# Patient Record
Sex: FEMALE | Race: WHITE
[De-identification: names, ages, dates, MRNs, and addresses within clinical notes are randomized per-mention and may not be internally consistent; named-entity substitution may affect disease eponyms.]

---

## 2022-12-30 ENCOUNTER — TRANSCRIPTION ENCOUNTER (OUTPATIENT)
Age: 57
End: 2022-12-30

## 2022-12-30 ENCOUNTER — OUTPATIENT (OUTPATIENT)
Dept: OUTPATIENT SERVICES | Facility: HOSPITAL | Age: 57
LOS: 1 days | Discharge: HOME | End: 2022-12-30
Payer: MEDICAID

## 2022-12-30 VITALS
DIASTOLIC BLOOD PRESSURE: 55 MMHG | TEMPERATURE: 98 F | RESPIRATION RATE: 17 BRPM | OXYGEN SATURATION: 96 % | HEART RATE: 72 BPM | SYSTOLIC BLOOD PRESSURE: 121 MMHG

## 2022-12-30 VITALS
RESPIRATION RATE: 16 BRPM | TEMPERATURE: 98 F | SYSTOLIC BLOOD PRESSURE: 148 MMHG | HEART RATE: 77 BPM | DIASTOLIC BLOOD PRESSURE: 69 MMHG | OXYGEN SATURATION: 96 %

## 2022-12-30 LAB
BASE EXCESS BLDV CALC-SCNC: 4.7 MMOL/L — HIGH (ref -2–3)
CA-I SERPL-SCNC: 1.22 MMOL/L — SIGNIFICANT CHANGE UP (ref 1.15–1.33)
GAS PNL BLDV: 137 MMOL/L — SIGNIFICANT CHANGE UP (ref 136–145)
GAS PNL BLDV: SIGNIFICANT CHANGE UP
GLUCOSE BLDC GLUCOMTR-MCNC: 91 MG/DL — SIGNIFICANT CHANGE UP (ref 70–99)
HCO3 BLDV-SCNC: 31 MMOL/L — HIGH (ref 22–29)
HCT VFR BLDA CALC: 35 % — LOW (ref 39–51)
HGB BLD CALC-MCNC: 11.7 G/DL — LOW (ref 12.6–17.4)
LACTATE BLDV-MCNC: 2.2 MMOL/L — HIGH (ref 0.5–2)
PCO2 BLDV: 54 MMHG — HIGH (ref 39–42)
PH BLDV: 7.37 — SIGNIFICANT CHANGE UP (ref 7.32–7.43)
PO2 BLDV: 45 MMHG — SIGNIFICANT CHANGE UP
POTASSIUM BLDV-SCNC: 4.3 MMOL/L — SIGNIFICANT CHANGE UP (ref 3.5–5.1)
SAO2 % BLDV: 73.9 % — SIGNIFICANT CHANGE UP

## 2022-12-30 PROCEDURE — 36902 INTRO CATH DIALYSIS CIRCUIT: CPT

## 2022-12-30 PROCEDURE — 93010 ELECTROCARDIOGRAM REPORT: CPT

## 2022-12-30 RX ORDER — ALTEPLASE 100 MG
8 KIT INTRAVENOUS ONCE
Refills: 0 | Status: DISCONTINUED | OUTPATIENT
Start: 2022-12-30 | End: 2023-01-13

## 2022-12-30 NOTE — PROGRESS NOTE ADULT - SUBJECTIVE AND OBJECTIVE BOX
Interventional Radiology Outpatient Documentation    PREOPERATIVE DAY OF PROCEDURE EVALUATION:     I have personally seen and examined this patient. I agree with the history and physical which I have reviewed and noted any changes below:     Plan is for left upper extremity fistulogram with possible intervention (Signed electronically Amaya Freeman MD) 12-30-22 @ 07:00     Procedure/ risks/ benefits/ goals/ alternatives were explained. All questions answered. Informed content obtained from patient. Consent placed in chart.     POSTOPERATIVE PROCEDURAL EVALUATION:     Procedure: Left upper extremity fistulogram with balloon plasty    Pre-Op Diagnosis: Malfunctioning AVF    Post-Op Diagnosis: Same    Attending: Lydia  Resident: Ayse    Anesthesia (type):  [ ] General Anesthesia  [ x] Sedation provided by anesthesia  [ ] Spinal Anesthesia  [ x] Local/Regional    Contrast: None    Estimated Blood Loss: Minimal, < 5 cc    Condition:   [ ] Critical  [ ] Serious  [ ] Fair   [ x] Good    Findings/Follow up Plan of Care:  Left upper extremity fistulogram revealing side to side radiocephalic AVF with moderate stenosis at the arterial anastamosis and mild stenosis of the cephalic outflow. Balloon plasty was performed. 3000u heparin and 200 micrograms of nitro was administered. Patient tolerated procedure well.    See full report in pacs    Specimens Removed: None    Implants: None    Complications: None immediate    Disposition: Recovery, then home

## 2022-12-30 NOTE — CHART NOTE - NSCHARTNOTEFT_GEN_A_CORE
PACU ANESTHESIA ADMISSION NOTE      Procedure:   Post op diagnosis:      ____  Intubated  TV:______       Rate: ______      FiO2: ______    __x__  Patent Airway    __x__  Full return of protective reflexes    __x__  Full recovery from anesthesia / back to baseline status    Vitals:  T(C): 36.4 (12-30-22 @ 07:35), Max: 36.4 (12-30-22 @ 07:16)  HR: 77 (12-30-22 @ 07:35) (77 - 77)  BP: 148/69 (12-30-22 @ 07:35) (148/69 - 148/69)  RR: 16 (12-30-22 @ 07:35) (16 - 16)  SpO2: 96% (12-30-22 @ 07:35) (96% - 96%)    Mental Status:  __x__ Awake   ___x__ Alert   _____ Drowsy   _____ Sedated    Nausea/Vomiting:  __x__ NO  ______Yes,   See Post - Op Orders          Pain Scale (0-10):  ___0__    Treatment: ____ None    __x__ See Post - Op/PCA Orders    Post - Operative Fluids:   ____ Oral   __x__ See Post - Op Orders    Plan: Discharge:   ____Home       _____Floor     _____Critical Care    _____  Other:_________________    Comments: Patient had smooth intraoperative event, no anesthesia complication.  PACU Vital signs: HR:  79          BP:      100/55         RR:   18          O2 Sat:       98%     Temp 97

## 2022-12-30 NOTE — PRE-ANESTHESIA EVALUATION ADULT - NSANTHHOMEMEDSFT_GEN_ALL_CORE
Unknown (pharmacy in [facility])  ? on 2 blood thinners (d/c'd on Nov 8th)

## 2022-12-30 NOTE — PRE-ANESTHESIA EVALUATION ADULT - NSANTHOSAYNRD_GEN_A_CORE
No. IZABELA screening performed.  STOP BANG Legend: 0-2 = LOW Risk; 3-4 = INTERMEDIATE Risk; 5-8 = HIGH Risk

## 2022-12-30 NOTE — PRE-ANESTHESIA EVALUATION ADULT - NSANTHPMHFT_GEN_ALL_CORE
PMHx: DM, HTN, CKD, CHF, ?Heart valve problems, pancreatitis, h/o goiter, L adrenal mass, duplicate L kidney, frequent falls, ?VTE  PSHx: s/p  LUE fistulogram, s/p cataract sx, s/p repair of urethral stricture & kidney sx  Last HD session on Wednesday, 12/28/22 (on MWF schedule)  Denies SOB/CP, mild cough, no f/c, or n/v.  <4 METS

## 2023-02-05 PROBLEM — N18.9 CHRONIC KIDNEY DISEASE, UNSPECIFIED: Chronic | Status: ACTIVE | Noted: 2022-12-30

## 2023-02-06 NOTE — PRE-ANESTHESIA EVALUATION ADULT - WEIGHT IN LBS
----- Message from Katherine Richard MD sent at 2/6/2023 12:36 PM CST -----  Pls let patient know .  Wear stiff soled shoe  TECHNIQUE: Left foot frontal, lateral, oblique upright.     FINDINGS:   No evidence of acute or healing fracture. Mild degenerative changes of the  first metatarsophalangeal joint and Bone mineralization is normal.     Soft tissues are normal.        IMPRESSION:  1. No acute bony abnormality.  2. Mild degenerative changes of the first MTP joint.    Patient would like communication of their results via: Home Phone : [phone] (home). Okay to leave a message containing results?: Yes.    167.5

## 2023-04-04 ENCOUNTER — OUTPATIENT (OUTPATIENT)
Dept: OUTPATIENT SERVICES | Facility: HOSPITAL | Age: 58
LOS: 1 days | Discharge: HOME | End: 2023-04-04
Payer: MEDICAID

## 2023-04-04 ENCOUNTER — EMERGENCY (EMERGENCY)
Facility: HOSPITAL | Age: 58
LOS: 0 days | Discharge: HOME | End: 2023-04-04
Attending: STUDENT IN AN ORGANIZED HEALTH CARE EDUCATION/TRAINING PROGRAM
Payer: MEDICAID

## 2023-04-04 VITALS
TEMPERATURE: 98 F | RESPIRATION RATE: 20 BRPM | SYSTOLIC BLOOD PRESSURE: 163 MMHG | OXYGEN SATURATION: 98 % | DIASTOLIC BLOOD PRESSURE: 74 MMHG | HEART RATE: 80 BPM

## 2023-04-04 VITALS
DIASTOLIC BLOOD PRESSURE: 60 MMHG | RESPIRATION RATE: 18 BRPM | TEMPERATURE: 98 F | SYSTOLIC BLOOD PRESSURE: 130 MMHG | OXYGEN SATURATION: 98 % | HEART RATE: 88 BPM

## 2023-04-04 VITALS — HEART RATE: 85 BPM | RESPIRATION RATE: 18 BRPM | OXYGEN SATURATION: 100 %

## 2023-04-04 VITALS
HEART RATE: 85 BPM | DIASTOLIC BLOOD PRESSURE: 83 MMHG | RESPIRATION RATE: 18 BRPM | TEMPERATURE: 97 F | SYSTOLIC BLOOD PRESSURE: 187 MMHG | OXYGEN SATURATION: 98 % | WEIGHT: 163.14 LBS

## 2023-04-04 DIAGNOSIS — Z99.2 DEPENDENCE ON RENAL DIALYSIS: ICD-10-CM

## 2023-04-04 DIAGNOSIS — N18.9 CHRONIC KIDNEY DISEASE, UNSPECIFIED: ICD-10-CM

## 2023-04-04 DIAGNOSIS — Z00.00 ENCOUNTER FOR GENERAL ADULT MEDICAL EXAMINATION WITHOUT ABNORMAL FINDINGS: ICD-10-CM

## 2023-04-04 DIAGNOSIS — E11.65 TYPE 2 DIABETES MELLITUS WITH HYPERGLYCEMIA: ICD-10-CM

## 2023-04-04 DIAGNOSIS — T82.858A STENOSIS OF OTHER VASCULAR PROSTHETIC DEVICES, IMPLANTS AND GRAFTS, INITIAL ENCOUNTER: ICD-10-CM

## 2023-04-04 DIAGNOSIS — E11.22 TYPE 2 DIABETES MELLITUS WITH DIABETIC CHRONIC KIDNEY DISEASE: ICD-10-CM

## 2023-04-04 LAB
ALBUMIN SERPL ELPH-MCNC: 4.4 G/DL — SIGNIFICANT CHANGE UP (ref 3.5–5.2)
ALP SERPL-CCNC: 111 U/L — SIGNIFICANT CHANGE UP (ref 30–115)
ALT FLD-CCNC: 12 U/L — SIGNIFICANT CHANGE UP (ref 0–41)
ANION GAP SERPL CALC-SCNC: 19 MMOL/L — HIGH (ref 7–14)
ANION GAP SERPL CALC-SCNC: 24 MMOL/L — HIGH (ref 7–14)
AST SERPL-CCNC: 18 U/L — SIGNIFICANT CHANGE UP (ref 0–41)
B-OH-BUTYR SERPL-SCNC: 3.9 MMOL/L — HIGH
BASE EXCESS BLDV CALC-SCNC: -3.5 MMOL/L — LOW (ref -2–3)
BASE EXCESS BLDV CALC-SCNC: -3.9 MMOL/L — LOW (ref -2–3)
BASOPHILS # BLD AUTO: 0.03 K/UL — SIGNIFICANT CHANGE UP (ref 0–0.2)
BASOPHILS NFR BLD AUTO: 0.7 % — SIGNIFICANT CHANGE UP (ref 0–1)
BILIRUB SERPL-MCNC: 0.8 MG/DL — SIGNIFICANT CHANGE UP (ref 0.2–1.2)
BUN SERPL-MCNC: 50 MG/DL — HIGH (ref 10–20)
BUN SERPL-MCNC: 53 MG/DL — HIGH (ref 10–20)
CA-I SERPL-SCNC: 1.16 MMOL/L — SIGNIFICANT CHANGE UP (ref 1.15–1.33)
CA-I SERPL-SCNC: 1.17 MMOL/L — SIGNIFICANT CHANGE UP (ref 1.15–1.33)
CALCIUM SERPL-MCNC: 9.6 MG/DL — SIGNIFICANT CHANGE UP (ref 8.4–10.5)
CALCIUM SERPL-MCNC: 9.6 MG/DL — SIGNIFICANT CHANGE UP (ref 8.4–10.5)
CHLORIDE SERPL-SCNC: 85 MMOL/L — LOW (ref 98–110)
CHLORIDE SERPL-SCNC: 88 MMOL/L — LOW (ref 98–110)
CO2 SERPL-SCNC: 20 MMOL/L — SIGNIFICANT CHANGE UP (ref 17–32)
CO2 SERPL-SCNC: 24 MMOL/L — SIGNIFICANT CHANGE UP (ref 17–32)
CREAT SERPL-MCNC: 5.4 MG/DL — CRITICAL HIGH (ref 0.7–1.5)
CREAT SERPL-MCNC: 5.6 MG/DL — CRITICAL HIGH (ref 0.7–1.5)
EGFR: 8 ML/MIN/1.73M2 — LOW
EGFR: 9 ML/MIN/1.73M2 — LOW
EOSINOPHIL # BLD AUTO: 0.06 K/UL — SIGNIFICANT CHANGE UP (ref 0–0.7)
EOSINOPHIL NFR BLD AUTO: 1.5 % — SIGNIFICANT CHANGE UP (ref 0–8)
GAS PNL BLDV: 128 MMOL/L — LOW (ref 136–145)
GAS PNL BLDV: 129 MMOL/L — LOW (ref 136–145)
GAS PNL BLDV: SIGNIFICANT CHANGE UP
GLUCOSE SERPL-MCNC: 456 MG/DL — CRITICAL HIGH (ref 70–99)
GLUCOSE SERPL-MCNC: 583 MG/DL — CRITICAL HIGH (ref 70–99)
HCG SERPL QL: NEGATIVE — SIGNIFICANT CHANGE UP
HCO3 BLDV-SCNC: 22 MMOL/L — SIGNIFICANT CHANGE UP (ref 22–29)
HCO3 BLDV-SCNC: 23 MMOL/L — SIGNIFICANT CHANGE UP (ref 22–29)
HCT VFR BLD CALC: 38.2 % — SIGNIFICANT CHANGE UP (ref 37–47)
HCT VFR BLDA CALC: 36 % — LOW (ref 39–51)
HCT VFR BLDA CALC: 38 % — LOW (ref 39–51)
HGB BLD CALC-MCNC: 12.1 G/DL — LOW (ref 12.6–17.4)
HGB BLD CALC-MCNC: 12.7 G/DL — SIGNIFICANT CHANGE UP (ref 12.6–17.4)
HGB BLD-MCNC: 12.5 G/DL — SIGNIFICANT CHANGE UP (ref 12–16)
IMM GRANULOCYTES NFR BLD AUTO: 0.2 % — SIGNIFICANT CHANGE UP (ref 0.1–0.3)
LACTATE BLDV-MCNC: 1.9 MMOL/L — SIGNIFICANT CHANGE UP (ref 0.5–2)
LACTATE BLDV-MCNC: 2.2 MMOL/L — HIGH (ref 0.5–2)
LYMPHOCYTES # BLD AUTO: 1.17 K/UL — LOW (ref 1.2–3.4)
LYMPHOCYTES # BLD AUTO: 29.1 % — SIGNIFICANT CHANGE UP (ref 20.5–51.1)
MAGNESIUM SERPL-MCNC: 2.8 MG/DL — HIGH (ref 1.8–2.4)
MCHC RBC-ENTMCNC: 32.2 PG — HIGH (ref 27–31)
MCHC RBC-ENTMCNC: 32.7 G/DL — SIGNIFICANT CHANGE UP (ref 32–37)
MCV RBC AUTO: 98.5 FL — SIGNIFICANT CHANGE UP (ref 81–99)
MONOCYTES # BLD AUTO: 0.25 K/UL — SIGNIFICANT CHANGE UP (ref 0.1–0.6)
MONOCYTES NFR BLD AUTO: 6.2 % — SIGNIFICANT CHANGE UP (ref 1.7–9.3)
NEUTROPHILS # BLD AUTO: 2.5 K/UL — SIGNIFICANT CHANGE UP (ref 1.4–6.5)
NEUTROPHILS NFR BLD AUTO: 62.3 % — SIGNIFICANT CHANGE UP (ref 42.2–75.2)
NRBC # BLD: 0 /100 WBCS — SIGNIFICANT CHANGE UP (ref 0–0)
PCO2 BLDV: 40 MMHG — SIGNIFICANT CHANGE UP (ref 39–42)
PCO2 BLDV: 47 MMHG — HIGH (ref 39–42)
PH BLDV: 7.3 — LOW (ref 7.32–7.43)
PH BLDV: 7.34 — SIGNIFICANT CHANGE UP (ref 7.32–7.43)
PHOSPHATE SERPL-MCNC: 7 MG/DL — HIGH (ref 2.1–4.9)
PLATELET # BLD AUTO: 238 K/UL — SIGNIFICANT CHANGE UP (ref 130–400)
PO2 BLDV: 42 MMHG — SIGNIFICANT CHANGE UP
PO2 BLDV: 50 MMHG — SIGNIFICANT CHANGE UP
POTASSIUM BLDV-SCNC: 5.6 MMOL/L — HIGH (ref 3.5–5.1)
POTASSIUM BLDV-SCNC: 6 MMOL/L — HIGH (ref 3.5–5.1)
POTASSIUM SERPL-MCNC: 5.6 MMOL/L — HIGH (ref 3.5–5)
POTASSIUM SERPL-MCNC: 5.9 MMOL/L — HIGH (ref 3.5–5)
POTASSIUM SERPL-SCNC: 5.6 MMOL/L — HIGH (ref 3.5–5)
POTASSIUM SERPL-SCNC: 5.9 MMOL/L — HIGH (ref 3.5–5)
PROT SERPL-MCNC: 7.2 G/DL — SIGNIFICANT CHANGE UP (ref 6–8)
RBC # BLD: 3.88 M/UL — LOW (ref 4.2–5.4)
RBC # FLD: 12.6 % — SIGNIFICANT CHANGE UP (ref 11.5–14.5)
SAO2 % BLDV: 65.2 % — SIGNIFICANT CHANGE UP
SAO2 % BLDV: 77.9 % — SIGNIFICANT CHANGE UP
SODIUM SERPL-SCNC: 129 MMOL/L — LOW (ref 135–146)
SODIUM SERPL-SCNC: 131 MMOL/L — LOW (ref 135–146)
WBC # BLD: 4.02 K/UL — LOW (ref 4.8–10.8)
WBC # FLD AUTO: 4.02 K/UL — LOW (ref 4.8–10.8)

## 2023-04-04 PROCEDURE — 99285 EMERGENCY DEPT VISIT HI MDM: CPT

## 2023-04-04 PROCEDURE — 84100 ASSAY OF PHOSPHORUS: CPT

## 2023-04-04 PROCEDURE — 36415 COLL VENOUS BLD VENIPUNCTURE: CPT

## 2023-04-04 PROCEDURE — 85018 HEMOGLOBIN: CPT

## 2023-04-04 PROCEDURE — 84132 ASSAY OF SERUM POTASSIUM: CPT

## 2023-04-04 PROCEDURE — 82330 ASSAY OF CALCIUM: CPT

## 2023-04-04 PROCEDURE — 85014 HEMATOCRIT: CPT

## 2023-04-04 PROCEDURE — 84295 ASSAY OF SERUM SODIUM: CPT

## 2023-04-04 PROCEDURE — 85025 COMPLETE CBC W/AUTO DIFF WBC: CPT

## 2023-04-04 PROCEDURE — 83605 ASSAY OF LACTIC ACID: CPT

## 2023-04-04 PROCEDURE — 82803 BLOOD GASES ANY COMBINATION: CPT

## 2023-04-04 PROCEDURE — 99285 EMERGENCY DEPT VISIT HI MDM: CPT | Mod: 25

## 2023-04-04 PROCEDURE — 93005 ELECTROCARDIOGRAM TRACING: CPT

## 2023-04-04 PROCEDURE — 93010 ELECTROCARDIOGRAM REPORT: CPT

## 2023-04-04 PROCEDURE — 82010 KETONE BODYS QUAN: CPT

## 2023-04-04 PROCEDURE — 71045 X-RAY EXAM CHEST 1 VIEW: CPT | Mod: 26

## 2023-04-04 PROCEDURE — 82962 GLUCOSE BLOOD TEST: CPT

## 2023-04-04 PROCEDURE — 80053 COMPREHEN METABOLIC PANEL: CPT

## 2023-04-04 PROCEDURE — 83735 ASSAY OF MAGNESIUM: CPT

## 2023-04-04 PROCEDURE — 84703 CHORIONIC GONADOTROPIN ASSAY: CPT

## 2023-04-04 PROCEDURE — 71045 X-RAY EXAM CHEST 1 VIEW: CPT

## 2023-04-04 PROCEDURE — 80048 BASIC METABOLIC PNL TOTAL CA: CPT

## 2023-04-04 RX ORDER — INSULIN LISPRO 100/ML
10 VIAL (ML) SUBCUTANEOUS ONCE
Refills: 0 | Status: DISCONTINUED | OUTPATIENT
Start: 2023-04-04 | End: 2023-04-04

## 2023-04-04 RX ORDER — INSULIN HUMAN 100 [IU]/ML
10 INJECTION, SOLUTION SUBCUTANEOUS ONCE
Refills: 0 | Status: COMPLETED | OUTPATIENT
Start: 2023-04-04 | End: 2023-04-04

## 2023-04-04 RX ORDER — SODIUM CHLORIDE 9 MG/ML
1000 INJECTION, SOLUTION INTRAVENOUS ONCE
Refills: 0 | Status: COMPLETED | OUTPATIENT
Start: 2023-04-04 | End: 2023-04-04

## 2023-04-04 RX ORDER — INSULIN HUMAN 100 [IU]/ML
10 INJECTION, SOLUTION SUBCUTANEOUS ONCE
Refills: 0 | Status: DISCONTINUED | OUTPATIENT
Start: 2023-04-04 | End: 2023-04-05

## 2023-04-04 RX ADMIN — SODIUM CHLORIDE 1000 MILLILITER(S): 9 INJECTION, SOLUTION INTRAVENOUS at 11:31

## 2023-04-04 RX ADMIN — INSULIN HUMAN 10 UNIT(S): 100 INJECTION, SOLUTION SUBCUTANEOUS at 14:08

## 2023-04-04 NOTE — ED PROVIDER NOTE - OBJECTIVE STATEMENT
58 yo female, PMHx of DM, CKD, HD M/W/F last was yesterday, present with hyperglycemia. Patient was at IR today for fistula repair but was sent to ED with hyperglycemia. She states she has not eaten last night and thus did not take her long acting insulin. Denies fevers, chills, chest pain, shortness of breath, nausea, vomiting, headache, dizziness, abdominal pain.

## 2023-04-04 NOTE — ED PROVIDER NOTE - CLINICAL SUMMARY MEDICAL DECISION MAKING FREE TEXT BOX
61-year-old female with history of insulin-dependent diabetes, CKD on hemodialysis M-W-F, who was sent in from interventional radiology for hyperglycemia.  P.o. after midnight for fistulogram, therefore did not take her long-acting insulin last night.  She is found to have elevated glucose levels prior to procedure and was sent to the ER to rule out DKA.  Patient is asymptomatic on assessment.  Denies nausea, vomiting, fevers, chills, urinary symptoms, chest pain, shortness of breath.  Vitals noted, triage note reviewed, and agree with exam as documented above.  Patient not in DKA.  pH 7.34, elevated beta hydroxybutyrate due to uncontrolled hyperglycemia.  Bicarbonate 22.  Patient was given fluids, subcutaneous insulin with interval improvement in glucose levels.  Well-appearing on reassessment.  Discussed with interventional radiology.  Plan for n.p.o. after midnight, to take half her dose of Lantus tonight, and reports Dr. Trujillo's office at 6:30 AM.  Patient updated at the bedside, all questions answered at the bedside.  Return precautions given.

## 2023-04-04 NOTE — ED PROVIDER NOTE - PATIENT PORTAL LINK FT
You can access the FollowMyHealth Patient Portal offered by [facility] by registering at the following website: [email or web address]. By joining [facility]’s FollowMyHealth portal, you will also be able to view your health information using other applications (apps) compatible with our system.

## 2023-04-04 NOTE — ASU PREOP CHECKLIST - HAND OFF
· New Drainge from old L hip wound, site dressed by nursing  · Increased swelling and redness around site  · Patient is Unable to tell me if increasing pain secondary to paraplegia  ·  afebrile   ·  Infectious Disease and Plastic surgery contacted and updated, awaiting phone call back for further recommendations  · Increased vital signs to q 4  · Will check CBC, BMP, CRP, and ESR in the morning  · Dr Bryant Precise aware and agreeable to plan of care  · Will reassess patient in the morning
Unit RN to OR RN

## 2023-04-04 NOTE — ASU PATIENT PROFILE, ADULT - FALL HARM RISK - RISK INTERVENTIONS

## 2023-04-04 NOTE — ED ADULT NURSE NOTE - OBJECTIVE STATEMENT
Sent from IR for hyperglycemia prior to AV Fistulagram,  in triage  hx of cdk dm2 axox4  left arm fistula

## 2023-04-04 NOTE — ED PROVIDER NOTE - PROGRESS NOTE DETAILS
CP: Patient's BGL decreasing with insulin and IVF. Discussed with IR. Patient to see Dr. Trujillo tomorrow morning 6:30 am, NPO after midnight, and 1/2 dose Lantus tonight. Discussed results with patient with  [id number] and plan. Patient verbalized understanding and is agreeable to plan.

## 2023-04-04 NOTE — ED PROVIDER NOTE - PHYSICAL EXAMINATION
CONSTITUTIONAL: Well-developed; well-nourished; in no acute distress.   SKIN: warm, dry  HEAD: Normocephalic  NECK: Supple; non tender.  CARD: S1, S2 normal; Regular rate and rhythm.   RESP: No wheezes, rales or rhonchi.  ABD: soft ntnd  EXT: Normal ROM.  No clubbing, cyanosis or edema. fistula LUE  NEURO: Alert, oriented, grossly unremarkable  PSYCH: Cooperative, appropriate.

## 2023-04-04 NOTE — ED PROVIDER NOTE - NSFOLLOWUPINSTRUCTIONS_ED_ALL_ED_FT
Hyperglycemia    Hyperglycemia occurs when the glucose (a sugar) level in your blood is too high. Symptoms include increased urination, increased thirst, a dry mouth, or changes in appetite. If started on a medication, take exactly as prescribed by your health care professional. Maintain a healthy lifestyle and follow up with your primary care physician.    SEEK IMMEDIATE MEDICAL CARE IF YOU HAVE THE FOLLOWING SYMPTOMS: shortness of breath, change in mental status, nausea or vomiting, fruity smell to your breath, or any signs of dehydration. Please follow up with Dr. Trujillo tomorrow morning 6:30 am and take 1/2 dose Lantus tonight, nothing by mouth after midnight.    Hyperglycemia    Hyperglycemia occurs when the glucose (a sugar) level in your blood is too high. Symptoms include increased urination, increased thirst, a dry mouth, or changes in appetite. If started on a medication, take exactly as prescribed by your health care professional. Maintain a healthy lifestyle and follow up with your primary care physician.    SEEK IMMEDIATE MEDICAL CARE IF YOU HAVE THE FOLLOWING SYMPTOMS: shortness of breath, change in mental status, nausea or vomiting, fruity smell to your breath, or any signs of dehydration.

## 2023-04-04 NOTE — PRE-ANESTHESIA EVALUATION ADULT - NSANTHADDINFOFT_GEN_ALL_CORE
risks, benefits, alternatives, general anesthesia as a backup discussed with the patient and she agrees to proceed as planned risks, benefits, alternatives, general anesthesia as a backup discussed with the patient and she agrees to proceed as planned  Patient fingerstick elevated >500, insulin administered and repeated, fingerstick 542, BMP drawn, Dr. Trujillo aware, if it remains elevated will cancel and send patient to ER for further workup.

## 2023-04-04 NOTE — PRE-ANESTHESIA EVALUATION ADULT - NSANTHOSAYNRD_GEN_A_CORE
denies/No. IZABELA screening performed.  STOP BANG Legend: 0-2 = LOW Risk; 3-4 = INTERMEDIATE Risk; 5-8 = HIGH Risk

## 2023-04-05 ENCOUNTER — OUTPATIENT (OUTPATIENT)
Dept: OUTPATIENT SERVICES | Facility: HOSPITAL | Age: 58
LOS: 1 days | Discharge: HOME | End: 2023-04-05
Payer: MEDICAID

## 2023-04-05 ENCOUNTER — TRANSCRIPTION ENCOUNTER (OUTPATIENT)
Age: 58
End: 2023-04-05

## 2023-04-05 ENCOUNTER — RESULT REVIEW (OUTPATIENT)
Age: 58
End: 2023-04-05

## 2023-04-05 VITALS
SYSTOLIC BLOOD PRESSURE: 180 MMHG | OXYGEN SATURATION: 97 % | RESPIRATION RATE: 18 BRPM | HEART RATE: 80 BPM | DIASTOLIC BLOOD PRESSURE: 79 MMHG

## 2023-04-05 VITALS
SYSTOLIC BLOOD PRESSURE: 193 MMHG | RESPIRATION RATE: 18 BRPM | HEIGHT: 67 IN | WEIGHT: 163.36 LBS | DIASTOLIC BLOOD PRESSURE: 81 MMHG | OXYGEN SATURATION: 99 % | TEMPERATURE: 97 F | HEART RATE: 80 BPM

## 2023-04-05 DIAGNOSIS — T82.858D STENOSIS OF OTHER VASCULAR PROSTHETIC DEVICES, IMPLANTS AND GRAFTS, SUBSEQUENT ENCOUNTER: ICD-10-CM

## 2023-04-05 DIAGNOSIS — T82.858A STENOSIS OF OTHER VASCULAR PROSTHETIC DEVICES, IMPLANTS AND GRAFTS, INITIAL ENCOUNTER: ICD-10-CM
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
## 2023-04-05 LAB — GLUCOSE BLDC GLUCOMTR-MCNC: 298 MG/DL — HIGH (ref 70–99)

## 2023-04-05 PROCEDURE — C1769: CPT

## 2023-04-05 PROCEDURE — 82962 GLUCOSE BLOOD TEST: CPT

## 2023-04-05 PROCEDURE — 36902 INTRO CATH DIALYSIS CIRCUIT: CPT

## 2023-04-05 PROCEDURE — C1725: CPT

## 2023-04-05 PROCEDURE — C1894: CPT

## 2023-04-05 NOTE — PROGRESS NOTE ADULT - SUBJECTIVE AND OBJECTIVE BOX
Interventional Radiology Outpatient Documentation    PREOPERATIVE DAY OF PROCEDURE EVALUATION:     I have personally seen and examined this patient. I agree with the history and physical which I have reviewed and noted any changes below:     Plan is for CT-guided biopsy of right upper lobe lung mass, possible right chest tube placement with sedation.      Procedure/ risks/ benefits/ goals/ alternatives were explained (including but not limited to pneumothorax and hemoptysis). All questions answered. Informed content obtained from patient. Consent placed in chart.

## 2023-04-05 NOTE — PROGRESS NOTE ADULT - SUBJECTIVE AND OBJECTIVE BOX
Interventional Radiology Outpatient Documentation    PREOPERATIVE DAY OF PROCEDURE EVALUATION:     I have personally seen and examined this patient. I agree with the history and physical which I have reviewed and noted any changes below:     Plan is for left upper extremity fistulogram with balloon plasty  (Signed electronically Amaya Freeman MD) 04-05-23 @ 1100    Procedure/ risks/ benefits/ goals/ alternatives were explained. All questions answered. Informed content obtained from patient. Consent placed in chart.     POSTOPERATIVE PROCEDURAL EVALUATION:     Procedure: LUE AVF with balloon plasty    Pre-Op Diagnosis: ESRD with HD    Post-Op Diagnosis: Same    Attending: Lydia  Resident: Ayse    Anesthesia (type):  [ ] General Anesthesia  [ x] Sedation provided by anesthesa  [ ] Spinal Anesthesia  [ x] Local/Regional    Contrast: 15 cc    Estimated Blood Loss: Minimal, < 5 cc    Condition:   [ ] Critical  [ ] Serious  [ ] Fair   [x ] Good    Findings/Follow up Plan of Care: Left radial access with left upper extremity fistulogram demonstrating stenosis just distal to the cephalic arch. A 7 mm x 8 cm balloon was inflated for 2 min. Post angioplasty revealed decreased stenosis.     See full report in pacs    Specimens Removed: None    Implants: None    Complications: None    Disposition: Recovery, then home

## 2023-04-05 NOTE — PROGRESS NOTE ADULT - SUBJECTIVE AND OBJECTIVE BOX
INTERVENTIONAL RADIOLOGY BRIEF-OPERATIVE NOTE    Procedure:  CT-guided biopsy of right upper lobe lung mass    Pre-Op Diagnosis: right upper lobe lung mass, history of smoking    Post-Op Diagnosis: same    Attending: Norbreto  Resident: None    Anesthesia (type):  [ ] General Anesthesia  [x ] Sedation  [ ] Spinal Anesthesia  [x ] Local/Regional    Contrast: None    Estimated Blood Loss:  Minimal    Condition:   [ ] Critical  [ ] Serious  [x ] Fair   [ ] Good    Findings/Follow up Plan of Care:  Successful CT-guided biopsy of right upper lobe lung mass.  Cores reviewed on site by pathology and deemed adequate for diagnosis.  Trace pneumo and moderate perilesional hemorrhage seen on post imaging CT. Will follow with subsequent radiographs.      Implants: [employer] plug at the pleural surface    Disposition: Radiology recovery area.      Please call Interventional Radiology [phone] with any questions, concerns, or issues.

## 2023-04-05 NOTE — ASU DISCHARGE PLAN (ADULT/PEDIATRIC) - NS MD DC FALL RISK RISK
For information on Fall & Injury Prevention, visit: [email or web address] OR  [email or web address] OR  https://www.cdc.gov/steadi/patient.html

## 2023-05-01 ENCOUNTER — INPATIENT (INPATIENT)
Facility: HOSPITAL | Age: 58
LOS: 7 days | Discharge: HOME | DRG: 420 | End: 2023-05-09
Attending: INTERNAL MEDICINE | Admitting: INTERNAL MEDICINE
Payer: MEDICAID

## 2023-05-01 VITALS
DIASTOLIC BLOOD PRESSURE: 79 MMHG | SYSTOLIC BLOOD PRESSURE: 183 MMHG | RESPIRATION RATE: 16 BRPM | HEART RATE: 73 BPM | OXYGEN SATURATION: 87 %

## 2023-05-01 DIAGNOSIS — R56.9 UNSPECIFIED CONVULSIONS: ICD-10-CM

## 2023-05-01 LAB
A1C WITH ESTIMATED AVERAGE GLUCOSE RESULT: 9.3 % — HIGH (ref 4–5.6)
ALBUMIN SERPL ELPH-MCNC: 4.1 G/DL — SIGNIFICANT CHANGE UP (ref 3.5–5.2)
ALBUMIN SERPL ELPH-MCNC: 4.6 G/DL — SIGNIFICANT CHANGE UP (ref 3.5–5.2)
ALP SERPL-CCNC: 127 U/L — HIGH (ref 30–115)
ALP SERPL-CCNC: 152 U/L — HIGH (ref 30–115)
ALT FLD-CCNC: 11 U/L — SIGNIFICANT CHANGE UP (ref 0–41)
ALT FLD-CCNC: 13 U/L — SIGNIFICANT CHANGE UP (ref 0–41)
ANION GAP SERPL CALC-SCNC: 17 MMOL/L — HIGH (ref 7–14)
ANION GAP SERPL CALC-SCNC: 26 MMOL/L — HIGH (ref 7–14)
APPEARANCE UR: CLEAR — SIGNIFICANT CHANGE UP
APTT BLD: 38.1 SEC — SIGNIFICANT CHANGE UP (ref 27–39.2)
AST SERPL-CCNC: 17 U/L — SIGNIFICANT CHANGE UP (ref 0–41)
AST SERPL-CCNC: 25 U/L — SIGNIFICANT CHANGE UP (ref 0–41)
BACTERIA # UR AUTO: ABNORMAL
BASE EXCESS BLDV CALC-SCNC: -1.3 MMOL/L — SIGNIFICANT CHANGE UP (ref -2–3)
BASOPHILS # BLD AUTO: 0.02 K/UL — SIGNIFICANT CHANGE UP (ref 0–0.2)
BASOPHILS # BLD AUTO: 0.03 K/UL — SIGNIFICANT CHANGE UP (ref 0–0.2)
BASOPHILS NFR BLD AUTO: 0.3 % — SIGNIFICANT CHANGE UP (ref 0–1)
BASOPHILS NFR BLD AUTO: 0.7 % — SIGNIFICANT CHANGE UP (ref 0–1)
BILIRUB SERPL-MCNC: 0.5 MG/DL — SIGNIFICANT CHANGE UP (ref 0.2–1.2)
BILIRUB SERPL-MCNC: 0.6 MG/DL — SIGNIFICANT CHANGE UP (ref 0.2–1.2)
BILIRUB UR-MCNC: NEGATIVE — SIGNIFICANT CHANGE UP
BUN SERPL-MCNC: 76 MG/DL — CRITICAL HIGH (ref 10–20)
BUN SERPL-MCNC: 81 MG/DL — CRITICAL HIGH (ref 10–20)
CA-I SERPL-SCNC: 1.18 MMOL/L — SIGNIFICANT CHANGE UP (ref 1.15–1.33)
CALCIUM SERPL-MCNC: 8.9 MG/DL — SIGNIFICANT CHANGE UP (ref 8.4–10.5)
CALCIUM SERPL-MCNC: 9.2 MG/DL — SIGNIFICANT CHANGE UP (ref 8.4–10.5)
CHLORIDE SERPL-SCNC: 94 MMOL/L — LOW (ref 98–110)
CHLORIDE SERPL-SCNC: 97 MMOL/L — LOW (ref 98–110)
CHOLEST SERPL-MCNC: 226 MG/DL — HIGH
CK SERPL-CCNC: 170 U/L — SIGNIFICANT CHANGE UP (ref 0–225)
CO2 SERPL-SCNC: 17 MMOL/L — SIGNIFICANT CHANGE UP (ref 17–32)
CO2 SERPL-SCNC: 23 MMOL/L — SIGNIFICANT CHANGE UP (ref 17–32)
COLOR SPEC: SIGNIFICANT CHANGE UP
CREAT SERPL-MCNC: 6.1 MG/DL — CRITICAL HIGH (ref 0.7–1.5)
CREAT SERPL-MCNC: 6.2 MG/DL — CRITICAL HIGH (ref 0.7–1.5)
CRP SERPL-MCNC: 3.4 MG/L — SIGNIFICANT CHANGE UP
DIFF PNL FLD: ABNORMAL
EGFR: 7 ML/MIN/1.73M2 — LOW
EGFR: 8 ML/MIN/1.73M2 — LOW
EOSINOPHIL # BLD AUTO: 0.01 K/UL — SIGNIFICANT CHANGE UP (ref 0–0.7)
EOSINOPHIL # BLD AUTO: 0.16 K/UL — SIGNIFICANT CHANGE UP (ref 0–0.7)
EOSINOPHIL NFR BLD AUTO: 0.1 % — SIGNIFICANT CHANGE UP (ref 0–8)
EOSINOPHIL NFR BLD AUTO: 3.9 % — SIGNIFICANT CHANGE UP (ref 0–8)
EPI CELLS # UR: 2 /HPF — SIGNIFICANT CHANGE UP (ref 0–5)
ERYTHROCYTE [SEDIMENTATION RATE] IN BLOOD: 23 MM/HR — HIGH (ref 0–20)
ESTIMATED AVERAGE GLUCOSE: 220 MG/DL — HIGH (ref 68–114)
FLUAV AG NPH QL: SIGNIFICANT CHANGE UP
FLUBV AG NPH QL: SIGNIFICANT CHANGE UP
GAS PNL BLDV: 134 MMOL/L — LOW (ref 136–145)
GAS PNL BLDV: SIGNIFICANT CHANGE UP
GAS PNL BLDV: SIGNIFICANT CHANGE UP
GLUCOSE BLDC GLUCOMTR-MCNC: 103 MG/DL — HIGH (ref 70–99)
GLUCOSE BLDC GLUCOMTR-MCNC: 269 MG/DL — HIGH (ref 70–99)
GLUCOSE BLDC GLUCOMTR-MCNC: 292 MG/DL — HIGH (ref 70–99)
GLUCOSE SERPL-MCNC: 331 MG/DL — HIGH (ref 70–99)
GLUCOSE SERPL-MCNC: 35 MG/DL — CRITICAL LOW (ref 70–99)
GLUCOSE UR QL: ABNORMAL
HCG SERPL QL: NEGATIVE — SIGNIFICANT CHANGE UP
HCO3 BLDV-SCNC: 26 MMOL/L — SIGNIFICANT CHANGE UP (ref 22–29)
HCT VFR BLD CALC: 35.9 % — LOW (ref 37–47)
HCT VFR BLD CALC: 40.8 % — SIGNIFICANT CHANGE UP (ref 37–47)
HCT VFR BLDA CALC: 32 % — LOW (ref 39–51)
HDLC SERPL-MCNC: 62 MG/DL — SIGNIFICANT CHANGE UP
HGB BLD CALC-MCNC: 10.8 G/DL — LOW (ref 12.6–17.4)
HGB BLD-MCNC: 12.1 G/DL — SIGNIFICANT CHANGE UP (ref 12–16)
HGB BLD-MCNC: 13.3 G/DL — SIGNIFICANT CHANGE UP (ref 12–16)
HYALINE CASTS # UR AUTO: 2 /LPF — SIGNIFICANT CHANGE UP (ref 0–7)
IMM GRANULOCYTES NFR BLD AUTO: 0 % — LOW (ref 0.1–0.3)
IMM GRANULOCYTES NFR BLD AUTO: 0.1 % — SIGNIFICANT CHANGE UP (ref 0.1–0.3)
INR BLD: 0.85 RATIO — SIGNIFICANT CHANGE UP (ref 0.65–1.3)
KETONES UR-MCNC: NEGATIVE — SIGNIFICANT CHANGE UP
LACTATE BLDV-MCNC: 1.3 MMOL/L — SIGNIFICANT CHANGE UP (ref 0.5–2)
LACTATE SERPL-SCNC: 1.2 MMOL/L — SIGNIFICANT CHANGE UP (ref 0.7–2)
LACTATE SERPL-SCNC: 1.3 MMOL/L — SIGNIFICANT CHANGE UP (ref 0.7–2)
LACTATE SERPL-SCNC: 6.4 MMOL/L — CRITICAL HIGH (ref 0.7–2)
LEUKOCYTE ESTERASE UR-ACNC: ABNORMAL
LIPID PNL WITH DIRECT LDL SERPL: 145 MG/DL — HIGH
LYMPHOCYTES # BLD AUTO: 0.91 K/UL — LOW (ref 1.2–3.4)
LYMPHOCYTES # BLD AUTO: 12.8 % — LOW (ref 20.5–51.1)
LYMPHOCYTES # BLD AUTO: 2.62 K/UL — SIGNIFICANT CHANGE UP (ref 1.2–3.4)
LYMPHOCYTES # BLD AUTO: 63.7 % — HIGH (ref 20.5–51.1)
MAGNESIUM SERPL-MCNC: 3.5 MG/DL — CRITICAL HIGH (ref 1.8–2.4)
MAGNESIUM SERPL-MCNC: 3.5 MG/DL — CRITICAL HIGH (ref 1.8–2.4)
MCHC RBC-ENTMCNC: 31.7 PG — HIGH (ref 27–31)
MCHC RBC-ENTMCNC: 32.5 PG — HIGH (ref 27–31)
MCHC RBC-ENTMCNC: 32.6 G/DL — SIGNIFICANT CHANGE UP (ref 32–37)
MCHC RBC-ENTMCNC: 33.7 G/DL — SIGNIFICANT CHANGE UP (ref 32–37)
MCV RBC AUTO: 96.5 FL — SIGNIFICANT CHANGE UP (ref 81–99)
MCV RBC AUTO: 97.1 FL — SIGNIFICANT CHANGE UP (ref 81–99)
MONOCYTES # BLD AUTO: 0.35 K/UL — SIGNIFICANT CHANGE UP (ref 0.1–0.6)
MONOCYTES # BLD AUTO: 0.37 K/UL — SIGNIFICANT CHANGE UP (ref 0.1–0.6)
MONOCYTES NFR BLD AUTO: 5.2 % — SIGNIFICANT CHANGE UP (ref 1.7–9.3)
MONOCYTES NFR BLD AUTO: 8.5 % — SIGNIFICANT CHANGE UP (ref 1.7–9.3)
NEUTROPHILS # BLD AUTO: 0.95 K/UL — LOW (ref 1.4–6.5)
NEUTROPHILS # BLD AUTO: 5.79 K/UL — SIGNIFICANT CHANGE UP (ref 1.4–6.5)
NEUTROPHILS NFR BLD AUTO: 23.2 % — LOW (ref 42.2–75.2)
NEUTROPHILS NFR BLD AUTO: 81.5 % — HIGH (ref 42.2–75.2)
NITRITE UR-MCNC: NEGATIVE — SIGNIFICANT CHANGE UP
NON HDL CHOLESTEROL: 164 MG/DL — HIGH
NRBC # BLD: 0 /100 WBCS — SIGNIFICANT CHANGE UP (ref 0–0)
NRBC # BLD: 0 /100 WBCS — SIGNIFICANT CHANGE UP (ref 0–0)
NT-PROBNP SERPL-SCNC: 1600 PG/ML — HIGH (ref 0–300)
PCO2 BLDV: 52 MMHG — HIGH (ref 39–42)
PH BLDV: 7.3 — LOW (ref 7.32–7.43)
PH UR: 6.5 — SIGNIFICANT CHANGE UP (ref 5–8)
PLATELET # BLD AUTO: 214 K/UL — SIGNIFICANT CHANGE UP (ref 130–400)
PLATELET # BLD AUTO: 238 K/UL — SIGNIFICANT CHANGE UP (ref 130–400)
PMV BLD: 10.9 FL — HIGH (ref 7.4–10.4)
PMV BLD: 11 FL — HIGH (ref 7.4–10.4)
PO2 BLDV: 59 MMHG — SIGNIFICANT CHANGE UP
POTASSIUM BLDV-SCNC: 5.2 MMOL/L — HIGH (ref 3.5–5.1)
POTASSIUM SERPL-MCNC: 5.3 MMOL/L — HIGH (ref 3.5–5)
POTASSIUM SERPL-MCNC: 6.2 MMOL/L — CRITICAL HIGH (ref 3.5–5)
POTASSIUM SERPL-SCNC: 5.3 MMOL/L — HIGH (ref 3.5–5)
POTASSIUM SERPL-SCNC: 6.2 MMOL/L — CRITICAL HIGH (ref 3.5–5)
PROCALCITONIN SERPL-MCNC: 0.15 NG/ML — HIGH (ref 0.02–0.1)
PROT SERPL-MCNC: 6.7 G/DL — SIGNIFICANT CHANGE UP (ref 6–8)
PROT SERPL-MCNC: 7.5 G/DL — SIGNIFICANT CHANGE UP (ref 6–8)
PROT UR-MCNC: ABNORMAL
PROTHROM AB SERPL-ACNC: 9.6 SEC — LOW (ref 9.95–12.87)
RBC # BLD: 3.72 M/UL — LOW (ref 4.2–5.4)
RBC # BLD: 4.2 M/UL — SIGNIFICANT CHANGE UP (ref 4.2–5.4)
RBC # FLD: 12.4 % — SIGNIFICANT CHANGE UP (ref 11.5–14.5)
RBC # FLD: 12.4 % — SIGNIFICANT CHANGE UP (ref 11.5–14.5)
RBC CASTS # UR COMP ASSIST: 4 /HPF — SIGNIFICANT CHANGE UP (ref 0–4)
RSV RNA NPH QL NAA+NON-PROBE: SIGNIFICANT CHANGE UP
SAO2 % BLDV: 84.1 % — SIGNIFICANT CHANGE UP
SARS-COV-2 RNA SPEC QL NAA+PROBE: SIGNIFICANT CHANGE UP
SODIUM SERPL-SCNC: 134 MMOL/L — LOW (ref 135–146)
SODIUM SERPL-SCNC: 140 MMOL/L — SIGNIFICANT CHANGE UP (ref 135–146)
SP GR SPEC: 1.02 — SIGNIFICANT CHANGE UP (ref 1.01–1.03)
T3 SERPL-MCNC: 85 NG/DL — SIGNIFICANT CHANGE UP (ref 80–200)
T4 AB SER-ACNC: 6.3 UG/DL — SIGNIFICANT CHANGE UP (ref 4.6–12)
TRIGL SERPL-MCNC: 95 MG/DL — SIGNIFICANT CHANGE UP
TROPONIN T SERPL-MCNC: 0.06 NG/ML — CRITICAL HIGH
TROPONIN T SERPL-MCNC: 0.06 NG/ML — CRITICAL HIGH
TSH SERPL-MCNC: 2.64 UIU/ML — SIGNIFICANT CHANGE UP (ref 0.27–4.2)
UROBILINOGEN FLD QL: SIGNIFICANT CHANGE UP
WBC # BLD: 4.11 K/UL — LOW (ref 4.8–10.8)
WBC # BLD: 7.11 K/UL — SIGNIFICANT CHANGE UP (ref 4.8–10.8)
WBC # FLD AUTO: 4.11 K/UL — LOW (ref 4.8–10.8)
WBC # FLD AUTO: 7.11 K/UL — SIGNIFICANT CHANGE UP (ref 4.8–10.8)
WBC UR QL: 7 /HPF — HIGH (ref 0–5)

## 2023-05-01 PROCEDURE — 83036 HEMOGLOBIN GLYCOSYLATED A1C: CPT

## 2023-05-01 PROCEDURE — 85025 COMPLETE CBC W/AUTO DIFF WBC: CPT

## 2023-05-01 PROCEDURE — 86140 C-REACTIVE PROTEIN: CPT

## 2023-05-01 PROCEDURE — 80307 DRUG TEST PRSMV CHEM ANLYZR: CPT

## 2023-05-01 PROCEDURE — 82330 ASSAY OF CALCIUM: CPT

## 2023-05-01 PROCEDURE — 76700 US EXAM ABDOM COMPLETE: CPT | Mod: 26

## 2023-05-01 PROCEDURE — 82803 BLOOD GASES ANY COMBINATION: CPT

## 2023-05-01 PROCEDURE — 93975 VASCULAR STUDY: CPT | Mod: 26

## 2023-05-01 PROCEDURE — 84681 ASSAY OF C-PEPTIDE: CPT

## 2023-05-01 PROCEDURE — 97162 PT EVAL MOD COMPLEX 30 MIN: CPT | Mod: GP

## 2023-05-01 PROCEDURE — 85652 RBC SED RATE AUTOMATED: CPT

## 2023-05-01 PROCEDURE — 93970 EXTREMITY STUDY: CPT

## 2023-05-01 PROCEDURE — 85018 HEMOGLOBIN: CPT

## 2023-05-01 PROCEDURE — 80048 BASIC METABOLIC PNL TOTAL CA: CPT

## 2023-05-01 PROCEDURE — 80061 LIPID PANEL: CPT

## 2023-05-01 PROCEDURE — 85014 HEMATOCRIT: CPT

## 2023-05-01 PROCEDURE — 84145 PROCALCITONIN (PCT): CPT

## 2023-05-01 PROCEDURE — 70450 CT HEAD/BRAIN W/O DYE: CPT | Mod: 26,MA

## 2023-05-01 PROCEDURE — 93976 VASCULAR STUDY: CPT

## 2023-05-01 PROCEDURE — 71045 X-RAY EXAM CHEST 1 VIEW: CPT | Mod: 26,77

## 2023-05-01 PROCEDURE — 84206 ASSAY OF PROINSULIN: CPT

## 2023-05-01 PROCEDURE — 93010 ELECTROCARDIOGRAM REPORT: CPT

## 2023-05-01 PROCEDURE — 82746 ASSAY OF FOLIC ACID SERUM: CPT

## 2023-05-01 PROCEDURE — 82962 GLUCOSE BLOOD TEST: CPT

## 2023-05-01 PROCEDURE — 93005 ELECTROCARDIOGRAM TRACING: CPT

## 2023-05-01 PROCEDURE — 97116 GAIT TRAINING THERAPY: CPT | Mod: GP

## 2023-05-01 PROCEDURE — 82553 CREATINE MB FRACTION: CPT

## 2023-05-01 PROCEDURE — 95819 EEG AWAKE AND ASLEEP: CPT

## 2023-05-01 PROCEDURE — 93970 EXTREMITY STUDY: CPT | Mod: 26

## 2023-05-01 PROCEDURE — 84484 ASSAY OF TROPONIN QUANT: CPT

## 2023-05-01 PROCEDURE — 99291 CRITICAL CARE FIRST HOUR: CPT

## 2023-05-01 PROCEDURE — 84132 ASSAY OF SERUM POTASSIUM: CPT

## 2023-05-01 PROCEDURE — 76700 US EXAM ABDOM COMPLETE: CPT

## 2023-05-01 PROCEDURE — 80053 COMPREHEN METABOLIC PANEL: CPT

## 2023-05-01 PROCEDURE — 82607 VITAMIN B-12: CPT

## 2023-05-01 PROCEDURE — 97530 THERAPEUTIC ACTIVITIES: CPT | Mod: GP

## 2023-05-01 PROCEDURE — 90935 HEMODIALYSIS ONE EVALUATION: CPT

## 2023-05-01 PROCEDURE — 83880 ASSAY OF NATRIURETIC PEPTIDE: CPT

## 2023-05-01 PROCEDURE — 87040 BLOOD CULTURE FOR BACTERIA: CPT

## 2023-05-01 PROCEDURE — 92610 EVALUATE SWALLOWING FUNCTION: CPT | Mod: GN

## 2023-05-01 PROCEDURE — 74176 CT ABD & PELVIS W/O CONTRAST: CPT | Mod: 26

## 2023-05-01 PROCEDURE — 83605 ASSAY OF LACTIC ACID: CPT

## 2023-05-01 PROCEDURE — 36415 COLL VENOUS BLD VENIPUNCTURE: CPT

## 2023-05-01 PROCEDURE — 83735 ASSAY OF MAGNESIUM: CPT

## 2023-05-01 PROCEDURE — 82010 KETONE BODYS QUAN: CPT

## 2023-05-01 PROCEDURE — 85379 FIBRIN DEGRADATION QUANT: CPT

## 2023-05-01 PROCEDURE — 84295 ASSAY OF SERUM SODIUM: CPT

## 2023-05-01 PROCEDURE — 71045 X-RAY EXAM CHEST 1 VIEW: CPT

## 2023-05-01 PROCEDURE — 71045 X-RAY EXAM CHEST 1 VIEW: CPT | Mod: 26

## 2023-05-01 PROCEDURE — 84100 ASSAY OF PHOSPHORUS: CPT

## 2023-05-01 PROCEDURE — 93306 TTE W/DOPPLER COMPLETE: CPT

## 2023-05-01 PROCEDURE — 74176 CT ABD & PELVIS W/O CONTRAST: CPT

## 2023-05-01 RX ORDER — PANTOPRAZOLE SODIUM 20 MG/1
40 TABLET, DELAYED RELEASE ORAL
Refills: 0 | Status: DISCONTINUED | OUTPATIENT
Start: 2023-05-01 | End: 2023-05-09

## 2023-05-01 RX ORDER — DEXTROSE 50 % IN WATER 50 %
25 SYRINGE (ML) INTRAVENOUS ONCE
Refills: 0 | Status: COMPLETED | OUTPATIENT
Start: 2023-05-01 | End: 2023-05-01

## 2023-05-01 RX ORDER — VANCOMYCIN HCL 1 G
1000 VIAL (EA) INTRAVENOUS ONCE
Refills: 0 | Status: COMPLETED | OUTPATIENT
Start: 2023-05-01 | End: 2023-05-01

## 2023-05-01 RX ORDER — LABETALOL HCL 100 MG
10 TABLET ORAL ONCE
Refills: 0 | Status: COMPLETED | OUTPATIENT
Start: 2023-05-01 | End: 2023-05-01

## 2023-05-01 RX ORDER — CHLORHEXIDINE GLUCONATE 213 G/1000ML
1 SOLUTION TOPICAL
Refills: 0 | Status: DISCONTINUED | OUTPATIENT
Start: 2023-05-01 | End: 2023-05-09

## 2023-05-01 RX ORDER — CEFEPIME 1 G/1
1000 INJECTION, POWDER, FOR SOLUTION INTRAMUSCULAR; INTRAVENOUS DAILY
Refills: 0 | Status: DISCONTINUED | OUTPATIENT
Start: 2023-05-01 | End: 2023-05-02

## 2023-05-01 RX ORDER — ATORVASTATIN CALCIUM 80 MG/1
10 TABLET, FILM COATED ORAL AT BEDTIME
Refills: 0 | Status: DISCONTINUED | OUTPATIENT
Start: 2023-05-01 | End: 2023-05-09

## 2023-05-01 RX ORDER — VANCOMYCIN HCL 1 G
1000 VIAL (EA) INTRAVENOUS
Refills: 0 | Status: DISCONTINUED | OUTPATIENT
Start: 2023-05-01 | End: 2023-05-02

## 2023-05-01 RX ORDER — DIATRIZOATE MEGLUMINE 180 MG/ML
30 INJECTION, SOLUTION INTRAVESICAL ONCE
Refills: 0 | Status: COMPLETED | OUTPATIENT
Start: 2023-05-01 | End: 2023-05-01

## 2023-05-01 RX ORDER — HEPARIN SODIUM 5000 [USP'U]/ML
5000 INJECTION INTRAVENOUS; SUBCUTANEOUS EVERY 8 HOURS
Refills: 0 | Status: DISCONTINUED | OUTPATIENT
Start: 2023-05-01 | End: 2023-05-09

## 2023-05-01 RX ORDER — DEXTROSE 50 % IN WATER 50 %
50 SYRINGE (ML) INTRAVENOUS ONCE
Refills: 0 | Status: COMPLETED | OUTPATIENT
Start: 2023-05-01 | End: 2023-05-01

## 2023-05-01 RX ORDER — HYDRALAZINE HCL 50 MG
10 TABLET ORAL EVERY 8 HOURS
Refills: 0 | Status: DISCONTINUED | OUTPATIENT
Start: 2023-05-01 | End: 2023-05-02

## 2023-05-01 RX ORDER — SODIUM ZIRCONIUM CYCLOSILICATE 10 G/10G
10 POWDER, FOR SUSPENSION ORAL DAILY
Refills: 0 | Status: DISCONTINUED | OUTPATIENT
Start: 2023-05-01 | End: 2023-05-01

## 2023-05-01 RX ORDER — ASPIRIN/CALCIUM CARB/MAGNESIUM 324 MG
81 TABLET ORAL DAILY
Refills: 0 | Status: DISCONTINUED | OUTPATIENT
Start: 2023-05-01 | End: 2023-05-09

## 2023-05-01 RX ORDER — CEFEPIME 1 G/1
1000 INJECTION, POWDER, FOR SOLUTION INTRAMUSCULAR; INTRAVENOUS ONCE
Refills: 0 | Status: COMPLETED | OUTPATIENT
Start: 2023-05-01 | End: 2023-05-01

## 2023-05-01 RX ORDER — ACETAMINOPHEN 500 MG
650 TABLET ORAL EVERY 6 HOURS
Refills: 0 | Status: DISCONTINUED | OUTPATIENT
Start: 2023-05-01 | End: 2023-05-09

## 2023-05-01 RX ORDER — SODIUM ZIRCONIUM CYCLOSILICATE 10 G/10G
10 POWDER, FOR SUSPENSION ORAL ONCE
Refills: 0 | Status: COMPLETED | OUTPATIENT
Start: 2023-05-01 | End: 2023-05-01

## 2023-05-01 RX ORDER — INSULIN HUMAN 100 [IU]/ML
10 INJECTION, SOLUTION SUBCUTANEOUS ONCE
Refills: 0 | Status: COMPLETED | OUTPATIENT
Start: 2023-05-01 | End: 2023-05-01

## 2023-05-01 RX ORDER — CARVEDILOL PHOSPHATE 80 MG/1
12.5 CAPSULE, EXTENDED RELEASE ORAL EVERY 12 HOURS
Refills: 0 | Status: DISCONTINUED | OUTPATIENT
Start: 2023-05-01 | End: 2023-05-06

## 2023-05-01 RX ORDER — CALCIUM GLUCONATE 100 MG/ML
1 VIAL (ML) INTRAVENOUS ONCE
Refills: 0 | Status: COMPLETED | OUTPATIENT
Start: 2023-05-01 | End: 2023-05-01

## 2023-05-01 RX ADMIN — Medication 81 MILLIGRAM(S): at 13:15

## 2023-05-01 RX ADMIN — Medication 100 GRAM(S): at 15:17

## 2023-05-01 RX ADMIN — HEPARIN SODIUM 5000 UNIT(S): 5000 INJECTION INTRAVENOUS; SUBCUTANEOUS at 21:48

## 2023-05-01 RX ADMIN — CARVEDILOL PHOSPHATE 12.5 MILLIGRAM(S): 80 CAPSULE, EXTENDED RELEASE ORAL at 20:20

## 2023-05-01 RX ADMIN — DIATRIZOATE MEGLUMINE 30 MILLILITER(S): 180 INJECTION, SOLUTION INTRAVESICAL at 20:20

## 2023-05-01 RX ADMIN — HEPARIN SODIUM 5000 UNIT(S): 5000 INJECTION INTRAVENOUS; SUBCUTANEOUS at 06:19

## 2023-05-01 RX ADMIN — HEPARIN SODIUM 5000 UNIT(S): 5000 INJECTION INTRAVENOUS; SUBCUTANEOUS at 13:16

## 2023-05-01 RX ADMIN — Medication 10 MILLIGRAM(S): at 21:48

## 2023-05-01 RX ADMIN — Medication 250 MILLIGRAM(S): at 21:48

## 2023-05-01 RX ADMIN — ATORVASTATIN CALCIUM 10 MILLIGRAM(S): 80 TABLET, FILM COATED ORAL at 21:48

## 2023-05-01 RX ADMIN — Medication 50 MILLILITER(S): at 15:17

## 2023-05-01 RX ADMIN — Medication 10 MILLIGRAM(S): at 13:15

## 2023-05-01 RX ADMIN — Medication 250 MILLIGRAM(S): at 03:04

## 2023-05-01 RX ADMIN — Medication 10 MILLIGRAM(S): at 01:52

## 2023-05-01 RX ADMIN — CARVEDILOL PHOSPHATE 12.5 MILLIGRAM(S): 80 CAPSULE, EXTENDED RELEASE ORAL at 06:18

## 2023-05-01 RX ADMIN — PANTOPRAZOLE SODIUM 40 MILLIGRAM(S): 20 TABLET, DELAYED RELEASE ORAL at 09:46

## 2023-05-01 RX ADMIN — Medication 10 MILLIGRAM(S): at 01:06

## 2023-05-01 RX ADMIN — INSULIN HUMAN 10 UNIT(S): 100 INJECTION, SOLUTION SUBCUTANEOUS at 15:23

## 2023-05-01 RX ADMIN — Medication 25 MILLILITER(S): at 00:40

## 2023-05-01 RX ADMIN — Medication 650 MILLIGRAM(S): at 21:53

## 2023-05-01 RX ADMIN — CEFEPIME 100 MILLIGRAM(S): 1 INJECTION, POWDER, FOR SOLUTION INTRAMUSCULAR; INTRAVENOUS at 02:17

## 2023-05-01 RX ADMIN — CEFEPIME 100 MILLIGRAM(S): 1 INJECTION, POWDER, FOR SOLUTION INTRAMUSCULAR; INTRAVENOUS at 13:51

## 2023-05-01 RX ADMIN — Medication 10 MILLIGRAM(S): at 06:17

## 2023-05-01 RX ADMIN — SODIUM ZIRCONIUM CYCLOSILICATE 10 GRAM(S): 10 POWDER, FOR SUSPENSION ORAL at 15:22

## 2023-05-01 NOTE — SWALLOW BEDSIDE ASSESSMENT ADULT - SWALLOW EVAL: DIAGNOSIS
+toleration for regular consistency and thin liquids w/o overt s/s aspiration/penetration; +globus sensation w/ regular solids which resolved w/ liquid wash

## 2023-05-01 NOTE — ED PROVIDER NOTE - PROGRESS NOTE DETAILS
MM: Blood pressure 174/79 after medications. MM: Blood pressure 174/79 after medications. Patient noted to be hypothermic, started on a bear hugger. pk: signed out to ICU pk: labs and imaging reviewed, rectal temp 94, bear nasringger initiaed, pt able to be weaned on O2 but still dependent, labtealol 10 given for bp pk: bp elevated 190 systolic, given labetalol 10, repeat BP after 15 min 150 systolic

## 2023-05-01 NOTE — SWALLOW BEDSIDE ASSESSMENT ADULT - PHARYNGEAL PHASE
+globus sensation w/ regular solids which resolved after taking sip of water/Within functional limits

## 2023-05-01 NOTE — CHART NOTE - NSCHARTNOTEFT_GEN_A_CORE
CCU Transfer Note    Transfer from: CCU    Transfer to: (  ) Medicine    (  ) Telemetry    (  ) RCU                               (  ) Palliative    (  ) Stroke Unit    (  ) MICU    ( x ) _________SDU_________    Accepting Physician:    Signout given to:     HPI / CCU COURSE:    HPI:     57 year old (non smoker) female patient ([place] Islander Speaking) known to have:  - Baseline: Alert, oriented, lives with daughter, ambulates independently  - DM Insulin Dependent. No Hba1c in chart.   - ESRD on hemodialysis every MWF via left UE AV fistula.  - Hypertension. Home med Coreg 12.5mg BID and Hydralazine 10mg in AM (not Q8h; confirmed by daughter)  - Dyslipidemia. No Lipid Profile in chart. Home med Atorvastatin 10mg QD, Aspirin 81mg QD      Patient was confused at time of my evaluation (asking where she is and why she is in ED), so daughter was called over the phone and a Russian PCA was present at bedside.  Patient was brought to the ED by EMS on  following an episode of a witnessed seizure.  Per daughter over the phone, history goes back to  at 23:30 PM when the daughter noticed that her mother was shaking all over 4 extremities.  Prior to the episode, she denied any complaint, including chest pain, diaphoresis, lightheadedness, or headache. She notes that POCT in AM was 95, at noon was 150, and at night was 120, and she reports adequate appetite. When asked about insulin dosing, daughter stated that mother usually administers her own insulin and is not sure how much she administered at night.  During the episode, daughter notes that her mother was shaking all over and was unresponsive; she had no uprolling of eyes, tongue biting, drooling, urinary or fecal incontinence.  The episode lasted for 15 minutes, before it spontaneously broke.  After the episode, the patient was confused and could not remember the details of the event.  Daughter called 911, and on EMS arrival the POCT was 109 and patient was not seizing (still confused).  On arrival, patient was confused. She complained of frontal headache and some blurry vision that she said were not new but had no other complaints.     On review of systems, daughter denies any recent fever, chills, night sweats, URTI symptoms (cough, rhinorrhea, sore throat), urinary symptoms (urinary frequency, urgency, intermittence, dysuria, foul smelling urine, cloudy urine), change in bowel movements (diarrhea or constipation), abdominal pain, headache, nausea, or vomiting.   No sick contacts.   No recent travel or exposure to recent travelers.      Upon presentation to the ED, the patient's Vital Signs in ED:  - /79 mmHg s/p IV Labetalol 10mg x2 doses -> 114/57 mmHg  - HR 70 bpm  - RR 16 bpm  - T 94 degrees  - SaO2 85% on RA so placed on 5LPM NC with improvement to 98%      Investigations   Laboratory Workup  - CBC:                        13.3   4.11  )-----------( 238      ( 01 May 2023 00:42 )             40.8     - Chemistry:      140  |  97<L>  |  76<HH>  ----------------------------<  35<LL>  5.3<H>   |  17  |  6.1<HH>    Ca    9.2      01 May 2023 00:42  Mg     3.5         TPro  7.5  /  Alb  4.6  /  TBili  0.5  /  DBili  x   /  AST  25  /  ALT  13  /  AlkPhos  152<H>      - Coagulation Studies:  PT/INR - ( 01 May 2023 00:42 )   PT: 9.60 sec;   INR: 0.85 ratio    PTT - ( 01 May 2023 00:42 )  PTT:38.1 sec      - Cardiac Markers:  CARDIAC MARKERS ( 01 May 2023 00:42 )  x     / x     / 170 U/L / x     / x          Microbiological Workup  Urinalysis Basic - ( 01 May 2023 01:42 )    Color: Light Yellow / Appearance: Clear / S.016 / pH: x  Gluc: x / Ketone: Negative  / Bili: Negative / Urobili: <2 mg/dL   Blood: x / Protein: 100 mg/dL / Nitrite: Negative   Leuk Esterase: Small / RBC: 4 /HPF / WBC 7 /HPF   Sq Epi: x / Non Sq Epi: x / Bacteria: Few      Radiological Workup  * CT Head No Cont (23 @ 01:32) No acute intracranial pathology. Follow-up MRI of the brain with and without contrast may be helpful for  further evaluation.    - POCT on arrival to ED was 40 so Patient received D50 25mL bolus for hypoglycemia   - She received IV Cefepime and Vancomycin in ED for hypothermia and concern for sepsis  - For SAO2 85% on RA, she was placed on 5LPM NC with improvement in SAO2 to 98%  - For /79 mmHg, she received IV Labetalol 10mg x2 doses with improvement   - She will be admitted to MICU for further investigations, management, and monitoring      ED Course: 56yo F hx of IDDM, ESRD on HD MWF, HTN, DLD presenting for altered mental status, 15 minute seizure 2/2 hypoglycemia from administering insulin. BG was 44. IV insulin was given. BG improved. BP was elevated, 201/79, treated with antihypertensives. CT-H (-). Glucose remains stable. Mental status stable now. Plan for downgrade to SDU.      Vital Signs Last 24 Hrs  T(C): 36.6 (01 May 2023 06:00), Max: 36.6 (01 May 2023 06:00)  T(F): 97.9 (01 May 2023 06:00), Max: 97.9 (01 May 2023 06:00)  HR: 76 (01 May 2023 06:00) (64 - 76)  BP: 132/56 (01 May 2023 06:00) (113/57 - 213/94)  BP(mean): 87 (01 May 2023 05:00) (87 - 87)  RR: 18 (01 May 2023 06:00) (16 - 18)  SpO2: 100% (01 May 2023 06:00) (85% - 100%)    Parameters below as of 01 May 2023 06:00  Patient On (Oxygen Delivery Method): room air        I&O's Summary      Physical Exam:     PHYSICAL EXAM:  GENERAL: NAD, lying in bed comfortably  HEAD:  Atraumatic, Normocephalic  EYES: EOMI, PERRLA, conjunctiva and sclera clear  ENT: Moist mucous membranes  NECK: Supple, No JVD  CHEST/LUNG: Clear to auscultation bilaterally; No rales, rhonchi, wheezing, or rubs. Unlabored respirations  HEART: Regular rate and rhythm; No murmurs, rubs, or gallops  ABDOMEN: Bowel sounds present; Soft, Nontender, Nondistended. No hepatomegaly  EXTREMITIES:  2+ Peripheral Pulses, brisk capillary refill. No clubbing, cyanosis, or edema  NERVOUS SYSTEM:  Alert & Oriented X3, speech clear. No deficits   MSK: FROM all 4 extremities, full and equal strength  SKIN: No rashes or lesions    LABS:   CARDIAC MARKERS ( 01 May 2023 00:42 )  x     / x     / 170 U/L / x     / x                                  13.3   4.11  )-----------( 238      ( 01 May 2023 00:42 )             40.8       05-    140  |  97<L>  |  76<HH>  ----------------------------<  35<LL>  5.3<H>   |  17  |  6.1<HH>    Ca    9.2      01 May 2023 00:42  Mg     3.5     -    TPro  7.5  /  Alb  4.6  /  TBili  0.5  /  DBili  x   /  AST  25  /  ALT  13  /  AlkPhos  152<H>  05-      PT/INR - ( 01 May 2023 00:42 )   PT: 9.60 sec;   INR: 0.85 ratio         PTT - ( 01 May 2023 00:42 )  PTT:38.1 sec          ECG:    Telemetry:    Imaging:      ASSESSMENT & PLAN:           FOR FOLLOW UP:  [ ]   [ ]   [ ]     Daria Ivory PGY2 CCU Transfer Note    Transfer from: CCU    Transfer to: (  ) Medicine    (  ) Telemetry    (  ) RCU                               (  ) Palliative    (  ) Stroke Unit    (  ) MICU    ( x ) _________SDU_________    Accepting Physician:    Signout given to:     HPI / CCU COURSE:    HPI:     57 year old (non smoker) female patient ([place] Speaking) known to have:  - Baseline: Alert, oriented, lives with daughter, ambulates independently  - DM Insulin Dependent. No Hba1c in chart.   - ESRD on hemodialysis every MWF via left UE AV fistula.  - Hypertension. Home med Coreg 12.5mg BID and Hydralazine 10mg in AM (not Q8h; confirmed by daughter)  - Dyslipidemia. No Lipid Profile in chart. Home med Atorvastatin 10mg QD, Aspirin 81mg QD      Patient was confused at time of my evaluation (asking where she is and why she is in ED), so daughter was called over the phone and a Russian PCA was present at bedside.  Patient was brought to the ED by EMS on  following an episode of a witnessed seizure.  Per daughter over the phone, history goes back to  at 23:30 PM when the daughter noticed that her mother was shaking all over 4 extremities.  Prior to the episode, she denied any complaint, including chest pain, diaphoresis, lightheadedness, or headache. She notes that POCT in AM was 95, at noon was 150, and at night was 120, and she reports adequate appetite. When asked about insulin dosing, daughter stated that mother usually administers her own insulin and is not sure how much she administered at night.  During the episode, daughter notes that her mother was shaking all over and was unresponsive; she had no uprolling of eyes, tongue biting, drooling, urinary or fecal incontinence.  The episode lasted for 15 minutes, before it spontaneously broke.  After the episode, the patient was confused and could not remember the details of the event.  Daughter called 911, and on EMS arrival the POCT was 109 and patient was not seizing (still confused).  On arrival, patient was confused. She complained of frontal headache and some blurry vision that she said were not new but had no other complaints.     On review of systems, daughter denies any recent fever, chills, night sweats, URTI symptoms (cough, rhinorrhea, sore throat), urinary symptoms (urinary frequency, urgency, intermittence, dysuria, foul smelling urine, cloudy urine), change in bowel movements (diarrhea or constipation), abdominal pain, headache, nausea, or vomiting.   No sick contacts.   No recent travel or exposure to recent travelers.      Upon presentation to the ED, the patient's Vital Signs in ED:  - /79 mmHg s/p IV Labetalol 10mg x2 doses -> 114/57 mmHg  - HR 70 bpm  - RR 16 bpm  - T 94 degrees  - SaO2 85% on RA so placed on 5LPM NC with improvement to 98%      Investigations   Laboratory Workup  - CBC:                        13.3   4.11  )-----------( 238      ( 01 May 2023 00:42 )             40.8     - Chemistry:      140  |  97<L>  |  76<HH>  ----------------------------<  35<LL>  5.3<H>   |  17  |  6.1<HH>    Ca    9.2      01 May 2023 00:42  Mg     3.5         TPro  7.5  /  Alb  4.6  /  TBili  0.5  /  DBili  x   /  AST  25  /  ALT  13  /  AlkPhos  152<H>      - Coagulation Studies:  PT/INR - ( 01 May 2023 00:42 )   PT: 9.60 sec;   INR: 0.85 ratio    PTT - ( 01 May 2023 00:42 )  PTT:38.1 sec      - Cardiac Markers:  CARDIAC MARKERS ( 01 May 2023 00:42 )  x     / x     / 170 U/L / x     / x          Microbiological Workup  Urinalysis Basic - ( 01 May 2023 01:42 )    Color: Light Yellow / Appearance: Clear / S.016 / pH: x  Gluc: x / Ketone: Negative  / Bili: Negative / Urobili: <2 mg/dL   Blood: x / Protein: 100 mg/dL / Nitrite: Negative   Leuk Esterase: Small / RBC: 4 /HPF / WBC 7 /HPF   Sq Epi: x / Non Sq Epi: x / Bacteria: Few      Radiological Workup  * CT Head No Cont (23 @ 01:32) No acute intracranial pathology. Follow-up MRI of the brain with and without contrast may be helpful for  further evaluation.    - POCT on arrival to ED was 40 so Patient received D50 25mL bolus for hypoglycemia   - She received IV Cefepime and Vancomycin in ED for hypothermia and concern for sepsis  - For SAO2 85% on RA, she was placed on 5LPM NC with improvement in SAO2 to 98%  - For /79 mmHg, she received IV Labetalol 10mg x2 doses with improvement   - She will be admitted to MICU for further investigations, management, and monitoring      ED Course: 56yo F hx of IDDM, ESRD on HD MWF, HTN, DLD presenting for altered mental status, 15 minute seizure 2/2 hypoglycemia from administering insulin. BG was 44. IV insulin was given. BG improved. BP was elevated, 201/79, treated with antihypertensives. CT-H (-). Glucose remains stable. Mental status stable now. Plan for downgrade to SDU.      Vital Signs Last 24 Hrs  T(C): 36.6 (01 May 2023 06:00), Max: 36.6 (01 May 2023 06:00)  T(F): 97.9 (01 May 2023 06:00), Max: 97.9 (01 May 2023 06:00)  HR: 76 (01 May 2023 06:00) (64 - 76)  BP: 132/56 (01 May 2023 06:00) (113/57 - 213/94)  BP(mean): 87 (01 May 2023 05:00) (87 - 87)  RR: 18 (01 May 2023 06:00) (16 - 18)  SpO2: 100% (01 May 2023 06:00) (85% - 100%)    Parameters below as of 01 May 2023 06:00  Patient On (Oxygen Delivery Method): room air        I&O's Summary      Physical Exam:     PHYSICAL EXAM:  GENERAL: NAD, lying in bed comfortably  HEAD:  Atraumatic, Normocephalic  EYES: EOMI, PERRLA, conjunctiva and sclera clear  ENT: Moist mucous membranes  NECK: Supple, No JVD  CHEST/LUNG: Clear to auscultation bilaterally; No rales, rhonchi, wheezing, or rubs. Unlabored respirations  HEART: Regular rate and rhythm; No murmurs, rubs, or gallops  ABDOMEN: Bowel sounds present; Soft, Nontender, Nondistended. No hepatomegaly  EXTREMITIES:  2+ Peripheral Pulses, brisk capillary refill. No clubbing, cyanosis, or edema  NERVOUS SYSTEM:  Alert & Oriented X3, speech clear. No deficits   MSK: FROM all 4 extremities, full and equal strength  SKIN: No rashes or lesions    LABS:   CARDIAC MARKERS ( 01 May 2023 00:42 )  x     / x     / 170 U/L / x     / x                                  13.3   4.11  )-----------( 238      ( 01 May 2023 00:42 )             40.8       05-    140  |  97<L>  |  76<HH>  ----------------------------<  35<LL>  5.3<H>   |  17  |  6.1<HH>    Ca    9.2      01 May 2023 00:42  Mg     3.5         TPro  7.5  /  Alb  4.6  /  TBili  0.5  /  DBili  x   /  AST  25  /  ALT  13  /  AlkPhos  152<H>  05      PT/INR - ( 01 May 2023 00:42 )   PT: 9.60 sec;   INR: 0.85 ratio         PTT - ( 01 May 2023 00:42 )  PTT:38.1 sec          ECG:    Telemetry:    Imaging:      ASSESSMENT & PLAN:     #Acute hypoxic respiratory failure- resolved  #Epilepsy 2/2 hypoglycemia (iatrogenic)  #Lactic acidosis- resolved  - s/p IV glucose  - currently off D5W  - passed swallow test: Easy to Chew  - was SaO2 85% on RA, placed on 5LPM O2 NC with improvement of SaO2 98%    #Hypertensive emergency- resolved  - cont HTN home meds (coreg, hydralazine 10mg QD)    #Suspected sepsis  - hypothermia    - if Blood Cx (-) -> d/c abx  - cont abx for now    #HLD  - cont atorvastatin    #Abdominal pain  - CT A+P    #ESRD on HD  - nephro consult    #Misc  - cont ASA 81mg      FOR FOLLOW UP:  [ ] CT Ab+P  [ ] Downgrade to SDU  [ ] Blood Cx    Daria Ivory PGY2

## 2023-05-01 NOTE — ED PROVIDER NOTE - INPATIENT RESIDENT/ACP NOTIFIED DATE
Colt Quispe (MD), Urology  [address] [place] [address]  [address] [place] [address]  Phone: [phone]  Fax: [phone] 01-May-2023 02:30

## 2023-05-01 NOTE — H&P ADULT - NSHPPHYSICALEXAM_GEN_ALL_CORE
- Physical Exam in ED  * General Appearance: Alert, not oriented to time and place, only oriented to person, in no acute distress  * Head: Normocephalic, without obvious abnormality, atraumatic  * Thyroid:  No enlargement/tenderness/nodules; no carotid bruit or JVD  * Lungs: Respirations unlabored, Good bilateral air entry, normal breath sounds (Clear to auscultation bilaterally, no audible wheezes, crackles, or rhonchi)  * Heart: Regular Rate and Rhythm, normal S1 and S2, no audible murmur, rub, or gallop  * Abdomen: Symmetric, minimally distended, soft, non-tender, bowel sounds active all four quadrants, no masses, no organomegaly (no hepatosplenomegaly)  * Extremities: Extremities normal, atraumatic, no cyanosis, no lower extremity pitting edema bilaterally, adequate dorsalis pedis pulses  * Skin: Skin color, texture, turgor normal, no rashes or lesions  * Lymph nodes: Cervical, supraclavicular, and axillary nodes normal  * Neurologic: limited exam

## 2023-05-01 NOTE — ED PROVIDER NOTE - OBJECTIVE STATEMENT
Patient is a 57-year-old female history of diabetes on insulin, ESRD on dialysis Monday Wednesday Friday last dialysis Friday presenting for evaluation of 7-minute episode witnessed seizure activity per daughter.  On EMS arrival patient was no longer seizing, given no medications.  Reportedly fingerstick in the field was 109.  On ED arrival, patient noted to be lethargic, fingerstick on ED arrival was 44, dextrose administered IV.

## 2023-05-01 NOTE — ED PROVIDER NOTE - CLINICAL SUMMARY MEDICAL DECISION MAKING FREE TEXT BOX
Patient with new onset seizure, most likely due to hypoglycemia.  Patient given D50.  Will admit to ICU for monitoring, fingersticks, and reevaluation.

## 2023-05-01 NOTE — H&P ADULT - ASSESSMENT
IMPRESSION:  Hypoglycemic Seizure in Setting of History of Insulin Dependent DM Not on KRUGER  HAGMA Likely Secondary to Lactic Acidosis from Suspected Seizure  Hypothermia and Asymptomatic Bacteruria   Acute Hypoxic Respiratory Failure in Setting of History of ESRD on HD  Hypertensive Urgency in Setting of History of Hypertension  Elevated ALP  History of Dyslipidemia and ESRD on MWF HD via LUE AV fistula      PLAN:    CNS: Neurology team consulted for concern of hypoglycemic seizure, Avoid sedation, CT head noted; Follow up rEEG and consider vEEG; Consider MR Head imaging; Trend LA; Hold AEDs for now; Monitor POCT and avoid hypoglycemia; Check Hba1c, pro-insulin, C-peptide, urine drug screen, and serum drug screen; Keep K>4 and Mg>2    HEENT: Oral Care    PULMONARY: HOB 45; Aspiration precautions; Monitor SaO2 on 5LPM NC for now and try to wean as tolerated; Continue IV Cefepime and Vancomycin for now; Daily chest X ray; f/u d-dimer, procalcitonin, ESR, and CRP    CARDIOVASCULAR: Monitor BP and avoid overcorrection: BP dropped from 201/79 to 114/57 mmHg in ED after IV Labetalol 10mg x2 doses were administered; Resume home coreg 12.5mg BID and hydralazine 10mg QD (consider changing frequency to Q8h; dose confirmed with daughter); Check TTE; Check pro BNP and trend troponin; Strict I/Os (dialysis patient); Keep euvolemic;    GI: GI prophylaxis; NPO for now pending speech and swallow; Trend LFTs (mainly ALP) and consider RUQ ultrasound if persistently elevated    RENAL: Nephrology team consulted for Hemodialysis resumption (follows with Dr Mendoza; on MWF HD via left UE AV fistula); Trend BUN, Cr and lytes, replete as necessary; Will hold off fluids for now; Check US kidney and bladder to rule out HN or atrophic kidneys; Monitor intake/output (has a murillo that is draining); Trend LA; Consider starting NaHCO3 if metabolic acidosis does not improve after LA trends down    INFECTIOUS DISEASE: Monitor T (hypothermic in ED); Start IV Cefepime and Vancomycin (renally adjusted); Daily chest X ray; f/u d-dimer, procalcitonin, ESR, and CRP; f/u urine culture and blood cultures x2 (received); Check MRSA; Tylenol for fever    HEMATOLOGICAL: DVT prophylaxis; VA duplex venous LE; Trend Hb; Active Type and Screen; Check folate and B12 levels    ENDOCRINE: Monitor POCT and avoid hypoglycemia; Check Hba1c, pro-insulin, C-peptide; Hold insulin for now (not on KRUGER at home); Follow up TSH, T3, and T4 sent by ED (fT4 not sent)      MUSCULOSKELETAL: Bedrest for now; PT/OT once more stable        Full code  Updated daughter Rhianna (HCP) over the phone  Poor prognosis overall   IMPRESSION:  Hypoglycemic Seizure in Setting of History of Insulin Dependent DM Not on KRUGER  HAGMA Likely Secondary to Lactic Acidosis from Suspected Seizure  Hypothermia and Asymptomatic Bacteruria   Acute Hypoxic Respiratory Failure in Setting of History of ESRD on HD  Hypertensive Urgency in Setting of History of Hypertension  Elevated ALP  History of Dyslipidemia and ESRD on MWF HD via LUE AV fistula      PLAN:    CNS: Neurology team consulted for concern of hypoglycemic seizure; Avoid sedation; CT head noted; Follow up rEEG and consider vEEG; Consider MR Head imaging; Trend LA; Hold AEDs for now; Monitor POCT and avoid hypoglycemia; Consider D5 maintenance if needed; Will hold off endocrinology consult; Check Hba1c, pro-insulin, C-peptide, urine drug screen, and serum drug screen; Keep K>4 and Mg>2    HEENT: Oral Care    PULMONARY: HOB 45; Aspiration precautions; Monitor SaO2 on 5LPM NC for now and try to wean as tolerated; Continue IV Cefepime and Vancomycin for now; Daily chest X ray; f/u d-dimer, procalcitonin, ESR, and CRP    CARDIOVASCULAR: Monitor BP and avoid overcorrection: BP dropped from 201/79 to 114/57 mmHg in ED after IV Labetalol 10mg x2 doses were administered; Resume home coreg 12.5mg BID and hydralazine 10mg QD (consider changing frequency to Q8h; dose confirmed with daughter); Check TTE; Check pro BNP and trend troponin; Strict I/Os (dialysis patient); Keep euvolemic;    GI: GI prophylaxis; NPO for now pending speech and swallow; Trend LFTs (mainly ALP) and consider RUQ ultrasound if persistently elevated    RENAL: Nephrology team consulted for Hemodialysis resumption (follows with Dr Mendoza; on MWF HD via left UE AV fistula); Trend BUN, Cr and lytes, replete as necessary; Will hold off fluids for now; Check US kidney and bladder to rule out HN or atrophic kidneys; Monitor intake/output (has a murillo that is draining); Trend LA; Consider starting NaHCO3 if metabolic acidosis does not improve after LA trends down    INFECTIOUS DISEASE: Monitor T (hypothermic in ED); Start IV Cefepime and Vancomycin (renally adjusted); Daily chest X ray; f/u d-dimer, procalcitonin, ESR, and CRP; f/u urine culture and blood cultures x2 (received); Check MRSA; Tylenol for fever    HEMATOLOGICAL: DVT prophylaxis; VA duplex venous LE; Trend Hb; Active Type and Screen; Check folate and B12 levels    ENDOCRINE: Monitor POCT and avoid hypoglycemia; Consider D5 maintenance if needed; Will hold off endocrinology consult; Check Hba1c, pro-insulin, C-peptide; Hold insulin for now (not on KRUGER at home); Follow up TSH, T3, and T4 sent by ED (fT4 not sent)      MUSCULOSKELETAL: Bedrest for now; PT/OT once more stable        Full code  Updated daughter Rhianna (HCP) over the phone  Poor prognosis overall   IMPRESSION:  Hypoglycemic Seizure in Setting of History of Insulin Dependent DM Not on KRUGER  HAGMA Likely Secondary to Lactic Acidosis from Suspected Seizure  Hypothermia and Asymptomatic Bacteruria   Acute Hypoxic Respiratory Failure in Setting of History of ESRD on HD  Hypertensive Urgency in Setting of History of Hypertension  Elevated ALP  History of Dyslipidemia and ESRD on MWF HD via LUE AV fistula      PLAN:    CNS: Neurology team consulted for concern of hypoglycemic seizure; Avoid sedation; CT head noted; Follow up rEEG and consider vEEG; Consider MR Head imaging; Trend LA; Hold AEDs for now; Monitor POCT and avoid hypoglycemia; Consider D5 maintenance if needed; Will hold off endocrinology consult; Check Hba1c, pro-insulin, C-peptide, urine drug screen, and serum drug screen; Keep K>4 and Mg>2    HEENT: Oral Care    PULMONARY: HOB 45; Aspiration precautions; Monitor SaO2 on 5LPM NC for now and try to wean as tolerated; Continue IV Cefepime and Vancomycin for now; Daily chest X ray; f/u d-dimer, procalcitonin, ESR, and CRP    CARDIOVASCULAR: Monitor BP and avoid overcorrection: BP dropped from 201/79 to 114/57 mmHg in ED after IV Labetalol 10mg x2 doses were administered; Resume home coreg 12.5mg BID and hydralazine 10mg QD (consider changing frequency to Q8h; dose confirmed with daughter); Check TTE; Check pro BNP and trend troponin; Strict I/Os (dialysis patient); Keep euvolemic;    GI: GI prophylaxis; NPO for now pending speech and swallow; Trend LFTs (mainly ALP) and consider RUQ ultrasound if persistently elevated    RENAL: Nephrology team consulted for Hemodialysis resumption (follows with Dr Mendoza; on MWF HD via left UE AV fistula); Trend BUN, Cr and lytes, replete as necessary; Will hold off fluids for now; Check US kidney and bladder to rule out HN or atrophic kidneys; Monitor intake/output (has a murillo that is draining); Trend LA; Consider starting NaHCO3 if metabolic acidosis does not improve after LA trends down    INFECTIOUS DISEASE: Monitor T (hypothermic in ED); Start IV Cefepime and Vancomycin (renally adjusted); Daily chest X ray; f/u d-dimer, procalcitonin, ESR, and CRP; f/u urine culture and blood cultures x2 (received); Check MRSA; Tylenol for fever    HEMATOLOGICAL: DVT prophylaxis; VA duplex venous LE; Trend Hb; Active Type and Screen; Check folate and B12 levels    ENDOCRINE: Monitor POCT and avoid hypoglycemia; Consider D5 maintenance if needed; Will hold off endocrinology consult; Check Hba1c, pro-insulin, C-peptide; Hold insulin for now (not on KRUGER at home); Check lipid profile and resume home Atorvastatin 10mg QD and Aspirin 81mg QD (primary prophylaxis);  Follow up TSH, T3, and T4 sent by ED (fT4 not sent)      MUSCULOSKELETAL: Bedrest for now; PT/OT once more stable        Full code  Updated daughter Rhianna (HCP) over the phone  Poor prognosis overall   IMPRESSION:  Hypoglycemic Seizure in Setting of History of Insulin Dependent DM Not on KRUGER  HAGMA Likely Secondary to Lactic Acidosis from Suspected Seizure  Hypothermia and Asymptomatic Bacteruria   Acute Hypoxic Respiratory Failure in Setting of History of ESRD on HD  Hypertensive Urgency in Setting of History of Hypertension  Elevated ALP  History of Dyslipidemia and ESRD on MWF HD via LUE AV fistula      PLAN:    CNS: Neurology team consulted for concern of hypoglycemic seizure; Avoid sedation; CT head noted; Follow up rEEG and consider vEEG; Consider MR Head imaging; Trend LA; Hold AEDs for now; Monitor POCT and avoid hypoglycemia; Consider D5 maintenance if needed; Will hold off endocrinology consult; Check Hba1c, pro-insulin, C-peptide, urine drug screen, and serum drug screen; Keep K>4 and Mg>2    HEENT: Oral Care    PULMONARY: HOB 45; Aspiration precautions; Monitor SaO2 on 5LPM NC for now and try to wean as tolerated; Continue IV Cefepime and Vancomycin for now; Daily chest X ray; f/u d-dimer, procalcitonin, ESR, and CRP    CARDIOVASCULAR: Monitor BP and avoid overcorrection: BP dropped from 201/79 to 114/57 mmHg in ED after IV Labetalol 10mg x2 doses were administered; Resume home coreg 12.5mg BID; Will increase hydralazine from home dose:10mg QD to Q8h (home dose confirmed with daughter); Check TTE; Check pro BNP and trend troponin; Strict I/Os (dialysis patient); Keep euvolemic;    GI: GI prophylaxis; NPO for now pending speech and swallow; Trend LFTs (mainly ALP) and consider RUQ ultrasound if persistently elevated    RENAL: Nephrology team consulted for Hemodialysis resumption (follows with Dr Mendoza; on MWF HD via left UE AV fistula); Trend BUN, Cr and lytes, replete as necessary; Will hold off fluids for now; Check US kidney and bladder to rule out HN or atrophic kidneys; Monitor intake/output (has a murillo that is draining); Trend LA; Consider starting NaHCO3 if metabolic acidosis does not improve after LA trends down    INFECTIOUS DISEASE: Monitor T (hypothermic in ED); Start IV Cefepime and Vancomycin (renally adjusted); Daily chest X ray; f/u d-dimer, procalcitonin, ESR, and CRP; f/u urine culture and blood cultures x2 (received); Check MRSA; Tylenol for fever    HEMATOLOGICAL: DVT prophylaxis; VA duplex venous LE; Trend Hb; Active Type and Screen; Check folate and B12 levels    ENDOCRINE: Monitor POCT and avoid hypoglycemia; Consider D5 maintenance if needed; Will hold off endocrinology consult; Check Hba1c, pro-insulin, C-peptide; Hold insulin for now (not on KRUGER at home); Check lipid profile and resume home Atorvastatin 10mg QD and Aspirin 81mg QD (primary prophylaxis);  Follow up TSH, T3, and T4 sent by ED (fT4 not sent)      MUSCULOSKELETAL: Bedrest for now; PT/OT once more stable        Full code  Updated daughter Rhianna (HCP) over the phone  Poor prognosis overall   IMPRESSION:    Hypoglycemic Seizure in Setting of History of Insulin Dependent DM Not on KRUGER  HAGMA Likely Secondary to Lactic Acidosis from Suspected Seizure  Hypothermia   Hypertensive Urgency in Setting of History of Hypertension  Elevated ALP  History of Dyslipidemia and ESRD on MWF HD via LUE AV fistula      PLAN:    CNS: Neurology team consulted for concern of hypoglycemic seizure; Avoid sedation; CT head noted; Follow up rEEG and consider vEEG; Consider MR Head imaging; Trend LA; Hold AEDs for now; Monitor POCT and avoid hypoglycemia; Consider D5 maintenance if needed; Will hold off endocrinology consult; Check Hba1c, pro-insulin, C-peptide, urine drug screen, and serum drug screen; Keep K>4 and Mg>2    HEENT: Oral Care    PULMONARY: HOB 45; Aspiration precautions; Monitor SaO2 on 5LPM NC for now and try to wean as tolerated; Continue IV Cefepime and Vancomycin for now; Daily chest X ray; f/u d-dimer, procalcitonin, ESR, and CRP    CARDIOVASCULAR: Monitor BP and avoid overcorrection: BP dropped from 201/79 to 114/57 mmHg in ED after IV Labetalol 10mg x2 doses were administered; Resume home coreg 12.5mg BID; Will increase hydralazine from home dose:10mg QD to Q8h (home dose confirmed with daughter); Check TTE; Check pro BNP and trend troponin; Strict I/Os (dialysis patient); Keep euvolemic;    GI: GI prophylaxis; NPO for now pending speech and swallow; Trend LFTs (mainly ALP) and consider RUQ ultrasound if persistently elevated    RENAL: Nephrology team consulted for Hemodialysis resumption (follows with Dr Mendoza; on MWF HD via left UE AV fistula); Trend BUN, Cr and lytes, replete as necessary; Will hold off fluids for now; Check US kidney and bladder to rule out HN or atrophic kidneys; Monitor intake/output (has a murillo that is draining); Trend LA;    INFECTIOUS DISEASE: Monitor T (hypothermic in ED); Start IV Cefepime and Vancomycin (renally adjusted); Daily chest X ray; f/u d-dimer, procalcitonin, ESR, and CRP; f/u urine culture and blood cultures x2 (received); Check MRSA; Tylenol for fever    HEMATOLOGICAL: DVT prophylaxis; VA duplex venous LE; Trend Hb; Active Type and Screen; Check folate and B12 levels    ENDOCRINE: Monitor POCT and avoid hypoglycemia; Consider D5 maintenance if needed; Will hold off endocrinology consult; Check Hba1c, pro-insulin, C-peptide; Hold insulin for now (not on KRUGER at home); Check lipid profile and resume home Atorvastatin 10mg QD and Aspirin 81mg QD (primary prophylaxis);  Follow up TSH, T3, and T4 sent by ED (fT4 not sent)      MUSCULOSKELETAL: Bedrest for now; PT/OT once more stable        Full code  Updated daughter Rhianna (HCP) over the phone  Poor prognosis overall

## 2023-05-01 NOTE — PATIENT PROFILE ADULT - FALL HARM RISK - RISK INTERVENTIONS

## 2023-05-01 NOTE — SWALLOW BEDSIDE ASSESSMENT ADULT - SLP GENERAL OBSERVATIONS
pt received on ED stretcher asleep arousable w/o c/o pain. +2L NC +Language Line [place] Irish-English ID# [id number] assisted w/ translation.

## 2023-05-01 NOTE — ED PROVIDER NOTE - PHYSICAL EXAMINATION
CONSTITUTIONAL: lethargic  SKIN: diaphoretic,  HEAD: NCAT  EYES: EOMI, PERRLA, no scleral icterus, conjunctiva pink  ENT: normal pharynx with no erythema or exudates  NECK: Supple; non tender. Full ROM.  CARD: RRR, no murmurs.  RESP: clear to ausculation b/l. No crackles or wheezing.  ABD: soft, non-tender, non-distended, no rebound or guarding.  EXT: Full ROM, no bony tenderness, no pedal edema, no calf tenderness  NEURO: normal motor. normal sensory. CN II-XII intact. Cerebellar testing normal. Normal gait.  PSYCH: Cooperative, appropriate.

## 2023-05-01 NOTE — ED PROVIDER NOTE - ATTENDING CONTRIBUTION TO CARE
56 yo female, PMHx of DM, CKD, HD M/W/F 58 yo female, PMHx of DM, CKD, HD M/W/F, BIBA after she was found to have a tonic-clonic seizure which lasted about 7 minutes as per EMS.  Seizure activity was witnessed by daughter who called 911.  Daughter is not at bedside and is not picking up the phone.  On arrival, patient is no longer seizing, lethargic but arousable.  Patient is unable to provide history.  On exam, pt in NAD, lethargic, head NC/AT, PEERL, lungs CTA B/L, CV S1S2 regular, abdomen soft/NT/ND/(+)BS, ext (-) edema, moving all extremities. Fingerstick done and is found to be 44.  Patient is given dextrose with improvement of mental status.  We will do labs, CT, EKG and admit.

## 2023-05-01 NOTE — ED PROVIDER NOTE - CARE PLAN
1 Principal Discharge DX:	Seizure due to hypoglycemia  Secondary Diagnosis:	Hypothermia  Secondary Diagnosis:	Sepsis

## 2023-05-01 NOTE — ED ADULT NURSE REASSESSMENT NOTE - NS ED NURSE REASSESS COMMENT FT1
Hand off report given by previous nurse Eliecer. Pt came for seizure. Per previous nurse, pt is at dialysis unit and had just finished the treatment. PT is waiting for transport back to ER. Pt is admit to SDU waiting for bed.
Pt is back to ER room 14A approx 2010. Pt finished her Monday dialysis schedule. VS reassessed. Plan of care explained to pt using Syrian  name was Edd [id number]. Pending ct scan of A/P. Oral contrast given to pt to prep for ct scan. Pending SDU admit waiting for bed. Bed alarm on and call bell within reach. Safety and comfort measure in place. Will continue to monitor pt .

## 2023-05-01 NOTE — ED ADULT NURSE NOTE - OBJECTIVE STATEMENT
Pt BIBA s/p 7-minute seizure witnessed by daughter, per EMS  in field, no medications given. On arrival patient was no longer seizing. On arrival, patient noted to be lethargic, fingerstick on ED arrival was 44, IV access obtained and dextrose administered IV.

## 2023-05-01 NOTE — H&P ADULT - HISTORY OF PRESENT ILLNESS
History of Present Illness  Ms. Ponce is a 57 year old (non smoker) female patient ([place] Speaking) known to have:  - Baseline: Alert, oriented, lives with daughter, ambulates independently  - DM Insulin Dependent. No Hba1c in chart. Follows with Dr Jarvis  - ESRD on hemodialysis every MWF via left UE AV fistula. Follows with Dr Arianna Mendoza   - Hypertension. Home med Coreg 12.5mg BID and Hydralazine 10mg in AM (not Q8h; confirmed by daughter)  - Dyslipidemia. No Lipid Profile in chart. Home med Atorvastatin 10mg QD, Aspirin 81mg QD      Patient was confused at time of my evaluation, so daughter was called over the phone.  Patient was brought to the ED by EMS on 04/30 following an episode of a witnessed seizure.  Per daughter over the phone, history goes back to 04/30 at 23:30 PM when the daughter noticed that her mother was shaking all over 4 extremities.  Prior to the episode, she denied any complaint, including chest pain, diaphoresis, light headedness, or headache. She notes that POCT in AM was 95, at noon was 150, and at night was 120, and she reports adequate appetite. When asked about insulin dosing, daughter stated that mother usually administers her own insulin and is not sure how much she administered at night.  During the episode, daughter notes that her mother was shaking all over and was unresponsive; she had no uprolling of eyes, tongue biting, drooling, urinary or fecal incontinence.  The episode lasted for 15 minutes, before it spontaneously broke.  After the episode, the patient was confused and could not remember the details of the event.  Daughter called 911, and on EMS arrival the POCT was 109 and patient was not seizing (still confused).  On arrival, patient was confused. She complained of frontal headache and some blurry vision that she said were not new but had no other complaints.     On review of systems, daughter denies any recent fever, chills, night sweats, URTI symptoms (cough, rhinorrhea, sore throat), urinary symptoms (urinary frequency, urgency, intermittence, dysuria, foul smelling urine, cloudy urine), change in bowel movements (diarrhea or constipation), abdominal pain, headache, nausea, or vomiting.   No sick contacts.   No recent travel or exposure to recent travelers.      Upon presentation to the ED, the patient's Vital Signs in ED:  - BP        Location: [facility] ED Hold 007 A ([facility] ED Hold)  Patient Name: ANAM NGUYỄN  Age: 57y  Gender: Female      History of Present Illness  Ms. Nguyễn is a 57 year old (non smoker) female patient ([place] Speaking) known to have:  - Baseline: Alert, oriented, lives with daughter, ambulates independently  - DM Insulin Dependent. No Hba1c in chart. Follows with Dr Jarvis  - ESRD on hemodialysis every MWF via left UE AV fistula. Follows with Dr Arianna Mendoza   - Hypertension. Home med Coreg 12.5mg BID and Hydralazine 10mg in AM (not Q8h; confirmed by daughter)  - Dyslipidemia. No Lipid Profile in chart. Home med Atorvastatin 10mg QD, Aspirin 81mg QD      Patient was confused at time of my evaluation (asking where she is and why she is in ED), so daughter was called over the phone and a Russian PCA was present at bedside.  Patient was brought to the ED by EMS on  following an episode of a witnessed seizure.  Per daughter over the phone, history goes back to  at 23:30 PM when the daughter noticed that her mother was shaking all over 4 extremities.  Prior to the episode, she denied any complaint, including chest pain, diaphoresis, light headedness, or headache. She notes that POCT in AM was 95, at noon was 150, and at night was 120, and she reports adequate appetite. When asked about insulin dosing, daughter stated that mother usually administers her own insulin and is not sure how much she administered at night.  During the episode, daughter notes that her mother was shaking all over and was unresponsive; she had no uprolling of eyes, tongue biting, drooling, urinary or fecal incontinence.  The episode lasted for 15 minutes, before it spontaneously broke.  After the episode, the patient was confused and could not remember the details of the event.  Daughter called 911, and on EMS arrival the POCT was 109 and patient was not seizing (still confused).  On arrival, patient was confused. She complained of frontal headache and some blurry vision that she said were not new but had no other complaints.     On review of systems, daughter denies any recent fever, chills, night sweats, URTI symptoms (cough, rhinorrhea, sore throat), urinary symptoms (urinary frequency, urgency, intermittence, dysuria, foul smelling urine, cloudy urine), change in bowel movements (diarrhea or constipation), abdominal pain, headache, nausea, or vomiting.   No sick contacts.   No recent travel or exposure to recent travelers.      Upon presentation to the ED, the patient's Vital Signs in ED:  - /79 mmHg s/p IV Labetalol 10mg x2 doses -> 114/57 mmHg  - HR 70 bpm  - RR 16 bpm  - T 94 degrees  - SaO2 85% on RA so placed on 5LPM NC with improvement to 98%      Investigations   Laboratory Workup  - CBC:                        13.3   4.11  )-----------( 238      ( 01 May 2023 00:42 )             40.8     - Chemistry:      140  |  97<L>  |  76<HH>  ----------------------------<  35<LL>  5.3<H>   |  17  |  6.1<HH>    Ca    9.2      01 May 2023 00:42  Mg     3.5         TPro  7.5  /  Alb  4.6  /  TBili  0.5  /  DBili  x   /  AST  25  /  ALT  13  /  AlkPhos  152<H>      - Coagulation Studies:  PT/INR - ( 01 May 2023 00:42 )   PT: 9.60 sec;   INR: 0.85 ratio    PTT - ( 01 May 2023 00:42 )  PTT:38.1 sec      - Cardiac Markers:  CARDIAC MARKERS ( 01 May 2023 00:42 )  x     / x     / 170 U/L / x     / x          Microbiological Workup  Urinalysis Basic - ( 01 May 2023 01:42 )    Color: Light Yellow / Appearance: Clear / S.016 / pH: x  Gluc: x / Ketone: Negative  / Bili: Negative / Urobili: <2 mg/dL   Blood: x / Protein: 100 mg/dL / Nitrite: Negative   Leuk Esterase: Small / RBC: 4 /HPF / WBC 7 /HPF   Sq Epi: x / Non Sq Epi: x / Bacteria: Few      Radiological Workup  * CT Head No Cont (23 @ 01:32) No acute intracranial pathology. Follow-up MRI of the brain with and without contrast may be helpful for  further evaluation.    - POCT on arrival to ED was 40 so Patient received D50 25mL bolus for hypoglycemia   - She received IV Cefepime and Vancomycin in ED for hypothermia and concern for sepsis  - For SAO2 85% on RA, she was placed on 5LPM NC with improvement in SAO2 to 98%  - For /79 mmHg, she received IV Labetalol 10mg x2 doses with improvement   - She will be admitted to MICU for further investigations, management, and monitoring     Location: [facility] ED Hold 007 A ([facility] ED Hold)  Patient Name: ANAM NGUYỄN  Age: 57y  Gender: Female      History of Present Illness  57 year old (non smoker) female patient ([place] Speaking) known to have:  - Baseline: Alert, oriented, lives with daughter, ambulates independently  - DM Insulin Dependent. No Hba1c in chart.   - ESRD on hemodialysis every MWF via left UE AV fistula.  - Hypertension. Home med Coreg 12.5mg BID and Hydralazine 10mg in AM (not Q8h; confirmed by daughter)  - Dyslipidemia. No Lipid Profile in chart. Home med Atorvastatin 10mg QD, Aspirin 81mg QD      Patient was confused at time of my evaluation (asking where she is and why she is in ED), so daughter was called over the phone and a Russian PCA was present at bedside.  Patient was brought to the ED by EMS on  following an episode of a witnessed seizure.  Per daughter over the phone, history goes back to  at 23:30 PM when the daughter noticed that her mother was shaking all over 4 extremities.  Prior to the episode, she denied any complaint, including chest pain, diaphoresis, lightheadedness, or headache. She notes that POCT in AM was 95, at noon was 150, and at night was 120, and she reports adequate appetite. When asked about insulin dosing, daughter stated that mother usually administers her own insulin and is not sure how much she administered at night.  During the episode, daughter notes that her mother was shaking all over and was unresponsive; she had no uprolling of eyes, tongue biting, drooling, urinary or fecal incontinence.  The episode lasted for 15 minutes, before it spontaneously broke.  After the episode, the patient was confused and could not remember the details of the event.  Daughter called 911, and on EMS arrival the POCT was 109 and patient was not seizing (still confused).  On arrival, patient was confused. She complained of frontal headache and some blurry vision that she said were not new but had no other complaints.     On review of systems, daughter denies any recent fever, chills, night sweats, URTI symptoms (cough, rhinorrhea, sore throat), urinary symptoms (urinary frequency, urgency, intermittence, dysuria, foul smelling urine, cloudy urine), change in bowel movements (diarrhea or constipation), abdominal pain, headache, nausea, or vomiting.   No sick contacts.   No recent travel or exposure to recent travelers.      Upon presentation to the ED, the patient's Vital Signs in ED:  - /79 mmHg s/p IV Labetalol 10mg x2 doses -> 114/57 mmHg  - HR 70 bpm  - RR 16 bpm  - T 94 degrees  - SaO2 85% on RA so placed on 5LPM NC with improvement to 98%      Investigations   Laboratory Workup  - CBC:                        13.3   4.11  )-----------( 238      ( 01 May 2023 00:42 )             40.8     - Chemistry:      140  |  97<L>  |  76<HH>  ----------------------------<  35<LL>  5.3<H>   |  17  |  6.1<HH>    Ca    9.2      01 May 2023 00:42  Mg     3.5         TPro  7.5  /  Alb  4.6  /  TBili  0.5  /  DBili  x   /  AST  25  /  ALT  13  /  AlkPhos  152<H>      - Coagulation Studies:  PT/INR - ( 01 May 2023 00:42 )   PT: 9.60 sec;   INR: 0.85 ratio    PTT - ( 01 May 2023 00:42 )  PTT:38.1 sec      - Cardiac Markers:  CARDIAC MARKERS ( 01 May 2023 00:42 )  x     / x     / 170 U/L / x     / x          Microbiological Workup  Urinalysis Basic - ( 01 May 2023 01:42 )    Color: Light Yellow / Appearance: Clear / S.016 / pH: x  Gluc: x / Ketone: Negative  / Bili: Negative / Urobili: <2 mg/dL   Blood: x / Protein: 100 mg/dL / Nitrite: Negative   Leuk Esterase: Small / RBC: 4 /HPF / WBC 7 /HPF   Sq Epi: x / Non Sq Epi: x / Bacteria: Few      Radiological Workup  * CT Head No Cont (23 @ 01:32) No acute intracranial pathology. Follow-up MRI of the brain with and without contrast may be helpful for  further evaluation.    - POCT on arrival to ED was 40 so Patient received D50 25mL bolus for hypoglycemia   - She received IV Cefepime and Vancomycin in ED for hypothermia and concern for sepsis  - For SAO2 85% on RA, she was placed on 5LPM NC with improvement in SAO2 to 98%  - For /79 mmHg, she received IV Labetalol 10mg x2 doses with improvement   - She will be admitted to MICU for further investigations, management, and monitoring

## 2023-05-01 NOTE — CONSULT NOTE ADULT - ASSESSMENT
57 year old (non smoker) female patient (Spanish Speaking) known to have DM, ESRD on HD, HTN and DLD admitted for AMS and seizures likely secondary to hypoglycemia.    Assessment and plan  ESRD on HD/ Seizure/ HTN/ DM  Last HD on Friday, due today  opti 160 2 k bath 2L UF as tolerated  BP controlled, c/w home medications  Hgb at target  Last P elevated, repeat P and PTH  will follow

## 2023-05-01 NOTE — H&P ADULT - ATTENDING COMMENTS
my note ondina resident noted  presented with seizure like activity, in ED was hypoglycemic on d5, hx of ESRD on HD, feels better this am, off D 5, CO LLQ pain, LA improved, on ABX  CT AP, ABX till cx if - dc, renal Fup for HD, sdu

## 2023-05-01 NOTE — SWALLOW BEDSIDE ASSESSMENT ADULT - SLP PERTINENT HISTORY OF CURRENT PROBLEM
Pt is a 58 y/o F w/ PMHx: IDDM, ESRD on HD MWF, HTN, DLD, presenting for altered mental status, 15 minute seizure 2' hypoglycemia from administering insulin. CTH (-). HAGMA likely 2' lactic acidosis from suspected seizure. Pt is a 58 y/o F w/ PMHx: IDDM, ESRD on HD MWF, HTN, DLD, presenting for altered mental status, 15 minute seizure 2' hypoglycemia from administering insulin. HAGMA likely 2' lactic acidosis from suspected seizure. CTH (-).

## 2023-05-02 LAB
ALBUMIN SERPL ELPH-MCNC: 3.8 G/DL — SIGNIFICANT CHANGE UP (ref 3.5–5.2)
ALP SERPL-CCNC: 101 U/L — SIGNIFICANT CHANGE UP (ref 30–115)
ALT FLD-CCNC: 9 U/L — SIGNIFICANT CHANGE UP (ref 0–41)
ANION GAP SERPL CALC-SCNC: 13 MMOL/L — SIGNIFICANT CHANGE UP (ref 7–14)
ANION GAP SERPL CALC-SCNC: 13 MMOL/L — SIGNIFICANT CHANGE UP (ref 7–14)
AST SERPL-CCNC: 17 U/L — SIGNIFICANT CHANGE UP (ref 0–41)
BASOPHILS # BLD AUTO: 0.04 K/UL — SIGNIFICANT CHANGE UP (ref 0–0.2)
BASOPHILS NFR BLD AUTO: 1 % — SIGNIFICANT CHANGE UP (ref 0–1)
BILIRUB SERPL-MCNC: 0.7 MG/DL — SIGNIFICANT CHANGE UP (ref 0.2–1.2)
BUN SERPL-MCNC: 51 MG/DL — HIGH (ref 10–20)
BUN SERPL-MCNC: 51 MG/DL — HIGH (ref 10–20)
C PEPTIDE SERPL-MCNC: 0.2 NG/ML — LOW (ref 1.1–4.4)
CALCIUM SERPL-MCNC: 9.2 MG/DL — SIGNIFICANT CHANGE UP (ref 8.4–10.5)
CALCIUM SERPL-MCNC: 9.3 MG/DL — SIGNIFICANT CHANGE UP (ref 8.4–10.4)
CHLORIDE SERPL-SCNC: 96 MMOL/L — LOW (ref 98–110)
CHLORIDE SERPL-SCNC: 99 MMOL/L — SIGNIFICANT CHANGE UP (ref 98–110)
CO2 SERPL-SCNC: 26 MMOL/L — SIGNIFICANT CHANGE UP (ref 17–32)
CO2 SERPL-SCNC: 28 MMOL/L — SIGNIFICANT CHANGE UP (ref 17–32)
CREAT SERPL-MCNC: 4.6 MG/DL — CRITICAL HIGH (ref 0.7–1.5)
CREAT SERPL-MCNC: 4.9 MG/DL — CRITICAL HIGH (ref 0.7–1.5)
EGFR: 10 ML/MIN/1.73M2 — LOW
EGFR: 11 ML/MIN/1.73M2 — LOW
EOSINOPHIL # BLD AUTO: 0.14 K/UL — SIGNIFICANT CHANGE UP (ref 0–0.7)
EOSINOPHIL NFR BLD AUTO: 3.4 % — SIGNIFICANT CHANGE UP (ref 0–8)
FOLATE SERPL-MCNC: 14.3 NG/ML — SIGNIFICANT CHANGE UP
GLUCOSE BLDC GLUCOMTR-MCNC: 175 MG/DL — HIGH (ref 70–99)
GLUCOSE BLDC GLUCOMTR-MCNC: 332 MG/DL — HIGH (ref 70–99)
GLUCOSE BLDC GLUCOMTR-MCNC: 465 MG/DL — CRITICAL HIGH (ref 70–99)
GLUCOSE BLDC GLUCOMTR-MCNC: 530 MG/DL — CRITICAL HIGH (ref 70–99)
GLUCOSE BLDC GLUCOMTR-MCNC: 538 MG/DL — CRITICAL HIGH (ref 70–99)
GLUCOSE SERPL-MCNC: 191 MG/DL — HIGH (ref 70–99)
GLUCOSE SERPL-MCNC: 209 MG/DL — HIGH (ref 70–99)
HCT VFR BLD CALC: 35.7 % — LOW (ref 37–47)
HGB BLD-MCNC: 11.7 G/DL — LOW (ref 12–16)
IMM GRANULOCYTES NFR BLD AUTO: 0.2 % — SIGNIFICANT CHANGE UP (ref 0.1–0.3)
LYMPHOCYTES # BLD AUTO: 1.52 K/UL — SIGNIFICANT CHANGE UP (ref 1.2–3.4)
LYMPHOCYTES # BLD AUTO: 37 % — SIGNIFICANT CHANGE UP (ref 20.5–51.1)
MAGNESIUM SERPL-MCNC: 2.8 MG/DL — HIGH (ref 1.8–2.4)
MCHC RBC-ENTMCNC: 31.5 PG — HIGH (ref 27–31)
MCHC RBC-ENTMCNC: 32.8 G/DL — SIGNIFICANT CHANGE UP (ref 32–37)
MCV RBC AUTO: 96.2 FL — SIGNIFICANT CHANGE UP (ref 81–99)
MONOCYTES # BLD AUTO: 0.29 K/UL — SIGNIFICANT CHANGE UP (ref 0.1–0.6)
MONOCYTES NFR BLD AUTO: 7.1 % — SIGNIFICANT CHANGE UP (ref 1.7–9.3)
NEUTROPHILS # BLD AUTO: 2.11 K/UL — SIGNIFICANT CHANGE UP (ref 1.4–6.5)
NEUTROPHILS NFR BLD AUTO: 51.3 % — SIGNIFICANT CHANGE UP (ref 42.2–75.2)
NRBC # BLD: 0 /100 WBCS — SIGNIFICANT CHANGE UP (ref 0–0)
PLATELET # BLD AUTO: 202 K/UL — SIGNIFICANT CHANGE UP (ref 130–400)
PMV BLD: 11 FL — HIGH (ref 7.4–10.4)
POTASSIUM SERPL-MCNC: 4.8 MMOL/L — SIGNIFICANT CHANGE UP (ref 3.5–5)
POTASSIUM SERPL-MCNC: 4.9 MMOL/L — SIGNIFICANT CHANGE UP (ref 3.5–5)
POTASSIUM SERPL-SCNC: 4.8 MMOL/L — SIGNIFICANT CHANGE UP (ref 3.5–5)
POTASSIUM SERPL-SCNC: 4.9 MMOL/L — SIGNIFICANT CHANGE UP (ref 3.5–5)
PROT SERPL-MCNC: 6.4 G/DL — SIGNIFICANT CHANGE UP (ref 6–8)
RBC # BLD: 3.71 M/UL — LOW (ref 4.2–5.4)
RBC # FLD: 12.3 % — SIGNIFICANT CHANGE UP (ref 11.5–14.5)
SODIUM SERPL-SCNC: 137 MMOL/L — SIGNIFICANT CHANGE UP (ref 135–146)
SODIUM SERPL-SCNC: 138 MMOL/L — SIGNIFICANT CHANGE UP (ref 135–146)
VIT B12 SERPL-MCNC: 1206 PG/ML — SIGNIFICANT CHANGE UP (ref 232–1245)
WBC # BLD: 4.11 K/UL — LOW (ref 4.8–10.8)
WBC # FLD AUTO: 4.11 K/UL — LOW (ref 4.8–10.8)

## 2023-05-02 PROCEDURE — 99233 SBSQ HOSP IP/OBS HIGH 50: CPT

## 2023-05-02 PROCEDURE — 71045 X-RAY EXAM CHEST 1 VIEW: CPT | Mod: 26

## 2023-05-02 PROCEDURE — 93306 TTE W/DOPPLER COMPLETE: CPT | Mod: 26

## 2023-05-02 PROCEDURE — 95819 EEG AWAKE AND ASLEEP: CPT | Mod: 26

## 2023-05-02 RX ORDER — INSULIN LISPRO 100/ML
VIAL (ML) SUBCUTANEOUS
Refills: 0 | Status: DISCONTINUED | OUTPATIENT
Start: 2023-05-02 | End: 2023-05-09

## 2023-05-02 RX ORDER — SODIUM CHLORIDE 9 MG/ML
1000 INJECTION, SOLUTION INTRAVENOUS
Refills: 0 | Status: DISCONTINUED | OUTPATIENT
Start: 2023-05-02 | End: 2023-05-09

## 2023-05-02 RX ORDER — NIFEDIPINE 30 MG
30 TABLET, EXTENDED RELEASE 24 HR ORAL ONCE
Refills: 0 | Status: COMPLETED | OUTPATIENT
Start: 2023-05-02 | End: 2023-05-02

## 2023-05-02 RX ORDER — DEXTROSE 50 % IN WATER 50 %
25 SYRINGE (ML) INTRAVENOUS ONCE
Refills: 0 | Status: DISCONTINUED | OUTPATIENT
Start: 2023-05-02 | End: 2023-05-09

## 2023-05-02 RX ORDER — DEXTROSE 50 % IN WATER 50 %
15 SYRINGE (ML) INTRAVENOUS ONCE
Refills: 0 | Status: DISCONTINUED | OUTPATIENT
Start: 2023-05-02 | End: 2023-05-09

## 2023-05-02 RX ORDER — GLUCAGON INJECTION, SOLUTION 0.5 MG/.1ML
1 INJECTION, SOLUTION SUBCUTANEOUS ONCE
Refills: 0 | Status: DISCONTINUED | OUTPATIENT
Start: 2023-05-02 | End: 2023-05-09

## 2023-05-02 RX ORDER — INSULIN LISPRO 100/ML
10 VIAL (ML) SUBCUTANEOUS ONCE
Refills: 0 | Status: COMPLETED | OUTPATIENT
Start: 2023-05-02 | End: 2023-05-02

## 2023-05-02 RX ORDER — DEXTROSE 50 % IN WATER 50 %
12.5 SYRINGE (ML) INTRAVENOUS ONCE
Refills: 0 | Status: DISCONTINUED | OUTPATIENT
Start: 2023-05-02 | End: 2023-05-09

## 2023-05-02 RX ORDER — INSULIN LISPRO 100/ML
6 VIAL (ML) SUBCUTANEOUS ONCE
Refills: 0 | Status: COMPLETED | OUTPATIENT
Start: 2023-05-02 | End: 2023-05-02

## 2023-05-02 RX ORDER — HYDRALAZINE HCL 50 MG
25 TABLET ORAL EVERY 8 HOURS
Refills: 0 | Status: DISCONTINUED | OUTPATIENT
Start: 2023-05-02 | End: 2023-05-07

## 2023-05-02 RX ADMIN — Medication 10 MILLIGRAM(S): at 05:09

## 2023-05-02 RX ADMIN — Medication 25 MILLIGRAM(S): at 21:13

## 2023-05-02 RX ADMIN — HEPARIN SODIUM 5000 UNIT(S): 5000 INJECTION INTRAVENOUS; SUBCUTANEOUS at 05:09

## 2023-05-02 RX ADMIN — CARVEDILOL PHOSPHATE 12.5 MILLIGRAM(S): 80 CAPSULE, EXTENDED RELEASE ORAL at 17:21

## 2023-05-02 RX ADMIN — Medication 10 UNIT(S): at 18:11

## 2023-05-02 RX ADMIN — ATORVASTATIN CALCIUM 10 MILLIGRAM(S): 80 TABLET, FILM COATED ORAL at 21:14

## 2023-05-02 RX ADMIN — Medication 6 UNIT(S): at 17:21

## 2023-05-02 RX ADMIN — CEFEPIME 100 MILLIGRAM(S): 1 INJECTION, POWDER, FOR SOLUTION INTRAMUSCULAR; INTRAVENOUS at 12:17

## 2023-05-02 RX ADMIN — Medication 81 MILLIGRAM(S): at 12:19

## 2023-05-02 RX ADMIN — Medication 25 MILLIGRAM(S): at 13:33

## 2023-05-02 RX ADMIN — HEPARIN SODIUM 5000 UNIT(S): 5000 INJECTION INTRAVENOUS; SUBCUTANEOUS at 13:33

## 2023-05-02 RX ADMIN — HEPARIN SODIUM 5000 UNIT(S): 5000 INJECTION INTRAVENOUS; SUBCUTANEOUS at 21:13

## 2023-05-02 RX ADMIN — Medication 30 MILLIGRAM(S): at 10:47

## 2023-05-02 RX ADMIN — CARVEDILOL PHOSPHATE 12.5 MILLIGRAM(S): 80 CAPSULE, EXTENDED RELEASE ORAL at 05:09

## 2023-05-02 NOTE — CHART NOTE - NSCHARTNOTEFT_GEN_A_CORE
Transfer from: SDU    Transfer to:  (  ) CCU    (  ) MICU   (  ) Telemetry     (  ) RCU                               (  ) Palliative    (  ) Stroke Unit    ( x ) FLOORS      Follow up:  1) endo consult for insulin regimen f/u  2) monitor FS, started sliding scale for now on 05/02    HPI / HOSPITAL COURSE:  57 year old (non smoker) female patient ([place] Speaking) known to have:  - Baseline: Alert, oriented, lives with daughter, ambulates independently  - DM Insulin Dependent. No Hba1c in chart.   - ESRD on hemodialysis every MWF via left UE AV fistula.  - Hypertension. Home med Coreg 12.5mg BID and Hydralazine 10mg in AM (not Q8h; confirmed by daughter)  - Dyslipidemia. No Lipid Profile in chart. Home med Atorvastatin 10mg QD, Aspirin 81mg QD    Patient was confused at time of my evaluation (asking where she is and why she is in ED), so daughter was called over the phone and a Russian PCA was present at bedside.  Patient was brought to the ED by EMS on 04/30 following an episode of a witnessed seizure.  Per daughter over the phone, history goes back to 04/30 at 23:30 PM when the daughter noticed that her mother was shaking all over 4 extremities.  Prior to the episode, she denied any complaint, including chest pain, diaphoresis, lightheadedness, or headache. She notes that POCT in AM was 95, at noon was 150, and at night was 120, and she reports adequate appetite. When asked about insulin dosing, daughter stated that mother usually administers her own insulin and is not sure how much she administered at night.  During the episode, daughter notes that her mother was shaking all over and was unresponsive; she had no uprolling of eyes, tongue biting, drooling, urinary or fecal incontinence.  The episode lasted for 15 minutes, before it spontaneously broke.  After the episode, the patient was confused and could not remember the details of the event.  Daughter called 911, and on EMS arrival the POCT was 109 and patient was not seizing (still confused).  On arrival, patient was confused. She complained of frontal headache and some blurry vision that she said were not new but had no other complaints.     On review of systems, daughter denies any recent fever, chills, night sweats, URTI symptoms (cough, rhinorrhea, sore throat), urinary symptoms (urinary frequency, urgency, intermittence, dysuria, foul smelling urine, cloudy urine), change in bowel movements (diarrhea or constipation), abdominal pain, headache, nausea, or vomiting.   No sick contacts.   No recent travel or exposure to recent travelers.      Upon presentation to the ED, the patient's Vital Signs in ED:  - /79 mmHg s/p IV Labetalol 10mg x2 doses -> 114/57 mmHg  - HR 70 bpm  - RR 16 bpm  - T 94 degrees  - SaO2 85% on RA so placed on 5LPM NC with improvement to 98%    SDU course:  Pt was admitted for Hypoglycemic Seizure like activity in Setting of History of Insulin Dependent DM.  Pt was given dextrrose, and all insulin regimen was holded. Her FS improved and now in good range. She is now placed on insulin sliding scale, and endo consult placed for insulin regimen. Her BP is improved now. she received HD session yesterday (nephro asad). Stable for DGTF today.      ASSESSMENT & PLAN:    Hypoglycemic Seizure like activity in Setting of History of Insulin Dependent DM.    Hypothermia resolved    Hypertensive Urgency in Setting of History of Hypertension resolved   History of Dyslipidemia and ESRD on MWF HD via LUE AV fistula      PLAN:    CNS:  No depressants.  FU with Neuro.  Avoid hypoglycemia     HEENT: Oral care    PULMONARY:  HOB @ 45 degrees.  Aspiration precautions.  Wean O2.      CARDIOVASCULAR:  BP control.  Avoid overload     GI: GI prophylaxis.  Feeding.  Bowel regimen     RENAL:  Follow up lytes.  Correct as needed.  HD per renal     INFECTIOUS DISEASE: Follow up cultures.  DC ABX if cultures neg.  Procal noted     HEMATOLOGICAL:  DVT prophylaxis.  Dimer     ENDOCRINE:  Follow up FS.  Endo follow up     MUSCULOSKELETAL:  OOB to chair with PT OT     DGTF Transfer from: SDU    Transfer to:  (  ) CCU    (  ) MICU   (  ) Telemetry     (  ) RCU                               (  ) Palliative    (  ) Stroke Unit    ( x ) FLOORS      Follow up:  1) endo consult for insulin regimen f/u  2) monitor FS, started sliding scale for now on 05/02  3) dc Abx once blood culture is neg    HPI / HOSPITAL COURSE:  57 year old (non smoker) female patient ([place] Speaking) known to have:  - Baseline: Alert, oriented, lives with daughter, ambulates independently  - DM Insulin Dependent. No Hba1c in chart.   - ESRD on hemodialysis every MWF via left UE AV fistula.  - Hypertension. Home med Coreg 12.5mg BID and Hydralazine 10mg in AM (not Q8h; confirmed by daughter)  - Dyslipidemia. No Lipid Profile in chart. Home med Atorvastatin 10mg QD, Aspirin 81mg QD    Patient was confused at time of my evaluation (asking where she is and why she is in ED), so daughter was called over the phone and a Russian PCA was present at bedside.  Patient was brought to the ED by EMS on 04/30 following an episode of a witnessed seizure.  Per daughter over the phone, history goes back to 04/30 at 23:30 PM when the daughter noticed that her mother was shaking all over 4 extremities.  Prior to the episode, she denied any complaint, including chest pain, diaphoresis, lightheadedness, or headache. She notes that POCT in AM was 95, at noon was 150, and at night was 120, and she reports adequate appetite. When asked about insulin dosing, daughter stated that mother usually administers her own insulin and is not sure how much she administered at night.  During the episode, daughter notes that her mother was shaking all over and was unresponsive; she had no uprolling of eyes, tongue biting, drooling, urinary or fecal incontinence.  The episode lasted for 15 minutes, before it spontaneously broke.  After the episode, the patient was confused and could not remember the details of the event.  Daughter called 911, and on EMS arrival the POCT was 109 and patient was not seizing (still confused).  On arrival, patient was confused. She complained of frontal headache and some blurry vision that she said were not new but had no other complaints.     On review of systems, daughter denies any recent fever, chills, night sweats, URTI symptoms (cough, rhinorrhea, sore throat), urinary symptoms (urinary frequency, urgency, intermittence, dysuria, foul smelling urine, cloudy urine), change in bowel movements (diarrhea or constipation), abdominal pain, headache, nausea, or vomiting.   No sick contacts.   No recent travel or exposure to recent travelers.      Upon presentation to the ED, the patient's Vital Signs in ED:  - /79 mmHg s/p IV Labetalol 10mg x2 doses -> 114/57 mmHg  - HR 70 bpm  - RR 16 bpm  - T 94 degrees  - SaO2 85% on RA so placed on 5LPM NC with improvement to 98%    SDU course:  Pt was admitted for Hypoglycemic Seizure like activity in Setting of History of Insulin Dependent DM.  Pt was given dextrrose, and all insulin regimen was holded. Her FS improved and now in good range. She is now placed on insulin sliding scale, and endo consult placed for insulin regimen. Her BP is improved now. she received HD session yesterday (nephro [facility]). Stable for DGTF today.      ASSESSMENT & PLAN:    Hypoglycemic Seizure like activity in Setting of History of Insulin Dependent DM.    Hypothermia resolved    Hypertensive Urgency in Setting of History of Hypertension resolved   History of Dyslipidemia and ESRD on MWF HD via LUE AV fistula      PLAN:    CNS:  No depressants.  FU with Neuro.  Avoid hypoglycemia     HEENT: Oral care    PULMONARY:  HOB @ 45 degrees.  Aspiration precautions.  Wean O2.      CARDIOVASCULAR:  BP control.  Avoid overload     GI: GI prophylaxis.  Feeding.  Bowel regimen     RENAL:  Follow up lytes.  Correct as needed.  HD per renal     INFECTIOUS DISEASE: Follow up cultures.  DC ABX if cultures neg.  Procal noted     HEMATOLOGICAL:  DVT prophylaxis.  Dimer     ENDOCRINE:  Follow up FS.  Endo follow up     MUSCULOSKELETAL:  OOB to chair with PT OT     DGTF

## 2023-05-02 NOTE — PROGRESS NOTE ADULT - SUBJECTIVE AND OBJECTIVE BOX
ANAM NGUYỄN  57y, Female  Allergy: No Known Allergies    Hospital Day: 1d    Patient seen and examined. No acute events overnight    PMH/PSH:  PAST MEDICAL & SURGICAL HISTORY:  Chronic kidney disease, unspecified CKD stage          VITALS:  T(F): 96.3 (23 @ 09:18), Max: 98.1 (23 @ 07:59)  HR: 68 (23 @ 09:18)  BP: 181/77 (23 @ 09:18) (113/57 - 182/77)  RR: 18 (23 @ 09:18)  SpO2: 96% (23 @ 07:59)    TESTS & MEASUREMENTS:  Weight (Kg):       23 @ 07:01  -  23 @ 07:00  --------------------------------------------------------  IN: 0 mL / OUT: 2350 mL / NET: -2350 mL                            11.7   4.11  )-----------( 202      ( 02 May 2023 06:30 )             35.7     PT/INR - ( 01 May 2023 00:42 )   PT: 9.60 sec;   INR: 0.85 ratio         PTT - ( 01 May 2023 00:42 )  PTT:38.1 sec      138  |  99  |  51<H>  ----------------------------<  209<H>  4.9   |  26  |  4.9<HH>    Ca    9.3      02 May 2023 06:30  Mg     2.8         TPro  6.4  /  Alb  3.8  /  TBili  0.7  /  DBili  x   /  AST  17  /  ALT  9   /  AlkPhos  101  0502    LIVER FUNCTIONS - ( 02 May 2023 06:30 )  Alb: 3.8 g/dL / Pro: 6.4 g/dL / ALK PHOS: 101 U/L / ALT: 9 U/L / AST: 17 U/L / GGT: x           CARDIAC MARKERS ( 01 May 2023 20:42 )  x     / 0.06 ng/mL / x     / x     / x      CARDIAC MARKERS ( 01 May 2023 12:46 )  x     / 0.06 ng/mL / x     / x     / x      CARDIAC MARKERS ( 01 May 2023 00:42 )  x     / x     / 170 U/L / x     / x            Urinalysis Basic - ( 01 May 2023 01:42 )    Color: Light Yellow / Appearance: Clear / S.016 / pH: x  Gluc: x / Ketone: Negative  / Bili: Negative / Urobili: <2 mg/dL   Blood: x / Protein: 100 mg/dL / Nitrite: Negative   Leuk Esterase: Small / RBC: 4 /HPF / WBC 7 /HPF   Sq Epi: x / Non Sq Epi: x / Bacteria: Few        RADIOLOGY & ADDITIONAL TESTS:    RECENT DIAGNOSTIC ORDERS:  Magnesium, Serum: AM Sched. Collection: 03-May-2023 04:30 (23 @ 10:33)  Comprehensive Metabolic Panel: AM Sched. Collection: 03-May-2023 04:30 (23 @ 10:33)  Complete Blood Count + Automated Diff: AM Sched. Collection: 03-May-2023 04:30 (23 @ 10:33)  VA Duplex Kidneys Limited: 04:09  Exam Completed (23 @ 15:51)  Basic Metabolic Panel: 16:00 (23 @ 14:22)  Troponin T, Serum: 16:00 (23 @ 13:45)  Diet, Easy to Chew:   For patients receiving Renal Replacement - No Protein Restr, No Conc K, No Conc Phos, Low Sodium (23 @ 12:34)      MEDICATIONS:  MEDICATIONS  (STANDING):  aspirin  chewable 81 milliGRAM(s) Oral daily  atorvastatin 10 milliGRAM(s) Oral at bedtime  carvedilol 12.5 milliGRAM(s) Oral every 12 hours  cefepime   IVPB 1000 milliGRAM(s) IV Intermittent daily  chlorhexidine 4% Liquid 1 Application(s) Topical <User Schedule>  heparin   Injectable 5000 Unit(s) SubCutaneous every 8 hours  hydrALAZINE 10 milliGRAM(s) Oral every 8 hours  pantoprazole    Tablet 40 milliGRAM(s) Oral before breakfast  vancomycin  IVPB 1000 milliGRAM(s) IV Intermittent every 48 hours    MEDICATIONS  (PRN):  acetaminophen     Tablet .. 650 milliGRAM(s) Oral every 6 hours PRN Temp greater or equal to 38C (100.4F), Mild Pain (1 - 3)      HOME MEDICATIONS:  aspirin 81 mg oral tablet ()  atorvastatin 10 mg oral tablet ()  Coreg 12.5 mg oral tablet ()  hydrALAZINE 10 mg oral tablet ()      REVIEW OF SYSTEMS:  All other review of systems is negative unless indicated above.     PHYSICAL EXAM:  PHYSICAL EXAM:  GENERAL: NAD, well-developed  HEAD:  Atraumatic, Normocephalic  NECK: Supple, No JVD  CHEST/LUNG: Clear to auscultation bilaterally; No wheeze  HEART: Regular rate and rhythm; No murmurs, rubs, or gallops  ABDOMEN: Soft, Nontender, Nondistended; Bowel sounds present  EXTREMITIES:  2+ Peripheral Pulses, No clubbing, cyanosis, or edema  SKIN: No rashes or lesions

## 2023-05-02 NOTE — PROGRESS NOTE ADULT - ASSESSMENT
57 year old (non smoker) female patient ([place] Speaking) pmhx of IDDM, ESRD, HTN/HLD  Patient was brought to the ED by EMS on 04/30 following an episode of a witnessed seizure.    #Acute hypoxic respiratory failure  #Seizure 2/2 hypoglycemia  #Lactic acidosis- resolved  #Hx of IDDM  #Susepcted aspiration pneumonitis  FS 35 on admission  Currently off insulin, FS in 170s-200s  CXR (05/02): No focal consolidation, pneumonthorax, or pleural effusion  CTAP 05/01: Hazy patchy left basilar pulmonary opacity  Currently on 2L NC saturating 95-98%  - passed swallow test: Easy to Chew  - Cont vanc/cefepime for now  - f/u BCx, DC abx if negative  - Endo eval for DM regimen recds on discharge    #Hypertensive urgency, resolved  - cont HTN home meds (coreg, hydralazine)  - Uptitrate hydralazine to 25 TID    #HLD  - cont atorvastatin    #ESRD on HD  - HD per nephrology    DVT PPX heparin    #Progress Note Handoff  Pending (specify): f/u BCx, f/u endo for insulin rec, monitor FS  Family discussion: edison pt regarding tx for hypoglycemia  Disposition: Home

## 2023-05-02 NOTE — PROGRESS NOTE ADULT - SUBJECTIVE AND OBJECTIVE BOX
Patient is a 57y old  Female who presents with a chief complaint of Hypoglycemic Seizure (01 May 2023 15:00)        Over Night Events:  No complaints.  no events overnight.  Tolerating PO         ROS:     All ROS are negative except HPI         PHYSICAL EXAM    ICU Vital Signs Last 24 Hrs  T(C): 36.1 (02 May 2023 05:12), Max: 36.5 (01 May 2023 22:27)  T(F): 97 (02 May 2023 05:12), Max: 97.7 (01 May 2023 22:27)  HR: 69 (02 May 2023 05:12) (66 - 73)  BP: 141/65 (02 May 2023 05:12) (113/57 - 182/77)  BP(mean): --  ABP: --  ABP(mean): --  RR: 18 (02 May 2023 05:12) (16 - 18)  SpO2: 99% (02 May 2023 05:12) (97% - 100%)    O2 Parameters below as of 02 May 2023 05:12  Patient On (Oxygen Delivery Method): nasal cannula  O2 Flow (L/min): 2          CONSTITUTIONAL:  Well nourished.  NAD    ENT:   Airway patent,   Mouth with normal mucosa.       EYES:   Pupils equal,   Round and reactive to light.    CARDIAC:   Normal rate,   Regular rhythm.    No edema      RESPIRATORY:   No wheezing  Bilateral BS  Normal chest expansion  Not tachypneic,  No use of accessory muscles    GASTROINTESTINAL:  Abdomen soft,   Non-tender,   No guarding,   + BS    MUSCULOSKELETAL:   Range of motion is not limited,  No clubbing, cyanosis    NEUROLOGICAL:   Alert   No motor  deficits.    SKIN:   Skin normal color for race,   No evidence of rash.    PSYCHIATRIC:   No apparent risk to self or others.        23 @ 07:01  -  23 @ 07:00  --------------------------------------------------------  IN:  Total IN: 0 mL    OUT:    Indwelling Catheter - Urethral (mL): 350 mL    Other (mL): 2000 mL  Total OUT: 2350 mL    Total NET: -2350 mL          LABS:                            12.1   7.11  )-----------( 214      ( 01 May 2023 12:46 )             35.9                                               05    137  |  96<L>  |  51<H>  ----------------------------<  191<H>  4.8   |  28  |  4.6<HH>    Ca    9.2      02 May 2023 00:44  Mg     3.5     05-    TPro  6.7  /  Alb  4.1  /  TBili  0.6  /  DBili  x   /  AST  17  /  ALT  11  /  AlkPhos  127<H>  05-      PT/INR - ( 01 May 2023 00:42 )   PT: 9.60 sec;   INR: 0.85 ratio         PTT - ( 01 May 2023 00:42 )  PTT:38.1 sec                                       Urinalysis Basic - ( 01 May 2023 01:42 )    Color: Light Yellow / Appearance: Clear / S.016 / pH: x  Gluc: x / Ketone: Negative  / Bili: Negative / Urobili: <2 mg/dL   Blood: x / Protein: 100 mg/dL / Nitrite: Negative   Leuk Esterase: Small / RBC: 4 /HPF / WBC 7 /HPF   Sq Epi: x / Non Sq Epi: x / Bacteria: Few        CARDIAC MARKERS ( 01 May 2023 20:42 )  x     / 0.06 ng/mL / x     / x     / x      CARDIAC MARKERS ( 01 May 2023 12:46 )  x     / 0.06 ng/mL / x     / x     / x      CARDIAC MARKERS ( 01 May 2023 00:42 )  x     / x     / 170 U/L / x     / x                                                LIVER FUNCTIONS - ( 01 May 2023 12:46 )  Alb: 4.1 g/dL / Pro: 6.7 g/dL / ALK PHOS: 127 U/L / ALT: 11 U/L / AST: 17 U/L / GGT: x                                                                                                                                       MEDICATIONS  (STANDING):  aspirin  chewable 81 milliGRAM(s) Oral daily  atorvastatin 10 milliGRAM(s) Oral at bedtime  carvedilol 12.5 milliGRAM(s) Oral every 12 hours  cefepime   IVPB 1000 milliGRAM(s) IV Intermittent daily  chlorhexidine 4% Liquid 1 Application(s) Topical <User Schedule>  heparin   Injectable 5000 Unit(s) SubCutaneous every 8 hours  hydrALAZINE 10 milliGRAM(s) Oral every 8 hours  pantoprazole    Tablet 40 milliGRAM(s) Oral before breakfast  vancomycin  IVPB 1000 milliGRAM(s) IV Intermittent every 48 hours    MEDICATIONS  (PRN):  acetaminophen     Tablet .. 650 milliGRAM(s) Oral every 6 hours PRN Temp greater or equal to 38C (100.4F), Mild Pain (1 - 3)      New X-rays reviewed:                                                                                  ECHO    CXR interpreted by me:

## 2023-05-02 NOTE — PROGRESS NOTE ADULT - SUBJECTIVE AND OBJECTIVE BOX
Nephrology progress note    Patient was seen and examined, events over the last 24 h noted .  HD yesterday    Allergies:  No Known Allergies    Hospital Medications:   MEDICATIONS  (STANDING):  aspirin  chewable 81 milliGRAM(s) Oral daily  atorvastatin 10 milliGRAM(s) Oral at bedtime  carvedilol 12.5 milliGRAM(s) Oral every 12 hours  chlorhexidine 4% Liquid 1 Application(s) Topical <User Schedule>  dextrose 5%. 1000 milliLiter(s) (50 mL/Hr) IV Continuous <Continuous>  dextrose 5%. 1000 milliLiter(s) (100 mL/Hr) IV Continuous <Continuous>  dextrose 50% Injectable 25 Gram(s) IV Push once  dextrose 50% Injectable 12.5 Gram(s) IV Push once  dextrose 50% Injectable 25 Gram(s) IV Push once  glucagon  Injectable 1 milliGRAM(s) IntraMuscular once  heparin   Injectable 5000 Unit(s) SubCutaneous every 8 hours  hydrALAZINE 25 milliGRAM(s) Oral every 8 hours  insulin lispro (ADMELOG) corrective regimen sliding scale   SubCutaneous three times a day before meals  pantoprazole    Tablet 40 milliGRAM(s) Oral before breakfast        VITALS:  T(F): 96.5 (23 @ 13:16), Max: 98.1 (23 @ 07:59)  HR: 80 (23 @ 16:00)  BP: 149/67 (23 @ 16:00)  RR: 19 (23 @ 13:16)  SpO2: 96% (23 @ 07:59)  Wt(kg): --     @ 07:01  -   @ 07:00  --------------------------------------------------------  IN: 0 mL / OUT: 2350 mL / NET: -2350 mL     @ 07:01  -   @ 19:44  --------------------------------------------------------  IN: 0 mL / OUT: 200 mL / NET: -200 mL          PHYSICAL EXAM:  Constitutional: NAD  HEENT: anicteric sclera, oropharynx clear, MMM  Neck: No JVD  Respiratory: CTAB, no wheezes, rales or rhonchi  Cardiovascular: S1, S2, RRR  Gastrointestinal: BS+, soft, NT/ND  Extremities: No cyanosis or clubbing. No peripheral edema  :  No murillo.   Skin: No rashes    LABS:      138  |  99  |  51<H>  ----------------------------<  209<H>  4.9   |  26  |  4.9<HH>    Ca    9.3      02 May 2023 06:30  Mg     2.8         TPro  6.4  /  Alb  3.8  /  TBili  0.7  /  DBili      /  AST  17  /  ALT  9   /  AlkPhos  101                            11.7   4.11  )-----------( 202      ( 02 May 2023 06:30 )             35.7       Urine Studies:  Urinalysis Basic - ( 01 May 2023 01:42 )    Color: Light Yellow / Appearance: Clear / S.016 / pH:   Gluc:  / Ketone: Negative  / Bili: Negative / Urobili: <2 mg/dL   Blood:  / Protein: 100 mg/dL / Nitrite: Negative   Leuk Esterase: Small / RBC: 4 /HPF / WBC 7 /HPF   Sq Epi:  / Non Sq Epi:  / Bacteria: Few        RADIOLOGY & ADDITIONAL STUDIES:

## 2023-05-02 NOTE — PROGRESS NOTE ADULT - ASSESSMENT
Impression:    Hypoglycemic Seizure like activity in Setting of History of Insulin Dependent DM.    Hypothermia resolved    Hypertensive Urgency in Setting of History of Hypertension resolved   History of Dyslipidemia and ESRD on MWF HD via LUE AV fistula      PLAN:    CNS:  No depressants.  FU with Neuro.  Avoid hypoglycemia     HEENT: Oral care    PULMONARY:  HOB @ 45 degrees.  Aspiration precautions.  Wean O2.      CARDIOVASCULAR:  BP control.  Avoid overload     GI: GI prophylaxis.  Feeding.  Bowel regimen     RENAL:  Follow up lytes.  Correct as needed.  HD per renal     INFECTIOUS DISEASE: Follow up cultures.  DC ABX if cultures neg.  Procal     HEMATOLOGICAL:  DVT prophylaxis.  Dimer     ENDOCRINE:  Follow up FS.  Endo follow up     MUSCULOSKELETAL:  OOB to chair urbano PT OT     DGTF          Impression:    Hypoglycemic Seizure like activity in Setting of History of Insulin Dependent DM.    Hypothermia resolved    Hypertensive Urgency in Setting of History of Hypertension resolved   History of Dyslipidemia and ESRD on MWF HD via LUE AV fistula      PLAN:    CNS:  No depressants.  FU with Neuro.  Avoid hypoglycemia     HEENT: Oral care    PULMONARY:  HOB @ 45 degrees.  Aspiration precautions.  Wean O2.      CARDIOVASCULAR:  BP control.  Avoid overload     GI: GI prophylaxis.  Feeding.  Bowel regimen     RENAL:  Follow up lytes.  Correct as needed.  HD per renal     INFECTIOUS DISEASE: Follow up cultures.  DC ABX if cultures neg.  Procal noted     HEMATOLOGICAL:  DVT prophylaxis.  Dimer     ENDOCRINE:  Follow up FS.  Endo follow up     MUSCULOSKELETAL:  OOB to chair urbano PT OT     DGTF

## 2023-05-02 NOTE — PROGRESS NOTE ADULT - ASSESSMENT
57 year old (non smoker) female patient ([place] Speaking) known to have DM, ESRD on HD, HTN and DLD admitted for AMS and seizures likely secondary to hypoglycemia.    Assessment and plan  ESRD on HD/ Seizure/ HTN/ DM  HD yesterday, next tomorrow  BP controlled, c/w home medications  Hgb at target  Last Phos  elevated, repeat Phos and PTH  will follow

## 2023-05-03 LAB
-  AMPICILLIN: SIGNIFICANT CHANGE UP
-  CIPROFLOXACIN: SIGNIFICANT CHANGE UP
-  LEVOFLOXACIN: SIGNIFICANT CHANGE UP
-  NITROFURANTOIN: SIGNIFICANT CHANGE UP
-  TETRACYCLINE: SIGNIFICANT CHANGE UP
-  VANCOMYCIN: SIGNIFICANT CHANGE UP
ALBUMIN SERPL ELPH-MCNC: 3.6 G/DL — SIGNIFICANT CHANGE UP (ref 3.5–5.2)
ALBUMIN SERPL ELPH-MCNC: 4 G/DL — SIGNIFICANT CHANGE UP (ref 3.5–5.2)
ALP SERPL-CCNC: 118 U/L — HIGH (ref 30–115)
ALP SERPL-CCNC: 125 U/L — HIGH (ref 30–115)
ALT FLD-CCNC: 9 U/L — SIGNIFICANT CHANGE UP (ref 0–41)
ALT FLD-CCNC: 9 U/L — SIGNIFICANT CHANGE UP (ref 0–41)
ANION GAP SERPL CALC-SCNC: 12 MMOL/L — SIGNIFICANT CHANGE UP (ref 7–14)
ANION GAP SERPL CALC-SCNC: 15 MMOL/L — HIGH (ref 7–14)
ANION GAP SERPL CALC-SCNC: 22 MMOL/L — HIGH (ref 7–14)
ANION GAP SERPL CALC-SCNC: 25 MMOL/L — HIGH (ref 7–14)
AST SERPL-CCNC: 13 U/L — SIGNIFICANT CHANGE UP (ref 0–41)
AST SERPL-CCNC: 15 U/L — SIGNIFICANT CHANGE UP (ref 0–41)
B-OH-BUTYR SERPL-SCNC: 0.9 MMOL/L — HIGH
B-OH-BUTYR SERPL-SCNC: 3.9 MMOL/L — HIGH
B-OH-BUTYR SERPL-SCNC: 6 MMOL/L — HIGH
BASE EXCESS BLDA CALC-SCNC: 2.4 MMOL/L — SIGNIFICANT CHANGE UP (ref -2–3)
BASOPHILS # BLD AUTO: 0.04 K/UL — SIGNIFICANT CHANGE UP (ref 0–0.2)
BASOPHILS # BLD AUTO: 0.04 K/UL — SIGNIFICANT CHANGE UP (ref 0–0.2)
BASOPHILS NFR BLD AUTO: 1.1 % — HIGH (ref 0–1)
BASOPHILS NFR BLD AUTO: 1.7 % — HIGH (ref 0–1)
BILIRUB SERPL-MCNC: 0.5 MG/DL — SIGNIFICANT CHANGE UP (ref 0.2–1.2)
BILIRUB SERPL-MCNC: 0.7 MG/DL — SIGNIFICANT CHANGE UP (ref 0.2–1.2)
BUN SERPL-MCNC: 32 MG/DL — HIGH (ref 10–20)
BUN SERPL-MCNC: 36 MG/DL — HIGH (ref 10–20)
BUN SERPL-MCNC: 41 MG/DL — HIGH (ref 10–20)
BUN SERPL-MCNC: 80 MG/DL — CRITICAL HIGH (ref 10–20)
CALCIUM SERPL-MCNC: 8.4 MG/DL — SIGNIFICANT CHANGE UP (ref 8.4–10.5)
CALCIUM SERPL-MCNC: 8.5 MG/DL — SIGNIFICANT CHANGE UP (ref 8.4–10.4)
CALCIUM SERPL-MCNC: 8.5 MG/DL — SIGNIFICANT CHANGE UP (ref 8.4–10.5)
CALCIUM SERPL-MCNC: 9.1 MG/DL — SIGNIFICANT CHANGE UP (ref 8.4–10.4)
CHLORIDE SERPL-SCNC: 89 MMOL/L — LOW (ref 98–110)
CHLORIDE SERPL-SCNC: 92 MMOL/L — LOW (ref 98–110)
CHLORIDE SERPL-SCNC: 93 MMOL/L — LOW (ref 98–110)
CHLORIDE SERPL-SCNC: 97 MMOL/L — LOW (ref 98–110)
CK MB CFR SERPL CALC: 2.7 NG/ML — SIGNIFICANT CHANGE UP (ref 0.6–6.3)
CO2 SERPL-SCNC: 16 MMOL/L — LOW (ref 17–32)
CO2 SERPL-SCNC: 19 MMOL/L — SIGNIFICANT CHANGE UP (ref 17–32)
CO2 SERPL-SCNC: 23 MMOL/L — SIGNIFICANT CHANGE UP (ref 17–32)
CO2 SERPL-SCNC: 28 MMOL/L — SIGNIFICANT CHANGE UP (ref 17–32)
CREAT SERPL-MCNC: 2.8 MG/DL — HIGH (ref 0.7–1.5)
CREAT SERPL-MCNC: 3.7 MG/DL — HIGH (ref 0.7–1.5)
CREAT SERPL-MCNC: 4.5 MG/DL — CRITICAL HIGH (ref 0.7–1.5)
CREAT SERPL-MCNC: 6.3 MG/DL — CRITICAL HIGH (ref 0.7–1.5)
CULTURE RESULTS: SIGNIFICANT CHANGE UP
D DIMER BLD IA.RAPID-MCNC: 247 NG/ML DDU — HIGH
EGFR: 11 ML/MIN/1.73M2 — LOW
EGFR: 14 ML/MIN/1.73M2 — LOW
EGFR: 19 ML/MIN/1.73M2 — LOW
EGFR: 7 ML/MIN/1.73M2 — LOW
EOSINOPHIL # BLD AUTO: 0.05 K/UL — SIGNIFICANT CHANGE UP (ref 0–0.7)
EOSINOPHIL # BLD AUTO: 0.13 K/UL — SIGNIFICANT CHANGE UP (ref 0–0.7)
EOSINOPHIL NFR BLD AUTO: 2.6 % — SIGNIFICANT CHANGE UP (ref 0–8)
EOSINOPHIL NFR BLD AUTO: 3.5 % — SIGNIFICANT CHANGE UP (ref 0–8)
GIANT PLATELETS BLD QL SMEAR: PRESENT — SIGNIFICANT CHANGE UP
GLUCOSE BLDC GLUCOMTR-MCNC: 118 MG/DL — HIGH (ref 70–99)
GLUCOSE BLDC GLUCOMTR-MCNC: 219 MG/DL — HIGH (ref 70–99)
GLUCOSE BLDC GLUCOMTR-MCNC: 219 MG/DL — HIGH (ref 70–99)
GLUCOSE BLDC GLUCOMTR-MCNC: 242 MG/DL — HIGH (ref 70–99)
GLUCOSE BLDC GLUCOMTR-MCNC: 321 MG/DL — HIGH (ref 70–99)
GLUCOSE BLDC GLUCOMTR-MCNC: 363 MG/DL — HIGH (ref 70–99)
GLUCOSE BLDC GLUCOMTR-MCNC: 367 MG/DL — HIGH (ref 70–99)
GLUCOSE BLDC GLUCOMTR-MCNC: 424 MG/DL — HIGH (ref 70–99)
GLUCOSE BLDC GLUCOMTR-MCNC: 502 MG/DL — CRITICAL HIGH (ref 70–99)
GLUCOSE BLDC GLUCOMTR-MCNC: 512 MG/DL — CRITICAL HIGH (ref 70–99)
GLUCOSE BLDC GLUCOMTR-MCNC: 518 MG/DL — CRITICAL HIGH (ref 70–99)
GLUCOSE BLDC GLUCOMTR-MCNC: 73 MG/DL — SIGNIFICANT CHANGE UP (ref 70–99)
GLUCOSE BLDC GLUCOMTR-MCNC: 79 MG/DL — SIGNIFICANT CHANGE UP (ref 70–99)
GLUCOSE SERPL-MCNC: 105 MG/DL — HIGH (ref 70–99)
GLUCOSE SERPL-MCNC: 237 MG/DL — HIGH (ref 70–99)
GLUCOSE SERPL-MCNC: 468 MG/DL — CRITICAL HIGH (ref 70–99)
GLUCOSE SERPL-MCNC: 524 MG/DL — CRITICAL HIGH (ref 70–99)
HCO3 BLDA-SCNC: 26 MMOL/L — SIGNIFICANT CHANGE UP (ref 21–28)
HCT VFR BLD CALC: 33.2 % — LOW (ref 37–47)
HCT VFR BLD CALC: 49.7 % — HIGH (ref 37–47)
HGB BLD-MCNC: 10.7 G/DL — LOW (ref 12–16)
HGB BLD-MCNC: 16.7 G/DL — HIGH (ref 12–16)
HOROWITZ INDEX BLDA+IHG-RTO: 21 — SIGNIFICANT CHANGE UP
IMM GRANULOCYTES NFR BLD AUTO: 0.3 % — SIGNIFICANT CHANGE UP (ref 0.1–0.3)
LYMPHOCYTES # BLD AUTO: 0.46 K/UL — LOW (ref 1.2–3.4)
LYMPHOCYTES # BLD AUTO: 1.43 K/UL — SIGNIFICANT CHANGE UP (ref 1.2–3.4)
LYMPHOCYTES # BLD AUTO: 22.2 % — SIGNIFICANT CHANGE UP (ref 20.5–51.1)
LYMPHOCYTES # BLD AUTO: 38.6 % — SIGNIFICANT CHANGE UP (ref 20.5–51.1)
MAGNESIUM SERPL-MCNC: 2.3 MG/DL — SIGNIFICANT CHANGE UP (ref 1.8–2.4)
MAGNESIUM SERPL-MCNC: 3 MG/DL — HIGH (ref 1.8–2.4)
MANUAL SMEAR VERIFICATION: SIGNIFICANT CHANGE UP
MCHC RBC-ENTMCNC: 31 PG — SIGNIFICANT CHANGE UP (ref 27–31)
MCHC RBC-ENTMCNC: 31.6 PG — HIGH (ref 27–31)
MCHC RBC-ENTMCNC: 32.2 G/DL — SIGNIFICANT CHANGE UP (ref 32–37)
MCHC RBC-ENTMCNC: 33.6 G/DL — SIGNIFICANT CHANGE UP (ref 32–37)
MCV RBC AUTO: 92.2 FL — SIGNIFICANT CHANGE UP (ref 81–99)
MCV RBC AUTO: 97.9 FL — SIGNIFICANT CHANGE UP (ref 81–99)
METAMYELOCYTES # FLD: 0.9 % — HIGH (ref 0–0)
METHOD TYPE: SIGNIFICANT CHANGE UP
MONOCYTES # BLD AUTO: 0.18 K/UL — SIGNIFICANT CHANGE UP (ref 0.1–0.6)
MONOCYTES # BLD AUTO: 0.27 K/UL — SIGNIFICANT CHANGE UP (ref 0.1–0.6)
MONOCYTES NFR BLD AUTO: 7.3 % — SIGNIFICANT CHANGE UP (ref 1.7–9.3)
MONOCYTES NFR BLD AUTO: 8.5 % — SIGNIFICANT CHANGE UP (ref 1.7–9.3)
NEUTROPHILS # BLD AUTO: 1.22 K/UL — LOW (ref 1.4–6.5)
NEUTROPHILS # BLD AUTO: 1.82 K/UL — SIGNIFICANT CHANGE UP (ref 1.4–6.5)
NEUTROPHILS NFR BLD AUTO: 49.2 % — SIGNIFICANT CHANGE UP (ref 42.2–75.2)
NEUTROPHILS NFR BLD AUTO: 59 % — SIGNIFICANT CHANGE UP (ref 42.2–75.2)
NRBC # BLD: 0 /100 WBCS — SIGNIFICANT CHANGE UP (ref 0–0)
ORGANISM # SPEC MICROSCOPIC CNT: SIGNIFICANT CHANGE UP
ORGANISM # SPEC MICROSCOPIC CNT: SIGNIFICANT CHANGE UP
OVALOCYTES BLD QL SMEAR: SLIGHT — SIGNIFICANT CHANGE UP
PCO2 BLDA: 38 MMHG — SIGNIFICANT CHANGE UP (ref 25–48)
PH BLDA: 7.45 — SIGNIFICANT CHANGE UP (ref 7.35–7.45)
PHOSPHATE SERPL-MCNC: 2.9 MG/DL — SIGNIFICANT CHANGE UP (ref 2.1–4.9)
PLAT MORPH BLD: NORMAL — SIGNIFICANT CHANGE UP
PLATELET # BLD AUTO: 125 K/UL — LOW (ref 130–400)
PLATELET # BLD AUTO: 192 K/UL — SIGNIFICANT CHANGE UP (ref 130–400)
PMV BLD: 12 FL — HIGH (ref 7.4–10.4)
PMV BLD: SIGNIFICANT CHANGE UP (ref 7.4–10.4)
PO2 BLDA: 88 MMHG — SIGNIFICANT CHANGE UP (ref 83–108)
POIKILOCYTOSIS BLD QL AUTO: SLIGHT — SIGNIFICANT CHANGE UP
POTASSIUM SERPL-MCNC: 3.7 MMOL/L — SIGNIFICANT CHANGE UP (ref 3.5–5)
POTASSIUM SERPL-MCNC: 4.3 MMOL/L — SIGNIFICANT CHANGE UP (ref 3.5–5)
POTASSIUM SERPL-MCNC: 5.1 MMOL/L — HIGH (ref 3.5–5)
POTASSIUM SERPL-MCNC: 6 MMOL/L — CRITICAL HIGH (ref 3.5–5)
POTASSIUM SERPL-SCNC: 3.7 MMOL/L — SIGNIFICANT CHANGE UP (ref 3.5–5)
POTASSIUM SERPL-SCNC: 4.3 MMOL/L — SIGNIFICANT CHANGE UP (ref 3.5–5)
POTASSIUM SERPL-SCNC: 5.1 MMOL/L — HIGH (ref 3.5–5)
POTASSIUM SERPL-SCNC: 6 MMOL/L — CRITICAL HIGH (ref 3.5–5)
PROT SERPL-MCNC: 5.9 G/DL — LOW (ref 6–8)
PROT SERPL-MCNC: 6.6 G/DL — SIGNIFICANT CHANGE UP (ref 6–8)
RBC # BLD: 3.39 M/UL — LOW (ref 4.2–5.4)
RBC # BLD: 5.39 M/UL — SIGNIFICANT CHANGE UP (ref 4.2–5.4)
RBC # FLD: 12 % — SIGNIFICANT CHANGE UP (ref 11.5–14.5)
RBC # FLD: 12.2 % — SIGNIFICANT CHANGE UP (ref 11.5–14.5)
RBC BLD AUTO: ABNORMAL
SAO2 % BLDA: 96.7 % — SIGNIFICANT CHANGE UP (ref 94–98)
SODIUM SERPL-SCNC: 130 MMOL/L — LOW (ref 135–146)
SODIUM SERPL-SCNC: 131 MMOL/L — LOW (ref 135–146)
SODIUM SERPL-SCNC: 133 MMOL/L — LOW (ref 135–146)
SODIUM SERPL-SCNC: 137 MMOL/L — SIGNIFICANT CHANGE UP (ref 135–146)
SPECIMEN SOURCE: SIGNIFICANT CHANGE UP
TROPONIN T SERPL-MCNC: 0.07 NG/ML — CRITICAL HIGH
TROPONIN T SERPL-MCNC: 0.08 NG/ML — CRITICAL HIGH
TROPONIN T SERPL-MCNC: 0.1 NG/ML — CRITICAL HIGH
VARIANT LYMPHS # BLD: 5.1 % — HIGH (ref 0–5)
WBC # BLD: 2.07 K/UL — LOW (ref 4.8–10.8)
WBC # BLD: 3.7 K/UL — LOW (ref 4.8–10.8)
WBC # FLD AUTO: 2.07 K/UL — LOW (ref 4.8–10.8)
WBC # FLD AUTO: 3.7 K/UL — LOW (ref 4.8–10.8)

## 2023-05-03 PROCEDURE — 99233 SBSQ HOSP IP/OBS HIGH 50: CPT

## 2023-05-03 PROCEDURE — 71045 X-RAY EXAM CHEST 1 VIEW: CPT | Mod: 26

## 2023-05-03 PROCEDURE — 93010 ELECTROCARDIOGRAM REPORT: CPT

## 2023-05-03 PROCEDURE — 76937 US GUIDE VASCULAR ACCESS: CPT | Mod: 26

## 2023-05-03 PROCEDURE — 93010 ELECTROCARDIOGRAM REPORT: CPT | Mod: 77

## 2023-05-03 RX ORDER — INSULIN HUMAN 100 [IU]/ML
6 INJECTION, SOLUTION SUBCUTANEOUS ONCE
Refills: 0 | Status: COMPLETED | OUTPATIENT
Start: 2023-05-03 | End: 2023-05-03

## 2023-05-03 RX ORDER — INSULIN GLARGINE 100 [IU]/ML
16 INJECTION, SOLUTION SUBCUTANEOUS EVERY MORNING
Refills: 0 | Status: DISCONTINUED | OUTPATIENT
Start: 2023-05-03 | End: 2023-05-03

## 2023-05-03 RX ORDER — INSULIN GLARGINE 100 [IU]/ML
16 INJECTION, SOLUTION SUBCUTANEOUS AT BEDTIME
Refills: 0 | Status: DISCONTINUED | OUTPATIENT
Start: 2023-05-03 | End: 2023-05-07

## 2023-05-03 RX ORDER — SODIUM CHLORIDE 9 MG/ML
1000 INJECTION, SOLUTION INTRAVENOUS
Refills: 0 | Status: DISCONTINUED | OUTPATIENT
Start: 2023-05-03 | End: 2023-05-03

## 2023-05-03 RX ORDER — INSULIN HUMAN 100 [IU]/ML
10 INJECTION, SOLUTION SUBCUTANEOUS ONCE
Refills: 0 | Status: COMPLETED | OUTPATIENT
Start: 2023-05-03 | End: 2023-05-03

## 2023-05-03 RX ORDER — ACETAMINOPHEN 500 MG
975 TABLET ORAL ONCE
Refills: 0 | Status: COMPLETED | OUTPATIENT
Start: 2023-05-03 | End: 2023-05-03

## 2023-05-03 RX ORDER — INSULIN LISPRO 100/ML
5 VIAL (ML) SUBCUTANEOUS
Refills: 0 | Status: DISCONTINUED | OUTPATIENT
Start: 2023-05-03 | End: 2023-05-09

## 2023-05-03 RX ORDER — INSULIN HUMAN 100 [IU]/ML
10 INJECTION, SOLUTION SUBCUTANEOUS ONCE
Refills: 0 | Status: DISCONTINUED | OUTPATIENT
Start: 2023-05-03 | End: 2023-05-03

## 2023-05-03 RX ORDER — INSULIN HUMAN 100 [IU]/ML
7 INJECTION, SOLUTION SUBCUTANEOUS
Qty: 100 | Refills: 0 | Status: DISCONTINUED | OUTPATIENT
Start: 2023-05-03 | End: 2023-05-03

## 2023-05-03 RX ADMIN — INSULIN HUMAN 10 UNIT(S): 100 INJECTION, SOLUTION SUBCUTANEOUS at 21:50

## 2023-05-03 RX ADMIN — INSULIN HUMAN 7 UNIT(S)/HR: 100 INJECTION, SOLUTION SUBCUTANEOUS at 09:25

## 2023-05-03 RX ADMIN — SODIUM CHLORIDE 75 MILLILITER(S): 9 INJECTION, SOLUTION INTRAVENOUS at 09:21

## 2023-05-03 RX ADMIN — INSULIN HUMAN 6 UNIT(S): 100 INJECTION, SOLUTION SUBCUTANEOUS at 09:22

## 2023-05-03 RX ADMIN — PANTOPRAZOLE SODIUM 40 MILLIGRAM(S): 20 TABLET, DELAYED RELEASE ORAL at 05:21

## 2023-05-03 RX ADMIN — INSULIN GLARGINE 16 UNIT(S): 100 INJECTION, SOLUTION SUBCUTANEOUS at 21:26

## 2023-05-03 RX ADMIN — HEPARIN SODIUM 5000 UNIT(S): 5000 INJECTION INTRAVENOUS; SUBCUTANEOUS at 15:00

## 2023-05-03 RX ADMIN — CARVEDILOL PHOSPHATE 12.5 MILLIGRAM(S): 80 CAPSULE, EXTENDED RELEASE ORAL at 17:44

## 2023-05-03 RX ADMIN — Medication 4: at 17:50

## 2023-05-03 RX ADMIN — Medication 12: at 07:58

## 2023-05-03 RX ADMIN — HEPARIN SODIUM 5000 UNIT(S): 5000 INJECTION INTRAVENOUS; SUBCUTANEOUS at 05:22

## 2023-05-03 RX ADMIN — Medication 975 MILLIGRAM(S): at 16:15

## 2023-05-03 RX ADMIN — Medication 25 MILLIGRAM(S): at 05:22

## 2023-05-03 RX ADMIN — Medication 81 MILLIGRAM(S): at 14:58

## 2023-05-03 RX ADMIN — Medication 25 MILLIGRAM(S): at 14:58

## 2023-05-03 RX ADMIN — CHLORHEXIDINE GLUCONATE 1 APPLICATION(S): 213 SOLUTION TOPICAL at 05:24

## 2023-05-03 RX ADMIN — HEPARIN SODIUM 5000 UNIT(S): 5000 INJECTION INTRAVENOUS; SUBCUTANEOUS at 21:26

## 2023-05-03 RX ADMIN — CARVEDILOL PHOSPHATE 12.5 MILLIGRAM(S): 80 CAPSULE, EXTENDED RELEASE ORAL at 05:21

## 2023-05-03 RX ADMIN — ATORVASTATIN CALCIUM 10 MILLIGRAM(S): 80 TABLET, FILM COATED ORAL at 21:25

## 2023-05-03 RX ADMIN — Medication 975 MILLIGRAM(S): at 15:15

## 2023-05-03 NOTE — PROGRESS NOTE ADULT - ATTENDING COMMENTS
Impression:    DKA  Recent seizure due to hypoglycemia  Hypertensive Urgency in Setting of History of Hypertension resolved   History of Dyslipidemia and ESRD on MWF HD via LUE AV fistula    Impression and plan above are my own.

## 2023-05-03 NOTE — PROGRESS NOTE ADULT - ASSESSMENT
57 year old (non smoker) female patient ([place] Speaking) known to have DM, ESRD on HD, HTN and DLD admitted for AMS and seizures likely secondary to hypoglycemia.    Assessment and plan  ESRD on HD/ Seizure/ hypoglycemia / HTN/ DM  upgraded to ICU for DKA, on insulin drip during HD today, 2K bath, UF 2L as tolerated  BP controlled, c/w home medications  phos at goal (was drawn post HD, so should check another level pre-HD)  will follow

## 2023-05-03 NOTE — PHYSICAL THERAPY INITIAL EVALUATION ADULT - SPECIFY REASON(S)
Pt recently returned from HD, c/o of chest pain, /88, MD present at bedside. Recommend to hold PT and f/u when appropriate.

## 2023-05-03 NOTE — PROGRESS NOTE ADULT - SUBJECTIVE AND OBJECTIVE BOX
SUBJECTIVE:    Patient is a 57y old Female who presents with a chief complaint of Hypoglycemic Seizure (03 May 2023 15:44)    Currently admitted to medicine with the primary diagnosis of: Hypoglycemia    Today is hospital day 2d.     Overnight Events:     FS were high last night and this AM    PAST MEDICAL & SURGICAL HISTORY  Chronic kidney disease, unspecified CKD stage      SOCIAL HISTORY:  no significant social hx    ALLERGIES:  No Known Allergies    MEDICATIONS:  STANDING MEDICATIONS  aspirin  chewable 81 milliGRAM(s) Oral daily  atorvastatin 10 milliGRAM(s) Oral at bedtime  carvedilol 12.5 milliGRAM(s) Oral every 12 hours  chlorhexidine 4% Liquid 1 Application(s) Topical <User Schedule>  dextrose 5%. 1000 milliLiter(s) IV Continuous <Continuous>  dextrose 5%. 1000 milliLiter(s) IV Continuous <Continuous>  dextrose 50% Injectable 25 Gram(s) IV Push once  dextrose 50% Injectable 12.5 Gram(s) IV Push once  dextrose 50% Injectable 25 Gram(s) IV Push once  glucagon  Injectable 1 milliGRAM(s) IntraMuscular once  heparin   Injectable 5000 Unit(s) SubCutaneous every 8 hours  hydrALAZINE 25 milliGRAM(s) Oral every 8 hours  insulin glargine Injectable (LANTUS) 16 Unit(s) SubCutaneous at bedtime  insulin lispro (ADMELOG) corrective regimen sliding scale   SubCutaneous three times a day before meals  insulin lispro Injectable (ADMELOG) 5 Unit(s) SubCutaneous three times a day before meals  pantoprazole    Tablet 40 milliGRAM(s) Oral before breakfast    PRN MEDICATIONS  acetaminophen     Tablet .. 650 milliGRAM(s) Oral every 6 hours PRN  dextrose Oral Gel 15 Gram(s) Oral once PRN    VITALS:   ICU Vital Signs Last 24 Hrs  T(C): 36.2 (03 May 2023 04:50), Max: 36.2 (03 May 2023 04:50)  T(F): 97.1 (03 May 2023 04:50), Max: 97.1 (03 May 2023 04:50)  HR: 70 (03 May 2023 17:38) (70 - 77)  BP: 167/77 (03 May 2023 17:38) (118/58 - 167/77)  BP(mean): 92 (03 May 2023 04:50) (92 - 92)  RR: 18 (03 May 2023 04:50) (18 - 18)      LABS:                        16.7   2.07  )-----------( 125      ( 03 May 2023 13:25 )             49.7     05-03    137  |  97<L>  |  32<H>  ----------------------------<  105<H>  3.7   |  28  |  2.8<H>    Ca    9.1      03 May 2023 13:25  Phos  2.9     05-03  Mg     2.3     05-03    TPro  6.6  /  Alb  4.0  /  TBili  0.7  /  DBili  x   /  AST  15  /  ALT  9   /  AlkPhos  118<H>  05-03        Troponin T, Serum: 0.10 ng/mL *HH* (05-03-23 @ 15:04)      Culture - Blood (collected 01 May 2023 03:00)  Source: .Blood Blood  Preliminary Report (02 May 2023 14:01):    No growth to date.    Culture - Blood (collected 01 May 2023 03:00)  Source: .Blood Blood  Preliminary Report (02 May 2023 14:01):    No growth to date.    Culture - Urine (collected 01 May 2023 01:42)  Source: Catheterized Catheterized  Preliminary Report (02 May 2023 22:04):    >100,000 CFU/ml Enterococcus faecalis      CARDIAC MARKERS ( 03 May 2023 15:04 )  x     / 0.10 ng/mL / x     / x     / 2.7 ng/mL  CARDIAC MARKERS ( 01 May 2023 20:42 )  x     / 0.06 ng/mL / x     / x     / x          RADIOLOGY:    < from: Xray Chest 1 View-PORTABLE IMMEDIATE (Xray Chest 1 View-PORTABLE IMMEDIATE .) (05.03.23 @ 16:14) >    ACC: [id number] EXAM:  XR CHEST PORTABLE IMMED 1V   ORDERED BY: KECIA COMER     PROCEDURE DATE:  05/03/2023      INTERPRETATION:  Clinical History / Reason for exam: Chest pain.    Comparison : Chest radiograph 5/2/2023.    Technique/Positioning: Frontal view of the chest.    Findings:    Support devices: Overlying telemetry leads.    Cardiac/mediastinum/hilum: Unchanged.    Lung parenchyma/Pleura: No focal consolidation, pleural effusion, or   pneumothorax.    Skeleton/soft tissues: Unchanged.    Impression:    No radiographic evidence of acute cardiopulmonary disease.    --- End of Report ---      PHYSICAL EXAM:  GEN: ill appearing, in NAD  LUNGS: clear to ascultation b/l, no active wheezing or crackles  HEART: normal rate and rhythm  ABD: soft, nontender, nondistended, +BS, no guarding  EXT: no edema in b/l LEs  NEURO: A&Ox3, Citizen of [place] speaking only    Lines:  Brandt Catheter:   Indwelling Urethral Catheter:     Connect To:  Straight Drainage/Gravity    Indication:  Urine Output Monitoring in Critically Ill (05-01-23 @ 04:12) (not performed) [Active]

## 2023-05-03 NOTE — PROGRESS NOTE ADULT - ASSESSMENT
57 year old (non smoker) female patient ([place] Speaking) pmhx of IDDM, ESRD, HTN and HLD presented to ED by EMS on 4/30 following an episode of a witnessed seizure. Serum glucose on arrival was 35, AG was 26, Lactate was 6.4    #DKA  - patient was found to be in DKA this AM, upgraded to ICU level of care w/ insulin drip during HD and downgraded back to 3E after HD  - s/p insulin drip; gap closed today, continue to monitor  - was placed on D5W at 50cc as patient has minimal urine production in the setting of ESRD  - c/w basal bolus regimen 16u Lantus at bedtime and 5u Lispro qac with ISS  - keep sugars 140-180, monitor FS  - monitor BMP for AG  - adjust insulin regimen PRN    #Chest pain- atypical  #Suspecting Type II NSTEMI  - not in respiratory distress, saturating well on RA, lungs are clear, chest pain not reproducible, no rash, occurred in the setting of DKA and post HD  - EKG NSR  - CXR wnl  - ECHO 5/3 EF 74%, no DD  - Trops 0.10 (baseline of 0.06), CKMB 2.7, D-dimer 247  - c/w aspirin     - tylenol prn for pain  - repeat EKG in PM and tomorrow AM and trend trops as per cardio  - if EKG changes or trops continue to uptrend, consider official cardio consult    #Acute hypoxic respiratory failure- resolved   #Seizure 2/2 hypoglycemia  #Lactic acidosis- resolved  #Hx of IDDM   #Suspected aspiration pneumonitis  - CXR (05/02): No focal consolidation, pneumonthorax, or pleural effusion  - CTAP 05/01: Hazy patchy left basilar pulmonary opacity  - Currently on RA saturating well   - passed swallow test: Easy to Chew  - Cont vanc/cefepime for now; UCx growing E. faecalis   - f/u BCx, DC abx if negative  - Endo eval for DM regimen recs on discharge    #Hypertensive urgency  - cont HTN home meds (coreg, hydralazine)  - c/w hydralazine 25 TID     #HLD  - cont atorvastatin    #ESRD on HD  - HD today  - HD per nephrology    #Misc  - DVT ppx: heparin  - GI ppx: PPI  - Diet: Carb Consistent, Renal diet, Easy to Chew  - Activity: IAT, pending PT  - Code Status: Full Code  - Dispo: Home  - Family discussion: Plan of care discussed with patient, aware and agreeable. Tried calling daughter Rhianna at [phone], no response.     57 year old (non smoker) female patient ([place] Speaking) pmhx of IDDM, ESRD, HTN and HLD presented to ED by EMS on 4/30 following an episode of a witnessed seizure. Serum glucose on arrival was 35, AG was 26, Lactate was 6.4    - not w/ chronic murillo, placed for critically ill patient, consider TOV in AM if patient's DKA resolves. Murillo placed here with minimal urine output    #DKA  - patient was found to be in DKA this AM, upgraded to ICU level of care w/ insulin drip during HD and downgraded back to 3E after HD  - s/p insulin drip; gap closed today, continue to monitor  - was placed on D5W at 50cc as patient has minimal urine production in the setting of ESRD  - c/w basal bolus regimen 16u Lantus at bedtime and 5u Lispro qac with ISS  - keep sugars 140-180, monitor FS  - monitor BMP for AG  - adjust insulin regimen PRN    #Chest pain- atypical  #Suspecting Type II NSTEMI  - not in respiratory distress, saturating well on RA, lungs are clear, chest pain not reproducible, no rash, occurred in the setting of DKA and post HD  - EKG NSR  - CXR wnl  - ECHO 5/3 EF 74%, no DD  - Trops 0.10 (baseline of 0.06), CKMB 2.7, D-dimer 247  - c/w aspirin     - tylenol prn for pain  - repeat EKG in PM and tomorrow AM and trend trops as per cardio  - if EKG changes or trops continue to uptrend, consider official cardio consult    #Acute hypoxic respiratory failure- resolved   #Seizure 2/2 hypoglycemia  #Lactic acidosis- resolved  #Hx of IDDM   #Suspected aspiration pneumonitis  - CXR (05/02): No focal consolidation, pneumonthorax, or pleural effusion  - CTAP 05/01: Hazy patchy left basilar pulmonary opacity  - Currently on RA saturating well   - passed swallow test: Easy to Chew  - Cont vanc/cefepime for now; UCx growing E. faecalis   - f/u BCx, DC abx if negative  - Endo eval for DM regimen recs on discharge    #Hypertensive urgency  - cont HTN home meds (coreg, hydralazine)  - c/w hydralazine 25 TID     #HLD  - cont atorvastatin    #ESRD on HD  - HD today  - HD per nephrology    #Misc  - DVT ppx: heparin  - GI ppx: PPI  - Diet: Carb Consistent, Renal diet, Easy to Chew  - Activity: IAT, pending PT  - Code Status: Full Code  - Dispo: Home  - Family discussion: Plan of care discussed with patient, aware and agreeable. Tried calling daughter Rhianna at [phone], no response.     57 year old (non smoker) female patient ([place] Speaking) pmhx of IDDM, ESRD, HTN and HLD presented to ED by EMS on 4/30 following an episode of a witnessed seizure. Serum glucose on arrival was 35, AG was 26, Lactate was 6.4    - not w/ chronic murillo, placed for critically ill patient, consider TOV in AM if patient's DKA resolves. Murillo placed here with minimal urine output    #DKA  - patient was found to be in DKA this AM, upgraded to ICU level of care w/ insulin drip during HD and downgraded back to 3E after HD  - s/p insulin drip; gap closed today, continue to monitor  - was placed on D5W at 50cc as patient has minimal urine production in the setting of ESRD  - c/w basal bolus regimen 16u Lantus at bedtime and 5u Lispro qac with ISS  - keep sugars 140-180, monitor FS  - monitor BMP for AG  - adjust insulin regimen PRN    #Chest pain- atypical  #Suspecting Type II NSTEMI  - not in respiratory distress, saturating well on RA, lungs are clear, chest pain not reproducible, no rash, occurred in the setting of DKA and post HD  - EKG NSR  - CXR wnl  - ECHO 5/3 EF 74%, no DD  - Trops 0.10 (baseline of 0.06), CKMB 2.7, D-dimer 247  - c/w aspirin     - tylenol prn for pain  - repeat EKG in PM and tomorrow AM and trend trops as per cardio  - if EKG changes or trops continue to uptrend, consider official cardio consult    #Asymptomatic Bacteruria  - CT A/P does not show evidence of cystitis or hydronephrosis  - murillo placed here, not chronic w/ minimum urine output in the setting of ESRD  - UCx grew E. Facaelis  - monitor off Abx for now  - monitor fever curve and WBC or urinary symptoms or suprapubic tenderness/flank pain    #Acute hypoxic respiratory failure- resolved   #Seizure 2/2 hypoglycemia  #Lactic acidosis- resolved  #Hx of IDDM   #Suspected aspiration pneumonitis  - CXR (05/02): No focal consolidation, pneumonthorax, or pleural effusion  - CTAP 05/01: Hazy patchy left basilar pulmonary opacity  - Currently on RA saturating well   - passed swallow test: Easy to Chew  - Cont vanc/cefepime for now; UCx growing E. faecalis   - f/u BCx, DC abx if negative  - Endo eval for DM regimen recs on discharge    #Hypertensive urgency  - cont HTN home meds (coreg, hydralazine)  - c/w hydralazine 25 TID     #HLD  - cont atorvastatin    #ESRD on HD  - HD today  - HD per nephrology    #Misc  - DVT ppx: heparin  - GI ppx: PPI  - Diet: Carb Consistent, Renal diet, Easy to Chew  - Activity: IAT, pending PT  - Code Status: Full Code  - Dispo: Home  - Family discussion: Plan of care discussed with patient, aware and agreeable. Tried calling daughter Rhianna at [phone], no response.

## 2023-05-03 NOTE — PROGRESS NOTE ADULT - ASSESSMENT
57 year old (non smoker) female patient ([place] Speaking) pmhx of IDDM, ESRD, HTN/HLD  Patient was brought to the ED by EMS on 04/30 following an episode of a witnessed seizure.    #DKA  -s/p insulin drip; gap closed today   -c/w basal bolus regimen 16u Lantus qam and 5u Lispro qac with ISS  -keep sugars 140-180   -monitor BMP    #Chest pain- atypical   -EKG NSR; repeat in AM   -trend troponin; get CK-MB   -CXR  -ECHO reviewed    -c/w aspirin     -tylenol prn for pain     #Acute hypoxic respiratory failure- resolved   #Seizure 2/2 hypoglycemia  #Lactic acidosis- resolved  #Hx of IDDM   #Susepcted aspiration pneumonitis  -CXR (05/02): No focal consolidation, pneumonthorax, or pleural effusion  -CTAP 05/01: Hazy patchy left basilar pulmonary opacity  -Currently on RA saturating well   - passed swallow test: Easy to Chew  - Cont vanc/cefepime for now; UCx growing E. faecalis   - f/u BCx, DC abx if negative  - Endo eval for DM regimen recs on discharge    #Hypertensive urgency  - cont HTN home meds (coreg, hydralazine)  - c/w hydralazine 25 TID     #HLD  - cont atorvastatin    #ESRD on HD  - HD per nephrology    DVT PPX heparin    #Progress Note Handoff  Pending (specify): trend trops, CXR, monitor FS   Family discussion: Plan of care discussed with patient, aware and agreeable. Tried calling daughter Rhianna at [phone], no response.   Disposition: Home

## 2023-05-03 NOTE — PROGRESS NOTE ADULT - ASSESSMENT
Impression:    DKA  Recent seizure due to hypoglycemia  Hypertensive Urgency in Setting of History of Hypertension resolved   History of Dyslipidemia and ESRD on MWF HD via LUE AV fistula      PLAN:    CNS:  No depressants.  FU with Neuro.  Avoid hypoglycemia     HEENT: Oral care    PULMONARY:  HOB @ 45 degrees.  Aspiration precautions.  Wean O2.      CARDIOVASCULAR:  BP control.  Avoid overload     GI: GI prophylaxis.  Feeding.  Bowel regimen     RENAL:  Follow up lytes.  Correct as needed.  HD per renal     INFECTIOUS DISEASE: Follow up cultures.  DC ABX if cultures neg.  Procal noted     HEMATOLOGICAL:  DVT prophylaxis.  Dimer     ENDOCRINE:  FS q 1 hr, LR at 125/hr. Insulin gtt.    MUSCULOSKELETAL:  OOB to chair with PT OT     MICU     Impression:    DKA  Recent seizure due to hypoglycemia  Hypertensive Urgency in Setting of History of Hypertension resolved   History of Dyslipidemia and ESRD on MWF HD via LUE AV fistula      PLAN:    CNS:  No depressants.  FU with Neuro.  Avoid hypoglycemia     HEENT: Oral care    PULMONARY:  HOB @ 45 degrees.  Aspiration precautions.  Wean O2.      CARDIOVASCULAR:  BP control.  Avoid overload     GI: GI prophylaxis.  Feeding.  Bowel regimen     RENAL:  Follow up lytes.  Correct as needed.  HD today    INFECTIOUS DISEASE: Follow up cultures.  DC ABX if cultures neg.  Procal mildy elevated which could be seen in ESRD    HEMATOLOGICAL:  DVT prophylaxis.  Dimer     ENDOCRINE:  FS q 1 hr. Insulin gtt. Gentle Iv hydration (ESRD)    MUSCULOSKELETAL:  OOB to chair with PT OT     MICU until off the drip and AG closes      Impression:    DKA  Recent seizure due to hypoglycemia  Hypertensive Urgency in Setting of History of Hypertension resolved   History of Dyslipidemia and ESRD on MWF HD via LUE AV fistula      PLAN:    CNS:  No depressants.  FU with Neuro.  Avoid hypoglycemia     HEENT: Oral care    PULMONARY:  HOB @ 45 degrees.  Aspiration precautions.  Wean O2.  CXR noted.     CARDIOVASCULAR:  BP control.  Avoid overload     GI: GI prophylaxis.  Feeding.  Bowel regimen     RENAL:  Follow up lytes.  Correct as needed.  HD today. Gentle IV hydration at 75cc/hr. Keep equal balance.     INFECTIOUS DISEASE: Follow up cultures.  Procal mildy elevated which could be seen in ESRD. Afebrile. RVP negative.     HEMATOLOGICAL:  DVT prophylaxis.  Dimer     ENDOCRINE:  FS q 1 hr. Insulin drip at 0.1 units/kg for now. When FS is 200 or less then half the infusion and start D5NS.     MUSCULOSKELETAL:  OOB to chair with PT OT     MICU until off the drip and AG closes

## 2023-05-03 NOTE — PROGRESS NOTE ADULT - SUBJECTIVE AND OBJECTIVE BOX
Patient is a 57y old  Female who presents with a chief complaint of Hypoglycemic Seizure (02 May 2023 19:44)        Over Night Events:    Developed hyperglycemia and HAGMA, tolerating PO diet. Now on insulin gtt at 7 units/ hr.  Afebrile      ROS:  See HPI    PHYSICAL EXAM    ICU Vital Signs Last 24 Hrs  T(C): 36.2 (03 May 2023 04:50), Max: 36.2 (03 May 2023 04:50)  T(F): 97.1 (03 May 2023 04:50), Max: 97.1 (03 May 2023 04:50)  HR: 75 (03 May 2023 04:50) (75 - 80)  BP: 133/64 (03 May 2023 04:50) (118/58 - 171/70)  BP(mean): 92 (03 May 2023 04:50) (92 - 92)  ABP: --  ABP(mean): --  RR: 18 (03 May 2023 04:50) (18 - 19)  SpO2: --        05-02-23 @ 07:01  -  05-03-23 @ 07:00  --------------------------------------------------------  IN:  Total IN: 0 mL    OUT:    Indwelling Catheter - Urethral (mL): 500 mL    Stool (mL): 1 mL  Total OUT: 501 mL    Total NET: -501 mL      CONSTITUTIONAL:  Well nourished.  NAD    ENT:   Airway patent,   No thrush    EYES:   Clear bilaterally,   pupils equal,   round and reactive to light.    CARDIAC:   Normal rate,   regular rhythm.    no edema      CAROTID:   normal systolic impulse  no bruits    RESPIRATORY:   No wheezing  Normal chest expansion  Not tachypneic,  No use of accessory muscles    GASTROINTESTINAL:  Abdomen soft,   non-tender,   no guarding,   + BS    MUSCULOSKELETAL:   range of motion is not limited,  no clubbing, cyanosis    NEUROLOGICAL:   Alert and oriented   no motor deficits.        LABS:                            10.7   3.70  )-----------( 192      ( 03 May 2023 06:02 )             33.2                                               05-03    130<L>  |  89<L>  |  80<HH>  ----------------------------<  524<HH>  6.0<HH>   |  16<L>  |  6.3<HH>    Ca    8.4      03 May 2023 06:02  Mg     3.0     05-03    TPro  5.9<L>  /  Alb  3.6  /  TBili  0.5  /  DBili  x   /  AST  13  /  ALT  9   /  AlkPhos  125<H>  05-03                                                 CARDIAC MARKERS ( 01 May 2023 20:42 )  x     / 0.06 ng/mL / x     / x     / x      CARDIAC MARKERS ( 01 May 2023 12:46 )  x     / 0.06 ng/mL / x     / x     / x                                                LIVER FUNCTIONS - ( 03 May 2023 06:02 )  Alb: 3.6 g/dL / Pro: 5.9 g/dL / ALK PHOS: 125 U/L / ALT: 9 U/L / AST: 13 U/L / GGT: x                    Procalcitonin, Serum: 0.15 ng/mL (05-01-23 @ 12:46)  C-Reactive Protein, Serum: 3.4 mg/L (05-01-23 @ 12:46)                    POCT Blood Glucose.: 424 mg/dL (05-03-23 @ 09:36)  POCT Blood Glucose.: 502 mg/dL (05-03-23 @ 08:36)  POCT Blood Glucose.: 518 mg/dL (05-03-23 @ 07:53)  POCT Blood Glucose.: 512 mg/dL (05-03-23 @ 07:43)  POCT Blood Glucose.: 332 mg/dL (05-02-23 @ 21:12)  POCT Blood Glucose.: 465 mg/dL (05-02-23 @ 19:20)  POCT Blood Glucose.: 530 mg/dL (05-02-23 @ 18:02)  POCT Blood Glucose.: 538 mg/dL (05-02-23 @ 16:42)                      Culture - Blood (collected 01 May 2023 03:00)  Source: .Blood Blood  Preliminary Report (02 May 2023 14:01):    No growth to date.    Culture - Blood (collected 01 May 2023 03:00)  Source: .Blood Blood  Preliminary Report (02 May 2023 14:01):    No growth to date.    Culture - Urine (collected 01 May 2023 01:42)  Source: Catheterized Catheterized  Preliminary Report (02 May 2023 22:04):    >100,000 CFU/ml Enterococcus faecalis                                                                                           MEDICATIONS  (STANDING):  aspirin  chewable 81 milliGRAM(s) Oral daily  atorvastatin 10 milliGRAM(s) Oral at bedtime  carvedilol 12.5 milliGRAM(s) Oral every 12 hours  chlorhexidine 4% Liquid 1 Application(s) Topical <User Schedule>  dextrose 5%. 1000 milliLiter(s) (50 mL/Hr) IV Continuous <Continuous>  dextrose 5%. 1000 milliLiter(s) (100 mL/Hr) IV Continuous <Continuous>  dextrose 50% Injectable 25 Gram(s) IV Push once  dextrose 50% Injectable 12.5 Gram(s) IV Push once  dextrose 50% Injectable 25 Gram(s) IV Push once  glucagon  Injectable 1 milliGRAM(s) IntraMuscular once  heparin   Injectable 5000 Unit(s) SubCutaneous every 8 hours  hydrALAZINE 25 milliGRAM(s) Oral every 8 hours  insulin glargine Injectable (LANTUS) 16 Unit(s) SubCutaneous every morning  insulin lispro (ADMELOG) corrective regimen sliding scale   SubCutaneous three times a day before meals  insulin lispro Injectable (ADMELOG) 5 Unit(s) SubCutaneous three times a day before meals  insulin regular Infusion 7 Unit(s)/Hr (7 mL/Hr) IV Continuous <Continuous>  pantoprazole    Tablet 40 milliGRAM(s) Oral before breakfast  sodium chloride 0.45%. 1000 milliLiter(s) (75 mL/Hr) IV Continuous <Continuous>    MEDICATIONS  (PRN):  acetaminophen     Tablet .. 650 milliGRAM(s) Oral every 6 hours PRN Temp greater or equal to 38C (100.4F), Mild Pain (1 - 3)  dextrose Oral Gel 15 Gram(s) Oral once PRN Blood Glucose LESS THAN 70 milliGRAM(s)/deciliter      Xrays:                                                                                     ECHO

## 2023-05-03 NOTE — PROGRESS NOTE ADULT - SUBJECTIVE AND OBJECTIVE BOX
ANAM [facility]GUY[facility]  [facility] (Back) [address] ([facility] (Back))      Patient is a 57y old  Female who presents with a chief complaint of Hypoglycemic Seizure (03 May 2023 11:35)        Interval events:  Fijian translated by ROBBIE Marrero at bedside.  Patient seen and examined at bedside. Was in DKA this morning, started on insulin drip, gap closed. Went to HD, came back and c/o chest pain under left rib radiating to back, and 's/90's. EKG NSR. Trops sent. Gave Tylenol and BP meds.     -PMHx: Chronic kidney disease, unspecified CKD stage      -PSHx:        REVIEW OF SYSTEMS:  CONSTITUTIONAL: No fever, weight loss, or fatigue  RESPIRATORY: No cough, wheezing, chills or hemoptysis; No shortness of breath  CARDIOVASCULAR: as above   GASTROINTESTINAL: No abdominal or epigastric pain. No nausea, vomiting, or hematemesis; No diarrhea or constipation. No melena or hematochezia.  NEUROLOGICAL: No headaches  LYMPH NODES: No enlarged glands  MUSCULOSKELETAL: No joint pain or swelling; No muscle, back, or extremity pain      T(C): , Max: 36.2 (05-03-23 @ 04:50)  HR: 75 (05-03-23 @ 04:50)  BP: 133/64 (05-03-23 @ 04:50)  RR: 18 (05-03-23 @ 04:50)  SpO2: --  CAPILLARY BLOOD GLUCOSE      POCT Blood Glucose.: 73 mg/dL (03 May 2023 14:40)  POCT Blood Glucose.: 79 mg/dL (03 May 2023 14:11)  POCT Blood Glucose.: 118 mg/dL (03 May 2023 12:32)  POCT Blood Glucose.: 219 mg/dL (03 May 2023 11:30)  POCT Blood Glucose.: 363 mg/dL (03 May 2023 10:32)  POCT Blood Glucose.: 424 mg/dL (03 May 2023 09:36)  POCT Blood Glucose.: 502 mg/dL (03 May 2023 08:36)  POCT Blood Glucose.: 518 mg/dL (03 May 2023 07:53)  POCT Blood Glucose.: 512 mg/dL (03 May 2023 07:43)  POCT Blood Glucose.: 332 mg/dL (02 May 2023 21:12)  POCT Blood Glucose.: 465 mg/dL (02 May 2023 19:20)  POCT Blood Glucose.: 530 mg/dL (02 May 2023 18:02)  POCT Blood Glucose.: 538 mg/dL (02 May 2023 16:42)      PHYSICAL EXAM:  GENERAL: NAD, well-developed  HEAD:  Atraumatic, Normocephalic  NECK: Supple, No JVD   CHEST/LUNG: Clear to auscultation bilaterally; No wheeze  HEART: Regular rate and rhythm; No murmurs, rubs, or gallops  ABDOMEN: Soft, Nontender, Nondistended; Bowel sounds present  EXTREMITIES:  2+ Peripheral Pulses, No clubbing, cyanosis, or edema; LUE fistula with thrill   SKIN: No rashes or lesions    Consultant(s) Notes Reviewed:  [x ] YES  [ ] NO  Care Discussed with Consultants/Other Providers [ x] YES  [ ] NO    LABS:          16.7  2.07  )-------(125          49.7  N=59.0  L=22.2  MCV=92.2          10.7  3.70  )-------(192          33.2  N=49.2  L=38.6  MCV=97.9    137|97<L>|32<H>  ------------------<105<H>  3.7|28|2.8<H>  eGFR:--  Ca:9.1  130<L>|89<L>|80<HH>  ------------------<524<HH>  6.0<HH>|16<L>|6.3<HH>  eGFR:--  Ca:8.4            Microbiology:    Culture - Blood (collected 05-01-23 @ 03:00)  Source: .Blood Blood  Preliminary Report (05-02-23 @ 14:01):    No growth to date.    Culture - Blood (collected 05-01-23 @ 03:00)  Source: .Blood Blood  Preliminary Report (05-02-23 @ 14:01):    No growth to date.    Culture - Urine (collected 05-01-23 @ 01:42)  Source: Catheterized Catheterized  Preliminary Report (05-02-23 @ 22:04):    >100,000 CFU/ml Enterococcus faecalis        RADIOLOGY & ADDITIONAL TESTS:  < from: Xray Chest 1 View- PORTABLE-Routine (Xray Chest 1 View- PORTABLE-Routine in AM.) (05.02.23 @ 05:56) >  FINDINGS/  IMPRESSION:    No focal consolidation, pneumothorax or pleural effusion.    Stable cardiomediastinal silhouette.    Unchanged osseous structures.    < end of copied text >    < from: CT Abdomen and Pelvis w/ Oral Cont (05.01.23 @ 20:02) >  IMPRESSION:    No evidence of bowel obstruction.    Hazy somewhat patchy left basilar pulmonary opacity, may be infectious.   Correlate clinically.    < end of copied text >        Medications:  acetaminophen     Tablet .. 650 milliGRAM(s) Oral every 6 hours PRN  acetaminophen     Tablet .. 975 milliGRAM(s) Oral once  aspirin  chewable 81 milliGRAM(s) Oral daily  atorvastatin 10 milliGRAM(s) Oral at bedtime  carvedilol 12.5 milliGRAM(s) Oral every 12 hours  chlorhexidine 4% Liquid 1 Application(s) Topical <User Schedule>  dextrose 5% + sodium chloride 0.45%. 1000 milliLiter(s) IV Continuous <Continuous>  dextrose 5%. 1000 milliLiter(s) IV Continuous <Continuous>  dextrose 5%. 1000 milliLiter(s) IV Continuous <Continuous>  dextrose 50% Injectable 25 Gram(s) IV Push once  dextrose 50% Injectable 12.5 Gram(s) IV Push once  dextrose 50% Injectable 25 Gram(s) IV Push once  dextrose Oral Gel 15 Gram(s) Oral once PRN  glucagon  Injectable 1 milliGRAM(s) IntraMuscular once  heparin   Injectable 5000 Unit(s) SubCutaneous every 8 hours  hydrALAZINE 25 milliGRAM(s) Oral every 8 hours  insulin glargine Injectable (LANTUS) 16 Unit(s) SubCutaneous every morning  insulin lispro (ADMELOG) corrective regimen sliding scale   SubCutaneous three times a day before meals  insulin lispro Injectable (ADMELOG) 5 Unit(s) SubCutaneous three times a day before meals  insulin regular Infusion 7 Unit(s)/Hr IV Continuous <Continuous>  pantoprazole    Tablet 40 milliGRAM(s) Oral before breakfast

## 2023-05-03 NOTE — CONSULT NOTE ADULT - ATTENDING COMMENTS
ESRD on HD MWF / A[facility]   HD today  rest of plan as above
DKA-Pt currently on insulin drip-this should be maintained until anion gap normalized.  D5W can be coinfused to prevent hypoglycemia.  When anion gap resolved can then convert to basal bolus therapy.

## 2023-05-03 NOTE — PROGRESS NOTE ADULT - TIME BILLING
Total time spent to complete patient's bedside assessment, physical examination, review medical chart including labs & imaging, discuss medical plan of care with housestaff was more than 50 minutes.

## 2023-05-03 NOTE — CONSULT NOTE ADULT - ASSESSMENT
IMPRESSION:    PLAN;     IMPRESSION:  IDDM (since 2005)      PLAN:  - please get islet cell abs   - c/w insulin drip and dc drip 2 hours after bridging.   - on dc she can continue with her regular dose of: 15 U of long acting and 5U of short acting insulin TID with meals  - f/u FS, Insulin protocol

## 2023-05-03 NOTE — CONSULT NOTE ADULT - SUBJECTIVE AND OBJECTIVE BOX
NEPHROLOGY CONSULTATION NOTE    57 year old (non smoker) female patient ([place] Speaking) known to have DM, ESRD on HD, HTN and DLD admitted for AMS and seizures likely secondary to hypoglycemia.  Patient seen at bedside, on NC saturating well.  VS within acceptable range.    PAST MEDICAL & SURGICAL HISTORY:  Chronic kidney disease, unspecified CKD stage        Allergies:  No Known Allergies    Home Medications Reviewed  Hospital Medications:   MEDICATIONS  (STANDING):  aspirin  chewable 81 milliGRAM(s) Oral daily  atorvastatin 10 milliGRAM(s) Oral at bedtime  calcium gluconate IVPB 1 Gram(s) IV Intermittent once  carvedilol 12.5 milliGRAM(s) Oral every 12 hours  cefepime   IVPB 1000 milliGRAM(s) IV Intermittent daily  chlorhexidine 4% Liquid 1 Application(s) Topical <User Schedule>  dextrose 50% Injectable 50 milliLiter(s) IV Push once  heparin   Injectable 5000 Unit(s) SubCutaneous every 8 hours  hydrALAZINE 10 milliGRAM(s) Oral every 8 hours  insulin regular  human recombinant 10 Unit(s) IV Push once  pantoprazole    Tablet 40 milliGRAM(s) Oral before breakfast  sodium zirconium cyclosilicate 10 Gram(s) Oral once  vancomycin  IVPB 1000 milliGRAM(s) IV Intermittent every 48 hours    SOCIAL HISTORY:  Denies ETOH,Smoking,   FAMILY HISTORY:      REVIEW OF SYSTEMS:  CONSTITUTIONAL: No weakness, fevers or chills  EYES/ENT: No visual changes;  No vertigo or throat pain   NECK: No pain or stiffness  RESPIRATORY: No cough, wheezing, hemoptysis; No shortness of breath  CARDIOVASCULAR: No chest pain or palpitations.  GASTROINTESTINAL: No abdominal or epigastric pain. No nausea, vomiting, or hematemesis; No diarrhea or constipation. No melena or hematochezia.  GENITOURINARY: No dysuria, frequency, foamy urine, urinary urgency, incontinence or hematuria  NEUROLOGICAL: No numbness or weakness  SKIN: No itching, burning, rashes, or lesions   VASCULAR: No bilateral lower extremity edema.   All other review of systems is negative unless indicated above.    VITALS:  Vital Signs Last 24 Hrs  T(C): 36.2 (01 May 2023 13:37), Max: 36.6 (01 May 2023 06:00)  T(F): 97.2 (01 May 2023 13:37), Max: 97.9 (01 May 2023 06:00)  HR: 72 (01 May 2023 13:37) (64 - 76)  BP: 128/60 (01 May 2023 13:37) (113/57 - 213/94)  BP(mean): 87 (01 May 2023 05:00) (87 - 87)  RR: 18 (01 May 2023 13:37) (16 - 18)  SpO2: 99% (01 May 2023 13:37) (85% - 100%)    Parameters below as of 01 May 2023 13:37  Patient On (Oxygen Delivery Method): room air         @ 07:01  -   @ 15:01  --------------------------------------------------------  IN: 0 mL / OUT: 350 mL / NET: -350 mL      Height (cm): 170.2 ( @ 01:16)  PHYSICAL EXAM:  Constitutional: NAD  HEENT: anicteric sclera, oropharynx clear, MMM  Neck: No JVD  Respiratory: CTAB, no wheezes, rales or rhonchi  Cardiovascular: S1, S2, RRR  Gastrointestinal: BS+, soft, NT/ND  Extremities: No cyanosis or clubbing. No peripheral edema  Neurological: A/O x 3, no focal deficits  Psychiatric: Normal mood, normal affect  : No CVA tenderness. No murillo.   Skin: No rashes  Vascular Access: AVF     LABS:      134<L>  |  94<L>  |  81<HH>  ----------------------------<  331<H>  6.2<HH>   |  23  |  6.2<HH>    Ca    8.9      01 May 2023 12:46  Mg     3.5         TPro  6.7  /  Alb  4.1  /  TBili  0.6  /  DBili      /  AST  17  /  ALT  11  /  AlkPhos  127<H>      Creatinine Trend: 6.2 <--, 6.1 <--, 5.6 <--, 5.4 <--                        12.1   7.11  )-----------( 214      ( 01 May 2023 12:46 )             35.9     Urine Studies:  Urinalysis Basic - ( 01 May 2023 01:42 )    Color: Light Yellow / Appearance: Clear / S.016 / pH:   Gluc:  / Ketone: Negative  / Bili: Negative / Urobili: <2 mg/dL   Blood:  / Protein: 100 mg/dL / Nitrite: Negative   Leuk Esterase: Small / RBC: 4 /HPF / WBC 7 /HPF   Sq Epi:  / Non Sq Epi:  / Bacteria: Few                    
Request for consultation:  Requested by:    Patient is a 57y old  Female who presents with a chief complaint of Hypoglycemic Seizure (03 May 2023 09:58)    HPI:  Location: [facility] [place] ED Hold 007 A ([facility] [place] ED Hold)  Patient Name: ANAM NGUYỄN  Age: 57y  Gender: Female      History of Present Illness  57 year old (non smoker) female patient ([place] Speaking) known to have:  - Baseline: Alert, oriented, lives with daughter, ambulates independently  - DM Insulin Dependent. No Hba1c in chart.   - ESRD on hemodialysis every MWF via left UE AV fistula.  - Hypertension. Home med Coreg 12.5mg BID and Hydralazine 10mg in AM (not Q8h; confirmed by daughter)  - Dyslipidemia. No Lipid Profile in chart. Home med Atorvastatin 10mg QD, Aspirin 81mg QD      Patient was confused at time of my evaluation (asking where she is and why she is in ED), so daughter was called over the phone and a Russian PCA was present at bedside.  Patient was brought to the ED by EMS on  following an episode of a witnessed seizure.  Per daughter over the phone, history goes back to  at 23:30 PM when the daughter noticed that her mother was shaking all over 4 extremities.  Prior to the episode, she denied any complaint, including chest pain, diaphoresis, lightheadedness, or headache. She notes that POCT in AM was 95, at noon was 150, and at night was 120, and she reports adequate appetite. When asked about insulin dosing, daughter stated that mother usually administers her own insulin and is not sure how much she administered at night.  During the episode, daughter notes that her mother was shaking all over and was unresponsive; she had no uprolling of eyes, tongue biting, drooling, urinary or fecal incontinence.  The episode lasted for 15 minutes, before it spontaneously broke.  After the episode, the patient was confused and could not remember the details of the event.  Daughter called 911, and on EMS arrival the POCT was 109 and patient was not seizing (still confused).  On arrival, patient was confused. She complained of frontal headache and some blurry vision that she said were not new but had no other complaints.     On review of systems, daughter denies any recent fever, chills, night sweats, URTI symptoms (cough, rhinorrhea, sore throat), urinary symptoms (urinary frequency, urgency, intermittence, dysuria, foul smelling urine, cloudy urine), change in bowel movements (diarrhea or constipation), abdominal pain, headache, nausea, or vomiting.   No sick contacts.   No recent travel or exposure to recent travelers.      Upon presentation to the ED, the patient's Vital Signs in ED:  - /79 mmHg s/p IV Labetalol 10mg x2 doses -> 114/57 mmHg  - HR 70 bpm  - RR 16 bpm  - T 94 degrees  - SaO2 85% on RA so placed on 5LPM NC with improvement to 98%      Investigations   Laboratory Workup  - CBC:                        13.3   4.11  )-----------( 238      ( 01 May 2023 00:42 )             40.8     - Chemistry:      140  |  97<L>  |  76<HH>  ----------------------------<  35<LL>  5.3<H>   |  17  |  6.1<HH>    Ca    9.2      01 May 2023 00:42  Mg     3.5         TPro  7.5  /  Alb  4.6  /  TBili  0.5  /  DBili  x   /  AST  25  /  ALT  13  /  AlkPhos  152<H>      - Coagulation Studies:  PT/INR - ( 01 May 2023 00:42 )   PT: 9.60 sec;   INR: 0.85 ratio    PTT - ( 01 May 2023 00:42 )  PTT:38.1 sec      - Cardiac Markers:  CARDIAC MARKERS ( 01 May 2023 00:42 )  x     / x     / 170 U/L / x     / x          Microbiological Workup  Urinalysis Basic - ( 01 May 2023 01:42 )    Color: Light Yellow / Appearance: Clear / S.016 / pH: x  Gluc: x / Ketone: Negative  / Bili: Negative / Urobili: <2 mg/dL   Blood: x / Protein: 100 mg/dL / Nitrite: Negative   Leuk Esterase: Small / RBC: 4 /HPF / WBC 7 /HPF   Sq Epi: x / Non Sq Epi: x / Bacteria: Few      Radiological Workup  * CT Head No Cont (23 @ 01:32) No acute intracranial pathology. Follow-up MRI of the brain with and without contrast may be helpful for  further evaluation.    - POCT on arrival to ED was 40 so Patient received D50 25mL bolus for hypoglycemia   - She received IV Cefepime and Vancomycin in ED for hypothermia and concern for sepsis  - For SAO2 85% on RA, she was placed on 5LPM NC with improvement in SAO2 to 98%  - For /79 mmHg, she received IV Labetalol 10mg x2 doses with improvement   - She will be admitted to MICU for further investigations, management, and monitoring     (01 May 2023 03:28)      FAMILY HISTORY:    PAST MEDICAL & SURGICAL HISTORY:  Chronic kidney disease, unspecified CKD stage        Birth History:  Developmental History:    Review of Systems:  All review of systems negative, except for those marked:  General:		[] Abnormal:  Pulmonary:		[] Abnormal:  Cardiac:		[] Abnormal:  Gastrointestinal:	[] Abnormal:  ENT:			[] Abnormal:  Renal/Urologic:		[] Abnormal:  Musculoskeletal:	[] Abnormal:  Endocrine:		[] Abnormal:  Hematologic:		[] Abnormal:  Neurologic:		[] Abnormal:  Skin:			[] Abnormal:  Allergy/Immune:	[] Abnormal:  Psychiatric:		[] Abnormal:    Allergies    No Known Allergies    Intolerances      MEDICATIONS  (STANDING):  aspirin  chewable 81 milliGRAM(s) Oral daily  atorvastatin 10 milliGRAM(s) Oral at bedtime  carvedilol 12.5 milliGRAM(s) Oral every 12 hours  chlorhexidine 4% Liquid 1 Application(s) Topical <User Schedule>  dextrose 5%. 1000 milliLiter(s) (50 mL/Hr) IV Continuous <Continuous>  dextrose 5%. 1000 milliLiter(s) (100 mL/Hr) IV Continuous <Continuous>  dextrose 50% Injectable 25 Gram(s) IV Push once  dextrose 50% Injectable 12.5 Gram(s) IV Push once  dextrose 50% Injectable 25 Gram(s) IV Push once  glucagon  Injectable 1 milliGRAM(s) IntraMuscular once  heparin   Injectable 5000 Unit(s) SubCutaneous every 8 hours  hydrALAZINE 25 milliGRAM(s) Oral every 8 hours  insulin glargine Injectable (LANTUS) 16 Unit(s) SubCutaneous every morning  insulin lispro (ADMELOG) corrective regimen sliding scale   SubCutaneous three times a day before meals  insulin lispro Injectable (ADMELOG) 5 Unit(s) SubCutaneous three times a day before meals  insulin regular Infusion 7 Unit(s)/Hr (7 mL/Hr) IV Continuous <Continuous>  pantoprazole    Tablet 40 milliGRAM(s) Oral before breakfast  sodium chloride 0.45%. 1000 milliLiter(s) (75 mL/Hr) IV Continuous <Continuous>    MEDICATIONS  (PRN):  acetaminophen     Tablet .. 650 milliGRAM(s) Oral every 6 hours PRN Temp greater or equal to 38C (100.4F), Mild Pain (1 - 3)  dextrose Oral Gel 15 Gram(s) Oral once PRN Blood Glucose LESS THAN 70 milliGRAM(s)/deciliter      Vital Signs Last 24 Hrs  T(C): 36.2 (03 May 2023 04:50), Max: 36.2 (03 May 2023 04:50)  T(F): 97.1 (03 May 2023 04:50), Max: 97.1 (03 May 2023 04:50)  HR: 75 (03 May 2023 04:50) (75 - 80)  BP: 133/64 (03 May 2023 04:50) (118/58 - 171/70)  BP(mean): 92 (03 May 2023 04:50) (92 - 92)  RR: 18 (03 May 2023 04:50) (18 - 19)  SpO2: --      Height (cm): 170.2 ( @ 01:16)  Weight (kg): 78 ( @ 08:10)  BMI (kg/m2): 26.9 ( @ 08:10)    PHYSICAL EXAM  All physical exam findings normal, except those marked:  General:	Alert, active, cooperative, NAD, well hydrated  Neck		Normal: supple, no cervical adenopathy, no palpable thyroid  Cardiovascular	Normal: regular rate, normal S1, S2, no murmurs  Respiratory	Normal: no chest wall deformity, normal respiratory pattern, CTA B/L  Abdominal	Normal: soft, ND, NT, bowel sounds present, no masses, no organomegaly  		Normal normal genitalia, testes descended, circumcised/uncircumcised  .		Aldo stage:			Breast aldo:  .		Menstrual history:  Extremities	Normal: FROM x4  Skin		Normal: intact and not indurated, no rash, no acanthosis nigricans  Neurologic	Normal: grossly intact    LABS                          10.7   3.70  )-----------( 192      ( 03 May 2023 06:02 )             33.2     05-    130<L>  |  89<L>  |  80<HH>  ----------------------------<  524<HH>  6.0<HH>   |  16<L>  |  6.3<HH>    Ca    8.4      03 May 2023 06:02  Mg     3.0     05-    TPro  5.9<L>  /  Alb  3.6  /  TBili  0.5  /  DBili  x   /  AST  13  /  ALT  9   /  AlkPhos  125<H>  05-        CAPILLARY BLOOD GLUCOSE      POCT Blood Glucose.: 219 mg/dL (03 May 2023 11:30)  POCT Blood Glucose.: 363 mg/dL (03 May 2023 10:32)  POCT Blood Glucose.: 424 mg/dL (03 May 2023 09:36)  POCT Blood Glucose.: 502 mg/dL (03 May 2023 08:36)  POCT Blood Glucose.: 518 mg/dL (03 May 2023 07:53)  POCT Blood Glucose.: 512 mg/dL (03 May 2023 07:43)  POCT Blood Glucose.: 332 mg/dL (02 May 2023 21:12)  POCT Blood Glucose.: 465 mg/dL (02 May 2023 19:20)  POCT Blood Glucose.: 530 mg/dL (02 May 2023 18:02)  POCT Blood Glucose.: 538 mg/dL (02 May 2023 16:42)

## 2023-05-03 NOTE — PROGRESS NOTE ADULT - SUBJECTIVE AND OBJECTIVE BOX
Nephrology Progress Note    ANAM NGUYỄN  MRN-[id number]  57y  Female    S:  Patient is seen and examined, events over the last 24h noted.    O:  Allergies:  No Known Allergies    Hospital Medications:   MEDICATIONS  (STANDING):  acetaminophen     Tablet .. 975 milliGRAM(s) Oral once  aspirin  chewable 81 milliGRAM(s) Oral daily  atorvastatin 10 milliGRAM(s) Oral at bedtime  carvedilol 12.5 milliGRAM(s) Oral every 12 hours  chlorhexidine 4% Liquid 1 Application(s) Topical <User Schedule>  dextrose 5%. 1000 milliLiter(s) (100 mL/Hr) IV Continuous <Continuous>  dextrose 5%. 1000 milliLiter(s) (50 mL/Hr) IV Continuous <Continuous>  dextrose 50% Injectable 25 Gram(s) IV Push once  dextrose 50% Injectable 12.5 Gram(s) IV Push once  dextrose 50% Injectable 25 Gram(s) IV Push once  glucagon  Injectable 1 milliGRAM(s) IntraMuscular once  heparin   Injectable 5000 Unit(s) SubCutaneous every 8 hours  hydrALAZINE 25 milliGRAM(s) Oral every 8 hours  insulin glargine Injectable (LANTUS) 16 Unit(s) SubCutaneous every morning  insulin lispro (ADMELOG) corrective regimen sliding scale   SubCutaneous three times a day before meals  insulin lispro Injectable (ADMELOG) 5 Unit(s) SubCutaneous three times a day before meals  pantoprazole    Tablet 40 milliGRAM(s) Oral before breakfast    MEDICATIONS  (PRN):  acetaminophen     Tablet .. 650 milliGRAM(s) Oral every 6 hours PRN Temp greater or equal to 38C (100.4F), Mild Pain (1 - 3)  dextrose Oral Gel 15 Gram(s) Oral once PRN Blood Glucose LESS THAN 70 milliGRAM(s)/deciliter    Home Medications:  aspirin 81 mg oral tablet: 1 tab(s) orally once a day (01 May 2023 04:22)  atorvastatin 10 mg oral tablet: 1 tab(s) orally once a day (01 May 2023 04:22)  Coreg 12.5 mg oral tablet: 1 tab(s) orally 2 times a day (01 May 2023 04:24)  hydrALAZINE 10 mg oral tablet: 1 tab(s) orally once a day in AM only per daughter/ not Q8h (01 May 2023 04:23)      VITALS:  Daily     Daily Weight in k (03 May 2023 08:09)  T(F): 97.1 (23 @ 04:50), Max: 97.1 (23 @ 04:50)  HR: 75 (23 @ 04:50)  BP: 133/64 (23 @ 04:50)  RR: 18 (23 @ 04:50)  SpO2: --  Wt(kg): --  I&O's Detail    02 May 2023 07:01  -  03 May 2023 07:00  --------------------------------------------------------  IN:  Total IN: 0 mL    OUT:    Indwelling Catheter - Urethral (mL): 500 mL    Stool (mL): 1 mL  Total OUT: 501 mL    Total NET: -501 mL        I&O's Summary    02 May 2023 07:01  -  03 May 2023 07:00  --------------------------------------------------------  IN: 0 mL / OUT: 501 mL / NET: -501 mL        Weight (kg): 78 ( @ 08:10)    PHYSICAL EXAM:  Gen: NAD  Resp: b/l breath sounds  Card: S1/S2  Abd: soft  Extremities: no edema  Vascular access:       LABS:    Blood Gas Profile w/Lytes - Venous: Performed in Lab (23 @ 03:06)  Blood Gas Venous - Sodium: 134 mmol/L (23 @ 03:06)  Blood Gas Venous - Potassium: 5.2 mmol/L (23 @ 03:06)        137  |  97<L>  |  32<H>  ----------------------------<  105<H>  3.7   |  28  |  2.8<H>    Ca    9.1      03 May 2023 13:25  Phos  2.9       Mg     2.3         TPro  6.6  /  Alb  4.0  /  TBili  0.7  /  DBili      /  AST  15  /  ALT  9   /  AlkPhos  118<H>      eGFR: 19 mL/min/1.73m2 (23 @ 13:25)  eGFR: 7 mL/min/1.73m2 (23 @ 06:02)    Phosphorus Level, Serum: 2.9 mg/dL (23 @ 13:25)  Phosphorus Level, Serum: 7.0 mg/dL (23 @ 11:02)                            16.7   2.07  )-----------( 125      ( 03 May 2023 13:25 )             49.7     Mean Cell Volume: 92.2 fL (23 @ 13:25)        Urine Studies:  Urinalysis Basic - ( 01 May 2023 01:42 )    Color: Light Yellow / Appearance: Clear / S.016 / pH:   Gluc:  / Ketone: Negative  / Bili: Negative / Urobili: <2 mg/dL   Blood:  / Protein: 100 mg/dL / Nitrite: Negative   Leuk Esterase: Small / RBC: 4 /HPF / WBC 7 /HPF   Sq Epi:  / Non Sq Epi:  / Bacteria: Few          Culture Results:   No growth to date. ( @ 03:00)  Culture Results:   No growth to date. ( @ 03:00)    Creatinine trend:  Creatinine, Serum: 2.8 mg/dL (23 @ 13:25)  Creatinine, Serum: 6.3 mg/dL (23 @ 06:02)  Creatinine, Serum: 4.9 mg/dL (23 @ 06:30)  Creatinine, Serum: 4.6 mg/dL (23 @ 00:44)  Creatinine, Serum: 6.2 mg/dL (23 @ 12:46)  Creatinine, Serum: 6.1 mg/dL (23 @ 00:42)  Creatinine, Serum: 5.6 mg/dL (23 @ 11:02)  Creatinine, Serum: 5.4 mg/dL (23 @ 08:24)           Nephrology Progress Note    ANAM NGUYỄN  MRN-[id number]  57y  Female    S:  Patient is seen and examined, events over the last 24h noted.    O:  Allergies:  No Known Allergies    Hospital Medications:   MEDICATIONS  (STANDING):  acetaminophen     Tablet .. 975 milliGRAM(s) Oral once  aspirin  chewable 81 milliGRAM(s) Oral daily  atorvastatin 10 milliGRAM(s) Oral at bedtime  carvedilol 12.5 milliGRAM(s) Oral every 12 hours  heparin   Injectable 5000 Unit(s) SubCutaneous every 8 hours  hydrALAZINE 25 milliGRAM(s) Oral every 8 hours  insulin glargine Injectable (LANTUS) 16 Unit(s) SubCutaneous every morning  insulin lispro (ADMELOG) corrective regimen sliding scale   SubCutaneous three times a day before meals  insulin lispro Injectable (ADMELOG) 5 Unit(s) SubCutaneous three times a day before meals  pantoprazole    Tablet 40 milliGRAM(s) Oral before breakfast    MEDICATIONS  (PRN):  acetaminophen     Tablet .. 650 milliGRAM(s) Oral every 6 hours PRN Temp greater or equal to 38C (100.4F), Mild Pain (1 - 3)  dextrose Oral Gel 15 Gram(s) Oral once PRN Blood Glucose LESS THAN 70 milliGRAM(s)/deciliter    Home Medications:  aspirin 81 mg oral tablet: 1 tab(s) orally once a day (01 May 2023 04:22)  atorvastatin 10 mg oral tablet: 1 tab(s) orally once a day (01 May 2023 04:22)  Coreg 12.5 mg oral tablet: 1 tab(s) orally 2 times a day (01 May 2023 04:24)  hydrALAZINE 10 mg oral tablet: 1 tab(s) orally once a day in AM only per daughter/ not Q8h (01 May 2023 04:23)      VITALS:  Daily Weight in k (03 May 2023 08:09)  T(F): 97.1 (23 @ 04:50), Max: 97.1 (23 @ 04:50)  HR: 75 (23 @ 04:50)  BP: 133/64 (23 @ 04:50)  RR: 18 (23 @ 04:50)  SpO2: --  Wt(kg): --  I&O's Detail    02 May 2023 07:01  -  03 May 2023 07:00  --------------------------------------------------------  IN:  Total IN: 0 mL    OUT:    Indwelling Catheter - Urethral (mL): 500 mL    Stool (mL): 1 mL  Total OUT: 501 mL    Total NET: -501 mL        I&O's Summary    02 May 2023 07:01  -  03 May 2023 07:00  --------------------------------------------------------  IN: 0 mL / OUT: 501 mL / NET: -501 mL        Weight (kg): 78 ( @ 08:10)    PHYSICAL EXAM:  Gen: NAD  Resp: b/l breath sounds  Card: S1/S2  Abd: soft  Vascular access: AVF      LABS:        137  |  97<L>  |  32<H>  ----------------------------<  105<H>  3.7   |  28  |  2.8<H>    Ca    9.1      03 May 2023 13:25  Phos  2.9       Mg     2.3         TPro  6.6  /  Alb  4.0  /  TBili  0.7  /  DBili      /  AST  15  /  ALT  9   /  AlkPhos  118<H>      Phosphorus Level, Serum: 2.9 mg/dL (23 @ 13:25)                            16.7   2.07  )-----------( 125      ( 03 May 2023 13:25 )             49.7     Mean Cell Volume: 92.2 fL (23 @ 13:25)        Urine Studies:  Urinalysis Basic - ( 01 May 2023 01:42 )    Color: Light Yellow / Appearance: Clear / S.016 / pH:   Gluc:  / Ketone: Negative  / Bili: Negative / Urobili: <2 mg/dL   Blood:  / Protein: 100 mg/dL / Nitrite: Negative   Leuk Esterase: Small / RBC: 4 /HPF / WBC 7 /HPF   Sq Epi:  / Non Sq Epi:  / Bacteria: Few          Culture Results:   No growth to date. ( @ 03:00)  Culture Results:   No growth to date. ( @ 03:00)

## 2023-05-04 LAB
ALBUMIN SERPL ELPH-MCNC: 3.5 G/DL — SIGNIFICANT CHANGE UP (ref 3.5–5.2)
ALP SERPL-CCNC: 102 U/L — SIGNIFICANT CHANGE UP (ref 30–115)
ALT FLD-CCNC: 9 U/L — SIGNIFICANT CHANGE UP (ref 0–41)
ANION GAP SERPL CALC-SCNC: 14 MMOL/L — SIGNIFICANT CHANGE UP (ref 7–14)
ANION GAP SERPL CALC-SCNC: 16 MMOL/L — HIGH (ref 7–14)
AST SERPL-CCNC: 15 U/L — SIGNIFICANT CHANGE UP (ref 0–41)
B-OH-BUTYR SERPL-SCNC: 1.7 MMOL/L — HIGH
BASOPHILS # BLD AUTO: 0.04 K/UL — SIGNIFICANT CHANGE UP (ref 0–0.2)
BASOPHILS NFR BLD AUTO: 1.1 % — HIGH (ref 0–1)
BILIRUB SERPL-MCNC: 0.6 MG/DL — SIGNIFICANT CHANGE UP (ref 0.2–1.2)
BUN SERPL-MCNC: 45 MG/DL — HIGH (ref 10–20)
BUN SERPL-MCNC: 46 MG/DL — HIGH (ref 10–20)
CALCIUM SERPL-MCNC: 8.5 MG/DL — SIGNIFICANT CHANGE UP (ref 8.4–10.5)
CALCIUM SERPL-MCNC: 8.5 MG/DL — SIGNIFICANT CHANGE UP (ref 8.4–10.5)
CHLORIDE SERPL-SCNC: 93 MMOL/L — LOW (ref 98–110)
CHLORIDE SERPL-SCNC: 94 MMOL/L — LOW (ref 98–110)
CO2 SERPL-SCNC: 23 MMOL/L — SIGNIFICANT CHANGE UP (ref 17–32)
CO2 SERPL-SCNC: 26 MMOL/L — SIGNIFICANT CHANGE UP (ref 17–32)
CREAT SERPL-MCNC: 4.6 MG/DL — CRITICAL HIGH (ref 0.7–1.5)
CREAT SERPL-MCNC: 4.8 MG/DL — CRITICAL HIGH (ref 0.7–1.5)
DRUG SCREEN, SERUM: SIGNIFICANT CHANGE UP
EGFR: 10 ML/MIN/1.73M2 — LOW
EGFR: 11 ML/MIN/1.73M2 — LOW
EOSINOPHIL # BLD AUTO: 0.16 K/UL — SIGNIFICANT CHANGE UP (ref 0–0.7)
EOSINOPHIL NFR BLD AUTO: 4.4 % — SIGNIFICANT CHANGE UP (ref 0–8)
GLUCOSE BLDC GLUCOMTR-MCNC: 189 MG/DL — HIGH (ref 70–99)
GLUCOSE BLDC GLUCOMTR-MCNC: 191 MG/DL — HIGH (ref 70–99)
GLUCOSE BLDC GLUCOMTR-MCNC: 220 MG/DL — HIGH (ref 70–99)
GLUCOSE BLDC GLUCOMTR-MCNC: 359 MG/DL — HIGH (ref 70–99)
GLUCOSE BLDC GLUCOMTR-MCNC: 375 MG/DL — HIGH (ref 70–99)
GLUCOSE SERPL-MCNC: 203 MG/DL — HIGH (ref 70–99)
GLUCOSE SERPL-MCNC: 377 MG/DL — HIGH (ref 70–99)
HCT VFR BLD CALC: 33.5 % — LOW (ref 37–47)
HGB BLD-MCNC: 11.3 G/DL — LOW (ref 12–16)
IMM GRANULOCYTES NFR BLD AUTO: 0 % — LOW (ref 0.1–0.3)
LYMPHOCYTES # BLD AUTO: 1.44 K/UL — SIGNIFICANT CHANGE UP (ref 1.2–3.4)
LYMPHOCYTES # BLD AUTO: 39.8 % — SIGNIFICANT CHANGE UP (ref 20.5–51.1)
MAGNESIUM SERPL-MCNC: 2.6 MG/DL — HIGH (ref 1.8–2.4)
MCHC RBC-ENTMCNC: 31.7 PG — HIGH (ref 27–31)
MCHC RBC-ENTMCNC: 33.7 G/DL — SIGNIFICANT CHANGE UP (ref 32–37)
MCV RBC AUTO: 94.1 FL — SIGNIFICANT CHANGE UP (ref 81–99)
MONOCYTES # BLD AUTO: 0.29 K/UL — SIGNIFICANT CHANGE UP (ref 0.1–0.6)
MONOCYTES NFR BLD AUTO: 8 % — SIGNIFICANT CHANGE UP (ref 1.7–9.3)
NEUTROPHILS # BLD AUTO: 1.69 K/UL — SIGNIFICANT CHANGE UP (ref 1.4–6.5)
NEUTROPHILS NFR BLD AUTO: 46.7 % — SIGNIFICANT CHANGE UP (ref 42.2–75.2)
NRBC # BLD: 0 /100 WBCS — SIGNIFICANT CHANGE UP (ref 0–0)
PHOSPHATE SERPL-MCNC: 6.7 MG/DL — HIGH (ref 2.1–4.9)
PLATELET # BLD AUTO: 191 K/UL — SIGNIFICANT CHANGE UP (ref 130–400)
PMV BLD: 11.1 FL — HIGH (ref 7.4–10.4)
POTASSIUM SERPL-MCNC: 4.3 MMOL/L — SIGNIFICANT CHANGE UP (ref 3.5–5)
POTASSIUM SERPL-MCNC: 4.7 MMOL/L — SIGNIFICANT CHANGE UP (ref 3.5–5)
POTASSIUM SERPL-SCNC: 4.3 MMOL/L — SIGNIFICANT CHANGE UP (ref 3.5–5)
POTASSIUM SERPL-SCNC: 4.7 MMOL/L — SIGNIFICANT CHANGE UP (ref 3.5–5)
PROT SERPL-MCNC: 5.9 G/DL — LOW (ref 6–8)
RBC # BLD: 3.56 M/UL — LOW (ref 4.2–5.4)
RBC # FLD: 11.9 % — SIGNIFICANT CHANGE UP (ref 11.5–14.5)
SODIUM SERPL-SCNC: 132 MMOL/L — LOW (ref 135–146)
SODIUM SERPL-SCNC: 134 MMOL/L — LOW (ref 135–146)
WBC # BLD: 3.62 K/UL — LOW (ref 4.8–10.8)
WBC # FLD AUTO: 3.62 K/UL — LOW (ref 4.8–10.8)

## 2023-05-04 PROCEDURE — 93010 ELECTROCARDIOGRAM REPORT: CPT

## 2023-05-04 PROCEDURE — 99232 SBSQ HOSP IP/OBS MODERATE 35: CPT

## 2023-05-04 RX ORDER — INSULIN LISPRO 100/ML
5 VIAL (ML) SUBCUTANEOUS ONCE
Refills: 0 | Status: COMPLETED | OUTPATIENT
Start: 2023-05-04 | End: 2023-05-04

## 2023-05-04 RX ADMIN — Medication 4: at 12:20

## 2023-05-04 RX ADMIN — Medication 25 MILLIGRAM(S): at 21:48

## 2023-05-04 RX ADMIN — Medication 5 UNIT(S): at 17:19

## 2023-05-04 RX ADMIN — PANTOPRAZOLE SODIUM 40 MILLIGRAM(S): 20 TABLET, DELAYED RELEASE ORAL at 06:04

## 2023-05-04 RX ADMIN — Medication 25 MILLIGRAM(S): at 13:38

## 2023-05-04 RX ADMIN — Medication 25 MILLIGRAM(S): at 06:04

## 2023-05-04 RX ADMIN — Medication 5 UNIT(S): at 01:08

## 2023-05-04 RX ADMIN — CARVEDILOL PHOSPHATE 12.5 MILLIGRAM(S): 80 CAPSULE, EXTENDED RELEASE ORAL at 06:04

## 2023-05-04 RX ADMIN — HEPARIN SODIUM 5000 UNIT(S): 5000 INJECTION INTRAVENOUS; SUBCUTANEOUS at 06:04

## 2023-05-04 RX ADMIN — HEPARIN SODIUM 5000 UNIT(S): 5000 INJECTION INTRAVENOUS; SUBCUTANEOUS at 13:39

## 2023-05-04 RX ADMIN — Medication 10: at 17:19

## 2023-05-04 RX ADMIN — CARVEDILOL PHOSPHATE 12.5 MILLIGRAM(S): 80 CAPSULE, EXTENDED RELEASE ORAL at 18:31

## 2023-05-04 RX ADMIN — Medication 81 MILLIGRAM(S): at 12:26

## 2023-05-04 RX ADMIN — Medication 5 UNIT(S): at 08:24

## 2023-05-04 RX ADMIN — Medication 2: at 08:24

## 2023-05-04 RX ADMIN — ATORVASTATIN CALCIUM 10 MILLIGRAM(S): 80 TABLET, FILM COATED ORAL at 21:48

## 2023-05-04 RX ADMIN — Medication 5 UNIT(S): at 12:20

## 2023-05-04 RX ADMIN — INSULIN GLARGINE 16 UNIT(S): 100 INJECTION, SOLUTION SUBCUTANEOUS at 21:48

## 2023-05-04 NOTE — PHYSICAL THERAPY INITIAL EVALUATION ADULT - GENERAL OBSERVATIONS, REHAB EVAL
11:20-11:55 Pt. encountered in semifowler in bed in NAD. Agreeable to PT. Vitals monitored t/o session: /56mmHg, HR 70 bpm in supine, BP 91/50mmHg, HR 72 bpm in sitting, /55mmHg, HR 69 bpm in standing. Henna AVALOS aware.

## 2023-05-04 NOTE — CHART NOTE - NSCHARTNOTEFT_GEN_A_CORE
During the day the patient was in DKA with AG of 25 and was upgraded to ICU and started on insulin drip. The AG then closed and patient was taken of the drip anmd downgraded again to floors. labs at 4 pm (resulted around 8 pm) showed opening of the AG again (22), hyperglycemia (300s-400s) and BHB of 3.9. I discussed the case with ICU fellow who recommended getting an ABG to check for the PH (to see if it was significantly acidotic to consider upgrading (PH was normal (7.45) on ABG)) and no insulin drip given the patient has ESRD and she maybe baseline acidotic and insulin will not be cleared, and recommended insulin therapy, fluids if needed, and monitoring. The patient was given 10 units IV insulin, 16 units lantus, and AG went down to 15, BHB went down to 0.9 and FS went down to early 300s. This was followed by 5 units of lispro. Patient was monitored throughout the night. Her FS improved and was 180 in the AM. Excessive insulin was avoided given ESRD, fluids was not needed.     Please follow up the AG in the morning labs, and monitor FS.

## 2023-05-04 NOTE — PROGRESS NOTE ADULT - ASSESSMENT
a/p:  57 year old (non smoker) female patient (Maltese Speaking) pmhx of IDDM, ESRD, HTN/HLDPatient was brought to the ED by EMS on 04/30 following an episode of a witnessed seizure.    #DKA- resolved  #DM  -downgraded from ICU--s/p insulin drip  -AG 14---continue to monitor fs and bmp  -goal fs 140-180  -c/w basal bolus regimen 16u Lantus qam and 5u Lispro qac with ISS  -hba1c 9.3  -echo EF 74%    #Seizure 2/2 hypoglycemia  -EEG with borderline generalized slowing  -cont to monitor fs  -f/u ua/ucx    #Chest pain- atypical   -EKG NSR and trop neg   -continue asa, statin      #ESRD on HD  -cont HD m/w/f  -nephrology f/u    #Hypertensive urgency/HLD  - cont HTN home meds (coreg, hydralazine)  - c/w hydralazine 25 TID, statin    DVT/GI ppx  guarded prognosis    FULL CODE    Total time spent to complete patient's bedside assessment, review medical chart, discuss medical plan of care with covering medical team was more than 35 minutes  with >50% of time spendt face to face with patient, discussion with patient/family and/or coordination of care    #Progress Note Handoff  Pending (specify): f/u ucx, bcx, monitor fs  Disposition: Home_x (anticipate dc home in next 24 hrs)

## 2023-05-04 NOTE — PROGRESS NOTE ADULT - ASSESSMENT
Assessment and Plan:   Assessment	  57 year old (non smoker) female patient (Vin[place] Speaking) pmhx of IDDM, ESRD, HTN/HLD  Patient was brought to the ED by EMS on 04/30 following an episode of a witnessed seizure.    #DKA  -s/p insulin drip; gap closed yesterday, measured 14 @6AM (5/4/23)  -c/w basal bolus regimen 16u Lantus qam and 5u Lispro qac with ISS  -keep sugars 140-180   -monitor BMP    #Chest pain- atypical   -EKG NSR; EKG done   -trend troponin-trending down (5/4/23);   -CK-MB (2.7)  -ECHO reviewed    -c/w aspirin     -tylenol prn for pain     #Acute hypoxic respiratory failure- resolved     #Seizure 2/2 hypoglycemia    #Lactic acidosis- resolved    #Hx of IDDM     #Susepcted aspiration pneumonitis  -CXR (05/02): No focal consolidation, pneumonthorax, or pleural effusion  -CTAP 05/01: Hazy patchy left basilar pulmonary opacity  -Currently on RA saturating well   - passed swallow test: Easy to Chew  - Cont vanc/cefepime for now; UCx growing E. faecalis   - f/u BCx, DC abx if negative  - Endo eval for DM regimen recs on discharge    #Hypertensive urgency  - cont HTN home meds (coreg, hydralazine)  - c/w hydralazine 25 TID     #HLD  - cont atorvastatin    #ESRD on HD  - HD per nephrology    DVT PPX heparin    Family discussion: Plan of care discussed with patient, aware and agreeable. Tried calling daughter Rhianna at [phone], no response.   Disposition: Home

## 2023-05-04 NOTE — PROGRESS NOTE ADULT - SUBJECTIVE AND OBJECTIVE BOX
ANAM NGUYỄN 57y Female  MRN#: [id number]   CODE STATUS: FULL    Hospital Day: 3d    Pt is currently admitted with the primary diagnosis of Hypoglycemic Seizure    SUBJECTIVE  Patient was examined at bed side thia am with the EKG technologist as . She reports no acute events over night and significant improvement of her pain. Patient denies SOB, palpitations, chest pain, fever and chills.                                                OBJECTIVE  PAST MEDICAL & SURGICAL HISTORY  Chronic kidney disease, unspecified CKD stage                                            ALLERGIES:  No Known Allergies         HOME MEDICATIONS  Home Medications:  aspirin 81 mg oral tablet: 1 tab(s) orally once a day (01 May 2023 04:22)  atorvastatin 10 mg oral tablet: 1 tab(s) orally once a day (01 May 2023 04:22)  Coreg 12.5 mg oral tablet: 1 tab(s) orally 2 times a day (01 May 2023 04:24)  hydrALAZINE 10 mg oral tablet: 1 tab(s) orally once a day in AM only per daughter/ not Q8h (01 May 2023 04:23)                           MEDICATIONS:  STANDING MEDICATIONS  aspirin  chewable 81 milliGRAM(s) Oral daily  atorvastatin 10 milliGRAM(s) Oral at bedtime  carvedilol 12.5 milliGRAM(s) Oral every 12 hours  chlorhexidine 4% Liquid 1 Application(s) Topical <User Schedule>  dextrose 5%. 1000 milliLiter(s) IV Continuous <Continuous>  dextrose 5%. 1000 milliLiter(s) IV Continuous <Continuous>  dextrose 50% Injectable 25 Gram(s) IV Push once  dextrose 50% Injectable 25 Gram(s) IV Push once  dextrose 50% Injectable 12.5 Gram(s) IV Push once  glucagon  Injectable 1 milliGRAM(s) IntraMuscular once  heparin   Injectable 5000 Unit(s) SubCutaneous every 8 hours  hydrALAZINE 25 milliGRAM(s) Oral every 8 hours  insulin glargine Injectable (LANTUS) 16 Unit(s) SubCutaneous at bedtime  insulin lispro (ADMELOG) corrective regimen sliding scale   SubCutaneous three times a day before meals  insulin lispro Injectable (ADMELOG) 5 Unit(s) SubCutaneous three times a day before meals  pantoprazole    Tablet 40 milliGRAM(s) Oral before breakfast    PRN MEDICATIONS  acetaminophen     Tablet .. 650 milliGRAM(s) Oral every 6 hours PRN  dextrose Oral Gel 15 Gram(s) Oral once PRN                                            ------------------------------------------------------------  VITAL SIGNS: Last 24 Hours  T(C): 36.7 (04 May 2023 05:39), Max: 36.7 (04 May 2023 05:39)  T(F): 98 (04 May 2023 05:39), Max: 98 (04 May 2023 05:39)  HR: 65 (04 May 2023 05:39) (65 - 70)  BP: 145/67 (04 May 2023 05:39) (103/52 - 167/77)  BP(mean): --  RR: 18 (04 May 2023 05:39) (18 - 18)  SpO2: 100% (04 May 2023 05:39) (100% - 100%)                                               LABS:                        11.3   3.62  )-----------( 191      ( 04 May 2023 05:47 )             33.5     05-04    134<L>  |  94<L>  |  45<H>  ----------------------------<  203<H>  4.3   |  26  |  4.8<HH>    Ca    8.5      04 May 2023 05:47  Phos  6.7     05-04  Mg     2.6     05-04    TPro  5.9<L>  /  Alb  3.5  /  TBili  0.6  /  DBili  x   /  AST  15  /  ALT  9   /  AlkPhos  102  05-04        ABG - ( 03 May 2023 21:02 )  pH, Arterial: 7.45  pH, Blood: x     /  pCO2: 38    /  pO2: 88    / HCO3: 26    / Base Excess: 2.4   /  SaO2: 96.7        Troponin T, Serum: 0.07 ng/mL *HH* (05-03-23 @ 21:27)  Troponin T, Serum: 0.08 ng/mL *HH* (05-03-23 @ 17:42)  Troponin T, Serum: 0.10 ng/mL ** (05-03-23 @ 15:04)      CARDIAC MARKERS ( 03 May 2023 21:27 )  x     / 0.07 ng/mL / x     / x     / x      CARDIAC MARKERS ( 03 May 2023 17:42 )  x     / 0.08 ng/mL / x     / x     / x      CARDIAC MARKERS ( 03 May 2023 15:04 )  x     / 0.10 ng/mL / x     / x     / 2.7 ng/mL                                            RADIOLOGY:  < from: Xray Chest 1 View-PORTABLE IMMEDIATE (Xray Chest 1 View-PORTABLE IMMEDIATE .) (05.03.23 @ 16:14) >  Impression:    No radiographic evidence of acute cardiopulmonary disease.        --- End of Report ---    < end of copied text >  < from: CT Abdomen and Pelvis w/ Oral Cont (05.01.23 @ 20:02) >  IMPRESSION:    No evidence of bowel obstruction.    Hazy somewhat patchy left basilar pulmonary opacity, may be infectious.   Correlate clinically.    --- End of Report ---    < end of copied text >  < from: [facility] Duplex Lower Ext Vein Scan, Bilat (05.01.23 @ 19:14) >  IMPRESSION:  No evidence of deep venous thrombosis in either lower extremity.            --- End of Report ---    < end of copied text >  < from: US Abdomen Complete (05.01.23 @ 19:00) >  IMPRESSION:  1.  Unremarkable ultrasound evaluation of the hepatobiliary system.  2.  A 2.7 cm hypoechoic structure is demonstrated adjacent the pancreatic   head, although without correlate on CT from the same day and potentially   representing duodenum. Outpatient MRI or contrast-enhanced CT is   recommended to exclude a mass lesion.        --- End of Report ---    < end of copied text >        PHYSICAL EXAM:  GENERAL: NAD, lying in bed comfortably  HEAD:  Atraumatic, Normocephalic  ENT: Moist mucous membranes  NECK: Supple, No JVD  CHEST/LUNG: Clear to auscultation bilaterally; No rales, rhonchi, wheezing, or rubs.  HEART: Regular rate and rhythm; No murmurs, rubs, or gallops  ABDOMEN: BSx4; Soft, nontender, nondistended  EXTREMITIES:  2+ Peripheral Pulses, brisk capillary refill. No clubbing, cyanosis, or edema  NERVOUS SYSTEM:  A&Ox3, no focal deficits

## 2023-05-04 NOTE — PROGRESS NOTE ADULT - SUBJECTIVE AND OBJECTIVE BOX
Patient is a 57y old  Female who presents with a chief complaint of Hypoglycemic Seizure (04 May 2023 07:47)    History of Present Illness  57 year old (non smoker) female patient (Mexican Speaking) known to have:  - Baseline: Alert, oriented, lives with daughter, ambulates independently  - DM Insulin Dependent. No Hba1c in chart.   - ESRD on hemodialysis every MWF via left UE AV fistula.  - Hypertension. Home med Coreg 12.5mg BID and Hydralazine 10mg in AM (not Q8h; confirmed by daughter)  - Dyslipidemia. No Lipid Profile in chart. Home med Atorvastatin 10mg QD, Aspirin 81mg QD      Patient was confused at time of my evaluation (asking where she is and why she is in ED), so daughter was called over the phone and a Russian PCA was present at bedside.  Patient was brought to the ED by EMS on 04/30 following an episode of a witnessed seizure.  Per daughter over the phone, history goes back to 04/30 at 23:30 PM when the daughter noticed that her mother was shaking all over 4 extremities.  Prior to the episode, she denied any complaint, including chest pain, diaphoresis, lightheadedness, or headache. She notes that POCT in AM was 95, at noon was 150, and at night was 120, and she reports adequate appetite. When asked about insulin dosing, daughter stated that mother usually administers her own insulin and is not sure how much she administered at night.  During the episode, daughter notes that her mother was shaking all over and was unresponsive; she had no uprolling of eyes, tongue biting, drooling, urinary or fecal incontinence.  The episode lasted for 15 minutes, before it spontaneously broke.  After the episode, the patient was confused and could not remember the details of the event.  Daughter called 911, and on EMS arrival the POCT was 109 and patient was not seizing (still confused).  On arrival, patient was confused. She complained of frontal headache and some blurry vision that she said were not new but had no other complaints.     On review of systems, daughter denies any recent fever, chills, night sweats, URTI symptoms (cough, rhinorrhea, sore throat), urinary symptoms (urinary frequency, urgency, intermittence, dysuria, foul smelling urine, cloudy urine), change in bowel movements (diarrhea or constipation), abdominal pain, headache, nausea, or vomiting.   No sick contacts.   No recent travel or exposure to recent travelers   (01 May 2023 03:28)    PAST MEDICAL & SURGICAL HISTORY:  Chronic kidney disease, unspecified CKD stage    patient seen and examined independently on morning rounds for the first time today, chart reviewed and discussed with the medicine resident and on interdisciplinary rounds:    no overnight events--fs better controlled and AG closing (18--->14)    Vital Signs Last 24 Hrs  T(C): 35.9 (04 May 2023 13:00), Max: 36.7 (04 May 2023 05:39)  T(F): 96.7 (04 May 2023 13:00), Max: 98 (04 May 2023 05:39)  HR: 67 (04 May 2023 13:00) (65 - 70)  BP: 153/68 (04 May 2023 13:00) (103/52 - 167/77)  BP(mean): --  RR: 20 (04 May 2023 13:00) (18 - 20)  SpO2: 100% (04 May 2023 05:39) (100% - 100%)    PE:  GEN-NAD, AAOx3, Mexican speaking  PULM- Clear to auscultation bilaterally, fair air entry  CVS- +s1/s2 RRR   GI- soft NT ND +bs, no rebound, no guarding  EXT- no edema                          11.3   3.62  )-----------( 191      ( 04 May 2023 05:47 )             33.5     05-04    134<L>  |  94<L>  |  45<H>  ----------------------------<  203<H>  4.3   |  26  |  4.8<HH>    Ca    8.5      04 May 2023 05:47  Phos  6.7     05-04  Mg     2.6     05-04    TPro  5.9<L>  /  Alb  3.5  /  TBili  0.6  /  DBili  x   /  AST  15  /  ALT  9   /  AlkPhos  102  05-04    CARDIAC MARKERS ( 03 May 2023 21:27 )  x     / 0.07 ng/mL / x     / x     / x      CARDIAC MARKERS ( 03 May 2023 17:42 )  x     / 0.08 ng/mL / x     / x     / x      CARDIAC MARKERS ( 03 May 2023 15:04 )  x     / 0.10 ng/mL / x     / x     / 2.7 ng/mL            MEDICATIONS  (STANDING):  aspirin  chewable 81 milliGRAM(s) Oral daily  atorvastatin 10 milliGRAM(s) Oral at bedtime  carvedilol 12.5 milliGRAM(s) Oral every 12 hours  chlorhexidine 4% Liquid 1 Application(s) Topical <User Schedule>  dextrose 5%. 1000 milliLiter(s) (50 mL/Hr) IV Continuous <Continuous>  dextrose 5%. 1000 milliLiter(s) (100 mL/Hr) IV Continuous <Continuous>  dextrose 50% Injectable 25 Gram(s) IV Push once  dextrose 50% Injectable 12.5 Gram(s) IV Push once  dextrose 50% Injectable 25 Gram(s) IV Push once  glucagon  Injectable 1 milliGRAM(s) IntraMuscular once  heparin   Injectable 5000 Unit(s) SubCutaneous every 8 hours  hydrALAZINE 25 milliGRAM(s) Oral every 8 hours  insulin glargine Injectable (LANTUS) 16 Unit(s) SubCutaneous at bedtime  insulin lispro (ADMELOG) corrective regimen sliding scale   SubCutaneous three times a day before meals  insulin lispro Injectable (ADMELOG) 5 Unit(s) SubCutaneous three times a day before meals  pantoprazole    Tablet 40 milliGRAM(s) Oral before breakfast

## 2023-05-04 NOTE — PHYSICAL THERAPY INITIAL EVALUATION ADULT - ADDITIONAL COMMENTS
Pt. lives in a private home with daughter with 2 steps to enter and no steps inside. Endorses use of SC for ambulation. Independent with ADLs and IADLs.

## 2023-05-04 NOTE — PHYSICAL THERAPY INITIAL EVALUATION ADULT - PERTINENT HX OF CURRENT PROBLEM, REHAB EVAL
57 year old (non smoker) female patient (Tongan Speaking) known to have:  - Baseline: Alert, oriented, lives with daughter, ambulates independently  - DM Insulin Dependent. No Hba1c in chart.   - ESRD on hemodialysis every MWF via left UE AV fistula.  - Hypertension. Home med Coreg 12.5mg BID and Hydralazine 10mg in AM (not Q8h; confirmed by daughter)  - Dyslipidemia. No Lipid Profile in chart. Home med Atorvastatin 10mg QD, Aspirin 91mg QD

## 2023-05-05 ENCOUNTER — TRANSCRIPTION ENCOUNTER (OUTPATIENT)
Age: 58
End: 2023-05-05

## 2023-05-05 LAB
ALBUMIN SERPL ELPH-MCNC: 3.6 G/DL — SIGNIFICANT CHANGE UP (ref 3.5–5.2)
ALP SERPL-CCNC: 97 U/L — SIGNIFICANT CHANGE UP (ref 30–115)
ALT FLD-CCNC: 8 U/L — SIGNIFICANT CHANGE UP (ref 0–41)
ANION GAP SERPL CALC-SCNC: 16 MMOL/L — HIGH (ref 7–14)
AST SERPL-CCNC: 18 U/L — SIGNIFICANT CHANGE UP (ref 0–41)
B-OH-BUTYR SERPL-SCNC: 0.2 MMOL/L — SIGNIFICANT CHANGE UP
BASOPHILS # BLD AUTO: 0.04 K/UL — SIGNIFICANT CHANGE UP (ref 0–0.2)
BASOPHILS NFR BLD AUTO: 1.2 % — HIGH (ref 0–1)
BILIRUB SERPL-MCNC: 0.3 MG/DL — SIGNIFICANT CHANGE UP (ref 0.2–1.2)
BUN SERPL-MCNC: 67 MG/DL — CRITICAL HIGH (ref 10–20)
CALCIUM SERPL-MCNC: 9 MG/DL — SIGNIFICANT CHANGE UP (ref 8.4–10.5)
CHLORIDE SERPL-SCNC: 102 MMOL/L — SIGNIFICANT CHANGE UP (ref 98–110)
CO2 SERPL-SCNC: 24 MMOL/L — SIGNIFICANT CHANGE UP (ref 17–32)
CREAT SERPL-MCNC: 6.2 MG/DL — CRITICAL HIGH (ref 0.7–1.5)
EGFR: 7 ML/MIN/1.73M2 — LOW
EOSINOPHIL # BLD AUTO: 0.2 K/UL — SIGNIFICANT CHANGE UP (ref 0–0.7)
EOSINOPHIL NFR BLD AUTO: 5.8 % — SIGNIFICANT CHANGE UP (ref 0–8)
GLUCOSE BLDC GLUCOMTR-MCNC: 145 MG/DL — HIGH (ref 70–99)
GLUCOSE BLDC GLUCOMTR-MCNC: 154 MG/DL — HIGH (ref 70–99)
GLUCOSE BLDC GLUCOMTR-MCNC: 162 MG/DL — HIGH (ref 70–99)
GLUCOSE BLDC GLUCOMTR-MCNC: 181 MG/DL — HIGH (ref 70–99)
GLUCOSE BLDC GLUCOMTR-MCNC: 97 MG/DL — SIGNIFICANT CHANGE UP (ref 70–99)
GLUCOSE SERPL-MCNC: 169 MG/DL — HIGH (ref 70–99)
HCT VFR BLD CALC: 33 % — LOW (ref 37–47)
HGB BLD-MCNC: 11.3 G/DL — LOW (ref 12–16)
IMM GRANULOCYTES NFR BLD AUTO: 0 % — LOW (ref 0.1–0.3)
LYMPHOCYTES # BLD AUTO: 1.7 K/UL — SIGNIFICANT CHANGE UP (ref 1.2–3.4)
LYMPHOCYTES # BLD AUTO: 49.4 % — SIGNIFICANT CHANGE UP (ref 20.5–51.1)
MAGNESIUM SERPL-MCNC: 2.8 MG/DL — HIGH (ref 1.8–2.4)
MCHC RBC-ENTMCNC: 32.2 PG — HIGH (ref 27–31)
MCHC RBC-ENTMCNC: 34.2 G/DL — SIGNIFICANT CHANGE UP (ref 32–37)
MCV RBC AUTO: 94 FL — SIGNIFICANT CHANGE UP (ref 81–99)
MONOCYTES # BLD AUTO: 0.24 K/UL — SIGNIFICANT CHANGE UP (ref 0.1–0.6)
MONOCYTES NFR BLD AUTO: 7 % — SIGNIFICANT CHANGE UP (ref 1.7–9.3)
NEUTROPHILS # BLD AUTO: 1.26 K/UL — LOW (ref 1.4–6.5)
NEUTROPHILS NFR BLD AUTO: 36.6 % — LOW (ref 42.2–75.2)
NRBC # BLD: 0 /100 WBCS — SIGNIFICANT CHANGE UP (ref 0–0)
PHOSPHATE SERPL-MCNC: 8.3 MG/DL — HIGH (ref 2.1–4.9)
PLATELET # BLD AUTO: 182 K/UL — SIGNIFICANT CHANGE UP (ref 130–400)
PMV BLD: 11 FL — HIGH (ref 7.4–10.4)
POTASSIUM SERPL-MCNC: 4.5 MMOL/L — SIGNIFICANT CHANGE UP (ref 3.5–5)
POTASSIUM SERPL-SCNC: 4.5 MMOL/L — SIGNIFICANT CHANGE UP (ref 3.5–5)
PROINSULIN SERPL-MCNC: <0.3 PMOL/L — SIGNIFICANT CHANGE UP (ref 0–10)
PROT SERPL-MCNC: 5.9 G/DL — LOW (ref 6–8)
RBC # BLD: 3.51 M/UL — LOW (ref 4.2–5.4)
RBC # FLD: 12 % — SIGNIFICANT CHANGE UP (ref 11.5–14.5)
SODIUM SERPL-SCNC: 142 MMOL/L — SIGNIFICANT CHANGE UP (ref 135–146)
WBC # BLD: 3.44 K/UL — LOW (ref 4.8–10.8)
WBC # FLD AUTO: 3.44 K/UL — LOW (ref 4.8–10.8)

## 2023-05-05 PROCEDURE — 99233 SBSQ HOSP IP/OBS HIGH 50: CPT

## 2023-05-05 RX ORDER — SEVELAMER CARBONATE 2400 MG/1
1600 POWDER, FOR SUSPENSION ORAL
Refills: 0 | Status: DISCONTINUED | OUTPATIENT
Start: 2023-05-05 | End: 2023-05-09

## 2023-05-05 RX ORDER — SEVELAMER CARBONATE 2400 MG/1
1800 POWDER, FOR SUSPENSION ORAL
Refills: 0 | Status: DISCONTINUED | OUTPATIENT
Start: 2023-05-05 | End: 2023-05-05

## 2023-05-05 RX ADMIN — HEPARIN SODIUM 5000 UNIT(S): 5000 INJECTION INTRAVENOUS; SUBCUTANEOUS at 05:50

## 2023-05-05 RX ADMIN — Medication 25 MILLIGRAM(S): at 15:36

## 2023-05-05 RX ADMIN — CARVEDILOL PHOSPHATE 12.5 MILLIGRAM(S): 80 CAPSULE, EXTENDED RELEASE ORAL at 17:23

## 2023-05-05 RX ADMIN — SEVELAMER CARBONATE 1600 MILLIGRAM(S): 2400 POWDER, FOR SUSPENSION ORAL at 17:24

## 2023-05-05 RX ADMIN — Medication 650 MILLIGRAM(S): at 12:00

## 2023-05-05 RX ADMIN — CARVEDILOL PHOSPHATE 12.5 MILLIGRAM(S): 80 CAPSULE, EXTENDED RELEASE ORAL at 05:46

## 2023-05-05 RX ADMIN — Medication 5 UNIT(S): at 17:23

## 2023-05-05 RX ADMIN — Medication 25 MILLIGRAM(S): at 05:46

## 2023-05-05 RX ADMIN — HEPARIN SODIUM 5000 UNIT(S): 5000 INJECTION INTRAVENOUS; SUBCUTANEOUS at 15:35

## 2023-05-05 RX ADMIN — Medication 5 UNIT(S): at 11:59

## 2023-05-05 RX ADMIN — Medication 2: at 17:23

## 2023-05-05 RX ADMIN — ATORVASTATIN CALCIUM 10 MILLIGRAM(S): 80 TABLET, FILM COATED ORAL at 21:34

## 2023-05-05 RX ADMIN — PANTOPRAZOLE SODIUM 40 MILLIGRAM(S): 20 TABLET, DELAYED RELEASE ORAL at 05:48

## 2023-05-05 RX ADMIN — Medication 650 MILLIGRAM(S): at 13:00

## 2023-05-05 RX ADMIN — HEPARIN SODIUM 5000 UNIT(S): 5000 INJECTION INTRAVENOUS; SUBCUTANEOUS at 21:35

## 2023-05-05 RX ADMIN — Medication 81 MILLIGRAM(S): at 12:00

## 2023-05-05 RX ADMIN — INSULIN GLARGINE 16 UNIT(S): 100 INJECTION, SOLUTION SUBCUTANEOUS at 21:35

## 2023-05-05 RX ADMIN — Medication 25 MILLIGRAM(S): at 21:35

## 2023-05-05 RX ADMIN — Medication 5 UNIT(S): at 07:58

## 2023-05-05 RX ADMIN — CHLORHEXIDINE GLUCONATE 1 APPLICATION(S): 213 SOLUTION TOPICAL at 05:46

## 2023-05-05 NOTE — PROGRESS NOTE ADULT - SUBJECTIVE AND OBJECTIVE BOX
Patient is a 57y old  Female who presents with a chief complaint of Hypoglycemic Seizure (05 May 2023 08:46)    INTERVAL HPI/OVERNIGHT EVENTS: Patient was examined and seen at bedside. This morning pt is resting comfortably in bed and reports no new issues or overnight events. Feels better. Reports some dizziness when getting up.  ROS: Denies CP, SOB, AP, new weakness  All other systems reviewed and are within normal limits.  InitialHPI:  Location: [facility] ED Hold 007 A ([facility] ED Hold)  Patient Name: ANAM NGUYỄN  Age: 57y  Gender: Female      History of Present Illness  57 year old (non smoker) female patient (Israeli Speaking) known to have:  - Baseline: Alert, oriented, lives with daughter, ambulates independently  - DM Insulin Dependent. No Hba1c in chart.   - ESRD on hemodialysis every MWF via left UE AV fistula.  - Hypertension. Home med Coreg 12.5mg BID and Hydralazine 10mg in AM (not Q8h; confirmed by daughter)  - Dyslipidemia. No Lipid Profile in chart. Home med Atorvastatin 10mg QD, Aspirin 81mg QD      Patient was confused at time of my evaluation (asking where she is and why she is in ED), so daughter was called over the phone and a Russian PCA was present at bedside.  Patient was brought to the ED by EMS on  following an episode of a witnessed seizure.  Per daughter over the phone, history goes back to  at 23:30 PM when the daughter noticed that her mother was shaking all over 4 extremities.  Prior to the episode, she denied any complaint, including chest pain, diaphoresis, lightheadedness, or headache. She notes that POCT in AM was 95, at noon was 150, and at night was 120, and she reports adequate appetite. When asked about insulin dosing, daughter stated that mother usually administers her own insulin and is not sure how much she administered at night.  During the episode, daughter notes that her mother was shaking all over and was unresponsive; she had no uprolling of eyes, tongue biting, drooling, urinary or fecal incontinence.  The episode lasted for 15 minutes, before it spontaneously broke.  After the episode, the patient was confused and could not remember the details of the event.  Daughter called 911, and on EMS arrival the POCT was 109 and patient was not seizing (still confused).  On arrival, patient was confused. She complained of frontal headache and some blurry vision that she said were not new but had no other complaints.     On review of systems, daughter denies any recent fever, chills, night sweats, URTI symptoms (cough, rhinorrhea, sore throat), urinary symptoms (urinary frequency, urgency, intermittence, dysuria, foul smelling urine, cloudy urine), change in bowel movements (diarrhea or constipation), abdominal pain, headache, nausea, or vomiting.   No sick contacts.   No recent travel or exposure to recent travelers.      Upon presentation to the ED, the patient's Vital Signs in ED:  - /79 mmHg s/p IV Labetalol 10mg x2 doses -> 114/57 mmHg  - HR 70 bpm  - RR 16 bpm  - T 94 degrees  - SaO2 85% on RA so placed on 5LPM NC with improvement to 98%      Investigations   Laboratory Workup  - CBC:                        13.3   4.11  )-----------( 238      ( 01 May 2023 00:42 )             40.8     - Chemistry:      140  |  97<L>  |  76<HH>  ----------------------------<  35<LL>  5.3<H>   |  17  |  6.1<HH>    Ca    9.2      01 May 2023 00:42  Mg     3.5         TPro  7.5  /  Alb  4.6  /  TBili  0.5  /  DBili  x   /  AST  25  /  ALT  13  /  AlkPhos  152<H>      - Coagulation Studies:  PT/INR - ( 01 May 2023 00:42 )   PT: 9.60 sec;   INR: 0.85 ratio    PTT - ( 01 May 2023 00:42 )  PTT:38.1 sec      - Cardiac Markers:  CARDIAC MARKERS ( 01 May 2023 00:42 )  x     / x     / 170 U/L / x     / x          Microbiological Workup  Urinalysis Basic - ( 01 May 2023 01:42 )    Color: Light Yellow / Appearance: Clear / S.016 / pH: x  Gluc: x / Ketone: Negative  / Bili: Negative / Urobili: <2 mg/dL   Blood: x / Protein: 100 mg/dL / Nitrite: Negative   Leuk Esterase: Small / RBC: 4 /HPF / WBC 7 /HPF   Sq Epi: x / Non Sq Epi: x / Bacteria: Few      Radiological Workup  * CT Head No Cont (23 @ 01:32) No acute intracranial pathology. Follow-up MRI of the brain with and without contrast may be helpful for  further evaluation.    - POCT on arrival to ED was 40 so Patient received D50 25mL bolus for hypoglycemia   - She received IV Cefepime and Vancomycin in ED for hypothermia and concern for sepsis  - For SAO2 85% on RA, she was placed on 5LPM NC with improvement in SAO2 to 98%  - For /79 mmHg, she received IV Labetalol 10mg x2 doses with improvement   - She will be admitted to MICU for further investigations, management, and monitoring     (01 May 2023 03:28)    PAST MEDICAL & SURGICAL HISTORY:  Chronic kidney disease, unspecified CKD stage          General: NAD, AAO3  HEENT:  EOMI, no LAD  CV: S1 S2  Resp: decreased breath sounds at bases  GI: NT/ND/S +BS  MS: no clubbing/cyanosis/edema, + pulses b/l  Neuro: nonfocal, +reflexes thruout    MEDICATIONS  (STANDING):  aspirin  chewable 81 milliGRAM(s) Oral daily  atorvastatin 10 milliGRAM(s) Oral at bedtime  carvedilol 12.5 milliGRAM(s) Oral every 12 hours  chlorhexidine 4% Liquid 1 Application(s) Topical <User Schedule>  dextrose 5%. 1000 milliLiter(s) (100 mL/Hr) IV Continuous <Continuous>  dextrose 5%. 1000 milliLiter(s) (50 mL/Hr) IV Continuous <Continuous>  dextrose 50% Injectable 25 Gram(s) IV Push once  dextrose 50% Injectable 25 Gram(s) IV Push once  dextrose 50% Injectable 12.5 Gram(s) IV Push once  glucagon  Injectable 1 milliGRAM(s) IntraMuscular once  heparin   Injectable 5000 Unit(s) SubCutaneous every 8 hours  hydrALAZINE 25 milliGRAM(s) Oral every 8 hours  insulin glargine Injectable (LANTUS) 16 Unit(s) SubCutaneous at bedtime  insulin lispro (ADMELOG) corrective regimen sliding scale   SubCutaneous three times a day before meals  insulin lispro Injectable (ADMELOG) 5 Unit(s) SubCutaneous three times a day before meals  pantoprazole    Tablet 40 milliGRAM(s) Oral before breakfast    MEDICATIONS  (PRN):  acetaminophen     Tablet .. 650 milliGRAM(s) Oral every 6 hours PRN Temp greater or equal to 38C (100.4F), Mild Pain (1 - 3)  dextrose Oral Gel 15 Gram(s) Oral once PRN Blood Glucose LESS THAN 70 milliGRAM(s)/deciliter    Vital Signs Last 24 Hrs  T(C): 36.2 (05 May 2023 04:24), Max: 36.2 (05 May 2023 04:24)  T(F): 97.1 (05 May 2023 04:24), Max: 97.1 (05 May 2023 04:24)  HR: 67 (05 May 2023 11:00) (64 - 73)  BP: 128/66 (05 May 2023 11:00) (119/59 - 163/72)  BP(mean): 103 (05 May 2023 04:24) (103 - 103)  RR: 18 (05 May 2023 04:24) (18 - 18)  SpO2: --    Parameters below as of 05 May 2023 04:24  Patient On (Oxygen Delivery Method): room air      CAPILLARY BLOOD GLUCOSE      POCT Blood Glucose.: 97 mg/dL (05 May 2023 11:41)  POCT Blood Glucose.: 145 mg/dL (05 May 2023 07:40)  POCT Blood Glucose.: 191 mg/dL (04 May 2023 21:11)  POCT Blood Glucose.: 375 mg/dL (04 May 2023 16:47)                          11.3   3.44  )-----------( 182      ( 05 May 2023 07:04 )             33.0     -    142  |  102  |  67<HH>  ----------------------------<  169<H>  4.5   |  24  |  6.2<HH>    Ca    9.0      05 May 2023 07:04  Phos  8.3     05-05  Mg     2.8     05-05    TPro  5.9<L>  /  Alb  3.6  /  TBili  0.3  /  DBili  x   /  AST  18  /  ALT  8   /  AlkPhos  97  05-05    LIVER FUNCTIONS - ( 05 May 2023 07:04 )  Alb: 3.6 g/dL / Pro: 5.9 g/dL / ALK PHOS: 97 U/L / ALT: 8 U/L / AST: 18 U/L / GGT: x           CARDIAC MARKERS ( 03 May 2023 21:27 )  x     / 0.07 ng/mL / x     / x     / x      CARDIAC MARKERS ( 03 May 2023 17:42 )  x     / 0.08 ng/mL / x     / x     / x      CARDIAC MARKERS ( 03 May 2023 15:04 )  x     / 0.10 ng/mL / x     / x     / 2.7 ng/mL          ABG - ( 03 May 2023 21:02 )  pH, Arterial: 7.45  pH, Blood: x     /  pCO2: 38    /  pO2: 88    / HCO3: 26    / Base Excess: 2.4   /  SaO2: 96.7                    Chart, Consultant(s) Notes Reviewed:  [x ] YES  [ ] NO  Care Discussed with Consultants/Other Providers/ Housestaff [ x] YES  [ ] NO  Radiology, labs, old available records personally reviewed.

## 2023-05-05 NOTE — PROGRESS NOTE ADULT - SUBJECTIVE AND OBJECTIVE BOX
ANAM NGUYỄN 57y Female  MRN#: [id number]   CODE STATUS: FULL    Hospital Day: 4d    Pt is currently admitted with the primary diagnosis of Hypoglycemic Seizure    SUBJECTIVE  Patient was examined at bed side thia am with the EKG technologist as . She reports no acute events over night and significant improvement of her pain. Patient denies SOB, palpitations, chest pain, fever and chills.             OBJECTIVE  PAST MEDICAL & SURGICAL HISTORY  Chronic kidney disease, unspecified CKD stage                                                ALLERGIES:  No Known Allergies                           HOME MEDICATIONS  Home Medications:  aspirin 81 mg oral tablet: 1 tab(s) orally once a day (01 May 2023 04:22)  atorvastatin 10 mg oral tablet: 1 tab(s) orally once a day (01 May 2023 04:22)  Coreg 12.5 mg oral tablet: 1 tab(s) orally 2 times a day (01 May 2023 04:24)  hydrALAZINE 10 mg oral tablet: 1 tab(s) orally once a day in AM only per daughter/ not Q8h (01 May 2023 04:23)                           MEDICATIONS:  STANDING MEDICATIONS  aspirin  chewable 81 milliGRAM(s) Oral daily  atorvastatin 10 milliGRAM(s) Oral at bedtime  carvedilol 12.5 milliGRAM(s) Oral every 12 hours  chlorhexidine 4% Liquid 1 Application(s) Topical <User Schedule>  dextrose 5%. 1000 milliLiter(s) IV Continuous <Continuous>  dextrose 5%. 1000 milliLiter(s) IV Continuous <Continuous>  dextrose 50% Injectable 25 Gram(s) IV Push once  dextrose 50% Injectable 12.5 Gram(s) IV Push once  dextrose 50% Injectable 25 Gram(s) IV Push once  glucagon  Injectable 1 milliGRAM(s) IntraMuscular once  heparin   Injectable 5000 Unit(s) SubCutaneous every 8 hours  hydrALAZINE 25 milliGRAM(s) Oral every 8 hours  insulin glargine Injectable (LANTUS) 16 Unit(s) SubCutaneous at bedtime  insulin lispro (ADMELOG) corrective regimen sliding scale   SubCutaneous three times a day before meals  insulin lispro Injectable (ADMELOG) 5 Unit(s) SubCutaneous three times a day before meals  pantoprazole    Tablet 40 milliGRAM(s) Oral before breakfast    PRN MEDICATIONS  acetaminophen     Tablet .. 650 milliGRAM(s) Oral every 6 hours PRN  dextrose Oral Gel 15 Gram(s) Oral once PRN                                            ------------------------------------------------------------  VITAL SIGNS: Last 24 Hours  T(C): 36.2 (05 May 2023 04:24), Max: 36.2 (05 May 2023 04:24)  T(F): 97.1 (05 May 2023 04:24), Max: 97.1 (05 May 2023 04:24)  HR: 64 (05 May 2023 04:24) (64 - 73)  BP: 163/72 (05 May 2023 04:24) (119/59 - 163/72)  BP(mean): 103 (05 May 2023 04:24) (103 - 103)  RR: 18 (05 May 2023 04:24) (18 - 20)  SpO2: --      05-04-23 @ 07:01  -  05-05-23 @ 07:00  --------------------------------------------------------  IN: 0 mL / OUT: 350 mL / NET: -350 mL                                               LABS:                        11.3   3.62  )-----------( 191      ( 04 May 2023 05:47 )             33.5     05-04    134<L>  |  94<L>  |  45<H>  ----------------------------<  203<H>  4.3   |  26  |  4.8<HH>    Ca    8.5      04 May 2023 05:47  Phos  6.7     05-04  Mg     2.6     05-04    TPro  5.9<L>  /  Alb  3.5  /  TBili  0.6  /  DBili  x   /  AST  15  /  ALT  9   /  AlkPhos  102  05-04      ABG - ( 03 May 2023 21:02 )  pH, Arterial: 7.45  pH, Blood: x     /  pCO2: 38    /  pO2: 88    / HCO3: 26    / Base Excess: 2.4   /  SaO2: 96.7        CARDIAC MARKERS ( 03 May 2023 21:27 )  x     / 0.07 ng/mL / x     / x     / x      CARDIAC MARKERS ( 03 May 2023 17:42 )  x     / 0.08 ng/mL / x     / x     / x      CARDIAC MARKERS ( 03 May 2023 15:04 )  x     / 0.10 ng/mL / x     / x     / 2.7 ng/mL                                              RADIOLOGY:  < from: Xray Chest 1 View-PORTABLE IMMEDIATE (Xray Chest 1 View-PORTABLE IMMEDIATE .) (05.03.23 @ 16:14) >    Impression:    No radiographic evidence of acute cardiopulmonary disease.        --- End of Report ---    < end of copied text >  < from: CT Abdomen and Pelvis w/ Oral Cont (05.01.23 @ 20:02) >  IMPRESSION:    No evidence of bowel obstruction.    Hazy somewhat patchy left basilar pulmonary opacity, may be infectious.   Correlate clinically.    --- End of Report ---    < end of copied text >  < from: US Abdomen Complete (05.01.23 @ 19:00) >    IMPRESSION:  1.  Unremarkable ultrasound evaluation of the hepatobiliary system.  2.  A 2.7 cm hypoechoic structure is demonstrated adjacent the pancreatic   head, although without correlate on CT from the same day and potentially   representing duodenum. Outpatient MRI or contrast-enhanced CT is   recommended to exclude a mass lesion.        --- End of Report ---    < end of copied text >        PHYSICAL EXAM:  GENERAL: NAD, lying in bed comfortably  HEAD:  Atraumatic, Normocephalic  EYES: EOMI, PERRLA, conjunctiva and sclera clear  ENT: Moist mucous membranes  NECK: Supple, No JVD  CHEST/LUNG: Clear to auscultation bilaterally; No rales, rhonchi, wheezing, or rubs. Unlabored respirations  HEART: Regular rate and rhythm; No murmurs, rubs, or gallops  ABDOMEN: BSx4; Soft, nontender, nondistended  EXTREMITIES:  2+ Peripheral Pulses, brisk capillary refill. No clubbing, cyanosis, or edema  NERVOUS SYSTEM:  A&Ox3, no focal deficits   SKIN: No rashes or lesions                   ANAM NGUYỄN 57y Female  MRN#: [id number]   CODE STATUS: FULL    Hospital Day: 4d    Pt is currently admitted with the primary diagnosis of Hypoglycemic Seizure    SUBJECTIVE  Patient was examined at bed side this am with [id number] as . She reports no acute events over night and significant improvement of her pain. Patient denies SOB, palpitations, chest pain, fever and chills. she is however endorsing cough.             OBJECTIVE  PAST MEDICAL & SURGICAL HISTORY  Chronic kidney disease, unspecified CKD stage                                            ALLERGIES:  No Known Allergies                        HOME MEDICATIONS  Home Medications:  aspirin 81 mg oral tablet: 1 tab(s) orally once a day (01 May 2023 04:22)  atorvastatin 10 mg oral tablet: 1 tab(s) orally once a day (01 May 2023 04:22)  Coreg 12.5 mg oral tablet: 1 tab(s) orally 2 times a day (01 May 2023 04:24)  hydrALAZINE 10 mg oral tablet: 1 tab(s) orally once a day in AM only per daughter/ not Q8h (01 May 2023 04:23)                           MEDICATIONS:  STANDING MEDICATIONS  aspirin  chewable 81 milliGRAM(s) Oral daily  atorvastatin 10 milliGRAM(s) Oral at bedtime  carvedilol 12.5 milliGRAM(s) Oral every 12 hours  chlorhexidine 4% Liquid 1 Application(s) Topical <User Schedule>  dextrose 5%. 1000 milliLiter(s) IV Continuous <Continuous>  dextrose 5%. 1000 milliLiter(s) IV Continuous <Continuous>  dextrose 50% Injectable 25 Gram(s) IV Push once  dextrose 50% Injectable 12.5 Gram(s) IV Push once  dextrose 50% Injectable 25 Gram(s) IV Push once  glucagon  Injectable 1 milliGRAM(s) IntraMuscular once  heparin   Injectable 5000 Unit(s) SubCutaneous every 8 hours  hydrALAZINE 25 milliGRAM(s) Oral every 8 hours  insulin glargine Injectable (LANTUS) 16 Unit(s) SubCutaneous at bedtime  insulin lispro (ADMELOG) corrective regimen sliding scale   SubCutaneous three times a day before meals  insulin lispro Injectable (ADMELOG) 5 Unit(s) SubCutaneous three times a day before meals  pantoprazole    Tablet 40 milliGRAM(s) Oral before breakfast    PRN MEDICATIONS  acetaminophen     Tablet .. 650 milliGRAM(s) Oral every 6 hours PRN  dextrose Oral Gel 15 Gram(s) Oral once PRN                                            ------------------------------------------------------------  VITAL SIGNS: Last 24 Hours  T(C): 36.2 (05 May 2023 04:24), Max: 36.2 (05 May 2023 04:24)  T(F): 97.1 (05 May 2023 04:24), Max: 97.1 (05 May 2023 04:24)  HR: 64 (05 May 2023 04:24) (64 - 73)  BP: 163/72 (05 May 2023 04:24) (119/59 - 163/72)  BP(mean): 103 (05 May 2023 04:24) (103 - 103)  RR: 18 (05 May 2023 04:24) (18 - 20)  SpO2: --      05-04-23 @ 07:01  -  05-05-23 @ 07:00  --------------------------------------------------------  IN: 0 mL / OUT: 350 mL / NET: -350 mL                                               LABS:                        11.3   3.62  )-----------( 191      ( 04 May 2023 05:47 )             33.5     05-04    134<L>  |  94<L>  |  45<H>  ----------------------------<  203<H>  4.3   |  26  |  4.8<HH>    Ca    8.5      04 May 2023 05:47  Phos  6.7     05-04  Mg     2.6     05-04    TPro  5.9<L>  /  Alb  3.5  /  TBili  0.6  /  DBili  x   /  AST  15  /  ALT  9   /  AlkPhos  102  05-04      ABG - ( 03 May 2023 21:02 )  pH, Arterial: 7.45  pH, Blood: x     /  pCO2: 38    /  pO2: 88    / HCO3: 26    / Base Excess: 2.4   /  SaO2: 96.7        CARDIAC MARKERS ( 03 May 2023 21:27 )  x     / 0.07 ng/mL / x     / x     / x      CARDIAC MARKERS ( 03 May 2023 17:42 )  x     / 0.08 ng/mL / x     / x     / x      CARDIAC MARKERS ( 03 May 2023 15:04 )  x     / 0.10 ng/mL / x     / x     / 2.7 ng/mL                                              RADIOLOGY:  < from: Xray Chest 1 View-PORTABLE IMMEDIATE (Xray Chest 1 View-PORTABLE IMMEDIATE .) (05.03.23 @ 16:14) >    Impression:    No radiographic evidence of acute cardiopulmonary disease.        --- End of Report ---    < end of copied text >  < from: CT Abdomen and Pelvis w/ Oral Cont (05.01.23 @ 20:02) >  IMPRESSION:    No evidence of bowel obstruction.    Hazy somewhat patchy left basilar pulmonary opacity, may be infectious.   Correlate clinically.    --- End of Report ---    < end of copied text >  < from: US Abdomen Complete (05.01.23 @ 19:00) >    IMPRESSION:  1.  Unremarkable ultrasound evaluation of the hepatobiliary system.  2.  A 2.7 cm hypoechoic structure is demonstrated adjacent the pancreatic   head, although without correlate on CT from the same day and potentially   representing duodenum. Outpatient MRI or contrast-enhanced CT is   recommended to exclude a mass lesion.        --- End of Report ---    < end of copied text >        PHYSICAL EXAM:  GENERAL: NAD, lying in bed comfortably  HEAD:  Atraumatic, Normocephalic  ENT: Moist mucous membranes  NECK: Supple, No JVD  CHEST/LUNG: Clear to auscultation bilaterally; No rales, rhonchi, wheezing, or rubs.   HEART: Regular rate and rhythm; No murmurs, rubs, or gallops  ABDOMEN: BSx4; Soft, nontender, nondistended  EXTREMITIES:  2+ Peripheral Pulses, brisk capillary refill. No clubbing, cyanosis, or edema  NERVOUS SYSTEM:  A&Ox3, no focal deficits

## 2023-05-05 NOTE — PROGRESS NOTE ADULT - SUBJECTIVE AND OBJECTIVE BOX
Nephrology progress note    Patient is seen and examined, events over the last 24 h noted .  Seen on HD  No SOB  Allergies:  No Known Allergies    Hospital Medications:   MEDICATIONS  (STANDING):  aspirin  chewable 81 milliGRAM(s) Oral daily  atorvastatin 10 milliGRAM(s) Oral at bedtime  carvedilol 12.5 milliGRAM(s) Oral every 12 hours  chlorhexidine 4% Liquid 1 Application(s) Topical <User Schedule>  dextrose 5%. 1000 milliLiter(s) (100 mL/Hr) IV Continuous <Continuous>  dextrose 5%. 1000 milliLiter(s) (50 mL/Hr) IV Continuous <Continuous>  dextrose 50% Injectable 25 Gram(s) IV Push once  dextrose 50% Injectable 25 Gram(s) IV Push once  dextrose 50% Injectable 12.5 Gram(s) IV Push once  glucagon  Injectable 1 milliGRAM(s) IntraMuscular once  heparin   Injectable 5000 Unit(s) SubCutaneous every 8 hours  hydrALAZINE 25 milliGRAM(s) Oral every 8 hours  insulin glargine Injectable (LANTUS) 16 Unit(s) SubCutaneous at bedtime  insulin lispro (ADMELOG) corrective regimen sliding scale   SubCutaneous three times a day before meals  insulin lispro Injectable (ADMELOG) 5 Unit(s) SubCutaneous three times a day before meals  pantoprazole    Tablet 40 milliGRAM(s) Oral before breakfast        VITALS:  T(F): 97.1 (23 @ 04:24), Max: 97.1 (23 @ 04:24)  HR: 64 (23 @ 04:24)  BP: 163/72 (23 @ 04:24)  RR: 18 (23 @ 04:24)  SpO2: --  Wt(kg): --     @ 07:01  -   @ 07:00  --------------------------------------------------------  IN: 0 mL / OUT: 350 mL / NET: -350 mL          PHYSICAL EXAM:  Constitutional: NAD  HEENT: anicteric sclera  Neck: No JVD  Respiratory: CTA  Cardiovascular: S1, S2, RRR  Gastrointestinal: BS+, soft, NT/ND  Extremities:  No peripheral edema  Neurological: A/O x 3  : No CVA tenderness. No murillo.   Skin: No rashes  Vascular Access: AVF    LABS:      142  |  102  |  67<HH>  ----------------------------<  169<H>  4.5   |  24  |  6.2<HH>    Ca    9.0      05 May 2023 07:04  Phos  8.3       Mg     2.8         TPro  5.9<L>  /  Alb  3.6  /  TBili  0.3  /  DBili      /  AST  18  /  ALT  8   /  AlkPhos  97                            11.3   3.44  )-----------( 182      ( 05 May 2023 07:04 )             33.0       Urine Studies:  Urinalysis Basic - ( 01 May 2023 01:42 )    Color: Light Yellow / Appearance: Clear / S.016 / pH:   Gluc:  / Ketone: Negative  / Bili: Negative / Urobili: <2 mg/dL   Blood:  / Protein: 100 mg/dL / Nitrite: Negative   Leuk Esterase: Small / RBC: 4 /HPF / WBC 7 /HPF   Sq Epi:  / Non Sq Epi:  / Bacteria: Few        RADIOLOGY & ADDITIONAL STUDIES:  < from: CT Abdomen and Pelvis w/ Oral Cont (23 @ 20:02) >  IMPRESSION:    No evidence of bowel obstruction.    Hazy somewhat patchy left basilar pulmonary opacity, may be infectious.   Correlate clinically.    < end of copied text >  < from: Xray Chest 1 View-PORTABLE IMMEDIATE (Xray Chest 1 View-PORTABLE IMMEDIATE .) (23 @ 16:14) >  Impression:    No radiographic evidence of acute cardiopulmonary disease.    < end of copied text >

## 2023-05-05 NOTE — PROGRESS NOTE ADULT - ASSESSMENT
a/p:  57 year old (non smoker) female patient (Vincentian Speaking) pmhx of IDDM, ESRD, HTN/HLDPatient was brought to the ED by EMS on 04/30 following an episode of a witnessed seizure.    #Seizure 2/2 hypoglycemia  -EEG with borderline generalized slowing  -cont to monitor fs    #DKA- resolved (while inpt)  #DM  -downgraded from ICU--s/p insulin drip  -goal fs 140-180  -c/w basal bolus regimen 16u Lantus qam and 5u Lispro qac with ISS  -hba1c 9.3  -echo EF 74%    #Chest pain- atypical   -EKG NSR and trop neg   -continue asa, statin  - outpt cardiac workup      #ESRD on HD  -cont HD m/w/f  -nephrology f/u    #Hypertensive urgency/HLD  - cont HTN home meds (coreg, hydralazine)  - c/w hydralazine 25 TID, statin    Dizziness  check orthostatics    Chronic Headaches  outpt follow up    Pancreatic lesion (pt aware)  outpt MRI or CT w/ contrast    DVT/GI ppx      FULL CODE    Discharge instructions discussed and patient knows when to seek immediate medical attention.  Patient has proper follow up.  All results discussed and patient aware they require further follow up/work up.  Stressed importance of proper follow up.  Medications prescribed and changes discussed.  All questions and concerns from patient and family addressed. Understanding of instructions verbalized.    Time spent in completing discharge process and coordinating care 45 minutes.    Discussed with housestaff, nursing, case mgmt   a/p:  57 year old (non smoker) female patient (E[place]ian Speaking) pmhx of IDDM, ESRD, HTN/HLDPatient was brought to the ED by EMS on 04/30 following an episode of a witnessed seizure.    #Seizure 2/2 hypoglycemia  -EEG with borderline generalized slowing  -cont to monitor fs    #DKA- resolved (while inpt)  #DM  -downgraded from ICU--s/p insulin drip  -goal fs 140-180  -c/w basal bolus regimen 16u Lantus qam and 5u Lispro qac with ISS  -hba1c 9.3  -echo EF 74%    #Chest pain- atypical   -EKG NSR and trop neg   -continue asa, statin  - outpt cardiac workup      #ESRD on HD  -cont HD m/w/f  -nephrology f/u    #Hypertensive urgency/HLD  - cont HTN home meds (coreg, hydralazine)  - c/w hydralazine 25 TID, statin    Dizziness  check orthostatics if + TEDs, abd binder    Chronic Headaches  outpt follow up    Pancreatic lesion (pt aware)  outpt MRI or CT w/ contrast    DVT/GI ppx    Dispo: home if walks with PT  Has an appt at [facility] on 5/8 at 1pm w/ Dr. Chanel    FULL CODE    Discharge instructions discussed and patient knows when to seek immediate medical attention.  Patient has proper follow up.  All results discussed and patient aware they require further follow up/work up.  Stressed importance of proper follow up.  Medications prescribed and changes discussed.  All questions and concerns from patient and family addressed. Understanding of instructions verbalized.    Time spent in completing discharge process and coordinating care 45 minutes.    Discussed with housestaff, nursing, case mgmt, PT

## 2023-05-05 NOTE — DISCHARGE NOTE NURSING/CASE MANAGEMENT/SOCIAL WORK - PATIENT PORTAL LINK FT
You can access the FollowMyHealth Patient Portal offered by [facility] by registering at the following website: [email or web address]. By joining [facility]’s FollowMyHealth portal, you will also be able to view your health information using other applications (apps) compatible with our system.

## 2023-05-05 NOTE — PROGRESS NOTE ADULT - ASSESSMENT
57 year old (non smoker) female patient (Micronesian Speaking) known to have DM, ESRD on HD, HTN and DLD admitted for AMS and seizures likely secondary to hypoglycemia.    Assessment and plan  ESRD on HD/ Seizure/ HTN/ DM  HD MWF   opti 160 2 k bath 2L UF as tolerated  BP controlled, c/w home medications  Hgb at target  Last P elevated - renal diet, start Sevelamer 1800 mg q8h with meals  will follow

## 2023-05-05 NOTE — PROGRESS NOTE ADULT - ASSESSMENT
Assessment and Plan:   	  57 year old (non smoker) female patient (Scottish Speaking) pmhx of IDDM, ESRD, HTN/HLD  Patient was brought to the ED by EMS on 04/30 following an episode of a witnessed seizure.    #DKA  -s/p insulin drip; gap closed yesterday, measured 14 @6AM (5/4/23)  -c/w basal bolus regimen 16u Lantus qam and 5u Lispro qac with ISS  -keep sugars 140-180   -monitor BMP    #Chest pain- atypical   -EKG NSR; EKG done   -trend troponin-trending down (5/4/23);   -CK-MB (2.7)  -ECHO reviewed    -c/w aspirin     -tylenol prn for pain     #Acute hypoxic respiratory failure- resolved     #Seizure 2/2 hypoglycemia    #Lactic acidosis- resolved    #Hx of IDDM     #Susepcted aspiration pneumonitis  -CXR (05/02): No focal consolidation, pneumonthorax, or pleural effusion  -CTAP 05/01: Hazy patchy left basilar pulmonary opacity  -Currently on RA saturating well   - passed swallow test: Easy to Chew  - Cont vanc/cefepime for now; UCx growing E. faecalis   - f/u BCx, DC abx if negative  - Endo eval for DM regimen recs on discharge    #Hypertensive urgency  - cont HTN home meds (coreg, hydralazine)  - c/w hydralazine 25 TID     #HLD  - cont atorvastatin    #ESRD on HD  - HD per nephrology    DVT PPX heparin    Family discussion: Plan of care discussed with patient, aware and agreeable. Tried calling daughter Rhianna at [phone], no response.   Disposition: Home             Assessment and Plan:   	  57 year old (non smoker) female patient (Latvian Speaking) pmhx of IDDM, ESRD, HTN/HLD  Patient was brought to the ED by EMS on 04/30 following an episode of a witnessed seizure.    #DKA  -s/p insulin drip; gap closed yesterday, measured 14 @6AM (5/4/23)  -c/w basal bolus regimen 16u Lantus qam and 5u Lispro qac with ISS  -keep sugars 140-180   -monitor BMP  -passed TOV 5/4/23    #Chest pain- atypical   -EKG NSR; EKG done   -trend troponin-trending down (5/4/23);   -CK-MB (2.7)  -ECHO reviewed    -c/w aspirin     -tylenol prn for pain     #Acute hypoxic respiratory failure- resolved     #Seizure 2/2 hypoglycemia    #Lactic acidosis- resolved    #Hx of IDDM     #Susepcted aspiration pneumonitis  -CXR (05/02): No focal consolidation, pneumonthorax, or pleural effusion  -CTAP 05/01: Hazy patchy left basilar pulmonary opacity  -Currently on RA saturating well   - passed swallow test: Easy to Chew  - Cont vanc/cefepime for now; UCx growing E. faecalis   - f/u BCx, DC abx if negative  - Endo eval for DM regimen recs on discharge    #Hypertensive urgency  - cont HTN home meds (coreg, hydralazine)  - c/w hydralazine 25 TID     #HLD  - cont atorvastatin    #ESRD on HD  - HD per nephrology    DVT PPX heparin    Family discussion: Plan of care discussed with patient, aware and agreeable. Tried calling daughter Rhianna at [phone], no response.   Disposition: Home             57F (non-smoker, Maltese Speaking) PMHx IDDM, ESRD, HTN/HLD BIBEMS on 4/30 following an episode of a witnessed seizure. Found to be hypoglycemic and admitted to medicine for management.    #Orthostatic Hypotension  - positive orthostatics post HD  - patient is symptomatic    #Hx of IDDM  #DKA - resolved  #Seizure 2/2 hypoglycemia  - arrived at the ED with a serum glucose in the 40s  - s/p dextrose IV  - upgraded to SDU and downgraded the following day  - DKA after downgrade  - s/p insulin drip; gap closed yesterday, measured 14 @6AM (5/4/23)  - c/w basal bolus regimen 16u Lantus qam and 5u Lispro qac with ISS  - keep sugars 140-180   - monitor BMP  - Endo eval for DM regimen recs on discharge    #ESRD on HD  #Oliguric  #Hyperphosphatemia  - HD per nephrology  - HD today  - starting sevelamar 1800mg TID w/ meals as per nephro  - passed TOV 5/4/23    #Asymptomatic Bacteruria  - UCx +ve for E facaelis  - no leukocytosis, no urinary symptoms, afebrile  - continue to monitor off abx    #Atypical Chest Pain likely Type II NSTEMI - resolved  - EKG NSR; EKG done   - trend troponin-trending down (5/4/23)  - CK-MB (2.7)  - ECHO reviewed    - c/w aspirin     - tylenol prn for pain     #Acute hypoxic respiratory failure- resolved  #Susepcted aspiration pneumonitis  - CXR (05/02): No focal consolidation, pneumonthorax, or pleural effusion  - CTAP 05/01: Hazy patchy left basilar pulmonary opacity  - Currently on RA saturating well   - passed swallow test: Easy to Chew  - Cont vanc/cefepime for now; UCx growing E. faecalis   - BCx NGTD    #Hypertensive urgency - resolved  - cont HTN home meds (coreg, hydralazine)  - c/w hydralazine 25 TID     #HLD  - cont atorvastatin    #Lactic acidosis- resolved    #MISC  - DVT ppx heparin  - GI ppx: PPI  - Diet: Carb Consistent, Renal Diet, Easy to Chew  - Activity: AAT  - Code Status: Full Code  - Dispo: from home, anticipate for d/c

## 2023-05-05 NOTE — DISCHARGE NOTE NURSING/CASE MANAGEMENT/SOCIAL WORK - NSDCPEFALRISK_GEN_ALL_CORE
For information on Fall & Injury Prevention, visit: [email or web address] OR  [email or web address] OR  https://www.cdc.gov/steadi/patient.html

## 2023-05-06 ENCOUNTER — TRANSCRIPTION ENCOUNTER (OUTPATIENT)
Age: 58
End: 2023-05-06

## 2023-05-06 LAB
ALBUMIN SERPL ELPH-MCNC: 3.9 G/DL — SIGNIFICANT CHANGE UP (ref 3.5–5.2)
ALP SERPL-CCNC: 111 U/L — SIGNIFICANT CHANGE UP (ref 30–115)
ALT FLD-CCNC: 16 U/L — SIGNIFICANT CHANGE UP (ref 0–41)
ANION GAP SERPL CALC-SCNC: 17 MMOL/L — HIGH (ref 7–14)
AST SERPL-CCNC: 28 U/L — SIGNIFICANT CHANGE UP (ref 0–41)
BASOPHILS # BLD AUTO: 0.03 K/UL — SIGNIFICANT CHANGE UP (ref 0–0.2)
BASOPHILS NFR BLD AUTO: 0.9 % — SIGNIFICANT CHANGE UP (ref 0–1)
BILIRUB SERPL-MCNC: 0.2 MG/DL — SIGNIFICANT CHANGE UP (ref 0.2–1.2)
BUN SERPL-MCNC: 50 MG/DL — HIGH (ref 10–20)
CALCIUM SERPL-MCNC: 9.2 MG/DL — SIGNIFICANT CHANGE UP (ref 8.4–10.5)
CHLORIDE SERPL-SCNC: 95 MMOL/L — LOW (ref 98–110)
CO2 SERPL-SCNC: 25 MMOL/L — SIGNIFICANT CHANGE UP (ref 17–32)
CREAT SERPL-MCNC: 6.3 MG/DL — CRITICAL HIGH (ref 0.7–1.5)
CULTURE RESULTS: SIGNIFICANT CHANGE UP
CULTURE RESULTS: SIGNIFICANT CHANGE UP
EGFR: 7 ML/MIN/1.73M2 — LOW
EOSINOPHIL # BLD AUTO: 0.18 K/UL — SIGNIFICANT CHANGE UP (ref 0–0.7)
EOSINOPHIL NFR BLD AUTO: 5.3 % — SIGNIFICANT CHANGE UP (ref 0–8)
GLUCOSE BLDC GLUCOMTR-MCNC: 137 MG/DL — HIGH (ref 70–99)
GLUCOSE BLDC GLUCOMTR-MCNC: 147 MG/DL — HIGH (ref 70–99)
GLUCOSE BLDC GLUCOMTR-MCNC: 156 MG/DL — HIGH (ref 70–99)
GLUCOSE BLDC GLUCOMTR-MCNC: 172 MG/DL — HIGH (ref 70–99)
GLUCOSE BLDC GLUCOMTR-MCNC: 96 MG/DL — SIGNIFICANT CHANGE UP (ref 70–99)
GLUCOSE SERPL-MCNC: 130 MG/DL — HIGH (ref 70–99)
HCT VFR BLD CALC: 36.3 % — LOW (ref 37–47)
HGB BLD-MCNC: 12.1 G/DL — SIGNIFICANT CHANGE UP (ref 12–16)
IMM GRANULOCYTES NFR BLD AUTO: 0.3 % — SIGNIFICANT CHANGE UP (ref 0.1–0.3)
LYMPHOCYTES # BLD AUTO: 1.69 K/UL — SIGNIFICANT CHANGE UP (ref 1.2–3.4)
LYMPHOCYTES # BLD AUTO: 50.1 % — SIGNIFICANT CHANGE UP (ref 20.5–51.1)
MAGNESIUM SERPL-MCNC: 2.5 MG/DL — HIGH (ref 1.8–2.4)
MCHC RBC-ENTMCNC: 31.7 PG — HIGH (ref 27–31)
MCHC RBC-ENTMCNC: 33.3 G/DL — SIGNIFICANT CHANGE UP (ref 32–37)
MCV RBC AUTO: 95 FL — SIGNIFICANT CHANGE UP (ref 81–99)
MONOCYTES # BLD AUTO: 0.18 K/UL — SIGNIFICANT CHANGE UP (ref 0.1–0.6)
MONOCYTES NFR BLD AUTO: 5.3 % — SIGNIFICANT CHANGE UP (ref 1.7–9.3)
NEUTROPHILS # BLD AUTO: 1.28 K/UL — LOW (ref 1.4–6.5)
NEUTROPHILS NFR BLD AUTO: 38.1 % — LOW (ref 42.2–75.2)
NRBC # BLD: 0 /100 WBCS — SIGNIFICANT CHANGE UP (ref 0–0)
PHOSPHATE SERPL-MCNC: 7.1 MG/DL — HIGH (ref 2.1–4.9)
PLATELET # BLD AUTO: 212 K/UL — SIGNIFICANT CHANGE UP (ref 130–400)
PMV BLD: 11.9 FL — HIGH (ref 7.4–10.4)
POTASSIUM SERPL-MCNC: 4.6 MMOL/L — SIGNIFICANT CHANGE UP (ref 3.5–5)
POTASSIUM SERPL-SCNC: 4.6 MMOL/L — SIGNIFICANT CHANGE UP (ref 3.5–5)
PROT SERPL-MCNC: 6.4 G/DL — SIGNIFICANT CHANGE UP (ref 6–8)
RBC # BLD: 3.82 M/UL — LOW (ref 4.2–5.4)
RBC # FLD: 12.1 % — SIGNIFICANT CHANGE UP (ref 11.5–14.5)
SODIUM SERPL-SCNC: 137 MMOL/L — SIGNIFICANT CHANGE UP (ref 135–146)
SPECIMEN SOURCE: SIGNIFICANT CHANGE UP
SPECIMEN SOURCE: SIGNIFICANT CHANGE UP
WBC # BLD: 3.37 K/UL — LOW (ref 4.8–10.8)
WBC # FLD AUTO: 3.37 K/UL — LOW (ref 4.8–10.8)

## 2023-05-06 PROCEDURE — 99232 SBSQ HOSP IP/OBS MODERATE 35: CPT

## 2023-05-06 RX ORDER — INSULIN GLARGINE 100 [IU]/ML
16 INJECTION, SOLUTION SUBCUTANEOUS
Qty: 2 | Refills: 0
Start: 2023-05-06 | End: 2023-06-04

## 2023-05-06 RX ORDER — CARVEDILOL PHOSPHATE 80 MG/1
1 CAPSULE, EXTENDED RELEASE ORAL
Refills: 0 | DISCHARGE

## 2023-05-06 RX ORDER — SEVELAMER CARBONATE 2400 MG/1
2 POWDER, FOR SUSPENSION ORAL
Qty: 180 | Refills: 0
Start: 2023-05-06 | End: 2023-06-04

## 2023-05-06 RX ORDER — CARVEDILOL PHOSPHATE 80 MG/1
1 CAPSULE, EXTENDED RELEASE ORAL
Qty: 60 | Refills: 0
Start: 2023-05-06 | End: 2023-06-04

## 2023-05-06 RX ORDER — HYDRALAZINE HCL 50 MG
1 TABLET ORAL
Refills: 0 | DISCHARGE

## 2023-05-06 RX ORDER — HYDRALAZINE HCL 50 MG
1 TABLET ORAL
Qty: 90 | Refills: 0
Start: 2023-05-06 | End: 2023-06-04

## 2023-05-06 RX ORDER — ATORVASTATIN CALCIUM 80 MG/1
1 TABLET, FILM COATED ORAL
Qty: 30 | Refills: 0
Start: 2023-05-06 | End: 2023-06-04

## 2023-05-06 RX ORDER — INSULIN ASPART 100 [IU]/ML
5 INJECTION, SOLUTION SUBCUTANEOUS
Qty: 2 | Refills: 0
Start: 2023-05-06 | End: 2023-06-04

## 2023-05-06 RX ORDER — ATORVASTATIN CALCIUM 80 MG/1
1 TABLET, FILM COATED ORAL
Refills: 0 | DISCHARGE

## 2023-05-06 RX ADMIN — HEPARIN SODIUM 5000 UNIT(S): 5000 INJECTION INTRAVENOUS; SUBCUTANEOUS at 05:30

## 2023-05-06 RX ADMIN — Medication 5 UNIT(S): at 17:23

## 2023-05-06 RX ADMIN — CARVEDILOL PHOSPHATE 12.5 MILLIGRAM(S): 80 CAPSULE, EXTENDED RELEASE ORAL at 05:30

## 2023-05-06 RX ADMIN — Medication 5 UNIT(S): at 08:08

## 2023-05-06 RX ADMIN — HEPARIN SODIUM 5000 UNIT(S): 5000 INJECTION INTRAVENOUS; SUBCUTANEOUS at 21:46

## 2023-05-06 RX ADMIN — Medication 5 UNIT(S): at 12:23

## 2023-05-06 RX ADMIN — Medication 25 MILLIGRAM(S): at 05:31

## 2023-05-06 RX ADMIN — Medication 81 MILLIGRAM(S): at 12:23

## 2023-05-06 RX ADMIN — PANTOPRAZOLE SODIUM 40 MILLIGRAM(S): 20 TABLET, DELAYED RELEASE ORAL at 06:05

## 2023-05-06 RX ADMIN — SEVELAMER CARBONATE 1600 MILLIGRAM(S): 2400 POWDER, FOR SUSPENSION ORAL at 17:23

## 2023-05-06 RX ADMIN — Medication 2: at 12:23

## 2023-05-06 RX ADMIN — ATORVASTATIN CALCIUM 10 MILLIGRAM(S): 80 TABLET, FILM COATED ORAL at 21:46

## 2023-05-06 RX ADMIN — HEPARIN SODIUM 5000 UNIT(S): 5000 INJECTION INTRAVENOUS; SUBCUTANEOUS at 14:56

## 2023-05-06 RX ADMIN — CHLORHEXIDINE GLUCONATE 1 APPLICATION(S): 213 SOLUTION TOPICAL at 05:31

## 2023-05-06 RX ADMIN — Medication 25 MILLIGRAM(S): at 14:56

## 2023-05-06 RX ADMIN — SEVELAMER CARBONATE 1600 MILLIGRAM(S): 2400 POWDER, FOR SUSPENSION ORAL at 12:23

## 2023-05-06 RX ADMIN — INSULIN GLARGINE 16 UNIT(S): 100 INJECTION, SOLUTION SUBCUTANEOUS at 22:29

## 2023-05-06 RX ADMIN — SEVELAMER CARBONATE 1600 MILLIGRAM(S): 2400 POWDER, FOR SUSPENSION ORAL at 08:08

## 2023-05-06 RX ADMIN — Medication 25 MILLIGRAM(S): at 21:46

## 2023-05-06 NOTE — DISCHARGE NOTE PROVIDER - CARE PROVIDER_API CALL
Ayah Topete)  Internal Medicine; Nephrology  [address] [place] [address]  Phone: [phone]  Fax: [phone]  Follow Up Time: 1 week    Lupe Saavedra)  EndocrinologyMetabDiabetes; Internal Medicine  [address] [place] [address]  Phone: [phone]  Fax: [phone]  Follow Up Time: 1 week   Ayah Topete)  Internal Medicine; Nephrology  [address] [place] [address]  Phone: [phone]  Fax: [phone]  Follow Up Time: 1 week    Lupe Saavedra)  EndocrinologyMetabDiabetes; Internal Medicine  [address] [place] [address]  Phone: [phone]  Fax: [phone]  Follow Up Time: 2 weeks    CARLOS GARCIA  Internal Medicine  Phone: [phone]  Fax: [phone])-  Follow Up Time: 1-3 days    Preet Arreola)  Cardiovascular Disease; Internal Medicine; Interventional Cardiology  [address] [place] [address]  Phone: [phone]  Fax: [phone]  Follow Up Time: 2 weeks

## 2023-05-06 NOTE — DISCHARGE NOTE PROVIDER - PROVIDER TOKENS
PROVIDER:[TOKEN:[[id number]],FOLLOWUP:[1 week]],PROVIDER:[TOKEN:[[id number]],FOLLOWUP:[1 week]] PROVIDER:[TOKEN:[[id number]],FOLLOWUP:[1 week]],PROVIDER:[TOKEN:[[id number]],FOLLOWUP:[2 weeks]],PROVIDER:[TOKEN:[[id number]],FOLLOWUP:[1-3 days]],PROVIDER:[TOKEN:[[id number]],FOLLOWUP:[2 weeks]]

## 2023-05-06 NOTE — DISCHARGE NOTE PROVIDER - NSDCFUSCHEDAPPT_GEN_ALL_CORE_FT
Robyn Chanel  [facility]  Scheduled Appointment: 05/08/2023    Robyn Chanel  [facility]  [address]  Scheduled Appointment: 05/08/2023

## 2023-05-06 NOTE — DISCHARGE NOTE PROVIDER - NSDCCPCAREPLAN_GEN_ALL_CORE_FT
PRINCIPAL DISCHARGE DIAGNOSIS  Diagnosis: Seizure due to hypoglycemia  Assessment and Plan of Treatment: A hypoglycemic seizure is a seizure caused by low blood sugar (glucose) levels in the body.  When the blood sugar levels drop too low, the brain can't function properly and may start to misfire, causing symptoms like confusion, disorientation, weakness, sweating, and tremors. If the blood sugar level continues to drop, it can eventually cause a seizure, which is a sudden, uncontrolled electrical discharge in the brain that can cause convulsions, loss of consciousness, and other serious symptoms.  Hypoglycemic seizures can be a complication of diabetes, and can occur when someone takes too much insulin or other medications that lower blood sugar levels. It's important to manage blood sugar levels carefully to avoid hypoglycemic seizures, and to seek medical attention right away if you or someone you know experiences symptoms of low blood sugar.  Please follow up with your primary care doctor and your endocrinologist to safely adjust and continue your diabetes/insulin regimen.      SECONDARY DISCHARGE DIAGNOSES  Diagnosis: Hypothermia  Assessment and Plan of Treatment:     Diagnosis: Sepsis  Assessment and Plan of Treatment:      PRINCIPAL DISCHARGE DIAGNOSIS  Diagnosis: Seizure due to hypoglycemia  Assessment and Plan of Treatment: A hypoglycemic seizure is a seizure caused by low blood sugar (glucose) levels in the body.  When the blood sugar levels drop too low, the brain can't function properly and may start to misfire, causing symptoms like confusion, disorientation, weakness, sweating, and tremors. If the blood sugar level continues to drop, it can eventually cause a seizure, which is a sudden, uncontrolled electrical discharge in the brain that can cause convulsions, loss of consciousness, and other serious symptoms.  Hypoglycemic seizures can be a complication of diabetes, and can occur when someone takes too much insulin or other medications that lower blood sugar levels. It's important to manage blood sugar levels carefully to avoid hypoglycemic seizures, and to seek medical attention right away if you or someone you know experiences symptoms of low blood sugar.  Please follow up with your primary care doctor and your endocrinologist to safely adjust and continue your diabetes/insulin regimen.     PRINCIPAL DISCHARGE DIAGNOSIS  Diagnosis: Seizure due to hypoglycemia  Assessment and Plan of Treatment: A hypoglycemic seizure is a seizure caused by low blood sugar (glucose) levels in the body.  When the blood sugar levels drop too low, the brain can't function properly and may start to misfire, causing symptoms like confusion, disorientation, weakness, sweating, and tremors. If the blood sugar level continues to drop, it can eventually cause a seizure, which is a sudden, uncontrolled electrical discharge in the brain that can cause convulsions, loss of consciousness, and other serious symptoms.  Hypoglycemic seizures can be a complication of diabetes, and can occur when someone takes too much insulin or other medications that lower blood sugar levels. It's important to manage blood sugar levels carefully to avoid hypoglycemic seizures, and to seek medical attention right away if you or someone you know experiences symptoms of low blood sugar. Please monitor your blood sugars very closely at home to prevent hypoglycemia.  Please follow up with your primary care doctor and your endocrinologist to safely adjust and continue your diabetes/insulin   regimen. Discuss with your primary the need for cardiology referral.      SECONDARY DISCHARGE DIAGNOSES  Diagnosis: Orthostatic hypotension  Assessment and Plan of Treatment: Please avoid sudden changes in position as your blood pressure drops on sitting or standing up due to gravity. We recommend to monitor your blood pressure at home 3 times per day and take Hydralazine if systolic blood pressure (first number is >160). We also recommend to wear ABDIRASHID stockings and tight abdominal binder when getting out of bed.  Please bring the blood pressure readings to your primary care doctor.    Diagnosis: Pancreatic lesion  Assessment and Plan of Treatment: please ask your primary to obtain MRI of pancreas or CT with contrast

## 2023-05-06 NOTE — CHART NOTE - NSCHARTNOTEFT_GEN_A_CORE
Patient continues to be severely orthostatic. Carvedilol was stopped. Educated patient on slow transitions. Patient has thigh high caleb stockings and abdominal binder. Continue to monitor BP and adjust blood pressure medications for orthostatic hypotension. Did not start midodrine d/t supine hypertension. Patient's discharged held. Patient continues to be severely orthostatic. Carvedilol was stopped. Educated patient on slow transitions. Patient has thigh high caleb stockings and abdominal binder. Continue to monitor BP and adjust blood pressure medications for orthostatic hypotension. Did not start midodrine d/t supine hypertension.

## 2023-05-06 NOTE — PROGRESS NOTE ADULT - SUBJECTIVE AND OBJECTIVE BOX
seen and examined  24 h events noted   no distress         PAST HISTORY  --------------------------------------------------------------------------------  No significant changes to PMH, PSH, FHx, SHx, unless otherwise noted    ALLERGIES & MEDICATIONS  --------------------------------------------------------------------------------  Allergies    No Known Allergies    Intolerances      Standing Inpatient Medications  aspirin  chewable 81 milliGRAM(s) Oral daily  atorvastatin 10 milliGRAM(s) Oral at bedtime  carvedilol 12.5 milliGRAM(s) Oral every 12 hours  chlorhexidine 4% Liquid 1 Application(s) Topical <User Schedule>  dextrose 5%. 1000 milliLiter(s) IV Continuous <Continuous>  dextrose 5%. 1000 milliLiter(s) IV Continuous <Continuous>  dextrose 50% Injectable 25 Gram(s) IV Push once  dextrose 50% Injectable 12.5 Gram(s) IV Push once  dextrose 50% Injectable 25 Gram(s) IV Push once  glucagon  Injectable 1 milliGRAM(s) IntraMuscular once  heparin   Injectable 5000 Unit(s) SubCutaneous every 8 hours  hydrALAZINE 25 milliGRAM(s) Oral every 8 hours  insulin glargine Injectable (LANTUS) 16 Unit(s) SubCutaneous at bedtime  insulin lispro (ADMELOG) corrective regimen sliding scale   SubCutaneous three times a day before meals  insulin lispro Injectable (ADMELOG) 5 Unit(s) SubCutaneous three times a day before meals  pantoprazole    Tablet 40 milliGRAM(s) Oral before breakfast  sevelamer carbonate 1600 milliGRAM(s) Oral three times a day with meals    PRN Inpatient Medications  acetaminophen     Tablet .. 650 milliGRAM(s) Oral every 6 hours PRN  dextrose Oral Gel 15 Gram(s) Oral once PRN        VITALS/PHYSICAL EXAM  --------------------------------------------------------------------------------  T(C): 35.6 (05-06-23 @ 04:29), Max: 36.4 (05-05-23 @ 19:58)  HR: 63 (05-06-23 @ 04:29) (63 - 68)  BP: 130/62 (05-06-23 @ 04:29) (113/55 - 163/70)  RR: 16 (05-06-23 @ 04:29) (16 - 16)  SpO2: --  Wt(kg): --        05-05-23 @ 07:01  -  05-06-23 @ 07:00  --------------------------------------------------------  IN: 0 mL / OUT: 2000 mL / NET: -2000 mL      Physical Exam:  	Gen: NAD,   	Pulm: decrease BS  B/L  	CV: S1S2; no rub  	Abd:  soft, nontender/nondistended  	LE: minimal  edema  	Vascular access:av     LABS/STUDIES  --------------------------------------------------------------------------------              11.3   3.44  >-----------<  182      [05-05-23 @ 07:04]              33.0     142  |  102  |  67  ----------------------------<  169      [05-05-23 @ 07:04]  4.5   |  24  |  6.2        Ca     9.0     [05-05-23 @ 07:04]      Mg     2.8     [05-05-23 @ 07:04]      Phos  8.3     [05-05-23 @ 07:04]    TPro  5.9  /  Alb  3.6  /  TBili  0.3  /  DBili  x   /  AST  18  /  ALT  8   /  AlkPhos  97  [05-05-23 @ 07:04]      Creatinine Trend:  SCr 6.2 [05-05 @ 07:04]  SCr 4.8 [05-04 @ 05:47]  SCr 4.6 [05-04 @ 01:34]  SCr 4.5 [05-03 @ 22:15]  SCr 3.7 [05-03 @ 17:42]    Urinalysis - [05-01-23 @ 01:42]      Color Light Yellow / Appearance Clear / SG 1.016 / pH 6.5      Gluc 500 mg/dL / Ketone Negative  / Bili Negative / Urobili <2 mg/dL       Blood Small / Protein 100 mg/dL / Leuk Est Small / Nitrite Negative      RBC 4 / WBC 7 / Hyaline 2 / Gran  / Sq Epi  / Non Sq Epi  / Bacteria Few      TSH 2.64      [05-01-23 @ 01:50]  Lipid: chol 226, TG 95, HDL 62, LDL --      [05-01-23 @ 12:46]

## 2023-05-06 NOTE — DISCHARGE NOTE PROVIDER - ATTENDING DISCHARGE PHYSICAL EXAMINATION:
General: NAD, AAO3  HEENT:  EOMI, no LAD  CV: S1 S2 no MRG   Resp: clear to auscultation b/l  GI: soft NT ND  MS: no clubbing/cyanosis/edema  Neuro: nonfocal, AAOx3

## 2023-05-06 NOTE — DISCHARGE NOTE PROVIDER - NSDCMRMEDTOKEN_GEN_ALL_CORE_FT
aspirin 81 mg oral tablet: 1 tab(s) orally once a day  atorvastatin 10 mg oral tablet: 1 tab(s) orally once a day  Coreg 12.5 mg oral tablet: 1 tab(s) orally 2 times a day  hydrALAZINE 10 mg oral tablet: 1 tab(s) orally once a day in AM only per daughter/ not Q8h   aspirin 81 mg oral tablet: 1 tab(s) orally once a day  atorvastatin 10 mg oral tablet: 1 tab(s) orally once a day  Basaglar KwikPen 100 units/mL subcutaneous solution: 16 unit(s) subcutaneous once a day (at bedtime)  Coreg 12.5 mg oral tablet: 1 tab(s) orally 2 times a day  hydrALAZINE 25 mg oral tablet: 1 tab(s) orally every 8 hours  sevelamer carbonate 800 mg oral tablet: 2 tab(s) orally 3 times a day (with meals)   aspirin 81 mg oral tablet: 1 tab(s) orally once a day  atorvastatin 10 mg oral tablet: 1 tab(s) orally once a day  Basaglar KwikPen 100 units/mL subcutaneous solution: 16 unit(s) subcutaneous once a day (at bedtime)  Coreg 12.5 mg oral tablet: 1 tab(s) orally 2 times a day  hydrALAZINE 25 mg oral tablet: 1 tab(s) orally every 8 hours  NovoLOG FlexPen 100 units/mL injectable solution: 5 unit(s) injectable 3 times a day (before meals) Hold premeal insulin if blood glucose &lt; 100  sevelamer carbonate 800 mg oral tablet: 2 tab(s) orally 3 times a day (with meals)   aspirin 81 mg oral tablet: 1 tab(s) orally once a day  atorvastatin 10 mg oral tablet: 1 tab(s) orally once a day  Basaglar KwikPen 100 units/mL subcutaneous solution: 16 unit(s) subcutaneous once a day (at bedtime)  hydrALAZINE 25 mg oral tablet: 1 tab(s) orally every 8 hours as needed for SBP&gt;160  NovoLOG FlexPen 100 units/mL injectable solution: 5 unit(s) injectable 3 times a day (before meals) Hold premeal insulin if blood glucose &lt; 100  sevelamer carbonate 800 mg oral tablet: 2 tab(s) orally 3 times a day (with meals)

## 2023-05-06 NOTE — DISCHARGE NOTE PROVIDER - CARE PROVIDERS DIRECT ADDRESSES
[email or web address]

## 2023-05-06 NOTE — PROGRESS NOTE ADULT - ASSESSMENT
57 year old (non smoker) female patient (Malagasy Speaking) known to have DM, ESRD on HD, HTN and DLD admitted for AMS and seizures likely secondary to hypoglycemia.  Assessment and plan  ESRD on HD/ Seizure/ HTN/ DM  HD on monday s/p HD yesterday   BP controlled,   Hgb at target/ no SHAR   Last P elevated - renal diet, on Sevelamer 1800 mg q8h with meals  will follow

## 2023-05-06 NOTE — PROGRESS NOTE ADULT - SUBJECTIVE AND OBJECTIVE BOX
ANAM NGUYỄN  57y, Female  Allergy: No Known Allergies    Hospital Day: 5d    Patient seen and examined earlier today. Was planned for dc but noted to be severely orthostatic and symptomatic again today.     PMH/PSH:  PAST MEDICAL & SURGICAL HISTORY:  Chronic kidney disease, unspecified CKD stage          LAST 24-Hr EVENTS:    VITALS:  T(F): 97.2 (05-06-23 @ 14:34), Max: 97.5 (05-05-23 @ 19:58)  HR: 70 (05-06-23 @ 14:34)  BP: 138/63 (05-06-23 @ 14:34) (113/55 - 138/63)  RR: 18 (05-06-23 @ 14:34)  SpO2: --        TESTS & MEASUREMENTS:  Weight (Kg):       05-04-23 @ 07:01  -  05-05-23 @ 07:00  --------------------------------------------------------  IN: 0 mL / OUT: 350 mL / NET: -350 mL    05-05-23 @ 07:01  -  05-06-23 @ 07:00  --------------------------------------------------------  IN: 0 mL / OUT: 2000 mL / NET: -2000 mL                            11.3   3.44  )-----------( 182      ( 05 May 2023 07:04 )             33.0       05-05    142  |  102  |  67<HH>  ----------------------------<  169<H>  4.5   |  24  |  6.2<HH>    Ca    9.0      05 May 2023 07:04  Phos  8.3     05-05  Mg     2.8     05-05    TPro  5.9<L>  /  Alb  3.6  /  TBili  0.3  /  DBili  x   /  AST  18  /  ALT  8   /  AlkPhos  97  05-05    LIVER FUNCTIONS - ( 05 May 2023 07:04 )  Alb: 3.6 g/dL / Pro: 5.9 g/dL / ALK PHOS: 97 U/L / ALT: 8 U/L / AST: 18 U/L / GGT: x                 Culture - Blood (collected 05-01-23 @ 03:00)  Source: .Blood Blood  Final Report (05-06-23 @ 14:00):    No Growth Final    Culture - Blood (collected 05-01-23 @ 03:00)  Source: .Blood Blood  Final Report (05-06-23 @ 14:00):    No Growth Final    Culture - Urine (collected 05-01-23 @ 01:42)  Source: Catheterized Catheterized  Final Report (05-03-23 @ 19:48):    >100,000 CFU/ml Enterococcus faecalis  Organism: Enterococcus faecalis (05-03-23 @ 19:48)  Organism: Enterococcus faecalis (05-03-23 @ 19:48)      Method Type: RODNEY      -  Ampicillin: S <=2 Predicts results to ampicillin/sulbactam, amoxacillin-clavulanate and  piperacillin-tazobactam.      -  Ciprofloxacin: I 2      -  Levofloxacin: S 2      -  Nitrofurantoin: S <=32 Should not be used to treat pyelonephritis.      -  Tetracycline: R >8      -  Vancomycin: S 2        Procalcitonin, Serum: 0.15 ng/mL (05-01-23 @ 12:46)    D-Dimer Assay, Quantitative: 247 ng/mL DDU (05-03-23 @ 15:04)            RADIOLOGY, ECG, & ADDITIONAL TESTS:      RECENT DIAGNOSTIC ORDERS:  Magnesium, Serum: AM Sched. Collection: 08-May-2023 04:30 (05-06-23 @ 14:26)  Phosphorus Level, Serum: AM Sched. Collection: 08-May-2023 04:30 (05-06-23 @ 14:26)  Comprehensive Metabolic Panel: AM Sched. Collection: 08-May-2023 04:30 (05-06-23 @ 14:26)  Complete Blood Count + Automated Diff: AM Sched. Collection: 08-May-2023 04:30 (05-06-23 @ 14:26)  Magnesium, Serum: AM Sched. Collection: 07-May-2023 04:30 (05-06-23 @ 14:26)  Phosphorus Level, Serum: AM Sched. Collection: 07-May-2023 04:30 (05-06-23 @ 14:26)  Comprehensive Metabolic Panel: AM Sched. Collection: 07-May-2023 04:30 (05-06-23 @ 14:26)  Complete Blood Count + Automated Diff: AM Sched. Collection: 07-May-2023 04:30 (05-06-23 @ 14:26)  Magnesium, Serum: 16:00 (05-06-23 @ 14:26)  Phosphorus Level, Serum: 16:00 (05-06-23 @ 14:26)  Comprehensive Metabolic Panel: 16:00 (05-06-23 @ 14:26)  Complete Blood Count + Automated Diff: 16:00 (05-06-23 @ 14:26)      MEDICATIONS:  MEDICATIONS  (STANDING):  aspirin  chewable 81 milliGRAM(s) Oral daily  atorvastatin 10 milliGRAM(s) Oral at bedtime  chlorhexidine 4% Liquid 1 Application(s) Topical <User Schedule>  dextrose 5%. 1000 milliLiter(s) (50 mL/Hr) IV Continuous <Continuous>  dextrose 5%. 1000 milliLiter(s) (100 mL/Hr) IV Continuous <Continuous>  dextrose 50% Injectable 25 Gram(s) IV Push once  dextrose 50% Injectable 12.5 Gram(s) IV Push once  dextrose 50% Injectable 25 Gram(s) IV Push once  glucagon  Injectable 1 milliGRAM(s) IntraMuscular once  heparin   Injectable 5000 Unit(s) SubCutaneous every 8 hours  hydrALAZINE 25 milliGRAM(s) Oral every 8 hours  insulin glargine Injectable (LANTUS) 16 Unit(s) SubCutaneous at bedtime  insulin lispro (ADMELOG) corrective regimen sliding scale   SubCutaneous three times a day before meals  insulin lispro Injectable (ADMELOG) 5 Unit(s) SubCutaneous three times a day before meals  pantoprazole    Tablet 40 milliGRAM(s) Oral before breakfast  sevelamer carbonate 1600 milliGRAM(s) Oral three times a day with meals    MEDICATIONS  (PRN):  acetaminophen     Tablet .. 650 milliGRAM(s) Oral every 6 hours PRN Temp greater or equal to 38C (100.4F), Mild Pain (1 - 3)  dextrose Oral Gel 15 Gram(s) Oral once PRN Blood Glucose LESS THAN 70 milliGRAM(s)/deciliter      HOME MEDICATIONS:  aspirin 81 mg oral tablet (05-01)      PHYSICAL EXAM:  General: NAD, AAO3  HEENT:  EOMI, no LAD  CV: S1 S2 no MRG   Resp: clear to auscultation b/l  GI: soft NT ND  MS: no clubbing/cyanosis/edema  Neuro: nonfocal, AAOx3

## 2023-05-06 NOTE — DISCHARGE NOTE PROVIDER - DISCHARGE DIET
Consistent Carbohydrate Diabetic Diets/Renal Diets (for dialysis)/Easy to Chew Diet Low Sodium Diet/Consistent Carbohydrate Diabetic Diets/Renal Diets (for dialysis)/Easy to Chew Diet

## 2023-05-06 NOTE — DISCHARGE NOTE PROVIDER - HOSPITAL COURSE
57 year old (non smoker) female patient ([place]ozamb[place] Speaking) pmhx of IDDM, ESRD, HTN/HLDPatient was brought to the ED by EMS on 04/30 following an episode of a witnessed seizure.    #Seizure 2/2 hypoglycemia  -EEG with borderline generalized slowing  -cont to monitor fs    #DKA- resolved (while inpt)  #DM  -downgraded from ICU--s/p insulin drip  -goal fs 140-180  -c/w basal bolus regimen 16u Lantus qam and 5u Lispro qac with ISS  -hba1c 9.3  -echo EF 74%    #Chest pain- atypical   -EKG NSR and trop neg   -continue asa, statin  - outpt cardiac workup      #ESRD on HD  -cont HD m/w/f  -nephrology f/u    #Hypertensive urgency/HLD  - cont HTN home meds (coreg, hydralazine)  - c/w hydralazine 25 TID, statin    Dizziness  check orthostatics if + TEDs, abd binder    Chronic Headaches  outpt follow up    Pancreatic lesion (pt aware)  outpt MRI or CT w/ contrast    DVT/GI ppx    Dispo: home if walks with PT  Has an appt at [facility] clinic on 5/8 at 1pm w/ Dr. Chanel    FULL CODE 57 year old (non smoker) female patient (Lib[place] Speaking) pmhx of IDDM, ESRD, HTN/HLD. Patient was brought to the ED by EMS on 04/30 following an episode of a witnessed seizure.    #Seizure 2/2 hypoglycemia  -EEG with borderline generalized slowing    #DKA- resolved (not present on admission)  #DM  -downgraded from ICU--s/p insulin drip  -c/w basal bolus regimen 16u Lantus qam and 5u Lispro qac with ISS  -hba1c 9.3    #Chest pain- atypical   -EKG NSR and trop neg   -continue asa, statin  - outpt cardiac workup      #ESRD on HD  -cont HD m/w/f    #Hypertensive urgency/HLD  - cont HTN home meds (coreg, hydralazine)  - c/w statin     Dizziness 2/2 orthostatic hypotension- c/w caleb stockings, advised on sit to stand slowly to prevent falls, pt is able to ambulate without assistance    Chronic Headaches  outpt follow up    Pancreatic lesion (pt aware)  outpt MRI or CT w/ contrast      Has an appt at [facility] on 5/8 at 1pm w/ Dr. Chanel     57 year old (non smoker) female patient (Gib[place] Speaking) pmhx of IDDM, ESRD, HTN/HLD. Patient was brought to the ED by EMS on 04/30 following an episode of a witnessed seizure.    #Seizure 2/2 hypoglycemia  -EEG with borderline generalized slowing    #DKA- resolved (not present on admission)  #DM  -downgraded from ICU--s/p insulin drip  -c/w basal bolus regimen 16u Lantus qam and 5u Lispro qac with ISS  -hba1c 9.3    #Chest pain- atypical   -EKG NSR and trop neg   -continue asa, statin  - outpt cardiac workup      #ESRD on HD  -cont HD m/w/f    #Hypertensive urgency/HLD  - c/w statin   -Hydralazine PRN    Dizziness 2/2 orthostatic hypotension- c/w caleb stockings, advised on sit to stand slowly to prevent falls, pt is able to ambulate without assistance    Chronic Headaches  outpt follow up    Pancreatic lesion (pt aware)  outpt MRI or CT w/ contrast

## 2023-05-07 LAB
ALBUMIN SERPL ELPH-MCNC: 3.8 G/DL — SIGNIFICANT CHANGE UP (ref 3.5–5.2)
ALP SERPL-CCNC: 106 U/L — SIGNIFICANT CHANGE UP (ref 30–115)
ALT FLD-CCNC: 17 U/L — SIGNIFICANT CHANGE UP (ref 0–41)
ANION GAP SERPL CALC-SCNC: 15 MMOL/L — HIGH (ref 7–14)
AST SERPL-CCNC: 24 U/L — SIGNIFICANT CHANGE UP (ref 0–41)
BASOPHILS # BLD AUTO: 0.04 K/UL — SIGNIFICANT CHANGE UP (ref 0–0.2)
BASOPHILS NFR BLD AUTO: 1.1 % — HIGH (ref 0–1)
BILIRUB SERPL-MCNC: 0.5 MG/DL — SIGNIFICANT CHANGE UP (ref 0.2–1.2)
BUN SERPL-MCNC: 68 MG/DL — CRITICAL HIGH (ref 10–20)
CALCIUM SERPL-MCNC: 9.1 MG/DL — SIGNIFICANT CHANGE UP (ref 8.4–10.5)
CHLORIDE SERPL-SCNC: 94 MMOL/L — LOW (ref 98–110)
CO2 SERPL-SCNC: 24 MMOL/L — SIGNIFICANT CHANGE UP (ref 17–32)
CREAT SERPL-MCNC: 6.8 MG/DL — CRITICAL HIGH (ref 0.7–1.5)
EGFR: 7 ML/MIN/1.73M2 — LOW
EOSINOPHIL # BLD AUTO: 0.21 K/UL — SIGNIFICANT CHANGE UP (ref 0–0.7)
EOSINOPHIL NFR BLD AUTO: 5.9 % — SIGNIFICANT CHANGE UP (ref 0–8)
GLUCOSE BLDC GLUCOMTR-MCNC: 165 MG/DL — HIGH (ref 70–99)
GLUCOSE BLDC GLUCOMTR-MCNC: 185 MG/DL — HIGH (ref 70–99)
GLUCOSE BLDC GLUCOMTR-MCNC: 320 MG/DL — HIGH (ref 70–99)
GLUCOSE BLDC GLUCOMTR-MCNC: 320 MG/DL — HIGH (ref 70–99)
GLUCOSE SERPL-MCNC: 359 MG/DL — HIGH (ref 70–99)
HCT VFR BLD CALC: 35.5 % — LOW (ref 37–47)
HGB BLD-MCNC: 11.7 G/DL — LOW (ref 12–16)
IMM GRANULOCYTES NFR BLD AUTO: 0 % — LOW (ref 0.1–0.3)
LYMPHOCYTES # BLD AUTO: 1.56 K/UL — SIGNIFICANT CHANGE UP (ref 1.2–3.4)
LYMPHOCYTES # BLD AUTO: 44.1 % — SIGNIFICANT CHANGE UP (ref 20.5–51.1)
MAGNESIUM SERPL-MCNC: 2.6 MG/DL — HIGH (ref 1.8–2.4)
MCHC RBC-ENTMCNC: 31.2 PG — HIGH (ref 27–31)
MCHC RBC-ENTMCNC: 33 G/DL — SIGNIFICANT CHANGE UP (ref 32–37)
MCV RBC AUTO: 94.7 FL — SIGNIFICANT CHANGE UP (ref 81–99)
MONOCYTES # BLD AUTO: 0.2 K/UL — SIGNIFICANT CHANGE UP (ref 0.1–0.6)
MONOCYTES NFR BLD AUTO: 5.6 % — SIGNIFICANT CHANGE UP (ref 1.7–9.3)
NEUTROPHILS # BLD AUTO: 1.53 K/UL — SIGNIFICANT CHANGE UP (ref 1.4–6.5)
NEUTROPHILS NFR BLD AUTO: 43.3 % — SIGNIFICANT CHANGE UP (ref 42.2–75.2)
NRBC # BLD: 0 /100 WBCS — SIGNIFICANT CHANGE UP (ref 0–0)
PHOSPHATE SERPL-MCNC: 7.7 MG/DL — HIGH (ref 2.1–4.9)
PLATELET # BLD AUTO: 210 K/UL — SIGNIFICANT CHANGE UP (ref 130–400)
PMV BLD: 11.5 FL — HIGH (ref 7.4–10.4)
POTASSIUM SERPL-MCNC: 4.5 MMOL/L — SIGNIFICANT CHANGE UP (ref 3.5–5)
POTASSIUM SERPL-SCNC: 4.5 MMOL/L — SIGNIFICANT CHANGE UP (ref 3.5–5)
PROT SERPL-MCNC: 6.3 G/DL — SIGNIFICANT CHANGE UP (ref 6–8)
RBC # BLD: 3.75 M/UL — LOW (ref 4.2–5.4)
RBC # FLD: 12 % — SIGNIFICANT CHANGE UP (ref 11.5–14.5)
SODIUM SERPL-SCNC: 133 MMOL/L — LOW (ref 135–146)
WBC # BLD: 3.54 K/UL — LOW (ref 4.8–10.8)
WBC # FLD AUTO: 3.54 K/UL — LOW (ref 4.8–10.8)

## 2023-05-07 PROCEDURE — 99232 SBSQ HOSP IP/OBS MODERATE 35: CPT

## 2023-05-07 RX ORDER — INSULIN LISPRO 100/ML
4 VIAL (ML) SUBCUTANEOUS ONCE
Refills: 0 | Status: COMPLETED | OUTPATIENT
Start: 2023-05-07 | End: 2023-05-07

## 2023-05-07 RX ORDER — HYDRALAZINE HCL 50 MG
25 TABLET ORAL EVERY 8 HOURS
Refills: 0 | Status: DISCONTINUED | OUTPATIENT
Start: 2023-05-07 | End: 2023-05-09

## 2023-05-07 RX ORDER — INSULIN GLARGINE 100 [IU]/ML
18 INJECTION, SOLUTION SUBCUTANEOUS AT BEDTIME
Refills: 0 | Status: DISCONTINUED | OUTPATIENT
Start: 2023-05-07 | End: 2023-05-09

## 2023-05-07 RX ADMIN — SEVELAMER CARBONATE 1600 MILLIGRAM(S): 2400 POWDER, FOR SUSPENSION ORAL at 08:07

## 2023-05-07 RX ADMIN — Medication 5 UNIT(S): at 17:30

## 2023-05-07 RX ADMIN — SEVELAMER CARBONATE 1600 MILLIGRAM(S): 2400 POWDER, FOR SUSPENSION ORAL at 11:41

## 2023-05-07 RX ADMIN — Medication 25 MILLIGRAM(S): at 05:39

## 2023-05-07 RX ADMIN — Medication 5 UNIT(S): at 11:41

## 2023-05-07 RX ADMIN — Medication 8: at 08:04

## 2023-05-07 RX ADMIN — Medication 5 UNIT(S): at 08:04

## 2023-05-07 RX ADMIN — Medication 2: at 17:29

## 2023-05-07 RX ADMIN — Medication 2: at 11:41

## 2023-05-07 RX ADMIN — CHLORHEXIDINE GLUCONATE 1 APPLICATION(S): 213 SOLUTION TOPICAL at 05:39

## 2023-05-07 RX ADMIN — Medication 4 UNIT(S): at 22:09

## 2023-05-07 RX ADMIN — SEVELAMER CARBONATE 1600 MILLIGRAM(S): 2400 POWDER, FOR SUSPENSION ORAL at 17:29

## 2023-05-07 RX ADMIN — HEPARIN SODIUM 5000 UNIT(S): 5000 INJECTION INTRAVENOUS; SUBCUTANEOUS at 05:39

## 2023-05-07 RX ADMIN — PANTOPRAZOLE SODIUM 40 MILLIGRAM(S): 20 TABLET, DELAYED RELEASE ORAL at 06:00

## 2023-05-07 RX ADMIN — ATORVASTATIN CALCIUM 10 MILLIGRAM(S): 80 TABLET, FILM COATED ORAL at 21:46

## 2023-05-07 RX ADMIN — Medication 81 MILLIGRAM(S): at 11:41

## 2023-05-07 RX ADMIN — INSULIN GLARGINE 18 UNIT(S): 100 INJECTION, SOLUTION SUBCUTANEOUS at 22:09

## 2023-05-07 RX ADMIN — HEPARIN SODIUM 5000 UNIT(S): 5000 INJECTION INTRAVENOUS; SUBCUTANEOUS at 15:17

## 2023-05-07 RX ADMIN — HEPARIN SODIUM 5000 UNIT(S): 5000 INJECTION INTRAVENOUS; SUBCUTANEOUS at 21:46

## 2023-05-07 NOTE — PROGRESS NOTE ADULT - SUBJECTIVE AND OBJECTIVE BOX
Nephrology progress note    THIS IS AN INCOMPLETE NOTE . FULL NOTE TO FOLLOW SHORTLY    Patient is seen and examined, events over the last 24 h noted .    Allergies:  No Known Allergies    Hospital Medications:   MEDICATIONS  (STANDING):  aspirin  chewable 81 milliGRAM(s) Oral daily  atorvastatin 10 milliGRAM(s) Oral at bedtime  chlorhexidine 4% Liquid 1 Application(s) Topical <User Schedule>  dextrose 5%. 1000 milliLiter(s) (50 mL/Hr) IV Continuous <Continuous>  dextrose 5%. 1000 milliLiter(s) (100 mL/Hr) IV Continuous <Continuous>  dextrose 50% Injectable 25 Gram(s) IV Push once  dextrose 50% Injectable 12.5 Gram(s) IV Push once  dextrose 50% Injectable 25 Gram(s) IV Push once  glucagon  Injectable 1 milliGRAM(s) IntraMuscular once  heparin   Injectable 5000 Unit(s) SubCutaneous every 8 hours  hydrALAZINE 25 milliGRAM(s) Oral every 8 hours  insulin glargine Injectable (LANTUS) 16 Unit(s) SubCutaneous at bedtime  insulin lispro (ADMELOG) corrective regimen sliding scale   SubCutaneous three times a day before meals  insulin lispro Injectable (ADMELOG) 5 Unit(s) SubCutaneous three times a day before meals  pantoprazole    Tablet 40 milliGRAM(s) Oral before breakfast  sevelamer carbonate 1600 milliGRAM(s) Oral three times a day with meals        VITALS:  T(F): 97 (23 @ 05:04), Max: 97.2 (23 @ 14:34)  HR: 70 (23 @ 14:34)  BP: 138/63 (23 @ 14:34)  RR: 18 (23 @ 05:04)  SpO2: --  Wt(kg): --     @ 07:01  -   @ 07:00  --------------------------------------------------------  IN: 0 mL / OUT: 2000 mL / NET: -2000 mL          PHYSICAL EXAM:  Constitutional: NAD  HEENT: anicteric sclera, oropharynx clear, MMM  Neck: No JVD  Respiratory: CTAB, no wheezes, rales or rhonchi  Cardiovascular: S1, S2, RRR  Gastrointestinal: BS+, soft, NT/ND  Extremities: No cyanosis or clubbing. No peripheral edema  :  No murillo.   Skin: No rashes    LABS:      137  |  95<L>  |  50<H>  ----------------------------<  130<H>  4.6   |  25  |  6.3<HH>    Ca    9.2      06 May 2023 17:27  Phos  7.1       Mg     2.5         TPro  6.4  /  Alb  3.9  /  TBili  0.2  /  DBili      /  AST  28  /  ALT  16  /  AlkPhos  111                            12.1   3.37  )-----------( 212      ( 06 May 2023 17:27 )             36.3       Urine Studies:  Urinalysis Basic - ( 01 May 2023 01:42 )    Color: Light Yellow / Appearance: Clear / S.016 / pH:   Gluc:  / Ketone: Negative  / Bili: Negative / Urobili: <2 mg/dL   Blood:  / Protein: 100 mg/dL / Nitrite: Negative   Leuk Esterase: Small / RBC: 4 /HPF / WBC 7 /HPF   Sq Epi:  / Non Sq Epi:  / Bacteria: Few          TSH 2.64      [23 @ 01:50]  Lipid: chol 226, TG 95, HDL 62, LDL --      [23 @ 12:46]          RADIOLOGY & ADDITIONAL STUDIES:   Nephrology progress note    Patient is seen and examined, events over the last 24 h noted .  Lying in bed comfortable     Allergies:  No Known Allergies    Hospital Medications:   MEDICATIONS  (STANDING):  aspirin  chewable 81 milliGRAM(s) Oral daily  atorvastatin 10 milliGRAM(s) Oral at bedtime  glucagon  Injectable 1 milliGRAM(s) IntraMuscular once  heparin   Injectable 5000 Unit(s) SubCutaneous every 8 hours  hydrALAZINE 25 milliGRAM(s) Oral every 8 hours  insulin glargine Injectable (LANTUS) 16 Unit(s) SubCutaneous at bedtime  insulin lispro (ADMELOG) corrective regimen sliding scale   SubCutaneous three times a day before meals  insulin lispro Injectable (ADMELOG) 5 Unit(s) SubCutaneous three times a day before meals  pantoprazole    Tablet 40 milliGRAM(s) Oral before breakfast  sevelamer carbonate 1600 milliGRAM(s) Oral three times a day with meals        VITALS:  T(F): 97 (23 @ 05:04), Max: 97.2 (23 @ 14:34)  HR: 70 (23 @ 14:34)  BP: 138/63 (23 @ 14:34)  RR: 18 (23 @ 05:04)      05-05 @ 07:01  -   @ 07:00  --------------------------------------------------------  IN: 0 mL / OUT: 2000 mL / NET: -2000 mL          PHYSICAL EXAM:  Constitutional: NAD  Respiratory: CTAB,   Cardiovascular: S1, S2, RRR  Gastrointestinal: BS+, soft, NT/ND  Extremities: No cyanosis or clubbing. No peripheral edema  :  No murillo.   Skin: No rashes    LABS:      133<L>  |  94<L>  |  68<HH>  ----------------------------<  359<H>  4.5   |  24  |  6.8<HH>    Ca    9.1      07 May 2023 08:21  Phos  7.7       Mg     2.6         TPro  6.3  /  Alb  3.8  /  TBili  0.5  /  DBili  x   /  AST  24  /  ALT  17  /  AlkPhos  106  05-07    05-06    137  |  95<L>  |  50<H>  ----------------------------<  130<H>  4.6   |  25  |  6.3<HH>    Ca    9.2      06 May 2023 17:27  Phos  7.1     -  Mg     2.5     -    TPro  6.4  /  Alb  3.9  /  TBili  0.2  /  DBili      /  AST  28  /  ALT  16  /  AlkPhos  111  05-06                          12.1   3.37  )-----------( 212      ( 06 May 2023 17:27 )             36.3       Urine Studies:  Urinalysis Basic - ( 01 May 2023 01:42 )    Color: Light Yellow / Appearance: Clear / S.016 / pH:   Gluc:  / Ketone: Negative  / Bili: Negative / Urobili: <2 mg/dL   Blood:  / Protein: 100 mg/dL / Nitrite: Negative   Leuk Esterase: Small / RBC: 4 /HPF / WBC 7 /HPF   Sq Epi:  / Non Sq Epi:  / Bacteria: Few          TSH 2.64      [23 @ 01:50]  Lipid: chol 226, TG 95, HDL 62, LDL --      [23 @ 12:46]          RADIOLOGY & ADDITIONAL STUDIES:

## 2023-05-07 NOTE — PROGRESS NOTE ADULT - ASSESSMENT
57 year old (non smoker) female patient ([place] Speaking) known to have DM, ESRD on HD, HTN and DLD admitted for AMS and seizures likely secondary to hypoglycemia.  Assessment and plan  ESRD on HD/ Seizure/ HTN/ DM  HD on monday s/p HD yesterday   BP controlled,   Hgb at target/ no SHAR   appreciate Endo notes   Last P elevated - renal diet, on Sevelamer 1800 mg q8h with meals  will follow

## 2023-05-07 NOTE — PROGRESS NOTE ADULT - SUBJECTIVE AND OBJECTIVE BOX
ANAM NGUYỄN  57y, Female  Allergy: No Known Allergies    Hospital Day: 6d    Patient seen and examined earlier today. Endorsing mild headache. States this issue with labile BP and orthostatic blood pressure has been going on for years. Still feeling dizzy with ambulation. Pt understands the goal is for safe sit/stand/ ambulation prior to dc.     PMH/PSH:  PAST MEDICAL & SURGICAL HISTORY:  Chronic kidney disease, unspecified CKD stage          LAST 24-Hr EVENTS:    VITALS:  T(F): 97.4 (05-07-23 @ 14:20), Max: 97.4 (05-07-23 @ 14:20)  HR: --  BP: --  RR: 18 (05-07-23 @ 14:20)  SpO2: --        TESTS & MEASUREMENTS:  Weight (Kg):       05-05-23 @ 07:01  -  05-06-23 @ 07:00  --------------------------------------------------------  IN: 0 mL / OUT: 2000 mL / NET: -2000 mL                            11.7   3.54  )-----------( 210      ( 07 May 2023 08:21 )             35.5       05-07    133<L>  |  94<L>  |  68<HH>  ----------------------------<  359<H>  4.5   |  24  |  6.8<HH>    Ca    9.1      07 May 2023 08:21  Phos  7.7     05-07  Mg     2.6     05-07    TPro  6.3  /  Alb  3.8  /  TBili  0.5  /  DBili  x   /  AST  24  /  ALT  17  /  AlkPhos  106  05-07    LIVER FUNCTIONS - ( 07 May 2023 08:21 )  Alb: 3.8 g/dL / Pro: 6.3 g/dL / ALK PHOS: 106 U/L / ALT: 17 U/L / AST: 24 U/L / GGT: x                 Culture - Blood (collected 05-01-23 @ 03:00)  Source: .Blood Blood  Final Report (05-06-23 @ 14:00):    No Growth Final    Culture - Blood (collected 05-01-23 @ 03:00)  Source: .Blood Blood  Final Report (05-06-23 @ 14:00):    No Growth Final    Culture - Urine (collected 05-01-23 @ 01:42)  Source: Catheterized Catheterized  Final Report (05-03-23 @ 19:48):    >100,000 CFU/ml Enterococcus faecalis  Organism: Enterococcus faecalis (05-03-23 @ 19:48)  Organism: Enterococcus faecalis (05-03-23 @ 19:48)      Method Type: RODNEY      -  Ampicillin: S <=2 Predicts results to ampicillin/sulbactam, amoxacillin-clavulanate and  piperacillin-tazobactam.      -  Ciprofloxacin: I 2      -  Levofloxacin: S 2      -  Nitrofurantoin: S <=32 Should not be used to treat pyelonephritis.      -  Tetracycline: R >8      -  Vancomycin: S 2        Procalcitonin, Serum: 0.15 ng/mL (05-01-23 @ 12:46)    D-Dimer Assay, Quantitative: 247 ng/mL DDU (05-03-23 @ 15:04)            RADIOLOGY, ECG, & ADDITIONAL TESTS:      RECENT DIAGNOSTIC ORDERS:      MEDICATIONS:  MEDICATIONS  (STANDING):  aspirin  chewable 81 milliGRAM(s) Oral daily  atorvastatin 10 milliGRAM(s) Oral at bedtime  chlorhexidine 4% Liquid 1 Application(s) Topical <User Schedule>  dextrose 5%. 1000 milliLiter(s) (50 mL/Hr) IV Continuous <Continuous>  dextrose 5%. 1000 milliLiter(s) (100 mL/Hr) IV Continuous <Continuous>  dextrose 50% Injectable 25 Gram(s) IV Push once  dextrose 50% Injectable 12.5 Gram(s) IV Push once  dextrose 50% Injectable 25 Gram(s) IV Push once  glucagon  Injectable 1 milliGRAM(s) IntraMuscular once  heparin   Injectable 5000 Unit(s) SubCutaneous every 8 hours  insulin glargine Injectable (LANTUS) 18 Unit(s) SubCutaneous at bedtime  insulin lispro (ADMELOG) corrective regimen sliding scale   SubCutaneous three times a day before meals  insulin lispro Injectable (ADMELOG) 5 Unit(s) SubCutaneous three times a day before meals  pantoprazole    Tablet 40 milliGRAM(s) Oral before breakfast  sevelamer carbonate 1600 milliGRAM(s) Oral three times a day with meals    MEDICATIONS  (PRN):  acetaminophen     Tablet .. 650 milliGRAM(s) Oral every 6 hours PRN Temp greater or equal to 38C (100.4F), Mild Pain (1 - 3)  dextrose Oral Gel 15 Gram(s) Oral once PRN Blood Glucose LESS THAN 70 milliGRAM(s)/deciliter  hydrALAZINE 25 milliGRAM(s) Oral every 8 hours PRN Systolic blood pressure >180      HOME MEDICATIONS:  aspirin 81 mg oral tablet (05-01)      PHYSICAL EXAM:  General: NAD, AAO3  HEENT:  EOMI, no LAD  CV: S1 S2 no MRG   Resp: clear to auscultation b/l  GI: soft NT ND  MS: no clubbing/cyanosis/edema  Neuro: nonfocal, AAOx3

## 2023-05-07 NOTE — PROGRESS NOTE ADULT - ASSESSMENT
57 year old (non smoker) female patient ([place]ym[place] Speaking) pmhx of IDDM, ESRD, HTN/HLD. Patient was brought to the ED by EMS on 04/30 following an episode of a witnessed seizure.    #Seizure 2/2 hypoglycemia  -EEG with borderline generalized slowing  - continue to monitor blood glucose, stable on current insulin regimen     #Dizziness 2/2 orthostatic hypotension-   - pt endorses difficulty with labile BP and orthostasis for years   - c/w caleb stockings, advised on sit to stand slowly to prevent falls  - discuss adjusting ultrafiltration with nephrology for 5/8 HD   - pt will need to be able to ambulate independently despite orthostasis prior to dc   - BP meds only prn     #DKA- resolved (not present on admission)  #DM  -downgraded from ICU--s/p insulin drip  -c/w basal bolus regimen, increase lantus 18u due to AM hyperglycemia , c/w 5u Lispro qac with ISS  -hba1c 9.3    #Chest pain- atypical   -EKG NSR and trop neg   -continue asa, statin  - outpt cardiac workup      #ESRD on HD  -cont HD m/w/f    #Hypertensive urgency/HLD  - hold coreg, cont hydral  - c/w statin     #Chronic Headaches  outpt follow up    #Pancreatic lesion (pt aware)  outpt MRI or CT w/ contrast    Dispo: needs improvement in orthostatic hypotension prior to dc     Total time spent to complete patient's bedside assessment, review medical chart, discuss medical plan of care with covering medical team was more than 35 minutes  with >50% of time spent face to face with patient, discussion with patient/family and/or coordination of care      Vivian Kiser DO

## 2023-05-08 ENCOUNTER — APPOINTMENT (OUTPATIENT)
Dept: INTERNAL MEDICINE | Facility: CLINIC | Age: 58
End: 2023-05-08

## 2023-05-08 LAB
ALBUMIN SERPL ELPH-MCNC: 3.8 G/DL — SIGNIFICANT CHANGE UP (ref 3.5–5.2)
ALP SERPL-CCNC: 109 U/L — SIGNIFICANT CHANGE UP (ref 30–115)
ALT FLD-CCNC: 17 U/L — SIGNIFICANT CHANGE UP (ref 0–41)
ANION GAP SERPL CALC-SCNC: 17 MMOL/L — HIGH (ref 7–14)
AST SERPL-CCNC: 23 U/L — SIGNIFICANT CHANGE UP (ref 0–41)
BASOPHILS # BLD AUTO: 0.05 K/UL — SIGNIFICANT CHANGE UP (ref 0–0.2)
BASOPHILS NFR BLD AUTO: 1.5 % — HIGH (ref 0–1)
BILIRUB SERPL-MCNC: 0.2 MG/DL — SIGNIFICANT CHANGE UP (ref 0.2–1.2)
BUN SERPL-MCNC: 89 MG/DL — CRITICAL HIGH (ref 10–20)
CALCIUM SERPL-MCNC: 9.4 MG/DL — SIGNIFICANT CHANGE UP (ref 8.4–10.5)
CHLORIDE SERPL-SCNC: 98 MMOL/L — SIGNIFICANT CHANGE UP (ref 98–110)
CO2 SERPL-SCNC: 24 MMOL/L — SIGNIFICANT CHANGE UP (ref 17–32)
CREAT SERPL-MCNC: 7.4 MG/DL — CRITICAL HIGH (ref 0.7–1.5)
EGFR: 6 ML/MIN/1.73M2 — LOW
EOSINOPHIL # BLD AUTO: 0.28 K/UL — SIGNIFICANT CHANGE UP (ref 0–0.7)
EOSINOPHIL NFR BLD AUTO: 8.3 % — HIGH (ref 0–8)
GLUCOSE BLDC GLUCOMTR-MCNC: 144 MG/DL — HIGH (ref 70–99)
GLUCOSE BLDC GLUCOMTR-MCNC: 152 MG/DL — HIGH (ref 70–99)
GLUCOSE BLDC GLUCOMTR-MCNC: 192 MG/DL — HIGH (ref 70–99)
GLUCOSE BLDC GLUCOMTR-MCNC: 44 MG/DL — CRITICAL LOW (ref 70–99)
GLUCOSE BLDC GLUCOMTR-MCNC: 87 MG/DL — SIGNIFICANT CHANGE UP (ref 70–99)
GLUCOSE SERPL-MCNC: 89 MG/DL — SIGNIFICANT CHANGE UP (ref 70–99)
HCT VFR BLD CALC: 34.1 % — LOW (ref 37–47)
HGB BLD-MCNC: 11.3 G/DL — LOW (ref 12–16)
IMM GRANULOCYTES NFR BLD AUTO: 0 % — LOW (ref 0.1–0.3)
LYMPHOCYTES # BLD AUTO: 1.74 K/UL — SIGNIFICANT CHANGE UP (ref 1.2–3.4)
LYMPHOCYTES # BLD AUTO: 51.5 % — HIGH (ref 20.5–51.1)
MAGNESIUM SERPL-MCNC: 2.8 MG/DL — HIGH (ref 1.8–2.4)
MCHC RBC-ENTMCNC: 31.3 PG — HIGH (ref 27–31)
MCHC RBC-ENTMCNC: 33.1 G/DL — SIGNIFICANT CHANGE UP (ref 32–37)
MCV RBC AUTO: 94.5 FL — SIGNIFICANT CHANGE UP (ref 81–99)
MONOCYTES # BLD AUTO: 0.26 K/UL — SIGNIFICANT CHANGE UP (ref 0.1–0.6)
MONOCYTES NFR BLD AUTO: 7.7 % — SIGNIFICANT CHANGE UP (ref 1.7–9.3)
NEUTROPHILS # BLD AUTO: 1.05 K/UL — LOW (ref 1.4–6.5)
NEUTROPHILS NFR BLD AUTO: 31 % — LOW (ref 42.2–75.2)
NRBC # BLD: 0 /100 WBCS — SIGNIFICANT CHANGE UP (ref 0–0)
PHOSPHATE SERPL-MCNC: 7.8 MG/DL — HIGH (ref 2.1–4.9)
PLATELET # BLD AUTO: 205 K/UL — SIGNIFICANT CHANGE UP (ref 130–400)
PMV BLD: 11.5 FL — HIGH (ref 7.4–10.4)
POTASSIUM SERPL-MCNC: 4.6 MMOL/L — SIGNIFICANT CHANGE UP (ref 3.5–5)
POTASSIUM SERPL-SCNC: 4.6 MMOL/L — SIGNIFICANT CHANGE UP (ref 3.5–5)
PROT SERPL-MCNC: 6.2 G/DL — SIGNIFICANT CHANGE UP (ref 6–8)
RBC # BLD: 3.61 M/UL — LOW (ref 4.2–5.4)
RBC # FLD: 12.1 % — SIGNIFICANT CHANGE UP (ref 11.5–14.5)
SODIUM SERPL-SCNC: 139 MMOL/L — SIGNIFICANT CHANGE UP (ref 135–146)
WBC # BLD: 3.38 K/UL — LOW (ref 4.8–10.8)
WBC # FLD AUTO: 3.38 K/UL — LOW (ref 4.8–10.8)

## 2023-05-08 PROCEDURE — 99233 SBSQ HOSP IP/OBS HIGH 50: CPT

## 2023-05-08 RX ORDER — MIDODRINE HYDROCHLORIDE 2.5 MG/1
2.5 TABLET ORAL THREE TIMES A DAY
Refills: 0 | Status: DISCONTINUED | OUTPATIENT
Start: 2023-05-08 | End: 2023-05-09

## 2023-05-08 RX ADMIN — Medication 81 MILLIGRAM(S): at 13:19

## 2023-05-08 RX ADMIN — SEVELAMER CARBONATE 1600 MILLIGRAM(S): 2400 POWDER, FOR SUSPENSION ORAL at 13:19

## 2023-05-08 RX ADMIN — Medication 5 UNIT(S): at 13:19

## 2023-05-08 RX ADMIN — PANTOPRAZOLE SODIUM 40 MILLIGRAM(S): 20 TABLET, DELAYED RELEASE ORAL at 06:29

## 2023-05-08 RX ADMIN — Medication 650 MILLIGRAM(S): at 16:31

## 2023-05-08 RX ADMIN — Medication 650 MILLIGRAM(S): at 17:01

## 2023-05-08 RX ADMIN — INSULIN GLARGINE 18 UNIT(S): 100 INJECTION, SOLUTION SUBCUTANEOUS at 21:52

## 2023-05-08 RX ADMIN — SEVELAMER CARBONATE 1600 MILLIGRAM(S): 2400 POWDER, FOR SUSPENSION ORAL at 16:33

## 2023-05-08 RX ADMIN — CHLORHEXIDINE GLUCONATE 1 APPLICATION(S): 213 SOLUTION TOPICAL at 05:42

## 2023-05-08 RX ADMIN — ATORVASTATIN CALCIUM 10 MILLIGRAM(S): 80 TABLET, FILM COATED ORAL at 21:51

## 2023-05-08 RX ADMIN — MIDODRINE HYDROCHLORIDE 2.5 MILLIGRAM(S): 2.5 TABLET ORAL at 16:33

## 2023-05-08 RX ADMIN — HEPARIN SODIUM 5000 UNIT(S): 5000 INJECTION INTRAVENOUS; SUBCUTANEOUS at 13:19

## 2023-05-08 RX ADMIN — HEPARIN SODIUM 5000 UNIT(S): 5000 INJECTION INTRAVENOUS; SUBCUTANEOUS at 21:51

## 2023-05-08 RX ADMIN — Medication 5 UNIT(S): at 07:49

## 2023-05-08 RX ADMIN — Medication 2: at 13:19

## 2023-05-08 RX ADMIN — HEPARIN SODIUM 5000 UNIT(S): 5000 INJECTION INTRAVENOUS; SUBCUTANEOUS at 05:43

## 2023-05-08 RX ADMIN — Medication 5 UNIT(S): at 17:19

## 2023-05-08 RX ADMIN — SEVELAMER CARBONATE 1600 MILLIGRAM(S): 2400 POWDER, FOR SUSPENSION ORAL at 07:50

## 2023-05-08 NOTE — PROGRESS NOTE ADULT - ASSESSMENT
a/p:  57 year old (non smoker) female patient (Burmese Speaking) pmhx of IDDM, ESRD, HTN/HLDPatient was brought to the ED by EMS on 04/30 following an episode of a witnessed seizure.    Severe symptomatic orthostatic hypotension  Labile HTN  cont thigh high TEDs and tight abd binder  will try low dose Midorine    #Seizure 2/2 hypoglycemia  -EEG with borderline generalized slowing  -cont to monitor fs    #DKA- resolved (while inpt)  #DM  -downgraded from ICU--s/p insulin drip  -goal fs 140-180  -c/w Insulin  -hba1c 9.3  -echo EF 74%    #Chest pain- atypical   -EKG NSR and trop neg   -continue asa, statin  - outpt cardiac workup      #ESRD on HD  -cont HD m/w/f  -nephrology f/u      Chronic Headaches  outpt follow up    Pancreatic lesion (pt aware)  outpt MRI or CT w/ contrast    DVT/GI ppx    Dispo: home once able to ambulate.      #Progress Note Handoff  Pending: Clinical improvement and stability__x___PT____x____  Pt/Family discussion: Pt informed and agrees with the current plan  Disposition: Home___    My note supersedes the residents note should a discrepancy arise.    Chart and notes personally reviewed.  Care Discussed with Consultants/Other Providers/ Housestaff [ x] YES [ ] NO   Radiology, labs, old records personally reviewed.    discussed w/ housestaff, nursing, case management, PT    Attestation Statements:    Attestation Statements:  Risk Statement (NON-critical care).     On this date of service, level of risk to patient is considered: High.     Due to: inability to ambulate due to Severe symptomatic orthostatic hypotension, labile BP, FS, ESRD    Time-based billing (NON-critical care).     50 minutes spent on total encounter. The necessity of the time spent during the encounter on this date of service was due to:     time spent on review of labs, imaging studies, old records, obtaining history, personally examining patient, counselling and communicating with patient/ family, entering orders for medications/tests/etc, discussions with other health care providers, documentation in electronic health records, independent interpretation of labs, imaging/procedure results and care coordination.

## 2023-05-08 NOTE — PROGRESS NOTE ADULT - SUBJECTIVE AND OBJECTIVE BOX
Patient is a 57y old  Female who presents with a chief complaint of Hypoglycemic Seizure (05 May 2023 08:46)    INTERVAL HPI/OVERNIGHT EVENTS: Patient was examined and seen at bedside. This morning pt is resting comfortably in bed and reports no new issues or overnight events. Had HD. Prior to HD was able to ambulate to the bathroom but poss HD was severely orthostatic and unable to tolerate even sitting at the edge of the bed.  ROS: Denies CP, SOB, AP, new weakness  All other systems reviewed and are within normal limits.  InitialHPI:  Location: [facility] ED Hold 007 A ([facility] ED Hold)  Patient Name: ANAM NGUYỄN  Age: 57y  Gender: Female      History of Present Illness  57 year old (non smoker) female patient ([place] Speaking) known to have:  - Baseline: Alert, oriented, lives with daughter, ambulates independently  - DM Insulin Dependent. No Hba1c in chart.   - ESRD on hemodialysis every MWF via left UE AV fistula.  - Hypertension. Home med Coreg 12.5mg BID and Hydralazine 10mg in AM (not Q8h; confirmed by daughter)  - Dyslipidemia. No Lipid Profile in chart. Home med Atorvastatin 10mg QD, Aspirin 81mg QD      Patient was confused at time of my evaluation (asking where she is and why she is in ED), so daughter was called over the phone and a Russian PCA was present at bedside.  Patient was brought to the ED by EMS on  following an episode of a witnessed seizure.  Per daughter over the phone, history goes back to  at 23:30 PM when the daughter noticed that her mother was shaking all over 4 extremities.  Prior to the episode, she denied any complaint, including chest pain, diaphoresis, lightheadedness, or headache. She notes that POCT in AM was 95, at noon was 150, and at night was 120, and she reports adequate appetite. When asked about insulin dosing, daughter stated that mother usually administers her own insulin and is not sure how much she administered at night.  During the episode, daughter notes that her mother was shaking all over and was unresponsive; she had no uprolling of eyes, tongue biting, drooling, urinary or fecal incontinence.  The episode lasted for 15 minutes, before it spontaneously broke.  After the episode, the patient was confused and could not remember the details of the event.  Daughter called 911, and on EMS arrival the POCT was 109 and patient was not seizing (still confused).  On arrival, patient was confused. She complained of frontal headache and some blurry vision that she said were not new but had no other complaints.     On review of systems, daughter denies any recent fever, chills, night sweats, URTI symptoms (cough, rhinorrhea, sore throat), urinary symptoms (urinary frequency, urgency, intermittence, dysuria, foul smelling urine, cloudy urine), change in bowel movements (diarrhea or constipation), abdominal pain, headache, nausea, or vomiting.   No sick contacts.   No recent travel or exposure to recent travelers.      Upon presentation to the ED, the patient's Vital Signs in ED:  - /79 mmHg s/p IV Labetalol 10mg x2 doses -> 114/57 mmHg  - HR 70 bpm  - RR 16 bpm  - T 94 degrees  - SaO2 85% on RA so placed on 5LPM NC with improvement to 98%      Investigations   Laboratory Workup  - CBC:                        13.3   4.11  )-----------( 238      ( 01 May 2023 00:42 )             40.8     - Chemistry:      140  |  97<L>  |  76<HH>  ----------------------------<  35<LL>  5.3<H>   |  17  |  6.1<HH>    Ca    9.2      01 May 2023 00:42  Mg     3.5         TPro  7.5  /  Alb  4.6  /  TBili  0.5  /  DBili  x   /  AST  25  /  ALT  13  /  AlkPhos  152<H>      - Coagulation Studies:  PT/INR - ( 01 May 2023 00:42 )   PT: 9.60 sec;   INR: 0.85 ratio    PTT - ( 01 May 2023 00:42 )  PTT:38.1 sec      - Cardiac Markers:  CARDIAC MARKERS ( 01 May 2023 00:42 )  x     / x     / 170 U/L / x     / x          Microbiological Workup  Urinalysis Basic - ( 01 May 2023 01:42 )    Color: Light Yellow / Appearance: Clear / S.016 / pH: x  Gluc: x / Ketone: Negative  / Bili: Negative / Urobili: <2 mg/dL   Blood: x / Protein: 100 mg/dL / Nitrite: Negative   Leuk Esterase: Small / RBC: 4 /HPF / WBC 7 /HPF   Sq Epi: x / Non Sq Epi: x / Bacteria: Few      Radiological Workup  * CT Head No Cont (23 @ 01:32) No acute intracranial pathology. Follow-up MRI of the brain with and without contrast may be helpful for  further evaluation.    - POCT on arrival to ED was 40 so Patient received D50 25mL bolus for hypoglycemia   - She received IV Cefepime and Vancomycin in ED for hypothermia and concern for sepsis  - For SAO2 85% on RA, she was placed on 5LPM NC with improvement in SAO2 to 98%  - For /79 mmHg, she received IV Labetalol 10mg x2 doses with improvement   - She will be admitted to MICU for further investigations, management, and monitoring     (01 May 2023 03:28)    PAST MEDICAL & SURGICAL HISTORY:  Chronic kidney disease, unspecified CKD stage          General: NAD, AAO3  HEENT:  EOMI, no LAD  CV: S1 S2  Resp: decreased breath sounds at bases  GI: NT/ND/S +BS  MS: no clubbing/cyanosis/edema, + pulses b/l  Neuro: nonfocal, +reflexes thruout          Home Medications:  aspirin 81 mg oral tablet: 1 tab(s) orally once a day (01 May 2023 04:22)    MEDICATIONS  (STANDING):  aspirin  chewable 81 milliGRAM(s) Oral daily  atorvastatin 10 milliGRAM(s) Oral at bedtime  chlorhexidine 4% Liquid 1 Application(s) Topical <User Schedule>  dextrose 5%. 1000 milliLiter(s) (50 mL/Hr) IV Continuous <Continuous>  dextrose 5%. 1000 milliLiter(s) (100 mL/Hr) IV Continuous <Continuous>  dextrose 50% Injectable 25 Gram(s) IV Push once  dextrose 50% Injectable 12.5 Gram(s) IV Push once  dextrose 50% Injectable 25 Gram(s) IV Push once  glucagon  Injectable 1 milliGRAM(s) IntraMuscular once  heparin   Injectable 5000 Unit(s) SubCutaneous every 8 hours  insulin glargine Injectable (LANTUS) 18 Unit(s) SubCutaneous at bedtime  insulin lispro (ADMELOG) corrective regimen sliding scale   SubCutaneous three times a day before meals  insulin lispro Injectable (ADMELOG) 5 Unit(s) SubCutaneous three times a day before meals  midodrine. 2.5 milliGRAM(s) Oral three times a day  pantoprazole    Tablet 40 milliGRAM(s) Oral before breakfast  sevelamer carbonate 1600 milliGRAM(s) Oral three times a day with meals    MEDICATIONS  (PRN):  acetaminophen     Tablet .. 650 milliGRAM(s) Oral every 6 hours PRN Temp greater or equal to 38C (100.4F), Mild Pain (1 - 3)  dextrose Oral Gel 15 Gram(s) Oral once PRN Blood Glucose LESS THAN 70 milliGRAM(s)/deciliter  hydrALAZINE 25 milliGRAM(s) Oral every 8 hours PRN Systolic blood pressure >180    Vital Signs Last 24 Hrs  T(C): 36.1 (08 May 2023 05:16), Max: 36.1 (08 May 2023 05:16)  T(F): 97 (08 May 2023 05:16), Max: 97 (08 May 2023 05:16)  HR: 75 (08 May 2023 12:20) (73 - 75)  BP: 121/63 (08 May 2023 12:20) (121/63 - 135/62)  BP(mean): --  RR: 18 (08 May 2023 12:20) (18 - 18)  SpO2: --    Parameters below as of 08 May 2023 12:20  Patient On (Oxygen Delivery Method): room air      CAPILLARY BLOOD GLUCOSE      POCT Blood Glucose.: 192 mg/dL (08 May 2023 13:14)  POCT Blood Glucose.: 87 mg/dL (08 May 2023 07:24)  POCT Blood Glucose.: 320 mg/dL (07 May 2023 21:43)  POCT Blood Glucose.: 185 mg/dL (07 May 2023 16:34)    LABS:                        11.3   3.38  )-----------( 205      ( 08 May 2023 06:34 )             34.1     05-08    139  |  98  |  89<HH>  ----------------------------<  89  4.6   |  24  |  7.4<HH>    Ca    9.4      08 May 2023 06:34  Phos  7.8     05-08  Mg     2.8     05-08    TPro  6.2  /  Alb  3.8  /  TBili  0.2  /  DBili  x   /  AST  23  /  ALT  17  /  AlkPhos  109  05-08    LIVER FUNCTIONS - ( 08 May 2023 06:34 )  Alb: 3.8 g/dL / Pro: 6.2 g/dL / ALK PHOS: 109 U/L / ALT: 17 U/L / AST: 23 U/L / GGT: x                           Consultant Notes Reviewed:  [x ] YES  [ ] NO  Care Discussed with Consultants/Other Providers/ Housestaff [ x] YES  [ ] NO  Radiology, labs, new studies personally reviewed.

## 2023-05-08 NOTE — PROGRESS NOTE ADULT - ASSESSMENT
57 year old (non smoker) female patient (French Speaking) known to have DM, ESRD on HD, HTN and DLD admitted for AMS and seizures likely secondary to hypoglycemia.    Assessment and plan    ESRD on HD/ Seizure/ HTN/ DM  HD today 3h opti 160 2k UF 2l as tolerated   BP controlled,   Hgb at target/ no SHAR   appreciate Endo notes   Last P elevated - renal diet, on Sevelamer 1800 mg q8h with meals continue   will follow

## 2023-05-09 VITALS
SYSTOLIC BLOOD PRESSURE: 140 MMHG | TEMPERATURE: 97 F | DIASTOLIC BLOOD PRESSURE: 60 MMHG | HEART RATE: 80 BPM | RESPIRATION RATE: 18 BRPM

## 2023-05-09 LAB
GLUCOSE BLDC GLUCOMTR-MCNC: 295 MG/DL — HIGH (ref 70–99)
GLUCOSE BLDC GLUCOMTR-MCNC: 310 MG/DL — HIGH (ref 70–99)
GLUCOSE BLDC GLUCOMTR-MCNC: 47 MG/DL — CRITICAL LOW (ref 70–99)
GLUCOSE BLDC GLUCOMTR-MCNC: 75 MG/DL — SIGNIFICANT CHANGE UP (ref 70–99)

## 2023-05-09 PROCEDURE — 99239 HOSP IP/OBS DSCHRG MGMT >30: CPT

## 2023-05-09 RX ORDER — INSULIN GLARGINE 100 [IU]/ML
15 INJECTION, SOLUTION SUBCUTANEOUS AT BEDTIME
Refills: 0 | Status: DISCONTINUED | OUTPATIENT
Start: 2023-05-09 | End: 2023-05-09

## 2023-05-09 RX ORDER — INSULIN LISPRO 100/ML
3 VIAL (ML) SUBCUTANEOUS
Refills: 0 | Status: DISCONTINUED | OUTPATIENT
Start: 2023-05-09 | End: 2023-05-09

## 2023-05-09 RX ORDER — HYDRALAZINE HCL 50 MG
1 TABLET ORAL
Qty: 90 | Refills: 0
Start: 2023-05-09 | End: 2023-06-07

## 2023-05-09 RX ADMIN — Medication 3 UNIT(S): at 11:42

## 2023-05-09 RX ADMIN — PANTOPRAZOLE SODIUM 40 MILLIGRAM(S): 20 TABLET, DELAYED RELEASE ORAL at 05:24

## 2023-05-09 RX ADMIN — Medication 3 UNIT(S): at 17:15

## 2023-05-09 RX ADMIN — HEPARIN SODIUM 5000 UNIT(S): 5000 INJECTION INTRAVENOUS; SUBCUTANEOUS at 05:23

## 2023-05-09 RX ADMIN — Medication 6: at 11:43

## 2023-05-09 RX ADMIN — SEVELAMER CARBONATE 1600 MILLIGRAM(S): 2400 POWDER, FOR SUSPENSION ORAL at 11:40

## 2023-05-09 RX ADMIN — MIDODRINE HYDROCHLORIDE 2.5 MILLIGRAM(S): 2.5 TABLET ORAL at 11:41

## 2023-05-09 RX ADMIN — Medication 8: at 17:15

## 2023-05-09 RX ADMIN — Medication 81 MILLIGRAM(S): at 11:40

## 2023-05-09 RX ADMIN — SEVELAMER CARBONATE 1600 MILLIGRAM(S): 2400 POWDER, FOR SUSPENSION ORAL at 08:19

## 2023-05-09 RX ADMIN — SEVELAMER CARBONATE 1600 MILLIGRAM(S): 2400 POWDER, FOR SUSPENSION ORAL at 17:15

## 2023-05-09 RX ADMIN — MIDODRINE HYDROCHLORIDE 2.5 MILLIGRAM(S): 2.5 TABLET ORAL at 17:14

## 2023-05-09 RX ADMIN — MIDODRINE HYDROCHLORIDE 2.5 MILLIGRAM(S): 2.5 TABLET ORAL at 05:28

## 2023-05-09 RX ADMIN — HEPARIN SODIUM 5000 UNIT(S): 5000 INJECTION INTRAVENOUS; SUBCUTANEOUS at 12:25

## 2023-05-09 NOTE — PROGRESS NOTE ADULT - ASSESSMENT
a/p:  57 year old (non smoker) female patient ([place] Speaking) pmhx of IDDM, ESRD, HTN/HLDPatient was brought to the ED by EMS on 04/30 following an episode of a witnessed seizure.    Severe symptomatic orthostatic hypotension  Labile HTN  cont thigh high TEDs and tight abd binder  use Hydralazine PRN    #Seizure 2/2 hypoglycemia  #Labile FS  -EEG with borderline generalized slowing  -cont to monitor fs    #DKA- resolved (while inpt)  #Labile DM  -downgraded from ICU--s/p insulin drip  -goal fs 140-180  -c/w Insulin  -hba1c 9.3  -echo EF 74%  -counselled about monitoring    #Chest pain- atypical   -EKG NSR and trop neg   -continue asa, statin  - outpt cardiac workup      #ESRD on HD  -cont HD m/w/f  -nephrology f/u    Chronic Headaches  outpt follow up    Pancreatic lesion (pt aware)  outpt MRI or CT w/ contrast    DVT/GI ppx    Discharge instructions discussed and patient knows when to seek immediate medical attention.  Patient has proper follow up.  All results discussed and patient aware they require further follow up/work up.  Stressed importance of proper follow up.  Medications prescribed and changes discussed.  All questions and concerns from patient and family addressed. Understanding of instructions verbalized.    Time spent in completing discharge process and coordinating care 45 minutes.      My note supersedes the residents note should a discrepancy arise.    Chart and notes personally reviewed.  Care Discussed with Consultants/Other Providers/ Housestaff [ x] YES [ ] NO   Radiology, labs, old records personally reviewed.    discussed w/ housestaff, nursing, case management, PT

## 2023-05-09 NOTE — PROGRESS NOTE ADULT - REASON FOR ADMISSION
Hypoglycemic Seizure

## 2023-05-09 NOTE — PROGRESS NOTE ADULT - PROVIDER SPECIALTY LIST ADULT
Hospitalist
Hospitalist
Nephrology
Nephrology
Critical Care
Critical Care
Internal Medicine
Internal Medicine
Nephrology
Hospitalist
Internal Medicine
Nephrology
Nephrology
Endocrinology
Hospitalist
Internal Medicine
Internal Medicine

## 2023-05-09 NOTE — PROGRESS NOTE ADULT - SUBJECTIVE AND OBJECTIVE BOX
Patient is a 57y old  Female who presents with a chief complaint of Hypoglycemic Seizure (05 May 2023 08:46)    INTERVAL HPI/OVERNIGHT EVENTS: Patient was examined and seen at bedside. This morning pt is resting comfortably in bed and reports no new issues or overnight events. Feeling much better today. Able to ambulate. Wants to go home ASAP.  ROS: Denies CP, SOB, AP, new weakness  All other systems reviewed and are within normal limits.  InitialHPI:  Location: [facility] ED Hold 007 A ([facility] ED Hold)  Patient Name: ANAM NGUYỄN  Age: 57y  Gender: Female      History of Present Illness  57 year old (non smoker) female patient (Citizen of [place] Speaking) known to have:  - Baseline: Alert, oriented, lives with daughter, ambulates independently  - DM Insulin Dependent. No Hba1c in chart.   - ESRD on hemodialysis every MWF via left UE AV fistula.  - Hypertension. Home med Coreg 12.5mg BID and Hydralazine 10mg in AM (not Q8h; confirmed by daughter)  - Dyslipidemia. No Lipid Profile in chart. Home med Atorvastatin 10mg QD, Aspirin 81mg QD      Patient was confused at time of my evaluation (asking where she is and why she is in ED), so daughter was called over the phone and a Russian PCA was present at bedside.  Patient was brought to the ED by EMS on  following an episode of a witnessed seizure.  Per daughter over the phone, history goes back to  at 23:30 PM when the daughter noticed that her mother was shaking all over 4 extremities.  Prior to the episode, she denied any complaint, including chest pain, diaphoresis, lightheadedness, or headache. She notes that POCT in AM was 95, at noon was 150, and at night was 120, and she reports adequate appetite. When asked about insulin dosing, daughter stated that mother usually administers her own insulin and is not sure how much she administered at night.  During the episode, daughter notes that her mother was shaking all over and was unresponsive; she had no uprolling of eyes, tongue biting, drooling, urinary or fecal incontinence.  The episode lasted for 15 minutes, before it spontaneously broke.  After the episode, the patient was confused and could not remember the details of the event.  Daughter called 911, and on EMS arrival the POCT was 109 and patient was not seizing (still confused).  On arrival, patient was confused. She complained of frontal headache and some blurry vision that she said were not new but had no other complaints.     On review of systems, daughter denies any recent fever, chills, night sweats, URTI symptoms (cough, rhinorrhea, sore throat), urinary symptoms (urinary frequency, urgency, intermittence, dysuria, foul smelling urine, cloudy urine), change in bowel movements (diarrhea or constipation), abdominal pain, headache, nausea, or vomiting.   No sick contacts.   No recent travel or exposure to recent travelers.      Upon presentation to the ED, the patient's Vital Signs in ED:  - /79 mmHg s/p IV Labetalol 10mg x2 doses -> 114/57 mmHg  - HR 70 bpm  - RR 16 bpm  - T 94 degrees  - SaO2 85% on RA so placed on 5LPM NC with improvement to 98%      Investigations   Laboratory Workup  - CBC:                        13.3   4.11  )-----------( 238      ( 01 May 2023 00:42 )             40.8     - Chemistry:      140  |  97<L>  |  76<HH>  ----------------------------<  35<LL>  5.3<H>   |  17  |  6.1<HH>    Ca    9.2      01 May 2023 00:42  Mg     3.5         TPro  7.5  /  Alb  4.6  /  TBili  0.5  /  DBili  x   /  AST  25  /  ALT  13  /  AlkPhos  152<H>      - Coagulation Studies:  PT/INR - ( 01 May 2023 00:42 )   PT: 9.60 sec;   INR: 0.85 ratio    PTT - ( 01 May 2023 00:42 )  PTT:38.1 sec      - Cardiac Markers:  CARDIAC MARKERS ( 01 May 2023 00:42 )  x     / x     / 170 U/L / x     / x          Microbiological Workup  Urinalysis Basic - ( 01 May 2023 01:42 )    Color: Light Yellow / Appearance: Clear / S.016 / pH: x  Gluc: x / Ketone: Negative  / Bili: Negative / Urobili: <2 mg/dL   Blood: x / Protein: 100 mg/dL / Nitrite: Negative   Leuk Esterase: Small / RBC: 4 /HPF / WBC 7 /HPF   Sq Epi: x / Non Sq Epi: x / Bacteria: Few      Radiological Workup  * CT Head No Cont (23 @ 01:32) No acute intracranial pathology. Follow-up MRI of the brain with and without contrast may be helpful for  further evaluation.    - POCT on arrival to ED was 40 so Patient received D50 25mL bolus for hypoglycemia   - She received IV Cefepime and Vancomycin in ED for hypothermia and concern for sepsis  - For SAO2 85% on RA, she was placed on 5LPM NC with improvement in SAO2 to 98%  - For /79 mmHg, she received IV Labetalol 10mg x2 doses with improvement   - She will be admitted to MICU for further investigations, management, and monitoring     (01 May 2023 03:28)    PAST MEDICAL & SURGICAL HISTORY:  Chronic kidney disease, unspecified CKD stage          General: NAD, AAO3  HEENT:  EOMI, no LAD  CV: S1 S2  Resp: decreased breath sounds at bases  GI: NT/ND/S +BS  MS: no clubbing/cyanosis/edema, + pulses b/l  Neuro: nonfocal, +reflexes thruout          Home Medications:  aspirin 81 mg oral tablet: 1 tab(s) orally once a day (01 May 2023 04:22)    MEDICATIONS  (STANDING):  aspirin  chewable 81 milliGRAM(s) Oral daily  atorvastatin 10 milliGRAM(s) Oral at bedtime  chlorhexidine 4% Liquid 1 Application(s) Topical <User Schedule>  dextrose 5%. 1000 milliLiter(s) (50 mL/Hr) IV Continuous <Continuous>  dextrose 5%. 1000 milliLiter(s) (100 mL/Hr) IV Continuous <Continuous>  dextrose 50% Injectable 25 Gram(s) IV Push once  dextrose 50% Injectable 25 Gram(s) IV Push once  dextrose 50% Injectable 12.5 Gram(s) IV Push once  glucagon  Injectable 1 milliGRAM(s) IntraMuscular once  heparin   Injectable 5000 Unit(s) SubCutaneous every 8 hours  insulin glargine Injectable (LANTUS) 15 Unit(s) SubCutaneous at bedtime  insulin lispro (ADMELOG) corrective regimen sliding scale   SubCutaneous three times a day before meals  insulin lispro Injectable (ADMELOG) 3 Unit(s) SubCutaneous three times a day before meals  midodrine. 2.5 milliGRAM(s) Oral three times a day  pantoprazole    Tablet 40 milliGRAM(s) Oral before breakfast  sevelamer carbonate 1600 milliGRAM(s) Oral three times a day with meals    MEDICATIONS  (PRN):  acetaminophen     Tablet .. 650 milliGRAM(s) Oral every 6 hours PRN Temp greater or equal to 38C (100.4F), Mild Pain (1 - 3)  dextrose Oral Gel 15 Gram(s) Oral once PRN Blood Glucose LESS THAN 70 milliGRAM(s)/deciliter  hydrALAZINE 25 milliGRAM(s) Oral every 8 hours PRN Systolic blood pressure >180    Vital Signs Last 24 Hrs  T(C): 36.1 (09 May 2023 12:45), Max: 36.8 (08 May 2023 21:32)  T(F): 97 (09 May 2023 12:45), Max: 98.2 (08 May 2023 21:32)  HR: 80 (09 May 2023 12:45) (70 - 80)  BP: 140/60 (09 May 2023 12:45) (126/60 - 140/70)  BP(mean): --  RR: 18 (09 May 2023 12:45) (18 - 19)  SpO2: --      CAPILLARY BLOOD GLUCOSE      POCT Blood Glucose.: 310 mg/dL (09 May 2023 16:46)  POCT Blood Glucose.: 295 mg/dL (09 May 2023 11:27)  POCT Blood Glucose.: 75 mg/dL (09 May 2023 07:59)  POCT Blood Glucose.: 47 mg/dL (09 May 2023 07:20)  POCT Blood Glucose.: 152 mg/dL (08 May 2023 21:09)    LABS:                        11.3   3.38  )-----------( 205      ( 08 May 2023 06:34 )             34.1     05-08    139  |  98  |  89<HH>  ----------------------------<  89  4.6   |  24  |  7.4<HH>    Ca    9.4      08 May 2023 06:34  Phos  7.8     05-08  Mg     2.8     05-08    TPro  6.2  /  Alb  3.8  /  TBili  0.2  /  DBili  x   /  AST  23  /  ALT  17  /  AlkPhos  109  05-08    LIVER FUNCTIONS - ( 08 May 2023 06:34 )  Alb: 3.8 g/dL / Pro: 6.2 g/dL / ALK PHOS: 109 U/L / ALT: 17 U/L / AST: 23 U/L / GGT: x                           Consultant Notes Reviewed:  [x ] YES  [ ] NO  Care Discussed with Consultants/Other Providers/ Housestaff [ x] YES  [ ] NO  Radiology, labs, new studies personally reviewed.

## 2023-05-09 NOTE — PROGRESS NOTE ADULT - ASSESSMENT
57 year old (non smoker) female patient (Slov[place] Speaking) known to have DM, ESRD on HD, HTN and DLD admitted for AMS and seizures likely secondary to hypoglycemia.    Assessment and plan    ESRD on HD/ Seizure/ HTN/ DM  HD in AM no need for HD today   BP controlled,   Hgb at target/ no SHAR   Last P elevated - renal diet, on Sevelamer 1800 mg q8h with meals continue repeat IP  will follow

## 2023-05-09 NOTE — CHART NOTE - NSCHARTNOTEFT_GEN_A_CORE
Brief Nutrition Note    Per Hospitalist note on 5/8, pt is a 58 y/o (non smoker) female(Qatari Speaking) with PMHx of IDDM, ESRD, HTN/HLD. Patient was brought to the ED by EMS on 04/30 following an episode of a witnessed seizure.    Dosing weight is 78 KG; BMI 26.9 (overweight range). Current diet order is easy to chew, consistency carbohydrate (no snacks), for pts renal replacement - no protein restriction, no concentrated potassium, no concentrated phosphorus, low sodium. Pt has good appetite; consuming % of meals provided in-house. Tolerating diet texture/consistency well. No nausea or vomiting reported. Skin is intact; no pressure injuries noted. Nonpitting edema to right arm noted per flowsheet on 5/3. Last BM on 5/2.     Pt is at low nutrition risk; will f/u in 7-10 days or prn.    RD to remain available: Johana Akers [phone] or TEAMS

## 2023-05-10 LAB
GLUCOSE BLDC GLUCOMTR-MCNC: 181 MG/DL — HIGH (ref 70–99)
GLUCOSE BLDC GLUCOMTR-MCNC: 424 MG/DL — HIGH (ref 70–99)

## 2023-05-12 DIAGNOSIS — E83.39 OTHER DISORDERS OF PHOSPHORUS METABOLISM: ICD-10-CM

## 2023-05-12 DIAGNOSIS — N18.6 END STAGE RENAL DISEASE: ICD-10-CM

## 2023-05-12 DIAGNOSIS — R07.89 OTHER CHEST PAIN: ICD-10-CM

## 2023-05-12 DIAGNOSIS — I95.1 ORTHOSTATIC HYPOTENSION: ICD-10-CM

## 2023-05-12 DIAGNOSIS — K86.89 OTHER SPECIFIED DISEASES OF PANCREAS: ICD-10-CM

## 2023-05-12 DIAGNOSIS — Z79.4 LONG TERM (CURRENT) USE OF INSULIN: ICD-10-CM

## 2023-05-12 DIAGNOSIS — G40.89 OTHER SEIZURES: ICD-10-CM

## 2023-05-12 DIAGNOSIS — E78.5 HYPERLIPIDEMIA, UNSPECIFIED: ICD-10-CM

## 2023-05-12 DIAGNOSIS — T38.3X5A ADVERSE EFFECT OF INSULIN AND ORAL HYPOGLYCEMIC [ANTIDIABETIC] DRUGS, INITIAL ENCOUNTER: ICD-10-CM

## 2023-05-12 DIAGNOSIS — I16.1 HYPERTENSIVE EMERGENCY: ICD-10-CM

## 2023-05-12 DIAGNOSIS — Z99.2 DEPENDENCE ON RENAL DIALYSIS: ICD-10-CM

## 2023-05-12 DIAGNOSIS — I12.0 HYPERTENSIVE CHRONIC KIDNEY DISEASE WITH STAGE 5 CHRONIC KIDNEY DISEASE OR END STAGE RENAL DISEASE: ICD-10-CM

## 2023-05-12 DIAGNOSIS — E11.649 TYPE 2 DIABETES MELLITUS WITH HYPOGLYCEMIA WITHOUT COMA: ICD-10-CM

## 2023-05-12 DIAGNOSIS — R68.0 HYPOTHERMIA, NOT ASSOCIATED WITH LOW ENVIRONMENTAL TEMPERATURE: ICD-10-CM

## 2023-05-12 DIAGNOSIS — J96.01 ACUTE RESPIRATORY FAILURE WITH HYPOXIA: ICD-10-CM

## 2023-05-12 DIAGNOSIS — E11.10 TYPE 2 DIABETES MELLITUS WITH KETOACIDOSIS WITHOUT COMA: ICD-10-CM

## 2023-05-12 DIAGNOSIS — E11.22 TYPE 2 DIABETES MELLITUS WITH DIABETIC CHRONIC KIDNEY DISEASE: ICD-10-CM

## 2023-05-18 ENCOUNTER — INPATIENT (INPATIENT)
Facility: HOSPITAL | Age: 58
LOS: 2 days | Discharge: HOME HEALTH | DRG: 420 | End: 2023-05-21
Attending: HOSPITALIST | Admitting: HOSPITALIST
Payer: MEDICAID

## 2023-05-18 VITALS
HEART RATE: 63 BPM | TEMPERATURE: 98 F | SYSTOLIC BLOOD PRESSURE: 173 MMHG | DIASTOLIC BLOOD PRESSURE: 72 MMHG | HEIGHT: 67 IN | RESPIRATION RATE: 18 BRPM | OXYGEN SATURATION: 100 %

## 2023-05-18 DIAGNOSIS — R41.82 ALTERED MENTAL STATUS, UNSPECIFIED: ICD-10-CM

## 2023-05-18 LAB
ALBUMIN SERPL ELPH-MCNC: 4.8 G/DL — SIGNIFICANT CHANGE UP (ref 3.5–5.2)
ALP SERPL-CCNC: 118 U/L — HIGH (ref 30–115)
ALT FLD-CCNC: 18 U/L — SIGNIFICANT CHANGE UP (ref 0–41)
ANION GAP SERPL CALC-SCNC: 17 MMOL/L — HIGH (ref 7–14)
APPEARANCE UR: ABNORMAL
AST SERPL-CCNC: 27 U/L — SIGNIFICANT CHANGE UP (ref 0–41)
BACTERIA # UR AUTO: ABNORMAL
BASOPHILS # BLD AUTO: 0.06 K/UL — SIGNIFICANT CHANGE UP (ref 0–0.2)
BASOPHILS NFR BLD AUTO: 1.8 % — HIGH (ref 0–1)
BILIRUB SERPL-MCNC: 0.5 MG/DL — SIGNIFICANT CHANGE UP (ref 0.2–1.2)
BILIRUB UR-MCNC: ABNORMAL
BUN SERPL-MCNC: 29 MG/DL — HIGH (ref 10–20)
CALCIUM SERPL-MCNC: 10.2 MG/DL — SIGNIFICANT CHANGE UP (ref 8.4–10.4)
CHLORIDE SERPL-SCNC: 93 MMOL/L — LOW (ref 98–110)
CO2 SERPL-SCNC: 29 MMOL/L — SIGNIFICANT CHANGE UP (ref 17–32)
COLOR SPEC: YELLOW — SIGNIFICANT CHANGE UP
CREAT SERPL-MCNC: 3.9 MG/DL — HIGH (ref 0.7–1.5)
DACRYOCYTES BLD QL SMEAR: SLIGHT — SIGNIFICANT CHANGE UP
DIFF PNL FLD: ABNORMAL
EGFR: 13 ML/MIN/1.73M2 — LOW
EOSINOPHIL # BLD AUTO: 0.14 K/UL — SIGNIFICANT CHANGE UP (ref 0–0.7)
EOSINOPHIL NFR BLD AUTO: 4.5 % — SIGNIFICANT CHANGE UP (ref 0–8)
EPI CELLS # UR: 26 /HPF — HIGH (ref 0–5)
GIANT PLATELETS BLD QL SMEAR: PRESENT — SIGNIFICANT CHANGE UP
GLUCOSE BLDC GLUCOMTR-MCNC: 278 MG/DL — HIGH (ref 70–99)
GLUCOSE BLDC GLUCOMTR-MCNC: 76 MG/DL — SIGNIFICANT CHANGE UP (ref 70–99)
GLUCOSE SERPL-MCNC: 178 MG/DL — HIGH (ref 70–99)
GLUCOSE UR QL: SIGNIFICANT CHANGE UP
HCT VFR BLD CALC: 39.3 % — SIGNIFICANT CHANGE UP (ref 37–47)
HGB BLD-MCNC: 13.2 G/DL — SIGNIFICANT CHANGE UP (ref 12–16)
HYALINE CASTS # UR AUTO: 1 /LPF — SIGNIFICANT CHANGE UP (ref 0–7)
KETONES UR-MCNC: SIGNIFICANT CHANGE UP
LEUKOCYTE ESTERASE UR-ACNC: ABNORMAL
LYMPHOCYTES # BLD AUTO: 1.07 K/UL — LOW (ref 1.2–3.4)
LYMPHOCYTES # BLD AUTO: 33.3 % — SIGNIFICANT CHANGE UP (ref 20.5–51.1)
MCHC RBC-ENTMCNC: 31.6 PG — HIGH (ref 27–31)
MCHC RBC-ENTMCNC: 33.6 G/DL — SIGNIFICANT CHANGE UP (ref 32–37)
MCV RBC AUTO: 94 FL — SIGNIFICANT CHANGE UP (ref 81–99)
METAMYELOCYTES # FLD: 0.9 % — HIGH (ref 0–0)
MONOCYTES # BLD AUTO: 0.17 K/UL — SIGNIFICANT CHANGE UP (ref 0.1–0.6)
MONOCYTES NFR BLD AUTO: 5.4 % — SIGNIFICANT CHANGE UP (ref 1.7–9.3)
NEUTROPHILS # BLD AUTO: 1.71 K/UL — SIGNIFICANT CHANGE UP (ref 1.4–6.5)
NEUTROPHILS NFR BLD AUTO: 53.2 % — SIGNIFICANT CHANGE UP (ref 42.2–75.2)
NITRITE UR-MCNC: NEGATIVE — SIGNIFICANT CHANGE UP
PH UR: 6 — SIGNIFICANT CHANGE UP (ref 5–8)
PLAT MORPH BLD: NORMAL — SIGNIFICANT CHANGE UP
PLATELET # BLD AUTO: 291 K/UL — SIGNIFICANT CHANGE UP (ref 130–400)
PMV BLD: 10.5 FL — HIGH (ref 7.4–10.4)
POIKILOCYTOSIS BLD QL AUTO: SLIGHT — SIGNIFICANT CHANGE UP
POTASSIUM SERPL-MCNC: 4.5 MMOL/L — SIGNIFICANT CHANGE UP (ref 3.5–5)
POTASSIUM SERPL-SCNC: 4.5 MMOL/L — SIGNIFICANT CHANGE UP (ref 3.5–5)
PROT SERPL-MCNC: 7.9 G/DL — SIGNIFICANT CHANGE UP (ref 6–8)
PROT UR-MCNC: ABNORMAL
RBC # BLD: 4.18 M/UL — LOW (ref 4.2–5.4)
RBC # FLD: 12.1 % — SIGNIFICANT CHANGE UP (ref 11.5–14.5)
RBC BLD AUTO: NORMAL — SIGNIFICANT CHANGE UP
RBC CASTS # UR COMP ASSIST: 3 /HPF — SIGNIFICANT CHANGE UP (ref 0–4)
SODIUM SERPL-SCNC: 139 MMOL/L — SIGNIFICANT CHANGE UP (ref 135–146)
SP GR SPEC: 1.02 — SIGNIFICANT CHANGE UP (ref 1.01–1.03)
TROPONIN T SERPL-MCNC: 0.05 NG/ML — CRITICAL HIGH
TROPONIN T SERPL-MCNC: 0.06 NG/ML — CRITICAL HIGH
UROBILINOGEN FLD QL: ABNORMAL
VARIANT LYMPHS # BLD: 0.9 % — SIGNIFICANT CHANGE UP (ref 0–5)
WBC # BLD: 3.21 K/UL — LOW (ref 4.8–10.8)
WBC # FLD AUTO: 3.21 K/UL — LOW (ref 4.8–10.8)
WBC UR QL: 285 /HPF — HIGH (ref 0–5)

## 2023-05-18 PROCEDURE — 85025 COMPLETE CBC W/AUTO DIFF WBC: CPT

## 2023-05-18 PROCEDURE — 97163 PT EVAL HIGH COMPLEX 45 MIN: CPT | Mod: GP

## 2023-05-18 PROCEDURE — 87635 SARS-COV-2 COVID-19 AMP PRB: CPT

## 2023-05-18 PROCEDURE — 90935 HEMODIALYSIS ONE EVALUATION: CPT

## 2023-05-18 PROCEDURE — 82962 GLUCOSE BLOOD TEST: CPT

## 2023-05-18 PROCEDURE — 82010 KETONE BODYS QUAN: CPT

## 2023-05-18 PROCEDURE — 84484 ASSAY OF TROPONIN QUANT: CPT

## 2023-05-18 PROCEDURE — 83735 ASSAY OF MAGNESIUM: CPT

## 2023-05-18 PROCEDURE — 99222 1ST HOSP IP/OBS MODERATE 55: CPT | Mod: GC

## 2023-05-18 PROCEDURE — 80048 BASIC METABOLIC PNL TOTAL CA: CPT

## 2023-05-18 PROCEDURE — 80053 COMPREHEN METABOLIC PANEL: CPT

## 2023-05-18 PROCEDURE — 81001 URINALYSIS AUTO W/SCOPE: CPT

## 2023-05-18 PROCEDURE — 95819 EEG AWAKE AND ASLEEP: CPT

## 2023-05-18 PROCEDURE — 70450 CT HEAD/BRAIN W/O DYE: CPT | Mod: 26,MA

## 2023-05-18 PROCEDURE — 36415 COLL VENOUS BLD VENIPUNCTURE: CPT

## 2023-05-18 PROCEDURE — 99285 EMERGENCY DEPT VISIT HI MDM: CPT

## 2023-05-18 PROCEDURE — 71045 X-RAY EXAM CHEST 1 VIEW: CPT | Mod: 26

## 2023-05-18 RX ORDER — SODIUM CHLORIDE 9 MG/ML
1000 INJECTION, SOLUTION INTRAVENOUS
Refills: 0 | Status: DISCONTINUED | OUTPATIENT
Start: 2023-05-18 | End: 2023-05-21

## 2023-05-18 RX ORDER — HEPARIN SODIUM 5000 [USP'U]/ML
5000 INJECTION INTRAVENOUS; SUBCUTANEOUS EVERY 8 HOURS
Refills: 0 | Status: DISCONTINUED | OUTPATIENT
Start: 2023-05-18 | End: 2023-05-21

## 2023-05-18 RX ORDER — ASPIRIN/CALCIUM CARB/MAGNESIUM 324 MG
81 TABLET ORAL DAILY
Refills: 0 | Status: DISCONTINUED | OUTPATIENT
Start: 2023-05-18 | End: 2023-05-21

## 2023-05-18 RX ORDER — GLUCAGON INJECTION, SOLUTION 0.5 MG/.1ML
1 INJECTION, SOLUTION SUBCUTANEOUS ONCE
Refills: 0 | Status: DISCONTINUED | OUTPATIENT
Start: 2023-05-18 | End: 2023-05-21

## 2023-05-18 RX ORDER — DEXTROSE 50 % IN WATER 50 %
15 SYRINGE (ML) INTRAVENOUS ONCE
Refills: 0 | Status: DISCONTINUED | OUTPATIENT
Start: 2023-05-18 | End: 2023-05-21

## 2023-05-18 RX ORDER — DEXTROSE 50 % IN WATER 50 %
12.5 SYRINGE (ML) INTRAVENOUS ONCE
Refills: 0 | Status: DISCONTINUED | OUTPATIENT
Start: 2023-05-18 | End: 2023-05-21

## 2023-05-18 RX ORDER — CHLORHEXIDINE GLUCONATE 213 G/1000ML
1 SOLUTION TOPICAL
Refills: 0 | Status: DISCONTINUED | OUTPATIENT
Start: 2023-05-18 | End: 2023-05-21

## 2023-05-18 RX ORDER — ACETAMINOPHEN 500 MG
650 TABLET ORAL EVERY 6 HOURS
Refills: 0 | Status: DISCONTINUED | OUTPATIENT
Start: 2023-05-18 | End: 2023-05-21

## 2023-05-18 RX ORDER — DEXTROSE 50 % IN WATER 50 %
25 SYRINGE (ML) INTRAVENOUS ONCE
Refills: 0 | Status: DISCONTINUED | OUTPATIENT
Start: 2023-05-18 | End: 2023-05-21

## 2023-05-18 RX ORDER — INSULIN GLARGINE 100 [IU]/ML
9 INJECTION, SOLUTION SUBCUTANEOUS AT BEDTIME
Refills: 0 | Status: DISCONTINUED | OUTPATIENT
Start: 2023-05-18 | End: 2023-05-19

## 2023-05-18 RX ORDER — HYDRALAZINE HCL 50 MG
25 TABLET ORAL EVERY 8 HOURS
Refills: 0 | Status: DISCONTINUED | OUTPATIENT
Start: 2023-05-18 | End: 2023-05-20

## 2023-05-18 RX ORDER — INSULIN LISPRO 100/ML
3 VIAL (ML) SUBCUTANEOUS
Refills: 0 | Status: DISCONTINUED | OUTPATIENT
Start: 2023-05-18 | End: 2023-05-21

## 2023-05-18 RX ORDER — SEVELAMER CARBONATE 2400 MG/1
1600 POWDER, FOR SUSPENSION ORAL
Refills: 0 | Status: DISCONTINUED | OUTPATIENT
Start: 2023-05-18 | End: 2023-05-21

## 2023-05-18 RX ORDER — INSULIN LISPRO 100/ML
VIAL (ML) SUBCUTANEOUS
Refills: 0 | Status: DISCONTINUED | OUTPATIENT
Start: 2023-05-18 | End: 2023-05-21

## 2023-05-18 RX ORDER — LANOLIN ALCOHOL/MO/W.PET/CERES
3 CREAM (GRAM) TOPICAL AT BEDTIME
Refills: 0 | Status: DISCONTINUED | OUTPATIENT
Start: 2023-05-18 | End: 2023-05-21

## 2023-05-18 RX ORDER — ATORVASTATIN CALCIUM 80 MG/1
10 TABLET, FILM COATED ORAL AT BEDTIME
Refills: 0 | Status: DISCONTINUED | OUTPATIENT
Start: 2023-05-18 | End: 2023-05-21

## 2023-05-18 RX ORDER — ASPIRIN/CALCIUM CARB/MAGNESIUM 324 MG
1 TABLET ORAL
Refills: 0 | DISCHARGE

## 2023-05-18 RX ADMIN — INSULIN GLARGINE 9 UNIT(S): 100 INJECTION, SOLUTION SUBCUTANEOUS at 21:30

## 2023-05-18 RX ADMIN — HEPARIN SODIUM 5000 UNIT(S): 5000 INJECTION INTRAVENOUS; SUBCUTANEOUS at 21:30

## 2023-05-18 RX ADMIN — ATORVASTATIN CALCIUM 10 MILLIGRAM(S): 80 TABLET, FILM COATED ORAL at 21:30

## 2023-05-18 RX ADMIN — HEPARIN SODIUM 5000 UNIT(S): 5000 INJECTION INTRAVENOUS; SUBCUTANEOUS at 14:20

## 2023-05-18 RX ADMIN — SEVELAMER CARBONATE 1600 MILLIGRAM(S): 2400 POWDER, FOR SUSPENSION ORAL at 17:32

## 2023-05-18 RX ADMIN — Medication 650 MILLIGRAM(S): at 20:38

## 2023-05-18 NOTE — ED ADULT NURSE REASSESSMENT NOTE - NS ED NURSE REASSESS COMMENT FT1
Pt assessed. LAC IV present on arrival, placed by EMS. IV removed, no active bleeding. Fistula positive bruit and thrill. arm band precaution placed.

## 2023-05-18 NOTE — H&P ADULT - NSHPLABSRESULTS_GEN_ALL_CORE
LABS:  cret                        13.2   3.21  )-----------( 291      ( 18 May 2023 08:00 )             39.3     05-18    139  |  93<L>  |  29<H>  ----------------------------<  178<H>  4.5   |  29  |  3.9<H>    Ca    10.2      18 May 2023 08:00    TPro  7.9  /  Alb  4.8  /  TBili  0.5  /  DBili  x   /  AST  27  /  ALT  18  /  AlkPhos  118<H>  05-18

## 2023-05-18 NOTE — ED PROVIDER NOTE - OBJECTIVE STATEMENT
57 F to ED with low blood sugar and AMS.  No fevers, no sick contacts, no travels, no trauma.  lives with family at home and speaks primarily Japanese,   Interview with [place]  but pt is still very slow to talk and HPI/ROS limited  I also called daughter to get more information and states that the patient had a fall at home after syncope

## 2023-05-18 NOTE — ED PROVIDER NOTE - PHYSICAL EXAMINATION
EXAM:  CONSTITUTIONAL: WA / WN / NAD  HEAD: NCAT  EYES: PERRL; EOMI; anicteric.  ENT: Normal pharynx; mucous membranes pink/moist,    NECK: Supple; no meningeal signs  CARD: RRR; nl S1/S2; no M/R/G. Pulses equal bilaterally.  RESP: Respiratory rate and effort are normal; breath sounds kimberlyn   ABD: Soft, NT ND nl bowel sounds;    MSK/EXT: No gross deformities; full range of motion.  SKIN: Warm and dry;   NEURO: limited exam even with Moldovan

## 2023-05-18 NOTE — ED ADULT NURSE NOTE - CHIEF COMPLAINT QUOTE
Pt presents to the Ed BIBEMS w/ c/o of hypoglycemia. FS on scene was 34. 25mg dextrose given by EMS and FS @ 0625 was 256. FS in triage @ 0635 was 155. Pt recently admitted for hypoglycemia seizure. OJx2 + dominos x3 given in triage. Pt is currently back to baseline mental status.

## 2023-05-18 NOTE — CONSULT NOTE ADULT - SUBJECTIVE AND OBJECTIVE BOX
NEPHROLOGY CONSULTATION NOTE    57 F (Pan[place]ian Speaking) with medical hx of IDDM, ESRD on HD MWF ([facility] AVF), HTN, DLD presents for AMS and syncope at home, was found to be hypoglycemic.  Patient seen at bedside, feeling better.  VS elevated BP.    PAST MEDICAL & SURGICAL HISTORY:  Chronic kidney disease, unspecified CKD stage        Allergies:  No Known Allergies    Home Medications Reviewed  Hospital Medications:   MEDICATIONS  (STANDING):  aspirin  chewable 81 milliGRAM(s) Oral daily  atorvastatin 10 milliGRAM(s) Oral at bedtime  chlorhexidine 4% Liquid 1 Application(s) Topical <User Schedule>  dextrose 5%. 1000 milliLiter(s) (50 mL/Hr) IV Continuous <Continuous>  dextrose 5%. 1000 milliLiter(s) (100 mL/Hr) IV Continuous <Continuous>  dextrose 50% Injectable 25 Gram(s) IV Push once  dextrose 50% Injectable 12.5 Gram(s) IV Push once  dextrose 50% Injectable 25 Gram(s) IV Push once  glucagon  Injectable 1 milliGRAM(s) IntraMuscular once  heparin   Injectable 5000 Unit(s) SubCutaneous every 8 hours  insulin glargine Injectable (LANTUS) 9 Unit(s) SubCutaneous at bedtime  insulin lispro (ADMELOG) corrective regimen sliding scale   SubCutaneous three times a day before meals  insulin lispro Injectable (ADMELOG) 3 Unit(s) SubCutaneous three times a day before meals  sevelamer carbonate 1600 milliGRAM(s) Oral three times a day with meals    SOCIAL HISTORY:  Denies ETOH,Smoking,   FAMILY HISTORY:      REVIEW OF SYSTEMS:  CONSTITUTIONAL: dizziness   EYES/ENT: No visual changes;  No vertigo or throat pain   NECK: No pain or stiffness  RESPIRATORY: No cough, wheezing, hemoptysis; No shortness of breath  CARDIOVASCULAR: No chest pain or palpitations.  GASTROINTESTINAL: No abdominal or epigastric pain. No nausea, vomiting, or hematemesis; No diarrhea or constipation. No melena or hematochezia.  GENITOURINARY: No dysuria, frequency, foamy urine, urinary urgency, incontinence or hematuria  NEUROLOGICAL: No numbness or weakness  SKIN: No itching, burning, rashes, or lesions   VASCULAR: No bilateral lower extremity edema.   All other review of systems is negative unless indicated above.    VITALS:  Vital Signs Last 24 Hrs  T(C): 36.8 (18 May 2023 06:36), Max: 36.8 (18 May 2023 06:36)  T(F): 98.2 (18 May 2023 06:36), Max: 98.2 (18 May 2023 06:36)  HR: 63 (18 May 2023 06:36) (63 - 63)  BP: 173/72 (18 May 2023 06:36) (173/72 - 173/72)  BP(mean): --  RR: 18 (18 May 2023 06:36) (18 - 18)  SpO2: 100% (18 May 2023 06:36) (100% - 100%)    Parameters below as of 18 May 2023 06:36  Patient On (Oxygen Delivery Method): room air        Height (cm): 170.2 ( @ 06:36)  PHYSICAL EXAM:  Constitutional: NAD  HEENT: anicteric sclera, oropharynx clear, MMM  Neck: No JVD  Respiratory: CTAB, no wheezes, rales or rhonchi  Cardiovascular: S1, S2, RRR  Gastrointestinal: BS+, soft, NT/ND  Extremities: No cyanosis or clubbing. No peripheral edema  Neurological: A/O x 3, no focal deficits  Psychiatric: Normal mood, normal affect  : No CVA tenderness. No murillo.   Skin: No rashes  Vascular Access: left UE AVF     LABS:      139  |  93<L>  |  29<H>  ----------------------------<  178<H>  4.5   |  29  |  3.9<H>    Ca    10.2      18 May 2023 08:00    TPro  7.9  /  Alb  4.8  /  TBili  0.5  /  DBili      /  AST  27  /  ALT  18  /  AlkPhos  118<H>      Creatinine Trend: 3.9 <--, 7.4 <--, 6.8 <--, 6.3 <--, 6.2 <--, 4.8 <--, 4.6 <--, 4.5 <--, 3.7 <--, 2.8 <--, 6.3 <--, 4.9 <--, 4.6 <--, 6.2 <--, 6.1 <--                        13.2   3.21  )-----------( 291      ( 18 May 2023 08:00 )             39.3     Urine Studies:  Urinalysis Basic - ( 18 May 2023 12:00 )    Color: Yellow / Appearance: Turbid / S.021 / pH:   Gluc:  / Ketone: Trace  / Bili: Small / Urobili: 3 mg/dL   Blood:  / Protein: 300 mg/dL / Nitrite: Negative   Leuk Esterase: Large / RBC: 3 /HPF /  /HPF   Sq Epi:  / Non Sq Epi:  / Bacteria: Many                           NEPHROLOGY CONSULTATION NOTE    57 F (Taiwanese Speaking) with medical hx of IDDM, ESRD on HD MWF ([facility]), HTN, DLD presents for AMS and syncope at home, was found to be hypoglycemic.  Patient seen at bedside, feeling better.  VS elevated BP.    PAST MEDICAL & SURGICAL HISTORY:  Chronic kidney disease, unspecified CKD stage        Allergies:  No Known Allergies    Home Medications Reviewed  Hospital Medications:   MEDICATIONS  (STANDING):  aspirin  chewable 81 milliGRAM(s) Oral daily  atorvastatin 10 milliGRAM(s) Oral at bedtime  chlorhexidine 4% Liquid 1 Application(s) Topical <User Schedule>  dextrose 5%. 1000 milliLiter(s) (50 mL/Hr) IV Continuous <Continuous>  dextrose 5%. 1000 milliLiter(s) (100 mL/Hr) IV Continuous <Continuous>  dextrose 50% Injectable 25 Gram(s) IV Push once  dextrose 50% Injectable 12.5 Gram(s) IV Push once  dextrose 50% Injectable 25 Gram(s) IV Push once  glucagon  Injectable 1 milliGRAM(s) IntraMuscular once  heparin   Injectable 5000 Unit(s) SubCutaneous every 8 hours  insulin glargine Injectable (LANTUS) 9 Unit(s) SubCutaneous at bedtime  insulin lispro (ADMELOG) corrective regimen sliding scale   SubCutaneous three times a day before meals  insulin lispro Injectable (ADMELOG) 3 Unit(s) SubCutaneous three times a day before meals  sevelamer carbonate 1600 milliGRAM(s) Oral three times a day with meals    SOCIAL HISTORY:  Denies ETOH,Smoking,   FAMILY HISTORY:      REVIEW OF SYSTEMS:  CONSTITUTIONAL: dizziness   EYES/ENT: No visual changes;  No vertigo or throat pain   NECK: No pain or stiffness  RESPIRATORY: No cough, wheezing, hemoptysis; No shortness of breath  CARDIOVASCULAR: No chest pain or palpitations.  GASTROINTESTINAL: No abdominal or epigastric pain. No nausea, vomiting, or hematemesis; No diarrhea or constipation. No melena or hematochezia.  GENITOURINARY: No dysuria, frequency, foamy urine, urinary urgency, incontinence or hematuria  NEUROLOGICAL: No numbness or weakness  SKIN: No itching, burning, rashes, or lesions   VASCULAR: No bilateral lower extremity edema.   All other review of systems is negative unless indicated above.    VITALS:  Vital Signs Last 24 Hrs  T(C): 36.8 (18 May 2023 06:36), Max: 36.8 (18 May 2023 06:36)  T(F): 98.2 (18 May 2023 06:36), Max: 98.2 (18 May 2023 06:36)  HR: 63 (18 May 2023 06:36) (63 - 63)  BP: 173/72 (18 May 2023 06:36) (173/72 - 173/72)  BP(mean): --  RR: 18 (18 May 2023 06:36) (18 - 18)  SpO2: 100% (18 May 2023 06:36) (100% - 100%)    Parameters below as of 18 May 2023 06:36  Patient On (Oxygen Delivery Method): room air        Height (cm): 170.2 ( @ 06:36)  PHYSICAL EXAM:  Constitutional: NAD  HEENT: anicteric sclera, oropharynx clear, MMM  Neck: No JVD  Respiratory: CTAB, no wheezes, rales or rhonchi  Cardiovascular: S1, S2, RRR  Gastrointestinal: BS+, soft, NT/ND  Extremities: No cyanosis or clubbing. No peripheral edema  Neurological: A/O x 3, no focal deficits  Psychiatric: Normal mood, normal affect  : No CVA tenderness. No murillo.   Skin: No rashes  Vascular Access: left UE AVF     LABS:      139  |  93<L>  |  29<H>  ----------------------------<  178<H>  4.5   |  29  |  3.9<H>    Ca    10.2      18 May 2023 08:00    TPro  7.9  /  Alb  4.8  /  TBili  0.5  /  DBili      /  AST  27  /  ALT  18  /  AlkPhos  118<H>      Creatinine Trend: 3.9 <--, 7.4 <--, 6.8 <--, 6.3 <--, 6.2 <--, 4.8 <--, 4.6 <--, 4.5 <--, 3.7 <--, 2.8 <--, 6.3 <--, 4.9 <--, 4.6 <--, 6.2 <--, 6.1 <--                        13.2   3.21  )-----------( 291      ( 18 May 2023 08:00 )             39.3     Urine Studies:  Urinalysis Basic - ( 18 May 2023 12:00 )    Color: Yellow / Appearance: Turbid / S.021 / pH:   Gluc:  / Ketone: Trace  / Bili: Small / Urobili: 3 mg/dL   Blood:  / Protein: 300 mg/dL / Nitrite: Negative   Leuk Esterase: Large / RBC: 3 /HPF /  /HPF   Sq Epi:  / Non Sq Epi:  / Bacteria: Many    < from: Xray Chest 1 View-PORTABLE IMMEDIATE (Xray Chest 1 View-PORTABLE IMMEDIATE .) (23 @ 09:29) >    No radiographic evidence of acute cardiopulmonary disease.    < end of copied text >

## 2023-05-18 NOTE — H&P ADULT - ATTENDING COMMENTS
HPI:  57 F (Mongolian Speaking) with medical hx of IDDM, ESRD on HD MWF ([facility] AVF), HTN, DLD presents for AMS and possible syncope at home. Patient had a recent admission for DKA and seizures 2/2 hypoglycemia. History obtained She says that yesterday she went to dialysis, came back home and was feeling normal. She took her insulin and had dinner and after that she does not remember anything until now. Collateral obtained from daughter whom the patient lives with. Patient had a fall afterwards and it is unclear whether she lost consciousness, she was then confused so EMS was called. She has been eating well, manages her own meds and she and the daughter do not think she has difficulty with insulin dosing. Patient reports cough x1 day with clear sputum, chest pain with exertion. She denies changes in medications, fever, SOB, abdominal pain, n/v, dysuria, headache, sick contacts.       ED vitals /72 otherwise unremarkable. , trop 0.06. CTH and CXR unremarkable. Admitted to tele. (18 May 2023 13:24)    REVIEW OF SYSTEMS: see cc/HPI   CONSTITUTIONAL: No weakness, fevers or chills  EYES/ENT: No visual changes;  No vertigo or throat pain   NECK: No pain or stiffness  RESPIRATORY: No cough, wheezing, hemoptysis; No shortness of breath  CARDIOVASCULAR: No chest pain or palpitations  GASTROINTESTINAL: No abdominal or epigastric pain. No nausea, vomiting, or hematemesis; No diarrhea or constipation. No melena or hematochezia.  GENITOURINARY: No dysuria, frequency or hematuria  NEUROLOGICAL: No numbness or weakness, (+) confusion, (+) loss of consciousness  SKIN: No itching, rashes    Physical Exam:  General: WN/WD NAD  Neurology: A&Ox3, nonfocal, follows commands  Eyes: PERRLA/ EOMI  ENT/Neck: Neck supple, trachea midline, No JVD  Respiratory: CTA B/L, No wheezing, rales, rhonchi  CV: Normal rate regular rhythm, S1S2, no murmurs, rubs or gallops  Abdominal: Soft, NT, ND +BS,   Extremities: No edema, + peripheral pulses  Skin: No Rashes, Hematoma, Ecchymosis    A/p HPI:  57 F (Estonian Speaking) with medical hx of IDDM, ESRD on HD MWF ([facility] AVF), HTN, DLD presents for AMS and possible syncope at home. Patient had a recent admission for DKA and seizures 2/2 hypoglycemia. History obtained She says that yesterday she went to dialysis, came back home and was feeling normal. She took her insulin and had dinner and after that she does not remember anything until now. Collateral obtained from daughter whom the patient lives with. Patient had a fall afterwards and it is unclear whether she lost consciousness, she was then confused so EMS was called. She has been eating well, manages her own meds and she and the daughter do not think she has difficulty with insulin dosing. Patient reports cough x1 day with clear sputum, chest pain with exertion. She denies changes in medications, fever, SOB, abdominal pain, n/v, dysuria, headache, sick contacts.       ED vitals /72 otherwise unremarkable. , trop 0.06. CTH and CXR unremarkable. Admitted to tele. (18 May 2023 13:24)    Interview translated by Estonian speaking  [id number]     REVIEW OF SYSTEMS: see cc/HPI   CONSTITUTIONAL: No weakness, fevers or chills  EYES/ENT: No visual changes;  No vertigo or throat pain   NECK: No pain or stiffness  RESPIRATORY: No cough, wheezing, hemoptysis; No shortness of breath  CARDIOVASCULAR: No chest pain or palpitations  GASTROINTESTINAL: No abdominal or epigastric pain. No nausea, vomiting, or hematemesis; No diarrhea or constipation. No melena or hematochezia.  GENITOURINARY: No dysuria, frequency or hematuria  NEUROLOGICAL: No numbness or weakness, (+) confusion, (+) loss of consciousness  SKIN: No itching, rashes    Physical Exam:  General: WN/WD NAD, Feeling better   Neurology: A&Ox3, nonfocal, follows commands, walked to the bathroom w/ the aide   Eyes: PERRLA/ EOMI  ENT/Neck: Neck supple, trachea midline, No JVD  Respiratory: CTA B/L, No wheezing, rales, rhonchi  CV: Normal rate regular rhythm, S1S2, no murmurs, rubs or gallops  Abdominal: Soft, NT, ND +BS,   Extremities: No edema, + peripheral pulses  Skin: No Rashes, Hematoma, Ecchymosis    A/p  Loss of consciousness w/ confusion r/o arrhythmia t/o seizure r/o hypoglycemia r/o hypotension after HD  -agree w/ lowing insulin intake   -encourage adherence to diet and DO NOT miss meals   -rEEG   -Neurology eval     ESRD on HD   -c/w Sevelamer   -Nephrology eval     HTN ( H/o labile BP )  -check orthostatics ( may need midodrine support )    Dyslipidemia   -statin     DVT prophylaxis    PATIENT SEEN by ATTENDING 5/18 ( note revised 5/19).

## 2023-05-18 NOTE — H&P ADULT - NSHPREVIEWOFSYSTEMS_GEN_ALL_CORE
CONSTITUTIONAL: No weakness, fevers or chills  EYES/ENT: No visual changes;  No vertigo or throat pain   NECK: No pain or stiffness  RESPIRATORY: + cough, no wheezing, hemoptysis; No shortness of breath  CARDIOVASCULAR: + chest pain no palpitations  GASTROINTESTINAL: No abdominal or epigastric pain. No nausea, vomiting, or hematemesis; No diarrhea or constipation. No melena or hematochezia.  GENITOURINARY: No dysuria, frequency or hematuria  NEUROLOGICAL: No numbness or weakness  All other review of systems is negative unless indicated above.

## 2023-05-18 NOTE — H&P ADULT - HISTORY OF PRESENT ILLNESS
57 F (Mauritian Speaking) with medical hx of IDDM, ESRD on HD MWF (L[facility]), HTN, DLD presents for AMS and syncope at home. Patient had a recent admission for DKA and seizures 2/2 hypoglycemia       ED vitals /72 otherwise unremarkable. , trop 0.06. CTH and CXR unremarkable.  57 F (Bhutanese Speaking) with medical hx of IDDM, ESRD on HD MWF (LUE AVF), HTN, DLD presents for AMS and possible syncope at home. Patient had a recent admission for DKA and seizures 2/2 hypoglycemia. History obtained She says that yesterday she went to dialysis, came back home and was feeling normal. She took her insulin and had dinner and after that she does not remember anything until now. Collateral obtained from daughter whom the patient lives with. Patient had a fall afterwards and it is unclear whether she lost consciousness, she was then confused so EMS was called. She has been eating well, manages her own meds and she and the daughter do not think she has difficulty with insulin dosing. Patient reports cough x1 day with clear sputum, chest pain with exertion. She denies changes in medications, fever, SOB, abdominal pain, n/v, dysuria, headache, sick contacts.      ED vitals /72 otherwise unremarkable. , trop 0.06. CTH and CXR unremarkable. Admitted to tele. 57 F (Albanian Speaking) with medical hx of IDDM, ESRD on HD MWF (LUE AVF), HTN, DLD presents for AMS and possible syncope at home. Patient had a recent admission for DKA and seizures 2/2 hypoglycemia. History obtained She says that yesterday she went to dialysis, came back home and was feeling normal. She took her insulin and had dinner and after that she does not remember anything until now. Collateral obtained from daughter whom the patient lives with. Patient had a fall afterwards and it is unclear whether she lost consciousness, she was then confused so EMS was called. She has been eating well, manages her own meds and she and the daughter do not think she has difficulty with insulin dosing. Patient reports cough x1 day with clear sputum, chest pain with exertion. She denies changes in medications, fever, SOB, abdominal pain, n/v, dysuria, headache, sick contacts.       ED vitals /72 otherwise unremarkable. , trop 0.06. CTH and CXR unremarkable. Admitted to tele.

## 2023-05-18 NOTE — CONSULT NOTE ADULT - ASSESSMENT
57 F (Maltese Speaking) with medical hx of IDDM, ESRD on HD MWF (LUE AVF), HTN, DLD presents for AMS and syncope at home, was found to be hypoglycemic.    Assessment and plan  ESRD on HD / hypoglycemia/ DM/ HTN  hypoglycemia s/p dextrose  Last HD yesterday, next HD in AM  electrolytes and volume status reviewed  orthostatic VS   endo eval for adjusting insulin dose  c/w sevelamer  renal diet  c/w BP meds  Hgb at target  will follow

## 2023-05-18 NOTE — H&P ADULT - ASSESSMENT
57 F (Kazakh Speaking) with medical hx of IDDM, ESRD on HD MWF (LUE AVF), HTN, DLD presents for AMS and syncope at home.       #AMS and syncope likely 2/2 hypoglycemia; r/o cardiac causes  - FS checked by EMS 34, improved after D50 and juice    #ESRD on HD  - Nephro consult  - Troponin chronically elevated, no active chest pain    #HTN    #DLD    DVT Prophylaxis: heparin  GI Prophylaxis:  Diet: DASH/CC     57 F (Paraguayan Speaking) with medical hx of IDDM, ESRD on HD MWF (LUE AVF), HTN, DLD presents for AMS and syncope at home, was found to be hypoglycemic.    #AMS and syncope likely 2/2 hypoglycemia; r/o cardiac causes  - FS checked by EMS 34, improved after D50 and juice  - Last admission c-peptide 0.2 and proinsulin <0.3 (not consistent with extra endogenous secretion)  - At home on lantus 16 u and lispro 5 u tid--will lower to 9/3/3/3 + SS for now  - Monitor FS  - Endocrinology consult  - Monitor on tele  - Check RVP (had cough)    #ESRD on HD  - Nephro consult  - Cont sevelamer    #Atypical chest pain  - Troponin chronically elevated, EKG unchanged  - Recheck troponin    #HTN  - Labile BP, hx of orthostatic HTN  - Currently on hydralazine PRN only  - Abdominal binder, ABDIRASHID stocking  - Check orthostatics    #DLD  - Statin    DVT Prophylaxis: heparin  GI Prophylaxis: not indicated  Diet: DASH/CC

## 2023-05-18 NOTE — ED ADULT TRIAGE NOTE - TEMPERATURE IN FAHRENHEIT (DEGREES F)
Mother reports patient was seen in [facility] in [facility] yesterday, patient was seen for abdominal pain and black stools. Mother reports she was told no labs or tests can be done as patient came too late in the day. Today patient continues with constant abdominal pain and black stools. Mother reports was told patient may have gallstones. Advised patient should be seen today in ED since pain continues, mother agreed with plan and will have patient go to ED today.
98.2

## 2023-05-18 NOTE — ED ADULT NURSE REASSESSMENT NOTE - NS ED NURSE REASSESS COMMENT FT1
Received pt from previous RN, Ivorian speaking, son present at bedside. Pt AxOx3. NAD. Bed alarm maintained. Respirations easy and unlabored. Safety measures maintained. Care to continue.

## 2023-05-18 NOTE — ED ADULT NURSE NOTE - OBJECTIVE STATEMENT
pt is a 57 year old female c/o hypoglycemia. Pt FS in triage was 155. Pt is alert and oriented. Pt is Faroese speaking.

## 2023-05-18 NOTE — ED ADULT TRIAGE NOTE - CHIEF COMPLAINT QUOTE
Pt presents to the Ed BIBEMS w/ c/o of hypoglycemia. FS on scene was 34. 25mg dextrose given by EMS and FS @ 0625 was 256. FS in triage @ 0635 was 155. Pt recently admitted for hypoglycemia seizure. Pt presents to the Ed BIBEMS w/ c/o of hypoglycemia. FS on scene was 34. 25mg dextrose given by EMS and FS @ 0625 was 256. FS in triage @ 0635 was 155. Pt recently admitted for hypoglycemia seizure. OJx2 + dominos x3 given in triage. Pt is currently back to baseline mental status.

## 2023-05-19 LAB
ALBUMIN SERPL ELPH-MCNC: 3.9 G/DL — SIGNIFICANT CHANGE UP (ref 3.5–5.2)
ALP SERPL-CCNC: 101 U/L — SIGNIFICANT CHANGE UP (ref 30–115)
ALT FLD-CCNC: 13 U/L — SIGNIFICANT CHANGE UP (ref 0–41)
ANION GAP SERPL CALC-SCNC: 15 MMOL/L — HIGH (ref 7–14)
AST SERPL-CCNC: 19 U/L — SIGNIFICANT CHANGE UP (ref 0–41)
BASOPHILS # BLD AUTO: 0.04 K/UL — SIGNIFICANT CHANGE UP (ref 0–0.2)
BASOPHILS NFR BLD AUTO: 1.3 % — HIGH (ref 0–1)
BILIRUB SERPL-MCNC: 0.4 MG/DL — SIGNIFICANT CHANGE UP (ref 0.2–1.2)
BUN SERPL-MCNC: 46 MG/DL — HIGH (ref 10–20)
CALCIUM SERPL-MCNC: 9.2 MG/DL — SIGNIFICANT CHANGE UP (ref 8.4–10.5)
CHLORIDE SERPL-SCNC: 95 MMOL/L — LOW (ref 98–110)
CO2 SERPL-SCNC: 29 MMOL/L — SIGNIFICANT CHANGE UP (ref 17–32)
CREAT SERPL-MCNC: 5.5 MG/DL — CRITICAL HIGH (ref 0.7–1.5)
EGFR: 9 ML/MIN/1.73M2 — LOW
EOSINOPHIL # BLD AUTO: 0.17 K/UL — SIGNIFICANT CHANGE UP (ref 0–0.7)
EOSINOPHIL NFR BLD AUTO: 5.4 % — SIGNIFICANT CHANGE UP (ref 0–8)
GLUCOSE BLDC GLUCOMTR-MCNC: 122 MG/DL — HIGH (ref 70–99)
GLUCOSE BLDC GLUCOMTR-MCNC: 249 MG/DL — HIGH (ref 70–99)
GLUCOSE BLDC GLUCOMTR-MCNC: 295 MG/DL — HIGH (ref 70–99)
GLUCOSE BLDC GLUCOMTR-MCNC: 432 MG/DL — HIGH (ref 70–99)
GLUCOSE SERPL-MCNC: 315 MG/DL — HIGH (ref 70–99)
HCT VFR BLD CALC: 34.2 % — LOW (ref 37–47)
HGB BLD-MCNC: 11.5 G/DL — LOW (ref 12–16)
IMM GRANULOCYTES NFR BLD AUTO: 0 % — LOW (ref 0.1–0.3)
LYMPHOCYTES # BLD AUTO: 1.65 K/UL — SIGNIFICANT CHANGE UP (ref 1.2–3.4)
LYMPHOCYTES # BLD AUTO: 52.7 % — HIGH (ref 20.5–51.1)
MCHC RBC-ENTMCNC: 31.6 PG — HIGH (ref 27–31)
MCHC RBC-ENTMCNC: 33.6 G/DL — SIGNIFICANT CHANGE UP (ref 32–37)
MCV RBC AUTO: 94 FL — SIGNIFICANT CHANGE UP (ref 81–99)
MONOCYTES # BLD AUTO: 0.26 K/UL — SIGNIFICANT CHANGE UP (ref 0.1–0.6)
MONOCYTES NFR BLD AUTO: 8.3 % — SIGNIFICANT CHANGE UP (ref 1.7–9.3)
NEUTROPHILS # BLD AUTO: 1.01 K/UL — LOW (ref 1.4–6.5)
NEUTROPHILS NFR BLD AUTO: 32.3 % — LOW (ref 42.2–75.2)
NRBC # BLD: 0 /100 WBCS — SIGNIFICANT CHANGE UP (ref 0–0)
PLATELET # BLD AUTO: 252 K/UL — SIGNIFICANT CHANGE UP (ref 130–400)
PMV BLD: 10.4 FL — SIGNIFICANT CHANGE UP (ref 7.4–10.4)
POTASSIUM SERPL-MCNC: 4.4 MMOL/L — SIGNIFICANT CHANGE UP (ref 3.5–5)
POTASSIUM SERPL-SCNC: 4.4 MMOL/L — SIGNIFICANT CHANGE UP (ref 3.5–5)
PROT SERPL-MCNC: 6.5 G/DL — SIGNIFICANT CHANGE UP (ref 6–8)
RBC # BLD: 3.64 M/UL — LOW (ref 4.2–5.4)
RBC # FLD: 12.1 % — SIGNIFICANT CHANGE UP (ref 11.5–14.5)
SODIUM SERPL-SCNC: 139 MMOL/L — SIGNIFICANT CHANGE UP (ref 135–146)
WBC # BLD: 3.13 K/UL — LOW (ref 4.8–10.8)
WBC # FLD AUTO: 3.13 K/UL — LOW (ref 4.8–10.8)

## 2023-05-19 PROCEDURE — 99222 1ST HOSP IP/OBS MODERATE 55: CPT

## 2023-05-19 PROCEDURE — 99232 SBSQ HOSP IP/OBS MODERATE 35: CPT

## 2023-05-19 RX ORDER — INSULIN GLARGINE 100 [IU]/ML
5 INJECTION, SOLUTION SUBCUTANEOUS
Refills: 0 | Status: DISCONTINUED | OUTPATIENT
Start: 2023-05-19 | End: 2023-05-21

## 2023-05-19 RX ORDER — INSULIN LISPRO 100/ML
3 VIAL (ML) SUBCUTANEOUS
Refills: 0 | Status: DISCONTINUED | OUTPATIENT
Start: 2023-05-19 | End: 2023-05-19

## 2023-05-19 RX ADMIN — HEPARIN SODIUM 5000 UNIT(S): 5000 INJECTION INTRAVENOUS; SUBCUTANEOUS at 23:15

## 2023-05-19 RX ADMIN — INSULIN GLARGINE 5 UNIT(S): 100 INJECTION, SOLUTION SUBCUTANEOUS at 23:15

## 2023-05-19 RX ADMIN — ATORVASTATIN CALCIUM 10 MILLIGRAM(S): 80 TABLET, FILM COATED ORAL at 23:20

## 2023-05-19 RX ADMIN — HEPARIN SODIUM 5000 UNIT(S): 5000 INJECTION INTRAVENOUS; SUBCUTANEOUS at 05:11

## 2023-05-19 RX ADMIN — Medication 6: at 11:44

## 2023-05-19 RX ADMIN — Medication 3 UNIT(S): at 11:44

## 2023-05-19 NOTE — PROGRESS NOTE ADULT - SUBJECTIVE AND OBJECTIVE BOX
Neurology Consult    Patient is a 57F with a PMH of IDDM, ESRD on HD MWF (LUE AVF), HTN, DLD presented for AMS and possible syncope at home. Per patient, on the day prior to presentation she went to dialysis, came back home and was feeling normal. She took her insulin and had dinner but has no memory of events after. Per collateral obtained from daughter patient had a fall after dinner and it is unclear whether she lost consciousness. She was confused afterwards, so EMS was called. She has no history of epilepsy but does have a recent admission for DKA complicated by hypoglycemia and subsequent seizure.       PAST MEDICAL & SURGICAL HISTORY:  Chronic kidney disease, unspecified CKD stage          FAMILY HISTORY:      Social History: (-) x 3    Allergies    No Known Allergies    Intolerances        MEDICATIONS  (STANDING):  aspirin  chewable 81 milliGRAM(s) Oral daily  atorvastatin 10 milliGRAM(s) Oral at bedtime  chlorhexidine 4% Liquid 1 Application(s) Topical <User Schedule>  dextrose 5%. 1000 milliLiter(s) (50 mL/Hr) IV Continuous <Continuous>  dextrose 5%. 1000 milliLiter(s) (100 mL/Hr) IV Continuous <Continuous>  dextrose 50% Injectable 25 Gram(s) IV Push once  dextrose 50% Injectable 25 Gram(s) IV Push once  dextrose 50% Injectable 12.5 Gram(s) IV Push once  glucagon  Injectable 1 milliGRAM(s) IntraMuscular once  heparin   Injectable 5000 Unit(s) SubCutaneous every 8 hours  insulin glargine Injectable (LANTUS) 9 Unit(s) SubCutaneous at bedtime  insulin lispro (ADMELOG) corrective regimen sliding scale   SubCutaneous three times a day before meals  insulin lispro Injectable (ADMELOG) 3 Unit(s) SubCutaneous three times a day before meals  sevelamer carbonate 1600 milliGRAM(s) Oral three times a day with meals    MEDICATIONS  (PRN):  acetaminophen     Tablet .. 650 milliGRAM(s) Oral every 6 hours PRN Temp greater or equal to 38C (100.4F), Mild Pain (1 - 3)  dextrose Oral Gel 15 Gram(s) Oral once PRN Blood Glucose LESS THAN 70 milliGRAM(s)/deciliter  hydrALAZINE 25 milliGRAM(s) Oral every 8 hours PRN SBP>160  melatonin 3 milliGRAM(s) Oral at bedtime PRN Insomnia      Review of systems:    Constitutional: as per HPI  Eyes: No eye pain or discharge  ENMT:  No difficulty hearing; No sinus or throat pain  Neck: No pain or stiffness  Respiratory: No cough, wheezing, chills or hemoptysis  Cardiovascular: No chest pain, palpitations, shortness of breath, dyspnea on exertion  Gastrointestinal: No abdominal pain, nausea, vomiting or hematemesis; No diarrhea or constipation.   Genitourinary: No dysuria, frequency, hematuria or incontinence  Neurological: As per HPI  Skin: No rashes or lesions   Endocrine: No heat or cold intolerance; No hair loss  Musculoskeletal: No joint pain or swelling  Psychiatric: No depression, anxiety, mood swings  Heme/Lymph: No easy bruising or bleeding gums    Vital Signs Last 24 Hrs  T(C): 36.8 (19 May 2023 00:46), Max: 36.8 (19 May 2023 00:46)  T(F): 98.2 (19 May 2023 00:46), Max: 98.2 (19 May 2023 00:46)  HR: 72 (19 May 2023 00:46) (71 - 72)  BP: 165/70 (19 May 2023 00:46) (157/74 - 165/70)  BP(mean): --  RR: 18 (19 May 2023 00:46) (18 - 18)  SpO2: 100% (19 May 2023 00:46) (100% - 100%)    Parameters below as of 19 May 2023 00:46  Patient On (Oxygen Delivery Method): room air          Neurological Examination:  General:  Appearance is consistent with chronologic age.  No abnormal facies.  Gross skin survey within normal limits.    Cognitive/Language:  Awake, alert, and oriented to person, place, time and date.  Nondysarthric.    Cranial Nerves  - Eyes:  Visual fields full.  EOMI w/o nystagmus, skew or reported double vision. No ptosis/weakness of eyelid closure.    - Face:  Facial sensation normal V1 - 3, no facial asymmetry.    - Ears/Nose/Throat:  Hearing grossly intact  Motor examination:  Upper Extremities: L 5/5, R 5/5; Lower extremities: L 5/5, R 5/5  Sensory examination:   Intact to light touch throughout  Cerebellum:   FTN intact            Labs:   CBC Full  -  ( 19 May 2023 06:14 )  WBC Count : 3.13 K/uL  RBC Count : 3.64 M/uL  Hemoglobin : 11.5 g/dL  Hematocrit : 34.2 %  Platelet Count - Automated : 252 K/uL  Mean Cell Volume : 94.0 fL  Mean Cell Hemoglobin : 31.6 pg  Mean Cell Hemoglobin Concentration : 33.6 g/dL  Auto Neutrophil # : 1.01 K/uL  Auto Lymphocyte # : 1.65 K/uL  Auto Monocyte # : 0.26 K/uL  Auto Eosinophil # : 0.17 K/uL  Auto Basophil # : 0.04 K/uL  Auto Neutrophil % : 32.3 %  Auto Lymphocyte % : 52.7 %  Auto Monocyte % : 8.3 %  Auto Eosinophil % : 5.4 %  Auto Basophil % : 1.3 %        139  |  95<L>  |  46<H>  ----------------------------<  315<H>  4.4   |  29  |  5.5<HH>    Ca    9.2      19 May 2023 06:14    TPro  6.5  /  Alb  3.9  /  TBili  0.4  /  DBili  x   /  AST  19  /  ALT  13  /  AlkPhos  101  05-19    LIVER FUNCTIONS - ( 19 May 2023 06:14 )  Alb: 3.9 g/dL / Pro: 6.5 g/dL / ALK PHOS: 101 U/L / ALT: 13 U/L / AST: 19 U/L / GGT: x             Urinalysis Basic - ( 18 May 2023 12:00 )    Color: Yellow / Appearance: Turbid / S.021 / pH: x  Gluc: x / Ketone: Trace  / Bili: Small / Urobili: 3 mg/dL   Blood: x / Protein: 300 mg/dL / Nitrite: Negative   Leuk Esterase: Large / RBC: 3 /HPF /  /HPF   Sq Epi: x / Non Sq Epi: x / Bacteria: Many          [facility]     23 @ 12:45

## 2023-05-19 NOTE — PROGRESS NOTE ADULT - SUBJECTIVE AND OBJECTIVE BOX
ANAM NGUYỄN  57y  [facility]      Patient is a 57y old  Female who presents with a chief complaint of Syncope (18 May 2023 15:48)      INTERVAL HPI/OVERNIGHT EVENTS:        REVIEW OF SYSTEMS:        FAMILY HISTORY:    T(C): 36.8 (05-19-23 @ 00:46), Max: 36.8 (05-19-23 @ 00:46)  HR: 72 (05-19-23 @ 00:46) (71 - 72)  BP: 165/70 (05-19-23 @ 00:46) (157/74 - 165/70)  RR: 18 (05-19-23 @ 00:46) (18 - 18)  SpO2: 100% (05-19-23 @ 00:46) (100% - 100%)  Wt(kg): --Vital Signs Last 24 Hrs  T(C): 36.8 (19 May 2023 00:46), Max: 36.8 (19 May 2023 00:46)  T(F): 98.2 (19 May 2023 00:46), Max: 98.2 (19 May 2023 00:46)  HR: 72 (19 May 2023 00:46) (71 - 72)  BP: 165/70 (19 May 2023 00:46) (157/74 - 165/70)  BP(mean): --  RR: 18 (19 May 2023 00:46) (18 - 18)  SpO2: 100% (19 May 2023 00:46) (100% - 100%)    Parameters below as of 19 May 2023 00:46  Patient On (Oxygen Delivery Method): room air        PHYSICAL EXAM:  GENERAL: NAD, well-groomed, well-developed  HEAD:  Atraumatic, Normocephalic  EYES: EOMI, PERRLA, conjunctiva and sclera clear  ENMT: No tonsillar erythema, exudates, or enlargement; Moist mucous membranes, Good dentition, No lesions  NECK: Supple, No JVD, Normal thyroid  NERVOUS SYSTEM:  Alert & Oriented X3, Good concentration; Motor Strength 5/5 B/L upper and lower extremities; DTRs 2+ intact and symmetric  PULM: Clear to auscultation bilaterally  CARDIAC: Regular rate and rhythm; No murmurs, rubs, or gallops  GI: Soft, Nontender, Nondistended; Bowel sounds present  EXTREMITIES:  2+ Peripheral Pulses, No clubbing, cyanosis, or edema  LYMPH: No lymphadenopathy noted  SKIN: No rashes or lesions    Consultant(s) Notes Reviewed:  [x ] YES  [ ] NO  Care Discussed with Consultants/Other Providers [ x] YES  [ ] NO    LABS:                            11.5   3.13  )-----------( 252      ( 19 May 2023 06:14 )             34.2   05-19    139  |  95<L>  |  46<H>  ----------------------------<  315<H>  4.4   |  29  |  5.5<HH>    Ca    9.2      19 May 2023 06:14    TPro  6.5  /  Alb  3.9  /  TBili  0.4  /  DBili  x   /  AST  19  /  ALT  13  /  AlkPhos  101  05-19            acetaminophen     Tablet .. 650 milliGRAM(s) Oral every 6 hours PRN  aspirin  chewable 81 milliGRAM(s) Oral daily  atorvastatin 10 milliGRAM(s) Oral at bedtime  chlorhexidine 4% Liquid 1 Application(s) Topical <User Schedule>  dextrose 5%. 1000 milliLiter(s) IV Continuous <Continuous>  dextrose 5%. 1000 milliLiter(s) IV Continuous <Continuous>  dextrose 50% Injectable 25 Gram(s) IV Push once  dextrose 50% Injectable 12.5 Gram(s) IV Push once  dextrose 50% Injectable 25 Gram(s) IV Push once  dextrose Oral Gel 15 Gram(s) Oral once PRN  glucagon  Injectable 1 milliGRAM(s) IntraMuscular once  heparin   Injectable 5000 Unit(s) SubCutaneous every 8 hours  hydrALAZINE 25 milliGRAM(s) Oral every 8 hours PRN  insulin glargine Injectable (LANTUS) 9 Unit(s) SubCutaneous at bedtime  insulin lispro (ADMELOG) corrective regimen sliding scale   SubCutaneous three times a day before meals  insulin lispro Injectable (ADMELOG) 3 Unit(s) SubCutaneous three times a day before meals  melatonin 3 milliGRAM(s) Oral at bedtime PRN  sevelamer carbonate 1600 milliGRAM(s) Oral three times a day with meals    57 F (South Korean Speaking) with medical hx of IDDM, ESRD on HD MWF ([facility]), HTN, DLD presents for AMS and syncope at home, was found to be hypoglycemic.    1. AMS and syncope likely 2/2 hypoglycemia; r/o cardiac causes  -Telemetry no events   - FS checked by EMS 34, improved after D50 and juice  * Last admission c-peptide 0.2 and proinsulin <0.3 (not consistent with extra endogenous secretion)  - At home on lantus 16 u and lispro 5 u tid--will lower to 9/3/3/3 + SS for now  - Endocrinology consult:pending     2. ESRD on HD  - Nephro consult appreciated   - Cont sevelamer    3. Atypical chest pain  - Troponin chronically elevated, EKG unchanged  - Recheck troponin    4. HTN  - Labile BP, hx of orthostatic HTN  - Currently on hydralazine PRN only  - Abdominal binder, ABDIRASHID stocking  - Check orthostatics    5. DLD  - Statin    DVT Prophylaxis: heparin  GI Prophylaxis: not indicated  Diet: DASH/CC       ANAM NGUYỄN  57y  [facility]      Patient is a 57y old  Female who presents with a chief complaint of Syncope (18 May 2023 15:48)      INTERVAL HPI/OVERNIGHT EVENTS:      Patient feels well now . She has no complains. no overnight events.   it seems her hypoglycemia was asx initially until she had this episode of syncope.           FAMILY HISTORY:    T(C): 36.8 (05-19-23 @ 00:46), Max: 36.8 (05-19-23 @ 00:46)  HR: 72 (05-19-23 @ 00:46) (71 - 72)  BP: 165/70 (05-19-23 @ 00:46) (157/74 - 165/70)  RR: 18 (05-19-23 @ 00:46) (18 - 18)  SpO2: 100% (05-19-23 @ 00:46) (100% - 100%)  Wt(kg): --Vital Signs Last 24 Hrs  T(C): 36.8 (19 May 2023 00:46), Max: 36.8 (19 May 2023 00:46)  T(F): 98.2 (19 May 2023 00:46), Max: 98.2 (19 May 2023 00:46)  HR: 72 (19 May 2023 00:46) (71 - 72)  BP: 165/70 (19 May 2023 00:46) (157/74 - 165/70)  BP(mean): --  RR: 18 (19 May 2023 00:46) (18 - 18)  SpO2: 100% (19 May 2023 00:46) (100% - 100%)    Parameters below as of 19 May 2023 00:46  Patient On (Oxygen Delivery Method): room air        PHYSICAL EXAM:  GENERAL: NAD, well-groomed, well-developed  NERVOUS SYSTEM:  Alert & Oriented X3,   PULM: Clear to auscultation bilaterally  CARDIAC: Regular rate and rhythm;  GI: Soft, Nontender, Nondistended; Bowel sounds present  EXTREMITIES:  2+ Peripheral Pulses,   Consultant(s) Notes Reviewed:  [x ] YES  [ ] NO  Care Discussed with Consultants/Other Providers [ x] YES  [ ] NO    LABS:                            11.5   3.13  )-----------( 252      ( 19 May 2023 06:14 )             34.2   05-19    139  |  95<L>  |  46<H>  ----------------------------<  315<H>  4.4   |  29  |  5.5<HH>    Ca    9.2      19 May 2023 06:14    TPro  6.5  /  Alb  3.9  /  TBili  0.4  /  DBili  x   /  AST  19  /  ALT  13  /  AlkPhos  101  05-19            acetaminophen     Tablet .. 650 milliGRAM(s) Oral every 6 hours PRN  aspirin  chewable 81 milliGRAM(s) Oral daily  atorvastatin 10 milliGRAM(s) Oral at bedtime  chlorhexidine 4% Liquid 1 Application(s) Topical <User Schedule>  dextrose 5%. 1000 milliLiter(s) IV Continuous <Continuous>  dextrose 5%. 1000 milliLiter(s) IV Continuous <Continuous>  dextrose 50% Injectable 25 Gram(s) IV Push once  dextrose 50% Injectable 12.5 Gram(s) IV Push once  dextrose 50% Injectable 25 Gram(s) IV Push once  dextrose Oral Gel 15 Gram(s) Oral once PRN  glucagon  Injectable 1 milliGRAM(s) IntraMuscular once  heparin   Injectable 5000 Unit(s) SubCutaneous every 8 hours  hydrALAZINE 25 milliGRAM(s) Oral every 8 hours PRN  insulin glargine Injectable (LANTUS) 9 Unit(s) SubCutaneous at bedtime  insulin lispro (ADMELOG) corrective regimen sliding scale   SubCutaneous three times a day before meals  insulin lispro Injectable (ADMELOG) 3 Unit(s) SubCutaneous three times a day before meals  melatonin 3 milliGRAM(s) Oral at bedtime PRN  sevelamer carbonate 1600 milliGRAM(s) Oral three times a day with meals    57 F (Singaporean Speaking) with medical hx of IDDM, ESRD on HD MWF (LUE AVF), HTN, DLD presents for AMS and syncope at home, was found to be hypoglycemic.    1. AMS and syncope likely 2/2 hypoglycemia; r/o cardiac causes  -Telemetry no events   - FS checked by EMS 34, improved after D50 and juice  * Last admission c-peptide 0.2 and proinsulin <0.3 (not consistent with extra endogenous secretion)  - At home on lantus 16 u and lispro 5 u tid--will lower to 9/3/3/3 + SS for now  - Neuro consult:  a) Obtain REEG  b)  Seizure precautions  c)  Keep Mg > 2  d)  Diabetes medication management per Endocrine  e)  Rest of care per primary team  - Endocrinology consult:pending     2. ESRD on HD  - Nephro consult appreciated   - Cont sevelamer    3. Atypical chest pain  - Troponin chronically elevated, EKG unchanged  - Recheck troponin    4. HTN  - Labile BP, hx of orthostatic HTN  - Currently on hydralazine PRN only  - Abdominal binder, ABDIRASHID stocking  - Check orthostatics    5. DLD  - Statin    DVT Prophylaxis: heparin  GI Prophylaxis: not indicated  Diet: DASH/CC    Syncope due to hypoglycemia induce by insulin awaiting endocrine eval. likely need a period of permissive hyperglycemia as it seems her hypoglycemia was initially asymptomatic

## 2023-05-19 NOTE — CONSULT NOTE ADULT - SUBJECTIVE AND OBJECTIVE BOX
Patient is a 57y old  Female who presents with a chief complaint of Syncope (19 May 2023 10:12)    HPI:  57 F (Citizen of [place] Speaking) with medical hx of IDDM, ESRD on HD MWF (LUE AVF), HTN, DLD presents for AMS and possible syncope at home. Patient had a recent admission for DKA and seizures 2/2 hypoglycemia. History obtained She says that yesterday she went to dialysis, came back home and was feeling normal. She took her insulin and had dinner and after that she does not remember anything until now. Collateral obtained from daughter whom the patient lives with. Patient had a fall afterwards and it is unclear whether she lost consciousness, she was then confused so EMS was called. She has been eating well, manages her own meds and she and the daughter do not think she has difficulty with insulin dosing. Patient reports cough x1 day with clear sputum, chest pain with exertion. She denies changes in medications, fever, SOB, abdominal pain, n/v, dysuria, headache, sick contacts.   On EMS arrival, FS was 34 and pt given dextrose, juice with improvement of FS.  This morning, pt AAOx4 and provided full history in Citizen of [place]. States that [place] in [place] and has been trying to adjust to different system of insulin use.  She has also had a long history of dizziness, nearly falling and having episodes of syncope in the past few years and states that typically when these episodes occur, she has normal BP and FS. She does however have intermittent episodes of symptomatic hypoglycemia in the past few months, as low as 60 or 70, during which she begins to feel tremors or weak and has a snack.  Otherwise, she has been cutting out snacks and trying to eat healthy. She typically takes Basaglar in the evenings and Novolog only if needed during the day but if she eats foods with minimal or no carbs she does not take Novolog. Two days ago, she returned from HD, measured her FS, was 100 and began to eat. Then she took Novolog 2U. Rechecked her FS at 9 PM but does not remember what it was and then took Basaglar at 11 PM, she took 16 U for the first time, typically takes 13 U or less but increased it because she has been having elevated FS in mornings. She then went to sleep and per family, had syncopal event in  the morning although pt does not remember that.    FAMILY HISTORY:    PAST MEDICAL & SURGICAL HISTORY:  Chronic kidney disease, unspecified CKD stage    Review of Systems: per HPI    Alleries  No Known Allergies  Intolerances  MEDICATIONS  (STANDING):  aspirin  chewable 81 milliGRAM(s) Oral daily  atorvastatin 10 milliGRAM(s) Oral at bedtime  chlorhexidine 4% Liquid 1 Application(s) Topical <User Schedule>  dextrose 5%. 1000 milliLiter(s) (100 mL/Hr) IV Continuous <Continuous>  dextrose 5%. 1000 milliLiter(s) (50 mL/Hr) IV Continuous <Continuous>  dextrose 50% Injectable 25 Gram(s) IV Push once  dextrose 50% Injectable 25 Gram(s) IV Push once  dextrose 50% Injectable 12.5 Gram(s) IV Push once  glucagon  Injectable 1 milliGRAM(s) IntraMuscular once  heparin   Injectable 5000 Unit(s) SubCutaneous every 8 hours  insulin glargine Injectable (LANTUS) 9 Unit(s) SubCutaneous at bedtime  insulin lispro (ADMELOG) corrective regimen sliding scale   SubCutaneous three times a day before meals  insulin lispro Injectable (ADMELOG) 3 Unit(s) SubCutaneous three times a day before meals  sevelamer carbonate 1600 milliGRAM(s) Oral three times a day with meals    MEDICATIONS  (PRN):  acetaminophen     Tablet .. 650 milliGRAM(s) Oral every 6 hours PRN Temp greater or equal to 38C (100.4F), Mild Pain (1 - 3)  dextrose Oral Gel 15 Gram(s) Oral once PRN Blood Glucose LESS THAN 70 milliGRAM(s)/deciliter  hydrALAZINE 25 milliGRAM(s) Oral every 8 hours PRN SBP>160  melatonin 3 milliGRAM(s) Oral at bedtime PRN Insomnia    Vital Signs Last 24 Hrs  T(C): 36.8 (19 May 2023 00:46), Max: 36.8 (19 May 2023 00:46)  T(F): 98.2 (19 May 2023 00:46), Max: 98.2 (19 May 2023 00:46)  HR: 72 (19 May 2023 00:46) (71 - 72)  BP: 165/70 (19 May 2023 00:46) (157/74 - 165/70)  BP(mean): --  RR: 18 (19 May 2023 00:46) (18 - 18)  SpO2: 100% (19 May 2023 00:46) (100% - 100%)    Parameters below as of 19 May 2023 00:46  Patient On (Oxygen Delivery Method): room air    Height (cm): 170.2 (05-18 @ 06:36)    PHYSICAL EXAM  All physical exam findings normal, except those marked:  General:	Alert, active, cooperative, NAD, well hydrated  Neck		Normal: supple, no cervical adenopathy, no palpable thyroid  Cardiovascular	Normal: regular rate, normal S1, S2, no murmurs  Respiratory	Normal: no chest wall deformity, normal respiratory pattern, CTA B/L  Abdominal	Normal: soft, ND, NT, bowel sounds present, no masses, no organomegaly  Extremities	Normal: FROM x4  Skin		Normal: intact and not indurated, no rash, no acanthosis nigricans  Neurologic	Normal: grossly intact    LABS                          11.5   3.13  )-----------( 252      ( 19 May 2023 06:14 )             34.2     05-19    139  |  95<L>  |  46<H>  ----------------------------<  315<H>  4.4   |  29  |  5.5<HH>    Ca    9.2      19 May 2023 06:14    TPro  6.5  /  Alb  3.9  /  TBili  0.4  /  DBili  x   /  AST  19  /  ALT  13  /  AlkPhos  101  05-19      Ketone - Urine: Trace (05-18 @ 12:00)    CAPILLARY BLOOD GLUCOSE      POCT Blood Glucose.: 249 mg/dL (19 May 2023 07:43)  POCT Blood Glucose.: 278 mg/dL (18 May 2023 21:06)  POCT Blood Glucose.: 76 mg/dL (18 May 2023 16:42)  POCT Blood Glucose.: 141 mg/dL (18 May 2023 11:08)

## 2023-05-19 NOTE — PROVIDER CONTACT NOTE (OTHER) - SITUATION
. 6 units insulin lispro standing administered + 6 units insulin lispro per sliding scale for a total of 12 units. Pt asymptomatic.

## 2023-05-19 NOTE — CONSULT NOTE ADULT - ASSESSMENT
57 F (Sammarinese Speaking) with medical hx of IDDM, ESRD on HD MWF (LUE AVF), HTN, DLD presents for AMS and possible syncope at home, found to be hypoglycemic to 34 on EMS arrival.    *this is an incomplete note final recs to follow* 57 F (British Speaking) with medical hx of IDDM, ESRD on HD MWF (LUE AVF), HTN, DLD presents for AMS and possible syncope at home, found to be hypoglycemic to 34 on EMS arrival.    #DM, likely Type I  #Intermittent hypoglycemia  - Can change Lantus to 5 U BID  - Keep Lispro at 3 U with SS for now  - Will monitor FS to determine discharge recs, will likely benefit from Tresiba or Toujeo instead of Basaglar

## 2023-05-19 NOTE — PROGRESS NOTE ADULT - SUBJECTIVE AND OBJECTIVE BOX
Nephrology progress note    Patient is seen and examined, events over the last 24 h noted .  co dizzieness sitting and standing  Allergies:  No Known Allergies    Hospital Medications:   MEDICATIONS  (STANDING):  aspirin  chewable 81 milliGRAM(s) Oral daily  atorvastatin 10 milliGRAM(s) Oral at bedtime  chlorhexidine 4% Liquid 1 Application(s) Topical <User Schedule>  dextrose 5%. 1000 milliLiter(s) (100 mL/Hr) IV Continuous <Continuous>  dextrose 5%. 1000 milliLiter(s) (50 mL/Hr) IV Continuous <Continuous>  dextrose 50% Injectable 25 Gram(s) IV Push once  dextrose 50% Injectable 12.5 Gram(s) IV Push once  dextrose 50% Injectable 25 Gram(s) IV Push once  glucagon  Injectable 1 milliGRAM(s) IntraMuscular once  heparin   Injectable 5000 Unit(s) SubCutaneous every 8 hours  insulin glargine Injectable (LANTUS) 9 Unit(s) SubCutaneous at bedtime  insulin lispro (ADMELOG) corrective regimen sliding scale   SubCutaneous three times a day before meals  insulin lispro Injectable (ADMELOG) 3 Unit(s) SubCutaneous three times a day before meals  sevelamer carbonate 1600 milliGRAM(s) Oral three times a day with meals        VITALS:  T(F): 98.2 (23 @ 00:46), Max: 98.2 (23 @ 00:46)  HR: 72 (23 @ 00:46)  BP: 165/70 (23 @ 00:46)  RR: 18 (23 @ 00:46)  SpO2: 100% (23 @ 00:46)  Wt(kg): --        PHYSICAL EXAM:  Constitutional: NAD  HEENT: anicteric sclera, oropharynx clear, MMM  Neck: No JVD  Respiratory: CTAB, no wheezes, rales or rhonchi  Cardiovascular: S1, S2, RRR  Gastrointestinal: BS+, soft, NT/ND  Extremities: No cyanosis or clubbing. No peripheral edema  Neurological: A/O x 3  : No CVA tenderness. No murillo.   Skin: No rashes  Vascular Access: AVF    LABS:      139  |  95<L>  |  46<H>  ----------------------------<  315<H>  4.4   |  29  |  5.5<HH>    Ca    9.2      19 May 2023 06:14    TPro  6.5  /  Alb  3.9  /  TBili  0.4  /  DBili      /  AST  19  /  ALT  13  /  AlkPhos  101                            11.5   3.13  )-----------( 252      ( 19 May 2023 06:14 )             34.2       Urine Studies:  Urinalysis Basic - ( 18 May 2023 12:00 )    Color: Yellow / Appearance: Turbid / S.021 / pH:   Gluc:  / Ketone: Trace  / Bili: Small / Urobili: 3 mg/dL   Blood:  / Protein: 300 mg/dL / Nitrite: Negative   Leuk Esterase: Large / RBC: 3 /HPF /  /HPF   Sq Epi:  / Non Sq Epi:  / Bacteria: Many        RADIOLOGY & ADDITIONAL STUDIES:  < from: Xray Chest 1 View-PORTABLE IMMEDIATE (Xray Chest 1 View-PORTABLE IMMEDIATE .) (23 @ 09:29) >  No radiographic evidence of acute cardiopulmonary disease.    < end of copied text >

## 2023-05-19 NOTE — CONSULT NOTE ADULT - ATTENDING COMMENTS
57 F (Mauritanian Speaking) with medical hx of IDDM, ESRD on HD MWF (LUE AVF), HTN, DLD presents for AMS and syncope at home, was found to be hypoglycemic.    ESRD on HD / hypoglycemia/ DM/ HTN  hypoglycemia s/p dextrose  Last HD yesterday, next HD tomorrow MWF  please CHECK orthostatic VS - pt describes dizziness upon standing  reduce insulin dose  c/w sevelamer  renal diabetic diet  c/w BP meds  Hgb at target  will follow
57 F (Serbian Speaking) with medical hx of IDDM, ESRD on HD MWF ([facility] AVF), HTN, DLD presents for AMS and possible syncope at home, found to be hypoglycemic to 34 on EMS arrival. History was obtained using a Wallisian . PAtient states she [facility] and has always been on insulin. She states she now moved to the   and is trying to understanbd her regimen but has noticed erratic blood glucose readings at home.     #Diabetes MEllitus   - HAs been insulin dependent for 20 years,   - with ESRD, A1c 9.9 but unreliable in the setting of ESRD.   - REcommend to give lantus 5 units every 12 hours instead of once daily dosing of 9 units can start tonight  - for now continue with lispro 3 units with meals and sliding scale of 1 unit for every 50 mg/dl above 150 mg/dl , may need to titrate this based on trend  - On DC recommend to switch basal insulin to tresiba or toujeo for once daily dosing, and low dose lispro with meals and a sliding scale

## 2023-05-20 LAB
ALBUMIN SERPL ELPH-MCNC: 4.2 G/DL — SIGNIFICANT CHANGE UP (ref 3.5–5.2)
ALP SERPL-CCNC: 105 U/L — SIGNIFICANT CHANGE UP (ref 30–115)
ALT FLD-CCNC: 14 U/L — SIGNIFICANT CHANGE UP (ref 0–41)
ANION GAP SERPL CALC-SCNC: 14 MMOL/L — SIGNIFICANT CHANGE UP (ref 7–14)
ANION GAP SERPL CALC-SCNC: 17 MMOL/L — HIGH (ref 7–14)
AST SERPL-CCNC: 20 U/L — SIGNIFICANT CHANGE UP (ref 0–41)
B-OH-BUTYR SERPL-SCNC: <0.2 MMOL/L — SIGNIFICANT CHANGE UP
BASOPHILS # BLD AUTO: 0.04 K/UL — SIGNIFICANT CHANGE UP (ref 0–0.2)
BASOPHILS NFR BLD AUTO: 1.2 % — HIGH (ref 0–1)
BILIRUB SERPL-MCNC: 0.5 MG/DL — SIGNIFICANT CHANGE UP (ref 0.2–1.2)
BUN SERPL-MCNC: 35 MG/DL — HIGH (ref 10–20)
BUN SERPL-MCNC: 37 MG/DL — HIGH (ref 10–20)
CALCIUM SERPL-MCNC: 9.1 MG/DL — SIGNIFICANT CHANGE UP (ref 8.4–10.5)
CALCIUM SERPL-MCNC: 9.3 MG/DL — SIGNIFICANT CHANGE UP (ref 8.4–10.5)
CHLORIDE SERPL-SCNC: 97 MMOL/L — LOW (ref 98–110)
CHLORIDE SERPL-SCNC: 99 MMOL/L — SIGNIFICANT CHANGE UP (ref 98–110)
CO2 SERPL-SCNC: 24 MMOL/L — SIGNIFICANT CHANGE UP (ref 17–32)
CO2 SERPL-SCNC: 27 MMOL/L — SIGNIFICANT CHANGE UP (ref 17–32)
CREAT SERPL-MCNC: 5 MG/DL — CRITICAL HIGH (ref 0.7–1.5)
CREAT SERPL-MCNC: 5.4 MG/DL — CRITICAL HIGH (ref 0.7–1.5)
EGFR: 10 ML/MIN/1.73M2 — LOW
EGFR: 9 ML/MIN/1.73M2 — LOW
EOSINOPHIL # BLD AUTO: 0.16 K/UL — SIGNIFICANT CHANGE UP (ref 0–0.7)
EOSINOPHIL NFR BLD AUTO: 5 % — SIGNIFICANT CHANGE UP (ref 0–8)
GLUCOSE BLDC GLUCOMTR-MCNC: 114 MG/DL — HIGH (ref 70–99)
GLUCOSE BLDC GLUCOMTR-MCNC: 158 MG/DL — HIGH (ref 70–99)
GLUCOSE BLDC GLUCOMTR-MCNC: 234 MG/DL — HIGH (ref 70–99)
GLUCOSE BLDC GLUCOMTR-MCNC: 413 MG/DL — HIGH (ref 70–99)
GLUCOSE SERPL-MCNC: 261 MG/DL — HIGH (ref 70–99)
GLUCOSE SERPL-MCNC: 487 MG/DL — CRITICAL HIGH (ref 70–99)
HCT VFR BLD CALC: 34.7 % — LOW (ref 37–47)
HGB BLD-MCNC: 11.6 G/DL — LOW (ref 12–16)
IMM GRANULOCYTES NFR BLD AUTO: 0 % — LOW (ref 0.1–0.3)
LYMPHOCYTES # BLD AUTO: 1.17 K/UL — LOW (ref 1.2–3.4)
LYMPHOCYTES # BLD AUTO: 36.2 % — SIGNIFICANT CHANGE UP (ref 20.5–51.1)
MAGNESIUM SERPL-MCNC: 2.8 MG/DL — HIGH (ref 1.8–2.4)
MCHC RBC-ENTMCNC: 31.9 PG — HIGH (ref 27–31)
MCHC RBC-ENTMCNC: 33.4 G/DL — SIGNIFICANT CHANGE UP (ref 32–37)
MCV RBC AUTO: 95.3 FL — SIGNIFICANT CHANGE UP (ref 81–99)
MONOCYTES # BLD AUTO: 0.26 K/UL — SIGNIFICANT CHANGE UP (ref 0.1–0.6)
MONOCYTES NFR BLD AUTO: 8 % — SIGNIFICANT CHANGE UP (ref 1.7–9.3)
NEUTROPHILS # BLD AUTO: 1.6 K/UL — SIGNIFICANT CHANGE UP (ref 1.4–6.5)
NEUTROPHILS NFR BLD AUTO: 49.6 % — SIGNIFICANT CHANGE UP (ref 42.2–75.2)
NRBC # BLD: 0 /100 WBCS — SIGNIFICANT CHANGE UP (ref 0–0)
PLATELET # BLD AUTO: 202 K/UL — SIGNIFICANT CHANGE UP (ref 130–400)
PMV BLD: 10.8 FL — HIGH (ref 7.4–10.4)
POTASSIUM SERPL-MCNC: 4.5 MMOL/L — SIGNIFICANT CHANGE UP (ref 3.5–5)
POTASSIUM SERPL-MCNC: 4.5 MMOL/L — SIGNIFICANT CHANGE UP (ref 3.5–5)
POTASSIUM SERPL-SCNC: 4.5 MMOL/L — SIGNIFICANT CHANGE UP (ref 3.5–5)
POTASSIUM SERPL-SCNC: 4.5 MMOL/L — SIGNIFICANT CHANGE UP (ref 3.5–5)
PROT SERPL-MCNC: 6.7 G/DL — SIGNIFICANT CHANGE UP (ref 6–8)
RBC # BLD: 3.64 M/UL — LOW (ref 4.2–5.4)
RBC # FLD: 12.3 % — SIGNIFICANT CHANGE UP (ref 11.5–14.5)
SODIUM SERPL-SCNC: 138 MMOL/L — SIGNIFICANT CHANGE UP (ref 135–146)
SODIUM SERPL-SCNC: 140 MMOL/L — SIGNIFICANT CHANGE UP (ref 135–146)
WBC # BLD: 3.23 K/UL — LOW (ref 4.8–10.8)
WBC # FLD AUTO: 3.23 K/UL — LOW (ref 4.8–10.8)

## 2023-05-20 PROCEDURE — 99232 SBSQ HOSP IP/OBS MODERATE 35: CPT

## 2023-05-20 PROCEDURE — 95816 EEG AWAKE AND DROWSY: CPT | Mod: 26

## 2023-05-20 RX ORDER — HYDRALAZINE HCL 50 MG
25 TABLET ORAL ONCE
Refills: 0 | Status: COMPLETED | OUTPATIENT
Start: 2023-05-20 | End: 2023-05-20

## 2023-05-20 RX ORDER — INSULIN LISPRO 100/ML
6 VIAL (ML) SUBCUTANEOUS ONCE
Refills: 0 | Status: COMPLETED | OUTPATIENT
Start: 2023-05-20 | End: 2023-05-20

## 2023-05-20 RX ORDER — AMLODIPINE BESYLATE 2.5 MG/1
5 TABLET ORAL DAILY
Refills: 0 | Status: DISCONTINUED | OUTPATIENT
Start: 2023-05-20 | End: 2023-05-21

## 2023-05-20 RX ADMIN — AMLODIPINE BESYLATE 5 MILLIGRAM(S): 2.5 TABLET ORAL at 12:08

## 2023-05-20 RX ADMIN — Medication 3 UNIT(S): at 12:10

## 2023-05-20 RX ADMIN — SEVELAMER CARBONATE 800 MILLIGRAM(S): 2400 POWDER, FOR SUSPENSION ORAL at 17:41

## 2023-05-20 RX ADMIN — Medication 1: at 17:41

## 2023-05-20 RX ADMIN — Medication 25 MILLIGRAM(S): at 00:38

## 2023-05-20 RX ADMIN — Medication 6: at 07:46

## 2023-05-20 RX ADMIN — Medication 81 MILLIGRAM(S): at 12:08

## 2023-05-20 RX ADMIN — HEPARIN SODIUM 5000 UNIT(S): 5000 INJECTION INTRAVENOUS; SUBCUTANEOUS at 05:50

## 2023-05-20 RX ADMIN — Medication 25 MILLIGRAM(S): at 06:58

## 2023-05-20 RX ADMIN — Medication 3 UNIT(S): at 17:41

## 2023-05-20 RX ADMIN — Medication 6 UNIT(S): at 12:10

## 2023-05-20 RX ADMIN — SEVELAMER CARBONATE 1600 MILLIGRAM(S): 2400 POWDER, FOR SUSPENSION ORAL at 07:48

## 2023-05-20 RX ADMIN — ATORVASTATIN CALCIUM 10 MILLIGRAM(S): 80 TABLET, FILM COATED ORAL at 22:22

## 2023-05-20 RX ADMIN — SEVELAMER CARBONATE 1600 MILLIGRAM(S): 2400 POWDER, FOR SUSPENSION ORAL at 12:09

## 2023-05-20 RX ADMIN — INSULIN GLARGINE 5 UNIT(S): 100 INJECTION, SOLUTION SUBCUTANEOUS at 22:21

## 2023-05-20 RX ADMIN — HEPARIN SODIUM 5000 UNIT(S): 5000 INJECTION INTRAVENOUS; SUBCUTANEOUS at 22:21

## 2023-05-20 RX ADMIN — Medication 3 UNIT(S): at 07:47

## 2023-05-20 RX ADMIN — HEPARIN SODIUM 5000 UNIT(S): 5000 INJECTION INTRAVENOUS; SUBCUTANEOUS at 17:43

## 2023-05-20 RX ADMIN — Medication 2: at 12:09

## 2023-05-20 RX ADMIN — INSULIN GLARGINE 5 UNIT(S): 100 INJECTION, SOLUTION SUBCUTANEOUS at 12:16

## 2023-05-20 RX ADMIN — CHLORHEXIDINE GLUCONATE 1 APPLICATION(S): 213 SOLUTION TOPICAL at 05:30

## 2023-05-20 NOTE — PHYSICAL THERAPY INITIAL EVALUATION ADULT - PERTINENT HX OF CURRENT PROBLEM, REHAB EVAL
57F with a PMH of IDDM, ESRD on HD MWF (LUE AVF), HTN, DLD presented for AMS and possible syncope at home. Per patient, on the day prior to presentation she went to dialysis, came back home and was feeling normal. She took her insulin and had dinner but has no memory of events after. Per collateral obtained from daughter patient had a fall after dinner and it is unclear whether she lost consciousness. She was confused afterwards, so EMS was called. She has no history of epilepsy but does have a recent admission for DKA complicated by hypoglycemia and subsequent seizure.

## 2023-05-20 NOTE — PATIENT PROFILE ADULT - FALL HARM RISK - HARM RISK INTERVENTIONS

## 2023-05-20 NOTE — PATIENT PROFILE ADULT - NSFALLSECTIONLABEL_GEN_A_CORE
Last Office Visit   10/4/2021  Upcoming: Visit date not found    Last Refill  ALPRAZolam (XANAX) 0.25 MG tablet 30 tablet 0 9/16/2021     Sig: TAKE 1 TABLET BY MOUTH THREE TIMES A DAY AS NEEDED FOR ANXIETY    Sent to pharmacy as: ALPRAZolam 0.25 MG Oral Tablet (XANAX)    Class: Eprescribe    Notes to Pharmacy: Not to exceed 5 additional fills before 01/05/2022    E-Prescribing Status: Receipt confirmed by pharmacy (9/16/2021 12:41 PM CDT)        No Protocol  Medication has been pended for provider review.   
.

## 2023-05-20 NOTE — PHYSICAL THERAPY INITIAL EVALUATION ADULT - GENERAL OBSERVATIONS, REHAB EVAL
Pt seen from [id number]. Pt encountered in bed, +tele, Kenyan speaking, agreeable for b/s PT, c/o headache & dizziness with movts, BP monitored throughout the session. Pt is + orthostatic hypotension please see vital signs section for details.

## 2023-05-20 NOTE — PATIENT PROFILE ADULT - FALL HARM RISK - FACTORS
Weakness

## 2023-05-20 NOTE — PROGRESS NOTE ADULT - SUBJECTIVE AND OBJECTIVE BOX
seen and examined  24 h events noted   no distress       PAST HISTORY  --------------------------------------------------------------------------------  No significant changes to PMH, PSH, FHx, SHx, unless otherwise noted    ALLERGIES & MEDICATIONS  --------------------------------------------------------------------------------  Allergies    No Known Allergies    Intolerances      Standing Inpatient Medications  aspirin  chewable 81 milliGRAM(s) Oral daily  atorvastatin 10 milliGRAM(s) Oral at bedtime  chlorhexidine 4% Liquid 1 Application(s) Topical <User Schedule>  dextrose 5%. 1000 milliLiter(s) IV Continuous <Continuous>  dextrose 5%. 1000 milliLiter(s) IV Continuous <Continuous>  dextrose 50% Injectable 25 Gram(s) IV Push once  dextrose 50% Injectable 25 Gram(s) IV Push once  dextrose 50% Injectable 12.5 Gram(s) IV Push once  glucagon  Injectable 1 milliGRAM(s) IntraMuscular once  heparin   Injectable 5000 Unit(s) SubCutaneous every 8 hours  insulin glargine Injectable (LANTUS) 5 Unit(s) SubCutaneous two times a day  insulin lispro (ADMELOG) corrective regimen sliding scale   SubCutaneous three times a day before meals  insulin lispro Injectable (ADMELOG) 3 Unit(s) SubCutaneous three times a day before meals  sevelamer carbonate 1600 milliGRAM(s) Oral three times a day with meals    PRN Inpatient Medications  acetaminophen     Tablet .. 650 milliGRAM(s) Oral every 6 hours PRN  dextrose Oral Gel 15 Gram(s) Oral once PRN  hydrALAZINE 25 milliGRAM(s) Oral every 8 hours PRN  melatonin 3 milliGRAM(s) Oral at bedtime PRN        VITALS/PHYSICAL EXAM  --------------------------------------------------------------------------------  T(C): 36.1 (05-20-23 @ 05:08), Max: 37.2 (05-19-23 @ 16:46)  HR: 74 (05-20-23 @ 05:08) (70 - 86)  BP: 188/76 (05-20-23 @ 06:55) (148/72 - 195/90)  RR: 18 (05-20-23 @ 05:08) (17 - 18)  SpO2: 97% (05-20-23 @ 05:08) (97% - 98%)  Wt(kg): --        05-19-23 @ 07:01  -  05-20-23 @ 07:00  --------------------------------------------------------  IN: 100 mL / OUT: 1650 mL / NET: -1550 mL      Physical Exam:  	Gen: NAD  	Abd:  soft, nontender/nondistended  	LE:  no edema  	Vascular access:av     LABS/STUDIES  --------------------------------------------------------------------------------              11.6   3.23  >-----------<  202      [05-20-23 @ 06:07]              34.7     139  |  95  |  46  ----------------------------<  315      [05-19-23 @ 06:14]  4.4   |  29  |  5.5        Ca     9.2     [05-19-23 @ 06:14]    TPro  6.5  /  Alb  3.9  /  TBili  0.4  /  DBili  x   /  AST  19  /  ALT  13  /  AlkPhos  101  [05-19-23 @ 06:14]      Troponin 0.05      [05-18-23 @ 17:31]    Creatinine Trend:  SCr 5.5 [05-19 @ 06:14]  SCr 3.9 [05-18 @ 08:00]  SCr 7.4 [05-08 @ 06:34]  SCr 6.8 [05-07 @ 08:21]  SCr 6.3 [05-06 @ 17:27]    Urinalysis - [05-18-23 @ 12:00]      Color Yellow / Appearance Turbid / SG 1.021 / pH 6.0      Gluc Trace / Ketone Trace  / Bili Small / Urobili 3 mg/dL       Blood Small / Protein 300 mg/dL / Leuk Est Large / Nitrite Negative      RBC 3 /  / Hyaline 1 / Gran  / Sq Epi  / Non Sq Epi  / Bacteria Many      TSH 2.64      [05-01-23 @ 01:50]  Lipid: chol 226, TG 95, HDL 62, LDL --      [05-01-23 @ 12:46]

## 2023-05-20 NOTE — PROGRESS NOTE ADULT - SUBJECTIVE AND OBJECTIVE BOX
ANAM NGUYỄN  57y  Female      Patient is a 57y old  Female who presents with a chief complaint of Syncope (20 May 2023 07:57)      INTERVAL HPI/OVERNIGHT EVENTS:  She feels dizzy when she stands up, no chest pain  Vital Signs Last 24 Hrs  T(C): 36.1 (20 May 2023 13:19), Max: 37.2 (19 May 2023 16:46)  T(F): 97 (20 May 2023 13:19), Max: 98.9 (19 May 2023 16:46)  HR: 74 (20 May 2023 13:19) (70 - 86)  BP: 169/72 (20 May 2023 13:19) (148/72 - 195/90)  BP(mean): 105 (19 May 2023 16:46) (105 - 105)  RR: 18 (20 May 2023 13:19) (17 - 18)  SpO2: 97% (20 May 2023 05:08) (97% - 98%)    Parameters below as of 20 May 2023 05:08  Patient On (Oxygen Delivery Method): room air          05-19-23 @ 07:01  -  05-20-23 @ 07:00  --------------------------------------------------------  IN: 100 mL / OUT: 1650 mL / NET: -1550 mL    05-20-23 @ 07:01  - 05-20-23 @ 14:03  --------------------------------------------------------  IN: 240 mL / OUT: 0 mL / NET: 240 mL            Consultant(s) Notes Reviewed:  [x ] YES  [ ] NO          MEDICATIONS  (STANDING):  amLODIPine   Tablet 5 milliGRAM(s) Oral daily  aspirin  chewable 81 milliGRAM(s) Oral daily  atorvastatin 10 milliGRAM(s) Oral at bedtime  chlorhexidine 4% Liquid 1 Application(s) Topical <User Schedule>  dextrose 5%. 1000 milliLiter(s) (100 mL/Hr) IV Continuous <Continuous>  dextrose 5%. 1000 milliLiter(s) (50 mL/Hr) IV Continuous <Continuous>  dextrose 50% Injectable 25 Gram(s) IV Push once  dextrose 50% Injectable 12.5 Gram(s) IV Push once  dextrose 50% Injectable 25 Gram(s) IV Push once  glucagon  Injectable 1 milliGRAM(s) IntraMuscular once  heparin   Injectable 5000 Unit(s) SubCutaneous every 8 hours  insulin glargine Injectable (LANTUS) 5 Unit(s) SubCutaneous two times a day  insulin lispro (ADMELOG) corrective regimen sliding scale   SubCutaneous three times a day before meals  insulin lispro Injectable (ADMELOG) 3 Unit(s) SubCutaneous three times a day before meals  sevelamer carbonate 1600 milliGRAM(s) Oral three times a day with meals    MEDICATIONS  (PRN):  acetaminophen     Tablet .. 650 milliGRAM(s) Oral every 6 hours PRN Temp greater or equal to 38C (100.4F), Mild Pain (1 - 3)  dextrose Oral Gel 15 Gram(s) Oral once PRN Blood Glucose LESS THAN 70 milliGRAM(s)/deciliter  melatonin 3 milliGRAM(s) Oral at bedtime PRN Insomnia      LABS                          11.6   3.23  )-----------( 202      ( 20 May 2023 06:07 )             34.7     05-20    140  |  99  |  37<H>  ----------------------------<  261<H>  4.5   |  27  |  5.4<HH>    Ca    9.3      20 May 2023 11:10  Mg     2.8     05-20    TPro  6.7  /  Alb  4.2  /  TBili  0.5  /  DBili  x   /  AST  20  /  ALT  14  /  AlkPhos  105  05-20          Lactate Trend    CARDIAC MARKERS ( 18 May 2023 17:31 )  x     / 0.05 ng/mL / x     / x     / x          CAPILLARY BLOOD GLUCOSE      POCT Blood Glucose.: 234 mg/dL (20 May 2023 11:38)        RADIOLOGY & ADDITIONAL TESTS:    Imaging Personally Reviewed:  [ ] YES  [ ] NO    HEALTH ISSUES - PROBLEM Dx:          PHYSICAL EXAM:  GENERAL: NAD, well-developed.  HEAD:  Atraumatic, Normocephalic.  EYES: EOMI, PERRLA, conjunctiva and sclera clear.  NECK: Supple, No JVD.  CHEST/LUNG: Clear to auscultation bilaterally; No wheeze.  HEART: Regular rate and rhythm; S1 S2.   ABDOMEN: Soft, Nontender, Nondistended; Bowel sounds present.  EXTREMITIES:  2+ Peripheral Pulses, No clubbing, cyanosis, or edema.  PSYCH: AAOx3.  NEUROLOGY: non-focal.  SKIN: No rashes or lesions.

## 2023-05-21 ENCOUNTER — TRANSCRIPTION ENCOUNTER (OUTPATIENT)
Age: 58
End: 2023-05-21

## 2023-05-21 VITALS
HEART RATE: 78 BPM | RESPIRATION RATE: 18 BRPM | DIASTOLIC BLOOD PRESSURE: 71 MMHG | TEMPERATURE: 97 F | SYSTOLIC BLOOD PRESSURE: 154 MMHG

## 2023-05-21 LAB
ALBUMIN SERPL ELPH-MCNC: 3.9 G/DL — SIGNIFICANT CHANGE UP (ref 3.5–5.2)
ALP SERPL-CCNC: 98 U/L — SIGNIFICANT CHANGE UP (ref 30–115)
ALT FLD-CCNC: 13 U/L — SIGNIFICANT CHANGE UP (ref 0–41)
ANION GAP SERPL CALC-SCNC: 15 MMOL/L — HIGH (ref 7–14)
AST SERPL-CCNC: 21 U/L — SIGNIFICANT CHANGE UP (ref 0–41)
BASOPHILS # BLD AUTO: 0.04 K/UL — SIGNIFICANT CHANGE UP (ref 0–0.2)
BASOPHILS NFR BLD AUTO: 1.2 % — HIGH (ref 0–1)
BILIRUB SERPL-MCNC: 0.5 MG/DL — SIGNIFICANT CHANGE UP (ref 0.2–1.2)
BUN SERPL-MCNC: 50 MG/DL — HIGH (ref 10–20)
CALCIUM SERPL-MCNC: 9.3 MG/DL — SIGNIFICANT CHANGE UP (ref 8.4–10.5)
CHLORIDE SERPL-SCNC: 99 MMOL/L — SIGNIFICANT CHANGE UP (ref 98–110)
CO2 SERPL-SCNC: 24 MMOL/L — SIGNIFICANT CHANGE UP (ref 17–32)
CREAT SERPL-MCNC: 6.8 MG/DL — CRITICAL HIGH (ref 0.7–1.5)
EGFR: 7 ML/MIN/1.73M2 — LOW
EOSINOPHIL # BLD AUTO: 0.29 K/UL — SIGNIFICANT CHANGE UP (ref 0–0.7)
EOSINOPHIL NFR BLD AUTO: 8.4 % — HIGH (ref 0–8)
GLUCOSE BLDC GLUCOMTR-MCNC: 144 MG/DL — HIGH (ref 70–99)
GLUCOSE BLDC GLUCOMTR-MCNC: 231 MG/DL — HIGH (ref 70–99)
GLUCOSE BLDC GLUCOMTR-MCNC: 233 MG/DL — HIGH (ref 70–99)
GLUCOSE SERPL-MCNC: 227 MG/DL — HIGH (ref 70–99)
HCT VFR BLD CALC: 33.4 % — LOW (ref 37–47)
HGB BLD-MCNC: 11.2 G/DL — LOW (ref 12–16)
IMM GRANULOCYTES NFR BLD AUTO: 0 % — LOW (ref 0.1–0.3)
LYMPHOCYTES # BLD AUTO: 1.57 K/UL — SIGNIFICANT CHANGE UP (ref 1.2–3.4)
LYMPHOCYTES # BLD AUTO: 45.4 % — SIGNIFICANT CHANGE UP (ref 20.5–51.1)
MAGNESIUM SERPL-MCNC: 2.7 MG/DL — HIGH (ref 1.8–2.4)
MCHC RBC-ENTMCNC: 32 PG — HIGH (ref 27–31)
MCHC RBC-ENTMCNC: 33.5 G/DL — SIGNIFICANT CHANGE UP (ref 32–37)
MCV RBC AUTO: 95.4 FL — SIGNIFICANT CHANGE UP (ref 81–99)
MONOCYTES # BLD AUTO: 0.22 K/UL — SIGNIFICANT CHANGE UP (ref 0.1–0.6)
MONOCYTES NFR BLD AUTO: 6.4 % — SIGNIFICANT CHANGE UP (ref 1.7–9.3)
NEUTROPHILS # BLD AUTO: 1.34 K/UL — LOW (ref 1.4–6.5)
NEUTROPHILS NFR BLD AUTO: 38.6 % — LOW (ref 42.2–75.2)
NRBC # BLD: 0 /100 WBCS — SIGNIFICANT CHANGE UP (ref 0–0)
PLATELET # BLD AUTO: 206 K/UL — SIGNIFICANT CHANGE UP (ref 130–400)
PMV BLD: 10.8 FL — HIGH (ref 7.4–10.4)
POTASSIUM SERPL-MCNC: 4.7 MMOL/L — SIGNIFICANT CHANGE UP (ref 3.5–5)
POTASSIUM SERPL-SCNC: 4.7 MMOL/L — SIGNIFICANT CHANGE UP (ref 3.5–5)
PROT SERPL-MCNC: 6.4 G/DL — SIGNIFICANT CHANGE UP (ref 6–8)
RBC # BLD: 3.5 M/UL — LOW (ref 4.2–5.4)
RBC # FLD: 12.3 % — SIGNIFICANT CHANGE UP (ref 11.5–14.5)
SARS-COV-2 RNA SPEC QL NAA+PROBE: SIGNIFICANT CHANGE UP
SODIUM SERPL-SCNC: 138 MMOL/L — SIGNIFICANT CHANGE UP (ref 135–146)
WBC # BLD: 3.46 K/UL — LOW (ref 4.8–10.8)
WBC # FLD AUTO: 3.46 K/UL — LOW (ref 4.8–10.8)

## 2023-05-21 PROCEDURE — 99239 HOSP IP/OBS DSCHRG MGMT >30: CPT

## 2023-05-21 RX ORDER — AMLODIPINE BESYLATE 2.5 MG/1
1 TABLET ORAL
Qty: 30 | Refills: 1
Start: 2023-05-21 | End: 2023-07-19

## 2023-05-21 RX ORDER — INSULIN DEGLUDEC 100 U/ML
10 INJECTION, SOLUTION SUBCUTANEOUS
Qty: 1 | Refills: 2
Start: 2023-05-21 | End: 2023-08-18

## 2023-05-21 RX ORDER — INSULIN ASPART 100 [IU]/ML
3 INJECTION, SOLUTION SUBCUTANEOUS
Qty: 1 | Refills: 0
Start: 2023-05-21 | End: 2023-06-19

## 2023-05-21 RX ORDER — INSULIN ASPART 100 [IU]/ML
15 INJECTION, SOLUTION INTRAVENOUS; SUBCUTANEOUS
Qty: 1 | Refills: 0 | DISCHARGE
Start: 2023-05-21 | End: 2023-06-19

## 2023-05-21 RX ADMIN — SEVELAMER CARBONATE 1600 MILLIGRAM(S): 2400 POWDER, FOR SUSPENSION ORAL at 11:48

## 2023-05-21 RX ADMIN — Medication 3 UNIT(S): at 07:55

## 2023-05-21 RX ADMIN — CHLORHEXIDINE GLUCONATE 1 APPLICATION(S): 213 SOLUTION TOPICAL at 05:17

## 2023-05-21 RX ADMIN — HEPARIN SODIUM 5000 UNIT(S): 5000 INJECTION INTRAVENOUS; SUBCUTANEOUS at 15:08

## 2023-05-21 RX ADMIN — Medication 2: at 07:56

## 2023-05-21 RX ADMIN — AMLODIPINE BESYLATE 5 MILLIGRAM(S): 2.5 TABLET ORAL at 05:17

## 2023-05-21 RX ADMIN — SEVELAMER CARBONATE 1600 MILLIGRAM(S): 2400 POWDER, FOR SUSPENSION ORAL at 07:57

## 2023-05-21 RX ADMIN — Medication 81 MILLIGRAM(S): at 11:49

## 2023-05-21 RX ADMIN — Medication 3 UNIT(S): at 11:48

## 2023-05-21 RX ADMIN — HEPARIN SODIUM 5000 UNIT(S): 5000 INJECTION INTRAVENOUS; SUBCUTANEOUS at 05:16

## 2023-05-21 RX ADMIN — INSULIN GLARGINE 5 UNIT(S): 100 INJECTION, SOLUTION SUBCUTANEOUS at 07:57

## 2023-05-21 NOTE — DISCHARGE NOTE PROVIDER - NSDCCPCAREPLAN_GEN_ALL_CORE_FT
PRINCIPAL DISCHARGE DIAGNOSIS  Diagnosis: AMS (altered mental status)  Assessment and Plan of Treatment: due to hypoglycemia

## 2023-05-21 NOTE — DISCHARGE NOTE PROVIDER - ATTENDING DISCHARGE PHYSICAL EXAMINATION:
PHYSICAL EXAM:  GENERAL: NAD, well-developed.  HEAD:  Atraumatic, Normocephalic.  EYES: EOMI, PERRLA, conjunctiva and sclera clear.  NECK: Supple, No JVD.  CHEST/LUNG: Clear to auscultation bilaterally; No wheeze.  HEART: Regular rate and rhythm; S1 S2.   ABDOMEN: Soft, Nontender, Nondistended; Bowel sounds present.  EXTREMITIES:  2+ Peripheral Pulses, No clubbing, cyanosis, or edema.  PSYCH: AAOx3.  NEUROLOGY: non-focal.  SKIN: No rashes or lesions.

## 2023-05-21 NOTE — PROGRESS NOTE ADULT - ASSESSMENT
57 F (Tajik Speaking) with medical hx of IDDM, ESRD on HD MWF (LUE AVF), HTN, DLD presents for AMS and syncope at home, was found to be hypoglycemic.    Assessment and plan  ESRD on HD / hypoglycemia/ DM/ HTN  hypoglycemia s/p dextrose  HD today MWF  3 hrs 2 K bath  UF 1.5 L   check  orthostatic VS   endo input appreciated  c/w sevelamer  renal diet  c/w BP meds  Hgb at target  will follow 
57F with a PMH of IDDM, ESRD on HD MWF (LUE AVF), HTN, DLD presented for AMS and possible syncope at home. Per patient, on the day prior to presentation she went to dialysis, came back home and was feeling normal. She took her insulin and had dinner but has no memory of events after. Per collateral obtained from daughter patient had a fall after dinner and it is unclear whether she lost consciousness. She was confused afterwards, so EMS was called. She has no history of epilepsy but does have a recent admission for DKA complicated by hypoglycemia and subsequent seizure.    Impression  - Patient presents for syncopal episode. EEG performed during prior admission was notable only for generalized slowing. Patient is currently at baseline. Etiology of syncopal episode is most likely related to hypoglycemia, as finger stick taken by EMS was 34. Patient improved after D50. However, must rule out seizure.    Recommendations  - Obtain REEG  - Seizure precautions  - Keep Mg > 2  - Diabetes medication management per Endocrine  - Rest of care per primary team
57 F (Uzbek Speaking) with medical hx of IDDM, ESRD on HD MWF (LUE AVF), HTN, DLD presents for AMS and syncope at home, was found to be hypoglycemic.  Assessment and plan  ESRD on HD / hypoglycemia/ DM/ HTN  s/p hd yesterday / hd on monday   neurology notes appreciated / EEG   follow FS closely   c/w sevelamer/ check  ph   renal diet  if bp remains elevated start hydralazine 25 q 8   Hgb at target/ no SHAR   will follow 
57 F (Armenian Speaking) with medical hx of IDDM, ESRD on HD MWF (LUE AVF), HTN, DLD presents for AMS and syncope at home, was found to be hypoglycemic.    A/P: AMS and syncope likely due to hypoglycemia and or Orthostatic hypotension.   No further episode  Telemetry no events   EKG Normal Sinus Rhythm, Troponin 0.05, stable, chronically elevated.   Echo 5/2/23 was normal,   Head CT 5/18 negative.   Orthostatic changes positive.   Discontinue Hydralazine     DM type 2 with episode of Hypoglycemia:   FS by EMS 34, improved after D50 and juice  On Lantus 16 units and Lispro 5 units TID at home  Lantus reduced to 5 units BID per Endocrinology, FS is elevated today, if remained elevated will increase it to 7 units BID. Continue Lispro 3 units before meals.   Last admission c-peptide 0.2 and proinsulin <0.3 (not consistent with extra endogenous secretion)  Endocrinology follow up.     ESRD on HD  Nephrology follow up.  Cont sevelamer    HTN:   Labile BP,   Orthostatic is positive, discontinue Hydralazine, Start on Amlodipine 5mg daily.     Atypical chest pain  Troponin chronically elevated, EKG unchanged      5. DLD  - Statin    DVT Prophylaxis: heparin    Pending: improving FS, monitor BP and orthostatic.   
57 F (Fijian Speaking) with medical hx of IDDM, ESRD on HD MWF (LUE AVF), HTN, DLD presents for AMS and syncope at home, was found to be hypoglycemic.  Assessment and plan  ESRD on HD / hypoglycemia/ DM/ HTN  next HD tomorrow  neurology notes appreciated / EEG   follow FS closely   orthostatic positive / monitor BP when upright, limit aggressively treating supine HTN  c/w sevelamer/ check  ph   renal diet  Hgb at target/ no HSAR   will follow

## 2023-05-21 NOTE — DISCHARGE NOTE NURSING/CASE MANAGEMENT/SOCIAL WORK - PATIENT PORTAL LINK FT
You can access the FollowMyHealth Patient Portal offered by [facility] by registering at the following website: [email or web address]. By joining [facility]’s FollowMyHealth portal, you will also be able to view your health information using other applications (apps) compatible with our system.

## 2023-05-21 NOTE — DISCHARGE NOTE PROVIDER - NSDCMRMEDTOKEN_GEN_ALL_CORE_FT
aspirin 81 mg oral tablet: 1 tab(s) orally once a day  atorvastatin 10 mg oral tablet: 1 tab(s) orally once a day  Basaglar KwikPen 100 units/mL subcutaneous solution: 16 unit(s) subcutaneous once a day (at bedtime)  hydrALAZINE 25 mg oral tablet: 1 tab(s) orally every 8 hours as needed for SBP&gt;160  NovoLOG FlexPen 100 units/mL injectable solution: 5 unit(s) injectable 3 times a day (before meals) Hold premeal insulin if blood glucose &lt; 100  sevelamer carbonate 800 mg oral tablet: 2 tab(s) orally 3 times a day (with meals)   amLODIPine 5 mg oral tablet: 1 tab(s) orally once a day please take it on non hemodialysis days only and as needed if SBP &gt;160  aspirin 81 mg oral tablet: 1 tab(s) orally once a day  atorvastatin 10 mg oral tablet: 1 tab(s) orally once a day  NovoLOG FlexPen 100 units/mL injectable solution: 3 unit(s) injectable 3 times a day (before meals) Hold premeal insulin if blood glucose &lt; 100  sevelamer carbonate 800 mg oral tablet: 2 tab(s) orally 3 times a day (with meals)  Tresiba 100 units/mL subcutaneous solution: 10 international unit(s) subcutaneous once a day

## 2023-05-21 NOTE — DISCHARGE NOTE PROVIDER - HOSPITAL COURSE
PHYSICAL EXAM:  GENERAL: NAD, well-developed.  HEAD:  Atraumatic, Normocephalic.  EYES: EOMI, PERRLA, conjunctiva and sclera clear.  NECK: Supple, No JVD.  CHEST/LUNG: Clear to auscultation bilaterally; No wheeze.  HEART: Regular rate and rhythm; S1 S2.   ABDOMEN: Soft, Nontender, Nondistended; Bowel sounds present.  EXTREMITIES:  2+ Peripheral Pulses, No clubbing, cyanosis, or edema.  PSYCH: AAOx3.  NEUROLOGY: non-focal.  SKIN: No rashes or lesions.      Assessment and Plan:   · Assessment	   57 Female (Zambian Speaking) with medical hx of IDDM, ESRD on HD MWF (LUE AVF), HTN, DLD presents for AMS and possible syncope at home. Patient had a recent admission for DKA and seizures 2/2 hypoglycemia. History obtained She says that yesterday she went to dialysis, came back home and was feeling normal. She took her insulin and had dinner and after that she does not remember anything until now. Collateral obtained from daughter whom the patient lives with. Patient had a fall afterwards and it is unclear whether she lost consciousness, she was then confused so EMS was called. She has been eating well, manages her own meds and she and the daughter do not think she has difficulty with insulin dosing. Patient reports cough x1 day with clear sputum, chest pain with exertion. She denies changes in medications, fever, SOB, abdominal pain, n/v, dysuria, headache, sick contacts.       ED vitals /72 otherwise unremarkable. , trop 0.06. CTH and CXR unremarkable. Admitted to tele.    A/P: AMS and syncope likely due to hypoglycemia and or Orthostatic hypotension.   No further episode  Telemetry no events   EKG Normal Sinus Rhythm, Troponin 0.05, stable, chronically elevated.   Echo 5/2/23 was normal,   Head CT 5/18 negative.   Orthostatic changes positive.   Discontinue Hydralazine     DM type 2 with episode of Hypoglycemia:   FS by EMS 34, improved after D50 and juice  On Lantus 16 units and Lispro 5 units TID at home  Lantus reduced to 5 units BID per Endocrinology, FS is elevated today, if remained elevated will increase it to 7 units BID. Continue Lispro 3 units before meals.   Last admission c-peptide 0.2 and proinsulin <0.3 (not consistent with extra endogenous secretion)  Endocrinology follow up.     ESRD on HD  Nephrology follow up.  Cont sevelamer    HTN:   Labile BP,   Orthostatic is positive, discontinue Hydralazine, Start on Amlodipine 5mg daily.     Atypical chest pain  Troponin chronically elevated, EKG unchanged      5. DLD  - Statin    DVT Prophylaxis: heparin    Pending: improving FS, monitor BP and orthostatic. 57 year old female with PMH pf HTN, DM 2, ESRD on HD MWF (LUE AVF), DLD and orthostatic Hypotension presents for AMS and possible syncope at home. She took her insulin and had dinner and after that she does not remember anything. Patient had a fall afterwards and it is unclear whether she lost consciousness, she was then confused so EMS was called and her FS was 34, she received Dextrose IV and mental status improved, in the  ED vitals /72 otherwise unremarkable. , trop 0.06. CTH and CXR unremarkable. She was admitted to telemetry, endocrinology consulted and Her FS was monitored and Insulin doses adjusted, on telemetry no event, she has orthostatic hypotension which is chronic.     A/P: AMS and syncope likely due to hypoglycemia.   No further episode  Telemetry no events   EKG Normal Sinus Rhythm, Troponin 0.05, stable, chronically elevated.   Echo 5/2/23 was normal,   Head CT 5/18 negative.   Orthostatic changes positive. Discontinue Hydralazine, advised with high compression stocking and abdominal binder.     DM type 2 with episode of Hypoglycemia:   FS by EMS 34, improved after D50 and juice  On Lantus 16 units and Lispro 5 units TID at home  Lantus reduced to 5 units BID per Endocrinology, FS is elevated today, . Continue Lispro 3 units before meals.   Discharge on Tresieba 10 units daily and Novolog 3 units before meals.   Last admission c-peptide 0.2 and proinsulin <0.3 (not consistent with extra endogenous secretion)  Endocrinology follow up outpatient.      ESRD on HD  Nephrology follow up.  Cont sevelamer    HTN:   Labile BP,   Orthostatic is positive, discontinue Hydralazine, Start on Amlodipine 5mg daily on non HD days.      Atypical chest pain: resolved, likely musculoskeletal.   Troponin chronically elevated, EKG unchanged      5. DLD  - Statin

## 2023-05-21 NOTE — DISCHARGE NOTE PROVIDER - PROVIDER TOKENS
PROVIDER:[TOKEN:[[id number]],FOLLOWUP:[2 weeks]],PROVIDER:[TOKEN:[[id number]],FOLLOWUP:[2 weeks]]

## 2023-05-21 NOTE — DISCHARGE NOTE PROVIDER - CARE PROVIDER_API CALL
Robyn Chanel)  Internal Medicine  [address], [address] [place] [address]  Phone: [phone]  Fax: [phone]  Follow Up Time: 2 weeks    Gallito Alexander)  Internal Medicine  [address] [place] [address]  Phone: [phone]  Fax: [phone]  Follow Up Time: 2 weeks

## 2023-05-21 NOTE — PROGRESS NOTE ADULT - SUBJECTIVE AND OBJECTIVE BOX
Nephrology Progress Note    ANAM NGUYỄN  MRN-[id number]  57y  Female    S:  Patient is seen and examined, events over the last 24h noted.    O:  Allergies:  No Known Allergies    Hospital Medications:   MEDICATIONS  (STANDING):  amLODIPine   Tablet 5 milliGRAM(s) Oral daily  aspirin  chewable 81 milliGRAM(s) Oral daily  atorvastatin 10 milliGRAM(s) Oral at bedtime  heparin   Injectable 5000 Unit(s) SubCutaneous every 8 hours  insulin glargine Injectable (LANTUS) 5 Unit(s) SubCutaneous two times a day  insulin lispro (ADMELOG) corrective regimen sliding scale   SubCutaneous three times a day before meals  insulin lispro Injectable (ADMELOG) 3 Unit(s) SubCutaneous three times a day before meals  sevelamer carbonate 1600 milliGRAM(s) Oral three times a day with meals    MEDICATIONS  (PRN):  acetaminophen     Tablet .. 650 milliGRAM(s) Oral every 6 hours PRN Temp greater or equal to 38C (100.4F), Mild Pain (1 - 3)  dextrose Oral Gel 15 Gram(s) Oral once PRN Blood Glucose LESS THAN 70 milliGRAM(s)/deciliter  melatonin 3 milliGRAM(s) Oral at bedtime PRN Insomnia    Home Medications:  aspirin 81 mg oral tablet: 1 tab(s) orally once a day (18 May 2023 14:19)      VITALS:  Daily     Daily Weight in k.3 (21 May 2023 05:09)  T(F): 96.7 (23 @ 05:09), Max: 98.3 (23 @ 21:43)  HR: 74 (23 @ 05:09)  BP: 178/78 (23 @ 05:09)  RR: 18 (23 @ 05:09)  SpO2: --  Wt(kg): --  I&O's Detail    20 May 2023 07:01  -  21 May 2023 07:00  --------------------------------------------------------  IN:    Oral Fluid: 440 mL  Total IN: 440 mL    OUT:    Voided (mL): 50 mL  Total OUT: 50 mL    Total NET: 390 mL        I&O's Summary    20 May 2023 07:01  -  21 May 2023 07:00  --------------------------------------------------------  IN: 440 mL / OUT: 50 mL / NET: 390 mL          PHYSICAL EXAM:  Gen: NAD  Resp: b/l breath sounds  Card: S1/S2  Abd: soft  Extremities: no edema  Vascular access: AVF      LABS:        138  |  99  |  50<H>  ----------------------------<  227<H>  4.7   |  24  |  6.8<HH>    Ca    9.3      21 May 2023 05:55  Mg     2.7         TPro  6.4  /  Alb  3.9  /  TBili  0.5  /  DBili      /  AST    /  ALT  13  /  AlkPhos  98      Phosphorus Level, Serum: 7.8 mg/dL (23 @ 06:34)  Phosphorus Level, Serum: 7.7 mg/dL (23 @ 08:21)                            11.2   3.46  )-----------( 206      ( 21 May 2023 05:55 )             33.4     Mean Cell Volume: 95.4 fL (23 @ 05:55)        Urine Studies:  Urinalysis Basic - ( 18 May 2023 12:00 )    Color: Yellow / Appearance: Turbid / S.021 / pH:   Gluc:  / Ketone: Trace  / Bili: Small / Urobili: 3 mg/dL   Blood:  / Protein: 300 mg/dL / Nitrite: Negative   Leuk Esterase: Large / RBC: 3 /HPF /  /HPF   Sq Epi:  / Non Sq Epi:  / Bacteria: Many

## 2023-05-24 NOTE — PHYSICAL THERAPY INITIAL EVALUATION ADULT - PHYSICAL ASSIST/NONPHYSICAL ASSIST: STAND/SIT, REHAB EVAL
RN ADMISSION NOTE:



RECEIVED PT VIA GURNEY FROM ER. PT IS AAOX4. ABLE TO MAKE NEEDS KNOWN. ON RA. O2 SAT 99%NO 
SOB, NO S/S DISTRESS NOTED. PT ORIENTED TO ROOM AND HOW TO USE CALL LIGHT . IV ACCESS KELLEN 
AC. PATENT AND INTACT. FLUSHING WELL. ALL BELONGINGS ACCOUNTED. LUNGS CLEAR UPON 
AUSCULTATION.BOWEL SOUNDS PRESENT X4 QUADRANTS.SKIN ASSESMENT DONE. WITH NO SKIN 
BREAKDOWN.VITAL SIGNS RA 99%, /72, P87, R16,T 97.9, PA 0/10.BED IN LOW AND LOCK 
POSITION S/R UP X 2, CALL LIGHT WITHIN REACH. verbal cues/1 person assist

## 2023-05-25 DIAGNOSIS — R07.89 OTHER CHEST PAIN: ICD-10-CM

## 2023-05-25 DIAGNOSIS — N18.6 END STAGE RENAL DISEASE: ICD-10-CM

## 2023-05-25 DIAGNOSIS — Z99.2 DEPENDENCE ON RENAL DIALYSIS: ICD-10-CM

## 2023-05-25 DIAGNOSIS — I12.0 HYPERTENSIVE CHRONIC KIDNEY DISEASE WITH STAGE 5 CHRONIC KIDNEY DISEASE OR END STAGE RENAL DISEASE: ICD-10-CM

## 2023-05-25 DIAGNOSIS — E11.649 TYPE 2 DIABETES MELLITUS WITH HYPOGLYCEMIA WITHOUT COMA: ICD-10-CM

## 2023-05-25 DIAGNOSIS — I95.1 ORTHOSTATIC HYPOTENSION: ICD-10-CM

## 2023-05-25 DIAGNOSIS — E78.5 HYPERLIPIDEMIA, UNSPECIFIED: ICD-10-CM

## 2023-05-25 DIAGNOSIS — E11.22 TYPE 2 DIABETES MELLITUS WITH DIABETIC CHRONIC KIDNEY DISEASE: ICD-10-CM

## 2023-05-25 DIAGNOSIS — Z79.82 LONG TERM (CURRENT) USE OF ASPIRIN: ICD-10-CM

## 2023-09-17 NOTE — ED ADULT NURSE NOTE - NS PRO PASSIVE SMOKE EXP
Patient self converted from atrial fib to MD BAILEY notified.       Oneyda Lema RN  09/17/23 0005 Unknown

## 2024-11-14 NOTE — ED ADULT NURSE NOTE - BEFAST SPEECH SLURRED
Hope Carpenter MD;  Location: [facility] L&D;  Service: OB/GYN;  No

## 2025-05-16 ENCOUNTER — INPATIENT (INPATIENT)
Facility: HOSPITAL | Age: 60
LOS: 22 days | Discharge: HOME | DRG: 861 | End: 2025-06-08
Attending: STUDENT IN AN ORGANIZED HEALTH CARE EDUCATION/TRAINING PROGRAM | Admitting: INTERNAL MEDICINE
Payer: MEDICAID

## 2025-05-16 VITALS
RESPIRATION RATE: 20 BRPM | TEMPERATURE: 98 F | OXYGEN SATURATION: 98 % | DIASTOLIC BLOOD PRESSURE: 54 MMHG | HEART RATE: 68 BPM | SYSTOLIC BLOOD PRESSURE: 145 MMHG

## 2025-05-16 DIAGNOSIS — R79.89 OTHER SPECIFIED ABNORMAL FINDINGS OF BLOOD CHEMISTRY: ICD-10-CM

## 2025-05-16 LAB
ALBUMIN SERPL ELPH-MCNC: 3.9 G/DL — SIGNIFICANT CHANGE UP (ref 3.5–5.2)
ALP SERPL-CCNC: 134 U/L — HIGH (ref 30–115)
ALT FLD-CCNC: 11 U/L — SIGNIFICANT CHANGE UP (ref 0–41)
ANION GAP SERPL CALC-SCNC: 18 MMOL/L — HIGH (ref 7–14)
APPEARANCE UR: ABNORMAL
AST SERPL-CCNC: 19 U/L — SIGNIFICANT CHANGE UP (ref 0–41)
BACTERIA # UR AUTO: ABNORMAL /HPF
BASE EXCESS BLDV CALC-SCNC: 0.5 MMOL/L — SIGNIFICANT CHANGE UP (ref -2–3)
BASOPHILS # BLD AUTO: 0.02 K/UL — SIGNIFICANT CHANGE UP (ref 0–0.2)
BASOPHILS NFR BLD AUTO: 0.4 % — SIGNIFICANT CHANGE UP (ref 0–1)
BILIRUB SERPL-MCNC: 0.4 MG/DL — SIGNIFICANT CHANGE UP (ref 0.2–1.2)
BILIRUB UR-MCNC: NEGATIVE — SIGNIFICANT CHANGE UP
BUN SERPL-MCNC: 30 MG/DL — HIGH (ref 10–20)
CALCIUM SERPL-MCNC: 9 MG/DL — SIGNIFICANT CHANGE UP (ref 8.4–10.5)
CAST: 2 /LPF — SIGNIFICANT CHANGE UP (ref 0–4)
CHLORIDE SERPL-SCNC: 92 MMOL/L — LOW (ref 98–110)
CO2 SERPL-SCNC: 23 MMOL/L — SIGNIFICANT CHANGE UP (ref 17–32)
COLOR SPEC: YELLOW — SIGNIFICANT CHANGE UP
CREAT SERPL-MCNC: 4.1 MG/DL — CRITICAL HIGH (ref 0.7–1.5)
DIFF PNL FLD: ABNORMAL
EGFR: 12 ML/MIN/1.73M2 — LOW
EGFR: 12 ML/MIN/1.73M2 — LOW
EOSINOPHIL # BLD AUTO: 0.03 K/UL — SIGNIFICANT CHANGE UP (ref 0–0.7)
EOSINOPHIL NFR BLD AUTO: 0.6 % — SIGNIFICANT CHANGE UP (ref 0–8)
GAS PNL BLDV: 127 MMOL/L — LOW (ref 136–145)
GAS PNL BLDV: SIGNIFICANT CHANGE UP
GAS PNL BLDV: SIGNIFICANT CHANGE UP
GLUCOSE BLDC GLUCOMTR-MCNC: 224 MG/DL — HIGH (ref 70–99)
GLUCOSE BLDC GLUCOMTR-MCNC: 438 MG/DL — HIGH (ref 70–99)
GLUCOSE BLDC GLUCOMTR-MCNC: 460 MG/DL — CRITICAL HIGH (ref 70–99)
GLUCOSE BLDC GLUCOMTR-MCNC: 461 MG/DL — CRITICAL HIGH (ref 70–99)
GLUCOSE SERPL-MCNC: 308 MG/DL — HIGH (ref 70–99)
GLUCOSE UR QL: 500 MG/DL
HCO3 BLDV-SCNC: 26 MMOL/L — SIGNIFICANT CHANGE UP (ref 22–29)
HCT VFR BLD CALC: 31.8 % — LOW (ref 37–47)
HGB BLD-MCNC: 10.7 G/DL — LOW (ref 12–16)
IMM GRANULOCYTES NFR BLD AUTO: 0.2 % — SIGNIFICANT CHANGE UP (ref 0.1–0.3)
KETONES UR QL: 15 MG/DL
LACTATE BLDV-MCNC: 1.1 MMOL/L — SIGNIFICANT CHANGE UP (ref 0.5–2)
LEUKOCYTE ESTERASE UR-ACNC: ABNORMAL
LIDOCAIN IGE QN: 27 U/L — SIGNIFICANT CHANGE UP (ref 7–60)
LYMPHOCYTES # BLD AUTO: 0.79 K/UL — LOW (ref 1.2–3.4)
LYMPHOCYTES # BLD AUTO: 17.1 % — LOW (ref 20.5–51.1)
MAGNESIUM SERPL-MCNC: 2.5 MG/DL — HIGH (ref 1.8–2.4)
MCHC RBC-ENTMCNC: 32 PG — HIGH (ref 27–31)
MCHC RBC-ENTMCNC: 33.6 G/DL — SIGNIFICANT CHANGE UP (ref 32–37)
MCV RBC AUTO: 95.2 FL — SIGNIFICANT CHANGE UP (ref 81–99)
MONOCYTES # BLD AUTO: 0.42 K/UL — SIGNIFICANT CHANGE UP (ref 0.1–0.6)
MONOCYTES NFR BLD AUTO: 9.1 % — SIGNIFICANT CHANGE UP (ref 1.7–9.3)
NEUTROPHILS # BLD AUTO: 3.36 K/UL — SIGNIFICANT CHANGE UP (ref 1.4–6.5)
NEUTROPHILS NFR BLD AUTO: 72.6 % — SIGNIFICANT CHANGE UP (ref 42.2–75.2)
NITRITE UR-MCNC: NEGATIVE — SIGNIFICANT CHANGE UP
NRBC BLD AUTO-RTO: 0 /100 WBCS — SIGNIFICANT CHANGE UP (ref 0–0)
NT-PROBNP SERPL-SCNC: 4611 PG/ML — HIGH (ref 0–300)
PCO2 BLDV: 44 MMHG — HIGH (ref 39–42)
PH BLDV: 7.38 — SIGNIFICANT CHANGE UP (ref 7.32–7.43)
PH UR: 7 — SIGNIFICANT CHANGE UP (ref 5–8)
PLATELET # BLD AUTO: 192 K/UL — SIGNIFICANT CHANGE UP (ref 130–400)
PMV BLD: 11 FL — HIGH (ref 7.4–10.4)
PO2 BLDV: 59 MMHG — HIGH (ref 25–45)
POTASSIUM BLDV-SCNC: 4.4 MMOL/L — SIGNIFICANT CHANGE UP (ref 3.5–5.1)
POTASSIUM SERPL-MCNC: 4.5 MMOL/L — SIGNIFICANT CHANGE UP (ref 3.5–5)
POTASSIUM SERPL-SCNC: 4.5 MMOL/L — SIGNIFICANT CHANGE UP (ref 3.5–5)
PROT SERPL-MCNC: 6.1 G/DL — SIGNIFICANT CHANGE UP (ref 6–8)
PROT UR-MCNC: 300 MG/DL
RBC # BLD: 3.34 M/UL — LOW (ref 4.2–5.4)
RBC # FLD: 14.1 % — SIGNIFICANT CHANGE UP (ref 11.5–14.5)
RBC CASTS # UR COMP ASSIST: 6 /HPF — HIGH (ref 0–4)
SAO2 % BLDV: 89.9 % — HIGH (ref 67–88)
SODIUM SERPL-SCNC: 133 MMOL/L — LOW (ref 135–146)
SP GR SPEC: 1.02 — SIGNIFICANT CHANGE UP (ref 1–1.03)
SQUAMOUS # UR AUTO: 5 /HPF — SIGNIFICANT CHANGE UP (ref 0–5)
TROPONIN T, HIGH SENSITIVITY RESULT: 64 NG/L — CRITICAL HIGH (ref 6–13)
TROPONIN T, HIGH SENSITIVITY RESULT: 66 NG/L — CRITICAL HIGH (ref 6–13)
TROPONIN T, HIGH SENSITIVITY RESULT: 70 NG/L — CRITICAL HIGH (ref 6–13)
UROBILINOGEN FLD QL: 0.2 MG/DL — SIGNIFICANT CHANGE UP (ref 0.2–1)
WBC # BLD: 4.63 K/UL — LOW (ref 4.8–10.8)
WBC # FLD AUTO: 4.63 K/UL — LOW (ref 4.8–10.8)
WBC UR QL: 481 /HPF — HIGH (ref 0–5)

## 2025-05-16 PROCEDURE — 85379 FIBRIN DEGRADATION QUANT: CPT

## 2025-05-16 PROCEDURE — 70450 CT HEAD/BRAIN W/O DYE: CPT

## 2025-05-16 PROCEDURE — 83605 ASSAY OF LACTIC ACID: CPT

## 2025-05-16 PROCEDURE — 84100 ASSAY OF PHOSPHORUS: CPT

## 2025-05-16 PROCEDURE — 83735 ASSAY OF MAGNESIUM: CPT

## 2025-05-16 PROCEDURE — 82746 ASSAY OF FOLIC ACID SERUM: CPT

## 2025-05-16 PROCEDURE — 80053 COMPREHEN METABOLIC PANEL: CPT

## 2025-05-16 PROCEDURE — 94002 VENT MGMT INPAT INIT DAY: CPT

## 2025-05-16 PROCEDURE — 82962 GLUCOSE BLOOD TEST: CPT

## 2025-05-16 PROCEDURE — 99222 1ST HOSP IP/OBS MODERATE 55: CPT

## 2025-05-16 PROCEDURE — 82803 BLOOD GASES ANY COMBINATION: CPT

## 2025-05-16 PROCEDURE — 93306 TTE W/DOPPLER COMPLETE: CPT

## 2025-05-16 PROCEDURE — 83550 IRON BINDING TEST: CPT

## 2025-05-16 PROCEDURE — 90935 HEMODIALYSIS ONE EVALUATION: CPT

## 2025-05-16 PROCEDURE — 70498 CT ANGIOGRAPHY NECK: CPT

## 2025-05-16 PROCEDURE — 87641 MR-STAPH DNA AMP PROBE: CPT

## 2025-05-16 PROCEDURE — 85025 COMPLETE CBC W/AUTO DIFF WBC: CPT

## 2025-05-16 PROCEDURE — 82728 ASSAY OF FERRITIN: CPT

## 2025-05-16 PROCEDURE — 83036 HEMOGLOBIN GLYCOSYLATED A1C: CPT

## 2025-05-16 PROCEDURE — 72125 CT NECK SPINE W/O DYE: CPT

## 2025-05-16 PROCEDURE — 76705 ECHO EXAM OF ABDOMEN: CPT

## 2025-05-16 PROCEDURE — 70450 CT HEAD/BRAIN W/O DYE: CPT | Mod: 26

## 2025-05-16 PROCEDURE — 82607 VITAMIN B-12: CPT

## 2025-05-16 PROCEDURE — 93017 CV STRESS TEST TRACING ONLY: CPT

## 2025-05-16 PROCEDURE — 97162 PT EVAL MOD COMPLEX 30 MIN: CPT | Mod: GP

## 2025-05-16 PROCEDURE — 97110 THERAPEUTIC EXERCISES: CPT | Mod: GP

## 2025-05-16 PROCEDURE — 71045 X-RAY EXAM CHEST 1 VIEW: CPT

## 2025-05-16 PROCEDURE — 85014 HEMATOCRIT: CPT

## 2025-05-16 PROCEDURE — 71045 X-RAY EXAM CHEST 1 VIEW: CPT | Mod: 26

## 2025-05-16 PROCEDURE — 94003 VENT MGMT INPAT SUBQ DAY: CPT

## 2025-05-16 PROCEDURE — 80048 BASIC METABOLIC PNL TOTAL CA: CPT

## 2025-05-16 PROCEDURE — 85730 THROMBOPLASTIN TIME PARTIAL: CPT

## 2025-05-16 PROCEDURE — 99223 1ST HOSP IP/OBS HIGH 75: CPT

## 2025-05-16 PROCEDURE — 83540 ASSAY OF IRON: CPT

## 2025-05-16 PROCEDURE — 72142 MRI NECK SPINE W/DYE: CPT

## 2025-05-16 PROCEDURE — 99221 1ST HOSP IP/OBS SF/LOW 40: CPT

## 2025-05-16 PROCEDURE — 84132 ASSAY OF SERUM POTASSIUM: CPT

## 2025-05-16 PROCEDURE — 94660 CPAP INITIATION&MGMT: CPT

## 2025-05-16 PROCEDURE — 97116 GAIT TRAINING THERAPY: CPT | Mod: GP

## 2025-05-16 PROCEDURE — 93970 EXTREMITY STUDY: CPT

## 2025-05-16 PROCEDURE — 84295 ASSAY OF SERUM SODIUM: CPT

## 2025-05-16 PROCEDURE — 70496 CT ANGIOGRAPHY HEAD: CPT

## 2025-05-16 PROCEDURE — 82330 ASSAY OF CALCIUM: CPT

## 2025-05-16 PROCEDURE — 84484 ASSAY OF TROPONIN QUANT: CPT

## 2025-05-16 PROCEDURE — 85610 PROTHROMBIN TIME: CPT

## 2025-05-16 PROCEDURE — 94640 AIRWAY INHALATION TREATMENT: CPT

## 2025-05-16 PROCEDURE — 99291 CRITICAL CARE FIRST HOUR: CPT

## 2025-05-16 PROCEDURE — 72149 MRI LUMBAR SPINE W/DYE: CPT

## 2025-05-16 PROCEDURE — 0042T: CPT

## 2025-05-16 PROCEDURE — 93005 ELECTROCARDIOGRAM TRACING: CPT

## 2025-05-16 PROCEDURE — A9500: CPT

## 2025-05-16 PROCEDURE — 94760 N-INVAS EAR/PLS OXIMETRY 1: CPT

## 2025-05-16 PROCEDURE — 85018 HEMOGLOBIN: CPT

## 2025-05-16 PROCEDURE — 78452 HT MUSCLE IMAGE SPECT MULT: CPT

## 2025-05-16 PROCEDURE — 0225U NFCT DS DNA&RNA 21 SARSCOV2: CPT

## 2025-05-16 PROCEDURE — 36415 COLL VENOUS BLD VENIPUNCTURE: CPT

## 2025-05-16 PROCEDURE — 82010 KETONE BODYS QUAN: CPT

## 2025-05-16 PROCEDURE — 87640 STAPH A DNA AMP PROBE: CPT

## 2025-05-16 PROCEDURE — A9579: CPT

## 2025-05-16 RX ORDER — INSULIN GLARGINE-YFGN 100 [IU]/ML
80 INJECTION, SOLUTION SUBCUTANEOUS EVERY MORNING
Refills: 0 | Status: DISCONTINUED | OUTPATIENT
Start: 2025-05-17 | End: 2025-05-18

## 2025-05-16 RX ORDER — CEFTRIAXONE 500 MG/1
1000 INJECTION, POWDER, FOR SOLUTION INTRAMUSCULAR; INTRAVENOUS EVERY 24 HOURS
Refills: 0 | Status: DISCONTINUED | OUTPATIENT
Start: 2025-05-16 | End: 2025-05-16

## 2025-05-16 RX ORDER — NIFEDIPINE 30 MG
90 TABLET, EXTENDED RELEASE 24 HR ORAL DAILY
Refills: 0 | Status: DISCONTINUED | OUTPATIENT
Start: 2025-05-16 | End: 2025-05-19

## 2025-05-16 RX ORDER — INSULIN LISPRO 100 U/ML
INJECTION, SOLUTION INTRAVENOUS; SUBCUTANEOUS AT BEDTIME
Refills: 0 | Status: DISCONTINUED | OUTPATIENT
Start: 2025-05-16 | End: 2025-05-20

## 2025-05-16 RX ORDER — CEFTRIAXONE 500 MG/1
2000 INJECTION, POWDER, FOR SOLUTION INTRAMUSCULAR; INTRAVENOUS ONCE
Refills: 0 | Status: COMPLETED | OUTPATIENT
Start: 2025-05-16 | End: 2025-05-16

## 2025-05-16 RX ORDER — GLUCAGON 3 MG/1
1 POWDER NASAL ONCE
Refills: 0 | Status: DISCONTINUED | OUTPATIENT
Start: 2025-05-16 | End: 2025-06-08

## 2025-05-16 RX ORDER — DEXTROMETHORPHAN HBR, GUAIFENESIN 200 MG/10ML
600 LIQUID ORAL EVERY 12 HOURS
Refills: 0 | Status: DISCONTINUED | OUTPATIENT
Start: 2025-05-16 | End: 2025-05-28

## 2025-05-16 RX ORDER — INSULIN LISPRO 100 U/ML
INJECTION, SOLUTION INTRAVENOUS; SUBCUTANEOUS
Refills: 0 | Status: DISCONTINUED | OUTPATIENT
Start: 2025-05-16 | End: 2025-05-20

## 2025-05-16 RX ORDER — DEXTROSE 50 % IN WATER 50 %
25 SYRINGE (ML) INTRAVENOUS ONCE
Refills: 0 | Status: DISCONTINUED | OUTPATIENT
Start: 2025-05-16 | End: 2025-06-08

## 2025-05-16 RX ORDER — SEVELAMER HYDROCHLORIDE 800 MG/1
1600 TABLET ORAL
Refills: 0 | Status: DISCONTINUED | OUTPATIENT
Start: 2025-05-16 | End: 2025-05-27

## 2025-05-16 RX ORDER — DEXTROSE 50 % IN WATER 50 %
15 SYRINGE (ML) INTRAVENOUS ONCE
Refills: 0 | Status: DISCONTINUED | OUTPATIENT
Start: 2025-05-16 | End: 2025-06-08

## 2025-05-16 RX ORDER — ATORVASTATIN CALCIUM 80 MG/1
10 TABLET, FILM COATED ORAL AT BEDTIME
Refills: 0 | Status: DISCONTINUED | OUTPATIENT
Start: 2025-05-16 | End: 2025-05-27

## 2025-05-16 RX ORDER — HEPARIN SODIUM 1000 [USP'U]/ML
5000 INJECTION INTRAVENOUS; SUBCUTANEOUS EVERY 12 HOURS
Refills: 0 | Status: DISCONTINUED | OUTPATIENT
Start: 2025-05-16 | End: 2025-06-08

## 2025-05-16 RX ORDER — DOXAZOSIN MESYLATE 8 MG/1
1 TABLET ORAL
Refills: 0 | DISCHARGE

## 2025-05-16 RX ORDER — SODIUM CHLORIDE 9 G/1000ML
1000 INJECTION, SOLUTION INTRAVENOUS
Refills: 0 | Status: DISCONTINUED | OUTPATIENT
Start: 2025-05-16 | End: 2025-06-08

## 2025-05-16 RX ORDER — DOXAZOSIN MESYLATE 8 MG/1
1 TABLET ORAL
Refills: 0 | Status: DISCONTINUED | OUTPATIENT
Start: 2025-05-16 | End: 2025-06-08

## 2025-05-16 RX ORDER — INSULIN LISPRO 100 U/ML
3 INJECTION, SOLUTION INTRAVENOUS; SUBCUTANEOUS
Refills: 0 | Status: DISCONTINUED | OUTPATIENT
Start: 2025-05-16 | End: 2025-05-18

## 2025-05-16 RX ORDER — CEFTRIAXONE 500 MG/1
1000 INJECTION, POWDER, FOR SOLUTION INTRAMUSCULAR; INTRAVENOUS EVERY 24 HOURS
Refills: 0 | Status: COMPLETED | OUTPATIENT
Start: 2025-05-17 | End: 2025-05-19

## 2025-05-16 RX ORDER — ACETAMINOPHEN 500 MG/5ML
650 LIQUID (ML) ORAL EVERY 6 HOURS
Refills: 0 | Status: DISCONTINUED | OUTPATIENT
Start: 2025-05-16 | End: 2025-05-22

## 2025-05-16 RX ORDER — ASPIRIN 325 MG
81 TABLET ORAL DAILY
Refills: 0 | Status: DISCONTINUED | OUTPATIENT
Start: 2025-05-16 | End: 2025-06-08

## 2025-05-16 RX ADMIN — INSULIN LISPRO 8: 100 INJECTION, SOLUTION INTRAVENOUS; SUBCUTANEOUS at 23:23

## 2025-05-16 RX ADMIN — Medication 150 MILLIGRAM(S): at 17:43

## 2025-05-16 RX ADMIN — DEXTROMETHORPHAN HBR, GUAIFENESIN 600 MILLIGRAM(S): 200 LIQUID ORAL at 17:43

## 2025-05-16 RX ADMIN — ATORVASTATIN CALCIUM 10 MILLIGRAM(S): 80 TABLET, FILM COATED ORAL at 22:28

## 2025-05-16 RX ADMIN — Medication 650 MILLIGRAM(S): at 20:15

## 2025-05-16 RX ADMIN — CEFTRIAXONE 100 MILLIGRAM(S): 500 INJECTION, POWDER, FOR SOLUTION INTRAMUSCULAR; INTRAVENOUS at 12:40

## 2025-05-16 RX ADMIN — SEVELAMER HYDROCHLORIDE 1600 MILLIGRAM(S): 800 TABLET ORAL at 17:44

## 2025-05-16 RX ADMIN — INSULIN LISPRO 3 UNIT(S): 100 INJECTION, SOLUTION INTRAVENOUS; SUBCUTANEOUS at 22:31

## 2025-05-16 RX ADMIN — Medication 650 MILLIGRAM(S): at 19:40

## 2025-05-16 RX ADMIN — CEFTRIAXONE 100 MILLIGRAM(S): 500 INJECTION, POWDER, FOR SOLUTION INTRAMUSCULAR; INTRAVENOUS at 23:24

## 2025-05-16 RX ADMIN — Medication 81 MILLIGRAM(S): at 17:43

## 2025-05-16 RX ADMIN — DOXAZOSIN MESYLATE 1 MILLIGRAM(S): 8 TABLET ORAL at 22:28

## 2025-05-16 NOTE — ED PROVIDER NOTE - OBJECTIVE STATEMENT
59-year-old female with past medical history of ESRD on hemodialysis Monday Wednesday Friday IDDM dyslipidemia CKD–MBD hypertension presenting to the ED for chief complaint of chest pain during hemodialysis session this morning.  Patient had approximately 90 minutes of total hemodialysis prior to arrival.  Per patient she has been experiencing chest pressure radiating to her head and neck x 2 days.  Denies any fever vision changes URI symptoms numbness tingling focal weakness shortness of breath abdominal pain flank pain nausea vomiting diarrhea acute rash or edema.  Patient states she still produces urine.

## 2025-05-16 NOTE — ED ADULT NURSE NOTE - NSFALLUNIVINTERV_ED_ALL_ED
Assistance OOB with selected safe patient handling equipment if applicable/Encourage patient to sit up slowly, dangle for a short time, stand at bedside before walking/Bed/Stretcher in lowest position, wheels locked, appropriate side rails in place/Call bell, personal items and telephone in reach/Instruct patient to call for assistance before getting out of bed/chair/stretcher/Non-slip footwear applied when patient is off stretcher/[place] to call system/Physically safe environment - no spills, clutter or unnecessary equipment/Purposeful proactive rounding/Room/bathroom lighting operational, light cord in reach

## 2025-05-16 NOTE — CONSULT NOTE ADULT - SUBJECTIVE AND OBJECTIVE BOX
NEPHROLOGY CONSULTATION NOTE    Patient is a 59y Female with h/o ESRD on HD (MWF), IDDM, dyslipidemia, CKD-MBD, HTNwhom presented to the hospital with CP during HD session this morning.  Had 90 minutes of total HD prior to arrival.  Nephrology now consulted for HD needs while in-house.  U/A on presentation noted for large LE, few bacteria and elevated WBC count.  Moderate troponinemia also noted on admission, Lactate 1.1, K 4.5, elevated BNP and uncontrolled blood sugar.    PAST MEDICAL & SURGICAL HISTORY:  Chronic kidney disease, unspecified CKD stage    Allergies:  No Known Allergies    Home Medications Reviewed  Hospital Medications:   MEDICATIONS  (STANDING):    SOCIAL HISTORY:  Denies ETOH,Smoking,   FAMILY HISTORY:      REVIEW OF SYSTEMS:  CONSTITUTIONAL: No weakness, fevers or chills  EYES/ENT: No visual changes;  No vertigo or throat pain   NECK: No pain or stiffness  RESPIRATORY: No cough, wheezing, hemoptysis; No shortness of breath  CARDIOVASCULAR: No chest pain or palpitations.  GASTROINTESTINAL: No abdominal or epigastric pain. No nausea, vomiting, or hematemesis; No diarrhea or constipation. No melena or hematochezia.  GENITOURINARY: No dysuria, frequency, foamy urine, urinary urgency, incontinence or hematuria  NEUROLOGICAL: No numbness or weakness  SKIN: No itching, burning, rashes, or lesions   VASCULAR: No bilateral lower extremity edema.   All other review of systems is negative unless indicated above.    VITALS:  Vital Signs Last 24 Hrs  T(C): 36.5 (16 May 2025 07:50), Max: 36.5 (16 May 2025 07:50)  T(F): 97.7 (16 May 2025 07:50), Max: 97.7 (16 May 2025 07:50)  HR: 18 (16 May 2025 07:50) (18 - 68)  BP: 145/54 (16 May 2025 07:50) (145/54 - 145/54)  BP(mean): --  RR: 20 (16 May 2025 07:50) (20 - 20)  SpO2: 98% (16 May 2025 07:50) (98% - 98%)    Parameters below as of 16 May 2025 07:50  Patient On (Oxygen Delivery Method): nasal cannula  O2 Flow (L/min): 2        PHYSICAL EXAM:  Constitutional: NAD  HEENT: anicteric sclera, oropharynx clear, MMM  Neck: No JVD  Respiratory: CTAB, no wheezes, rales or rhonchi  Cardiovascular: S1, S2, RRR  Gastrointestinal: BS+, soft, NT/ND  Extremities: No cyanosis or clubbing. No peripheral edema  Neurological: A/O x 3, no focal deficits  Psychiatric: Normal mood, normal affect  : No CVA tenderness. No murillo.   Skin: No rashes  Vascular Access:  LUE AVF + thrill/bruit    LABS:      133[L]  |  92[L]  |  30[H]  ----------------------------<  308[H]  4.5   |  23  |  4.1[HH]    Ca    9.0      16 May 2025 08:48  Mg     2.5         TPro  6.1  /  Alb  3.9  /  TBili  0.4  /  DBili      /  AST  19  /  ALT  11  /  AlkPhos  134[H]      Creatinine Trend: 4.1 <--                        10.7   4.63  )-----------( 192      ( 16 May 2025 08:48 )             31.8     Urine Studies:  Urinalysis Basic - ( 16 May 2025 10:35 )    Color: Yellow / Appearance: Cloudy / S.020 / pH:   Gluc:  / Ketone:   / Bili: Negative / Urobili: 0.2 mg/dL   Blood:  / Protein: 300 mg/dL / Nitrite: Negative   Leuk Esterase: Large / RBC: 6 /HPF /  /HPF   Sq Epi:  / Non Sq Epi: 5 /HPF / Bacteria: Few /HPF                RADIOLOGY & ADDITIONAL STUDIES:               NEPHROLOGY CONSULTATION NOTE    Patient is a 59y Female with h/o ESRD on HD (MWF), IDDM, dyslipidemia, CKD-MBD, HTNwhom presented to the hospital with CP during HD session this morning.  Had 90 minutes of total HD prior to arrival.  Nephrology now consulted for HD needs while in-house.  U/A on presentation noted for large LE, few bacteria and elevated WBC count.  Moderate troponinemia also noted on admission, Lactate 1.1, K 4.5, elevated BNP and uncontrolled blood sugar.  EKG without significant hyperkalemic changes or ST elevations present, noted prolonged QT otherwise NSR noted.    PAST MEDICAL & SURGICAL HISTORY:  Chronic kidney disease, unspecified CKD stage    Allergies:  No Known Allergies    Home Medications Reviewed  Hospital Medications:   MEDICATIONS  (STANDING):    SOCIAL HISTORY:  Denies ETOH,Smoking,   FAMILY HISTORY:      REVIEW OF SYSTEMS:  CONSTITUTIONAL: No weakness, fevers or chills  EYES/ENT: No visual changes;  No vertigo or throat pain   NECK: No pain or stiffness  RESPIRATORY: No cough, wheezing, hemoptysis; No shortness of breath  CARDIOVASCULAR: No chest pain or palpitations.  GASTROINTESTINAL: No abdominal or epigastric pain. No nausea, vomiting, or hematemesis; No diarrhea or constipation. No melena or hematochezia.  GENITOURINARY: No dysuria, frequency, foamy urine, urinary urgency, incontinence or hematuria  NEUROLOGICAL: No numbness or weakness  SKIN: No itching, burning, rashes, or lesions   VASCULAR: No bilateral lower extremity edema.   All other review of systems is negative unless indicated above.    VITALS:  Vital Signs Last 24 Hrs  T(C): 36.5 (16 May 2025 07:50), Max: 36.5 (16 May 2025 07:50)  T(F): 97.7 (16 May 2025 07:50), Max: 97.7 (16 May 2025 07:50)  HR: 18 (16 May 2025 07:50) (18 - 68)  BP: 145/54 (16 May 2025 07:50) (145/54 - 145/54)  BP(mean): --  RR: 20 (16 May 2025 07:50) (20 - 20)  SpO2: 98% (16 May 2025 07:50) (98% - 98%)    Parameters below as of 16 May 2025 07:50  Patient On (Oxygen Delivery Method): nasal cannula  O2 Flow (L/min): 2        PHYSICAL EXAM:  Constitutional: NAD  HEENT: anicteric sclera, oropharynx clear, MMM  Neck: No JVD  Respiratory: CTAB, no wheezes, rales or rhonchi  Cardiovascular: S1, S2, RRR  Gastrointestinal: BS+, soft, NT/ND  Extremities: No cyanosis or clubbing. No peripheral edema  Neurological: A/O x 3, no focal deficits  Psychiatric: Normal mood, normal affect  : No CVA tenderness. No murillo.   Skin: No rashes  Vascular Access:  LUE AVF + thrill/bruit    LABS:      133[L]  |  92[L]  |  30[H]  ----------------------------<  308[H]  4.5   |  23  |  4.1[HH]    Ca    9.0      16 May 2025 08:48  Mg     2.5         TPro  6.1  /  Alb  3.9  /  TBili  0.4  /  DBili      /  AST  19  /  ALT  11  /  AlkPhos  134[H]      Creatinine Trend: 4.1 <--                        10.7   4.63  )-----------( 192      ( 16 May 2025 08:48 )             31.8     Urine Studies:  Urinalysis Basic - ( 16 May 2025 10:35 )    Color: Yellow / Appearance: Cloudy / S.020 / pH:   Gluc:  / Ketone:   / Bili: Negative / Urobili: 0.2 mg/dL   Blood:  / Protein: 300 mg/dL / Nitrite: Negative   Leuk Esterase: Large / RBC: 6 /HPF /  /HPF   Sq Epi:  / Non Sq Epi: 5 /HPF / Bacteria: Few /HPF                RADIOLOGY & ADDITIONAL STUDIES:

## 2025-05-16 NOTE — H&P ADULT - NSHPREVIEWOFSYSTEMS_GEN_ALL_CORE
REVIEW OF SYSTEMS:    CONSTITUTIONAL: No weakness, fevers or chills  NECK: Right posterior neck pain  RESPIRATORY: No cough, wheezing, hemoptysis; No shortness of breath  CARDIOVASCULAR: Chest pressure  GASTROINTESTINAL: No abdominal or epigastric pain. No nausea, vomiting, or hematemesis; No diarrhea or constipation. No melena or hematochezia.  GENITOURINARY: No dysuria, frequency or hematuria  NEUROLOGICAL: No numbness or weakness  SKIN: No itching, rashes no

## 2025-05-16 NOTE — ED PROVIDER NOTE - BIRTH SEX
I have reviewed discharge instructions with the patient. The patient verbalized understanding. Patient left ED via Discharge Method: ambulatory to Home with self. Opportunity for questions and clarification provided. Patient given 1 scripts. To continue your aftercare when you leave the hospital, you may receive an automated call from our care team to check in on how you are doing. This is a free service and part of our promise to provide the best care and service to meet your aftercare needs.  If you have questions, or wish to unsubscribe from this service please call [phone]. Thank you for Choosing our [facility] Emergency Department.           Nia Multani RN  07/07/22 [id number]
Female

## 2025-05-16 NOTE — PATIENT PROFILE ADULT - FALL HARM RISK - HARM RISK INTERVENTIONS

## 2025-05-16 NOTE — ED PROVIDER NOTE - PHYSICAL EXAMINATION
Constitutional: NAD  HEENT: anicteric sclera, oropharynx clear, MMM  Neck: No JVD soft non tender no cervical adenopathy noted  Respiratory: CTAB, no wheezes, rales or rhonchi  Cardiovascular: S1, S2, RRR  Gastrointestinal: BS+, soft, NT/ND  Extremities: No cyanosis or clubbing. No peripheral edema   LUE AVF + thrill/bruit  Neurological: A/O x 3, no focal deficits  Psychiatric: Normal mood, normal affect  : No CVA tenderness  Skin: No rashes

## 2025-05-16 NOTE — CONSULT NOTE ADULT - SUBJECTIVE AND OBJECTIVE BOX
Neurology Consult    HPI   Patient is a 59y old  Female w PMH of DM, ESRD HD M,W,F, htn, hld, presents to ED for generalized burning/spasm sensation., non focal throughout entire body. ED obtained CTH bc "pt was alert and oriented however speaking sluggish." CTH reports focal hypodensity is noted within the right frontal periventricular white matter, not previously seen. Finding may reflect age-indeterminate ischemic change. Pt reports she has had diffuse burning/spasms throughout body, non focal, since she has been diagnosed w DM, however when she was receiving HD and burning/spasms was worse than usual. Pt denies focal deficits.         PAST MEDICAL & SURGICAL HISTORY:  Chronic kidney disease, unspecified CKD stage          FAMILY HISTORY:      Social History: (-) x 3    Allergies    No Known Allergies    Intolerances        MEDICATIONS  (STANDING):    MEDICATIONS  (PRN):      Review of systems:    Constitutional: as per HPI  Eyes: No eye pain or discharge  ENMT:  No difficulty hearing; No sinus or throat pain  Neck: No pain or stiffness  Respiratory: No cough, wheezing, chills or hemoptysis  Cardiovascular: No chest pain, palpitations, shortness of breath, dyspnea on exertion  Gastrointestinal: No abdominal pain, nausea, vomiting or hematemesis; No diarrhea or constipation.   Genitourinary: No dysuria, frequency, hematuria or incontinence  Neurological: As per HPI  Skin: No rashes or lesions   Endocrine: No heat or cold intolerance; No hair loss  Musculoskeletal: No joint pain or swelling  Psychiatric: No depression, anxiety, mood swings  Heme/Lymph: No easy bruising or bleeding gums    Vital Signs Last 24 Hrs  T(C): 36.5 (16 May 2025 07:50), Max: 36.5 (16 May 2025 07:50)  T(F): 97.7 (16 May 2025 07:50), Max: 97.7 (16 May 2025 07:50)  HR: 18 (16 May 2025 07:50) (18 - 68)  BP: 145/54 (16 May 2025 07:50) (145/54 - 145/54)  BP(mean): --  RR: 20 (16 May 2025 07:50) (20 - 20)  SpO2: 98% (16 May 2025 07:50) (98% - 98%)    Parameters below as of 16 May 2025 07:50  Patient On (Oxygen Delivery Method): nasal cannula  O2 Flow (L/min): 2      Examination:  Physical exam:  General: No acute distress, awake and alert      Neurologic:  -Mental status: Awake, alert, oriented to person, place, and time. Speech is fluent with intact naming, repetition, and comprehension, no dysarthria. Recent and remote memory intact. Follows commands. Attention/concentration intact. Fund of knowledge appropriate.  -Cranial nerves:   II: Visual fields are full to confrontation.  III, IV, VI: Extraocular movements are intact without nystagmus. Pupils equally round and reactive to light  V:  Facial sensation V1-V3 equal and intact   VII: Face is symmetric with normal eye closure and smile  VIII: Hearing is grossly intact  Motor: Normal bulk and tone. No pronator drift in any extremity.   Sensation: Intact to light touch bilaterally, decreased sensation to b/l distal LE. . No neglect or extinction on double simultaneous testing.  Coordination: No dysmetria on finger-to-nose    NIHSS- 0         Labs:   CBC Full  -  ( 16 May 2025 08:48 )  WBC Count : 4.63 K/uL  RBC Count : 3.34 M/uL  Hemoglobin : 10.7 g/dL  Hematocrit : 31.8 %  Platelet Count - Automated : 192 K/uL  Mean Cell Volume : 95.2 fL  Mean Cell Hemoglobin : 32.0 pg  Mean Cell Hemoglobin Concentration : 33.6 g/dL  Auto Neutrophil # : x  Auto Lymphocyte # : x  Auto Monocyte # : x  Auto Eosinophil # : x  Auto Basophil # : x  Auto Neutrophil % : x  Auto Lymphocyte % : x  Auto Monocyte % : x  Auto Eosinophil % : x  Auto Basophil % : x        133[L]  |  92[L]  |  30[H]  ----------------------------<  308[H]  4.5   |  23  |  4.1[HH]    Ca    9.0      16 May 2025 08:48  Mg     2.5     -    TPro  6.1  /  Alb  3.9  /  TBili  0.4  /  DBili  x   /  AST  19  /  ALT  11  /  AlkPhos  134[H]  -    LIVER FUNCTIONS - ( 16 May 2025 08:48 )  Alb: 3.9 g/dL / Pro: 6.1 g/dL / ALK PHOS: 134 U/L / ALT: 11 U/L / AST: 19 U/L / GGT: x             Urinalysis Basic - ( 16 May 2025 10:35 )    Color: Yellow / Appearance: Cloudy / S.020 / pH: x  Gluc: x / Ketone: x  / Bili: Negative / Urobili: 0.2 mg/dL   Blood: x / Protein: 300 mg/dL / Nitrite: Negative   Leuk Esterase: Large / RBC: 6 /HPF /  /HPF   Sq Epi: x / Non Sq Epi: 5 /HPF / Bacteria: Few /HPF          Neuroimaging:  NCHCT: CT Head No Cont:   ACC: [id number] EXAM:  CT BRAIN   ORDERED BY: CELESTE FITZPATRICK     PROCEDURE DATE:  2025          INTERPRETATION:  CLINICAL INDICATION: Confusion.    Technique: CT of the head was performed without contrast.    Multiple contiguous axial images were acquired from the skullbase to the   vertex without the administration of intravenous contrast.  Coronal and   sagittal reformations were made.    COMPARISON:  prior head CT dated 2023.    FINDINGS:    The ventricles and sulci are unremarkable in appearance.     A focal hypodensity is noted within the right frontal periventricular   white matter, not previously seen There is no intraparenchymal hematoma,   mass effect or midline shift. No abnormal extra-axial fluid collections   are present.    The calvarium is intact. 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 The visualized intraorbital   compartments, paranasal sinuses and mastoid complexes appear free of   acute disease.    IMPRESSION:  A focal hypodensity is noted within the right frontal periventricular   white matter, not previously seen. Finding may reflect age-indeterminate   ischemic change. An MRI of the brain can be obtained for further   evaluation if not clinically contraindicated.    --- End of Report ---            LINDY SYED MD; Attending Radiologist  This document has been electronically signed. May 16 2025  9:55AM (25 @ 09:33)      25 @ 13:12

## 2025-05-16 NOTE — H&P ADULT - ASSESSMENT
Patient is a 59y old  Female with PMH of DM, ESRD on HD MWF, HTN, HLD, and HF who presents to ED for chest pain during HD session this morning.  Had 90 minutes of total HD prior to arrival.  Patient stated that she felt chest pressure and exertional shortness of breath. Shortness of breath improves with rest. Patient was alert and oriented however speaking sluggishly on arrival to the ED. Endorses neck pain and chest pressure as well. Denies abdominal pain, dysuria, swelling.      Med Req confirmed with patient at bedside      # Slurred speech briefly   - CTH showed A focal hypodensity is noted within the right frontal periventricular white matter, not previously seen. Finding may reflect age-indeterminate ischemic change. An MRI of the brain can be obtained for further evaluation if not clinically contraindicated.  - Neuro consult noted no additional inpatient workup.    # Chest pain during HD   # ESRD on HD MWF  # Hx of HF (Unknown EF)  - trop 64 -> 66, Creatinine 4.1  - ECG showed prominent T wave   - CXR negative   - Echocardio: pending   - on Telemetry                       - Will receive HD tomorrow then go back to regular MWF schedule if patient is still admitted  - Cont aspirin and statin     # Asymptomatic Pyuria  - UA positive for bacteria  - Will hold off on abx and monitor for now as clinically no symptoms  - f/u UCx     # DM  - Lantus 80 units in AM  - insulin 3 units TIDC  - f/u A1C  - FS    # HTN  # DLD  - c/w hydralazine 150 mg PO BID  - patient state she uses nifedipine 90 PO if her SBP is above 200  - c/w home atorvastatin      # DVT prophylaxis: Heparin subq  # GI prophylaxis: Not indicated  # Diet: DASH/TLC renal replacement  # Activity: as tolerated  # Code status: Full Code  # Disposition: tele    Pending: UCx, PT/OT, clinical improvement

## 2025-05-16 NOTE — ED ADULT NURSE NOTE - CHIEF COMPLAINT QUOTE
Pt here with left sided chest pain radiating to arm and jaw while getting HD. 1.5 hrs total received. EKG done. Aspirin given by EMS 325mg. EMS reports pt was in afib initial rhythm

## 2025-05-16 NOTE — CONSULT NOTE ADULT - ASSESSMENT
59y old  Female w PMH of DM, ESRD HD M,W,F, htn, hld, presents to ED for generalized burning/spasm sensation., non focal throughout entire body. ED obtained CTH bc "pt was alert and oriented however speaking sluggish." CTH reports focal hypodensity is noted within the right frontal periventricular white matter, not previously seen. Finding may reflect age-indeterminate ischemic change. Pt reports she has had diffuse burning/spasms throughout body, non focal, since she has been diagnosed w DM. No focal neurologic deficits on exam, NIHSS 0. Suspect CTH is incidental finding, unrelated to presenting symptoms, likely chronic in nature.       RECS  - C/w aspirin 81mg daily and statin  - F/u stroke clinic outpatient  - No further inpatient neuro workup warranted at this time.     Discussed w Dr. Munoz

## 2025-05-16 NOTE — ED PROVIDER NOTE - CLINICAL SUMMARY MEDICAL DECISION MAKING FREE TEXT BOX
59 y.o. female, PMH of DM, ESRD on HD MWF, HTN, HLD, HF, presents to ED for chest pain during HD session this morning.  Had 90 minutes of total HD prior to arrival.  Patient stated that she felt chest pressure and exertional shortness of breath. Shortness of breath improves with rest. Patient is alert and awake but is slow to respond and is speaking sluggishly. Pt also endorses neck pain and chest pressure as well. Denies abdominal pain, dysuria, swelling.    On exam, pt in NAD, AAOx3, head NC/AT, CN II-XII intact, PEERL, EOMi, neck (-) midline tenderness, lungs CTA B/L, CV S1S2 regular, abdomen soft/NT/ND/(+)BS, ext (+) (+) thrill in LUE over AVF, moving all extremities.     Work up reviewed.     Trop is 64/66.  UA (+) bacteria  CXR negative  CT head (+) focal hypodensity within the right frontal periventricular white matter, not previously seen. Finding may reflect age-indeterminate ischemic change.     Will admit pt for further CP/SOB work up and CT findings.

## 2025-05-16 NOTE — ED PROVIDER NOTE - CONSIDERATION OF ADMISSION OBSERVATION
Consideration of Admission/Observation Patient is to be admitted to the hospital and the case was discussed with the admitting physician.  Any changes in plan, additional imaging/labs, and further work up will be at the discretion of the admitting physician.

## 2025-05-16 NOTE — CONSULT NOTE ADULT - ASSESSMENT
59y Female with h/o ESRD on HD ([facility]), IDDM, dyslipidemia, CKD-MBD, HTNwhom presented to the hospital with CP during HD session this morning.  Had 90 minutes of total HD prior to arrival.  Nephrology now consulted for HD needs while in-house.  U/A on presentation noted for large LE, few bacteria and elevated WBC count.  Moderate troponinemia also noted on admission, Lactate 1.1, K 4.5, elevated BNP and uncontrolled blood sugar.    #ESRD on HD  Continue HD on admission, no acute indication to complete treatment today pending ACS evaluation and troponinemia eval  URR 83%, spKtV 2.12 on last HD unit assessment, sufficiently dialyzed outpatient  - HD orders:  180min, Bath: K/2.5Ca/35HCO3, Qb 400 cc/min, Qd 500 cc/min, 1-2L UF as BP tolerates  - HD planned tomorrow, then will return to [facility] outpatient regimen next week if remains in hospital  - Dose Medications for ESRD (approx 20 ml/min)  - Avoid Al, Mg, Phos containing compounds  - Low K, Low Na, Low Phos diet    #CKD-MBD  Last PTH 1130, Phos 6.1, Vit D 25.8 on outpatient labwork  - on Hecterol 4mcg in HD unit  - continue home Sevelamer 1600mg TID    #Anemia  Hb at goal for ESRD, currently 10.4  - on Venofer 100mg and Mircera 100mcg in HD unit    #HTN  - UF in HD  - continue home Amlodipine 5mg daily, hold prior to HD on HD days      Nephrology will follow 59y Female with h/o ESRD on HD ([facility]), IDDM, dyslipidemia, CKD-MBD, HTNwhom presented to the hospital with CP during HD session this morning.  Had 90 minutes of total HD prior to arrival.  Nephrology now consulted for HD needs while in-house.  U/A on presentation noted for large LE, few bacteria and elevated WBC count.  Moderate troponinemia also noted on admission, Lactate 1.1, K 4.5, elevated BNP and uncontrolled blood sugar.    #Atypical chest pain  with elevated troponinemia and BNP, ?new onset CHF  no acute EKG changes noted  - pending cardiology eval    #ESRD on HD  Continue HD on admission, no acute indication to complete treatment today pending ACS evaluation and troponinemia eval  URR 83%, spKtV 2.12 on last HD unit assessment, sufficiently dialyzed outpatient  - HD orders:  180min, Bath: K/2.5Ca/35HCO3, Qb 400 cc/min, Qd 500 cc/min, 1-2L UF as BP tolerates  - HD planned tomorrow, then will return to [facility] outpatient regimen next week if remains in hospital  - Dose Medications for ESRD (approx 20 ml/min)  - Avoid Al, Mg, Phos containing compounds  - Low K, Low Na, Low Phos diet    #CKD-MBD  Last PTH 1130, Phos 6.1, Vit D 25.8 on outpatient labwork  - on Hecterol 4mcg in HD unit  - continue home Sevelamer 1600mg TID    #Anemia  Hb at goal for ESRD, currently 10.4  - on Venofer 100mg and Mircera 100mcg in HD unit    #HTN  - UF in HD  - continue home Amlodipine 5mg daily, hold prior to HD on HD days      Nephrology will follow 59y Female with h/o ESRD on HD ([facility]), IDDM, dyslipidemia, CKD-MBD, HTNwhom presented to the hospital with CP during HD session this morning.  Had 90 minutes of total HD prior to arrival.  Nephrology now consulted for HD needs while in-house.  U/A on presentation noted for large LE, few bacteria and elevated WBC count.  Moderate troponinemia also noted on admission, Lactate 1.1, K 4.5, elevated BNP and uncontrolled blood sugar.    #Atypical chest pain  with elevated troponinemia and BNP, ?new onset CHF  no acute EKG changes noted  - pending cardiology eval    #ESRD on HD  Continue HD on admission, no acute indication to complete treatment today pending ACS evaluation and troponinemia eval  URR 83%, last spKtV 2.12 on last HD unit assessment, appears to be sufficiently dialyzed outpatient  - HD orders:  180min, Bath: K/2.5Ca/35HCO3, Qb 400 cc/min, Qd 500 cc/min, 1-2L UF as BP tolerates  - HD planned tomorrow, then will return to [facility] outpatient regimen next week if remains in hospital  - Dose Medications for ESRD (approx 20 ml/min)  - Avoid Al, Mg, Phos containing compounds  - Low K, Low Na, Low Phos diet    #CKD-MBD  Last PTH 1130, Phos 6.1, Vit D 25.8 on outpatient labwork  - on Hecterol 4mcg in HD unit  - continue home Sevelamer 1600mg TID    #Anemia  Hb at goal for ESRD, currently 10.4  - on Venofer 100mg and Mircera 100mcg in HD unit    #HTN  - UF in HD  - continue home Amlodipine 5mg daily, hold prior to HD on HD days      Nephrology will follow

## 2025-05-16 NOTE — ED ADULT TRIAGE NOTE - CHIEF COMPLAINT QUOTE
Pt here with R sided chest pain radiating to arm and jaw while getting HD. 1.5 hrs total received. EKG done. Pt here with left sided chest pain radiating to arm and jaw while getting HD. 1.5 hrs total received. EKG done. Pt here with left sided chest pain radiating to arm and jaw while getting HD. 1.5 hrs total received. EKG done. Aspirin given by EMS 325mg. EMS reports pt was in afib initial rhythm

## 2025-05-16 NOTE — H&P ADULT - HISTORY OF PRESENT ILLNESS
Patient is a 59y old  Female with PMH of DM, ESRD on HD MWF, HTN, HLD, and HF who presents to ED for chest pain during HD session this morning.  Had 90 minutes of total HD prior to arrival.  Patient stated that she felt chest pressure and exertional shortness of breath. Shortness of breath improves with rest. Patient was alert and oriented however speaking sluggishly on arrival to the ED. Endorses neck pain and chest pressure as well. Denies abdominal pain, dysuria, swelling.    in the ED /54, HR 68, Temp 97.7, SpO2 98 on 2L NC  labs significant for trop 64 -> 66, Creatinine 4.1  UA showed bacteria  CXR negative  CT head showed A focal hypodensity is noted within the right frontal periventricular white matter, not previously seen. Finding may reflect age-indeterminate ischemic change.       Patient admitted for workup of chest pain during HD

## 2025-05-16 NOTE — H&P ADULT - NSHPPHYSICALEXAM_GEN_ALL_CORE
LOS:     VITALS:   T(C): 36.1 (05-16-25 @ 15:51), Max: 36.5 (05-16-25 @ 07:50)  HR: 80 (05-16-25 @ 15:51) (18 - 80)  BP: 184/69 (05-16-25 @ 15:51) (145/54 - 184/69)  RR: 18 (05-16-25 @ 15:51) (18 - 20)  SpO2: 96% (05-16-25 @ 15:51) (96% - 98%)    GENERAL: NAD, lying in bed comfortably  CHEST/LUNG: Clear to auscultation bilaterally; No rales, rhonchi, wheezing, or rubs. Unlabored respirations  HEART: Regular rate and rhythm; No murmurs, rubs, or gallops  ABDOMEN: BSx4; Soft, nontender, nondistended  EXTREMITIES:  2+ Peripheral Pulses, brisk capillary refill. No clubbing, cyanosis, or edema  NERVOUS SYSTEM:  A&Ox3, no focal deficits   SKIN: No rashes or lesions

## 2025-05-16 NOTE — H&P ADULT - NSHPLABSRESULTS_GEN_ALL_CORE
10.7   4.63  )-----------( 192      ( 16 May 2025 08:48 )             31.8           133[L]  |  92[L]  |  30[H]  ----------------------------<  308[H]  4.5   |  23  |  4.1[HH]    Ca    9.0      16 May 2025 08:48  Mg     2.5         TPro  6.1  /  Alb  3.9  /  TBili  0.4  /  DBili  x   /  AST  19  /  ALT  11  /  AlkPhos  134[H]                Urinalysis Basic - ( 16 May 2025 10:35 )    Color: Yellow / Appearance: Cloudy / S.020 / pH: x  Gluc: x / Ketone: x  / Bili: Negative / Urobili: 0.2 mg/dL   Blood: x / Protein: 300 mg/dL / Nitrite: Negative   Leuk Esterase: Large / RBC: 6 /HPF /  /HPF   Sq Epi: x / Non Sq Epi: 5 /HPF / Bacteria: Few /HPF            Lactate Trend            CAPILLARY BLOOD GLUCOSE      POCT Blood Glucose.: 224 mg/dL (16 May 2025 16:35)

## 2025-05-16 NOTE — H&P ADULT - ATTENDING COMMENTS
This is a 59 year old Female, with h/o ESRD on HD (MWF), IDDM, dyslipidemia,  HTN, presenting to our hospital for chest pain .     1. Chest pain during HD   - Telemetry                     - ECG:T wave prominent                    - CXR: WNL   - Trop detectable but no delta due to ESKD   - Echocardio: pending   - Consider stress testing       2. Acute UTI   - Start rocephin   - Urine cx:pending     3. ESKD on HD   - nephro consult     4. Hypertension  - Cont home meds    5. DL   - Cont home meds    6. DM-II uncontrolled  - Hgba1c:pending  - Insulin protocol     7, Slurred speech briefly   - CTH:  a) A focal hypodensity is noted within the right frontal periventricular white matter, not previously seen. Finding may reflect age-indeterminate ischemic change. An MRI of the brain can be obtained for further evaluation if not clinically contraindicated.  - Cont aspirin and statin   - Neuro consult appreciated    7. DVT px This is a 59 year old Female, with h/o ESRD on HD (MWF), IDDM, dyslipidemia,  HTN, presenting to our hospital for chest pain .     1. Chest pain during HD   - Telemetry                     - ECG:T wave prominent                    - CXR: WNL   - Trop detectable but no delta likely elevated  due to ESKD   - Echocardio: pending   - Nuclear stress test:pending      2. Acute UTI ?  - Start rocephin   - Urine cx:pending     3. ESKD on HD   - nephro consult     4. Hypertension  - c/w hydralazine 150 mg PO BID  - patient state she uses nifedipine 90 PO if her SBP is above 200    5. DL   - Cont home meds    6. DM-II uncontrolled  - Hgba1c:pending  - Insulin protocol     7, Slurred speech briefly   - CTH:  a) A focal hypodensity is noted within the right frontal periventricular white matter, not previously seen. Finding may reflect age-indeterminate ischemic change. An MRI of the brain can be obtained for further evaluation if not clinically contraindicated.  - Cont aspirin and statin   - Neuro consult appreciated    7. DVT px

## 2025-05-16 NOTE — ED ADULT NURSE NOTE - OBJECTIVE STATEMENT
BIBA for chest pain s/p dialysis. Pt reports right shoulder pain that's been radiating to her back for the past few days. Reports that pain has kept her awake. On exam, pt is a poor historian, speech is clear but slow.

## 2025-05-17 ENCOUNTER — RESULT REVIEW (OUTPATIENT)
Age: 60
End: 2025-05-17

## 2025-05-17 LAB
A1C WITH ESTIMATED AVERAGE GLUCOSE RESULT: 8.1 % — HIGH (ref 4–5.6)
ALBUMIN SERPL ELPH-MCNC: 4 G/DL — SIGNIFICANT CHANGE UP (ref 3.5–5.2)
ALP SERPL-CCNC: 121 U/L — HIGH (ref 30–115)
ALT FLD-CCNC: 8 U/L — SIGNIFICANT CHANGE UP (ref 0–41)
ANION GAP SERPL CALC-SCNC: 20 MMOL/L — HIGH (ref 7–14)
AST SERPL-CCNC: 19 U/L — SIGNIFICANT CHANGE UP (ref 0–41)
BASOPHILS # BLD AUTO: 0.05 K/UL — SIGNIFICANT CHANGE UP (ref 0–0.2)
BASOPHILS NFR BLD AUTO: 1.7 % — HIGH (ref 0–1)
BILIRUB SERPL-MCNC: 0.3 MG/DL — SIGNIFICANT CHANGE UP (ref 0.2–1.2)
BUN SERPL-MCNC: 41 MG/DL — HIGH (ref 10–20)
CALCIUM SERPL-MCNC: 9.9 MG/DL — SIGNIFICANT CHANGE UP (ref 8.4–10.5)
CHLORIDE SERPL-SCNC: 89 MMOL/L — LOW (ref 98–110)
CO2 SERPL-SCNC: 22 MMOL/L — SIGNIFICANT CHANGE UP (ref 17–32)
CREAT SERPL-MCNC: 6 MG/DL — CRITICAL HIGH (ref 0.7–1.5)
EGFR: 8 ML/MIN/1.73M2 — LOW
EGFR: 8 ML/MIN/1.73M2 — LOW
EOSINOPHIL # BLD AUTO: 0.09 K/UL — SIGNIFICANT CHANGE UP (ref 0–0.7)
EOSINOPHIL NFR BLD AUTO: 3 % — SIGNIFICANT CHANGE UP (ref 0–8)
ESTIMATED AVERAGE GLUCOSE: 186 MG/DL — HIGH (ref 68–114)
GLUCOSE BLDC GLUCOMTR-MCNC: 205 MG/DL — HIGH (ref 70–99)
GLUCOSE BLDC GLUCOMTR-MCNC: 206 MG/DL — HIGH (ref 70–99)
GLUCOSE BLDC GLUCOMTR-MCNC: 223 MG/DL — HIGH (ref 70–99)
GLUCOSE BLDC GLUCOMTR-MCNC: 230 MG/DL — HIGH (ref 70–99)
GLUCOSE BLDC GLUCOMTR-MCNC: 262 MG/DL — HIGH (ref 70–99)
GLUCOSE SERPL-MCNC: 171 MG/DL — HIGH (ref 70–99)
HCT VFR BLD CALC: 32 % — LOW (ref 37–47)
HGB BLD-MCNC: 10.5 G/DL — LOW (ref 12–16)
IMM GRANULOCYTES NFR BLD AUTO: 0 % — LOW (ref 0.1–0.3)
LYMPHOCYTES # BLD AUTO: 0.99 K/UL — LOW (ref 1.2–3.4)
LYMPHOCYTES # BLD AUTO: 32.9 % — SIGNIFICANT CHANGE UP (ref 20.5–51.1)
MAGNESIUM SERPL-MCNC: 2.6 MG/DL — HIGH (ref 1.8–2.4)
MCHC RBC-ENTMCNC: 31.8 PG — HIGH (ref 27–31)
MCHC RBC-ENTMCNC: 32.8 G/DL — SIGNIFICANT CHANGE UP (ref 32–37)
MCV RBC AUTO: 97 FL — SIGNIFICANT CHANGE UP (ref 81–99)
MONOCYTES # BLD AUTO: 0.35 K/UL — SIGNIFICANT CHANGE UP (ref 0.1–0.6)
MONOCYTES NFR BLD AUTO: 11.6 % — HIGH (ref 1.7–9.3)
NEUTROPHILS # BLD AUTO: 1.53 K/UL — SIGNIFICANT CHANGE UP (ref 1.4–6.5)
NEUTROPHILS NFR BLD AUTO: 50.8 % — SIGNIFICANT CHANGE UP (ref 42.2–75.2)
NRBC BLD AUTO-RTO: 0 /100 WBCS — SIGNIFICANT CHANGE UP (ref 0–0)
PLATELET # BLD AUTO: 199 K/UL — SIGNIFICANT CHANGE UP (ref 130–400)
PMV BLD: 11.3 FL — HIGH (ref 7.4–10.4)
POTASSIUM SERPL-MCNC: 5.1 MMOL/L — HIGH (ref 3.5–5)
POTASSIUM SERPL-SCNC: 5.1 MMOL/L — HIGH (ref 3.5–5)
PROT SERPL-MCNC: 6.4 G/DL — SIGNIFICANT CHANGE UP (ref 6–8)
RBC # BLD: 3.3 M/UL — LOW (ref 4.2–5.4)
RBC # FLD: 14.6 % — HIGH (ref 11.5–14.5)
SODIUM SERPL-SCNC: 131 MMOL/L — LOW (ref 135–146)
WBC # BLD: 3.01 K/UL — LOW (ref 4.8–10.8)
WBC # FLD AUTO: 3.01 K/UL — LOW (ref 4.8–10.8)

## 2025-05-17 PROCEDURE — 93306 TTE W/DOPPLER COMPLETE: CPT | Mod: 26

## 2025-05-17 PROCEDURE — 99232 SBSQ HOSP IP/OBS MODERATE 35: CPT

## 2025-05-17 PROCEDURE — 93018 CV STRESS TEST I&R ONLY: CPT

## 2025-05-17 PROCEDURE — 78452 HT MUSCLE IMAGE SPECT MULT: CPT | Mod: 26

## 2025-05-17 PROCEDURE — 93016 CV STRESS TEST SUPVJ ONLY: CPT

## 2025-05-17 RX ORDER — POLYETHYLENE GLYCOL 3350 17 G/17G
17 POWDER, FOR SOLUTION ORAL DAILY
Refills: 0 | Status: DISCONTINUED | OUTPATIENT
Start: 2025-05-17 | End: 2025-06-08

## 2025-05-17 RX ORDER — REGADENOSON 0.08 MG/ML
0.4 INJECTION, SOLUTION INTRAVENOUS ONCE
Refills: 0 | Status: DISCONTINUED | OUTPATIENT
Start: 2025-05-17 | End: 2025-05-20

## 2025-05-17 RX ORDER — MAGNESIUM, ALUMINUM HYDROXIDE 200-200 MG
30 TABLET,CHEWABLE ORAL EVERY 4 HOURS
Refills: 0 | Status: DISCONTINUED | OUTPATIENT
Start: 2025-05-17 | End: 2025-05-27

## 2025-05-17 RX ORDER — SENNA 187 MG
2 TABLET ORAL AT BEDTIME
Refills: 0 | Status: DISCONTINUED | OUTPATIENT
Start: 2025-05-17 | End: 2025-06-08

## 2025-05-17 RX ADMIN — HEPARIN SODIUM 5000 UNIT(S): 1000 INJECTION INTRAVENOUS; SUBCUTANEOUS at 05:20

## 2025-05-17 RX ADMIN — Medication 81 MILLIGRAM(S): at 16:27

## 2025-05-17 RX ADMIN — HEPARIN SODIUM 5000 UNIT(S): 1000 INJECTION INTRAVENOUS; SUBCUTANEOUS at 18:38

## 2025-05-17 RX ADMIN — Medication 150 MILLIGRAM(S): at 05:20

## 2025-05-17 RX ADMIN — Medication 650 MILLIGRAM(S): at 06:12

## 2025-05-17 RX ADMIN — ATORVASTATIN CALCIUM 10 MILLIGRAM(S): 80 TABLET, FILM COATED ORAL at 22:10

## 2025-05-17 RX ADMIN — DOXAZOSIN MESYLATE 1 MILLIGRAM(S): 8 TABLET ORAL at 22:10

## 2025-05-17 RX ADMIN — Medication 650 MILLIGRAM(S): at 22:28

## 2025-05-17 RX ADMIN — POLYETHYLENE GLYCOL 3350 17 GRAM(S): 17 POWDER, FOR SOLUTION ORAL at 22:14

## 2025-05-17 RX ADMIN — Medication 150 MILLIGRAM(S): at 18:38

## 2025-05-17 RX ADMIN — SEVELAMER HYDROCHLORIDE 1600 MILLIGRAM(S): 800 TABLET ORAL at 16:30

## 2025-05-17 RX ADMIN — INSULIN LISPRO 3 UNIT(S): 100 INJECTION, SOLUTION INTRAVENOUS; SUBCUTANEOUS at 16:27

## 2025-05-17 RX ADMIN — Medication 2 TABLET(S): at 22:13

## 2025-05-17 RX ADMIN — INSULIN LISPRO 6: 100 INJECTION, SOLUTION INTRAVENOUS; SUBCUTANEOUS at 16:27

## 2025-05-17 RX ADMIN — DEXTROMETHORPHAN HBR, GUAIFENESIN 600 MILLIGRAM(S): 200 LIQUID ORAL at 18:38

## 2025-05-17 RX ADMIN — INSULIN GLARGINE-YFGN 80 UNIT(S): 100 INJECTION, SOLUTION SUBCUTANEOUS at 16:26

## 2025-05-17 RX ADMIN — CEFTRIAXONE 100 MILLIGRAM(S): 500 INJECTION, POWDER, FOR SOLUTION INTRAMUSCULAR; INTRAVENOUS at 22:27

## 2025-05-17 RX ADMIN — DEXTROMETHORPHAN HBR, GUAIFENESIN 600 MILLIGRAM(S): 200 LIQUID ORAL at 05:20

## 2025-05-17 NOTE — PROGRESS NOTE ADULT - SUBJECTIVE AND OBJECTIVE BOX
24H events:    Patient is a 59y old Female who presents with a chief complaint of AMS (17 May 2025 07:58)    Primary diagnosis of Elevated troponin          Today is hospital day 1d.   This morning patient was seen and examined at bedside, resting comfortably in bed.    No acute or major events overnight.    Code Status:    Family communication:  Contact date:  Name of person contacted:  Relationship to patient:  Communication details:  What matters most:    PAST MEDICAL & SURGICAL HISTORY  Chronic kidney disease, unspecified CKD stage      SOCIAL HISTORY:  Social History:      ALLERGIES:  No Known Allergies    MEDICATIONS:  STANDING MEDICATIONS  aspirin  chewable 81 milliGRAM(s) Oral daily  atorvastatin 10 milliGRAM(s) Oral at bedtime  cefTRIAXone   IVPB 1000 milliGRAM(s) IV Intermittent every 24 hours  dextrose 5%. 1000 milliLiter(s) IV Continuous <Continuous>  dextrose 50% Injectable 25 Gram(s) IV Push once  doxazosin 1 milliGRAM(s) Oral <User Schedule>  glucagon  Injectable 1 milliGRAM(s) IntraMuscular once  guaiFENesin  milliGRAM(s) Oral every 12 hours  heparin   Injectable 5000 Unit(s) SubCutaneous every 12 hours  hydrALAZINE 150 milliGRAM(s) Oral two times a day  insulin glargine Injectable (LANTUS) 80 Unit(s) SubCutaneous every morning  insulin lispro (ADMELOG) corrective regimen sliding scale   SubCutaneous three times a day before meals  insulin lispro (ADMELOG) corrective regimen sliding scale   SubCutaneous at bedtime  insulin lispro Injectable (ADMELOG) 3 Unit(s) SubCutaneous three times a day before meals  polyethylene glycol 3350 17 Gram(s) Oral daily  regadenoson Injectable 0.4 milliGRAM(s) IV Push once  senna 2 Tablet(s) Oral at bedtime  sevelamer carbonate 1600 milliGRAM(s) Oral three times a day with meals    PRN MEDICATIONS  acetaminophen     Tablet .. 650 milliGRAM(s) Oral every 6 hours PRN  aluminum hydroxide/magnesium hydroxide/simethicone Suspension 30 milliLiter(s) Oral every 4 hours PRN  dextrose Oral Gel 15 Gram(s) Oral once PRN  NIFEdipine XL 90 milliGRAM(s) Oral daily PRN    VITALS:   T(F): 97.6  HR: 82  BP: 167/75  RR: 17  SpO2: 96%    PHYSICAL EXAM:  GENERAL: NAD, lying in bed comfortably  CHEST/LUNG: Clear to auscultation bilaterally. Unlabored respirations  HEART: Regular rate and rhythm; No murmurs  ABDOMEN: BSx4; Soft, nontender, nondistended  EXTREMITIES:  2+ Peripheral Pulses. No clubbing, cyanosis, or edema  NERVOUS SYSTEM:  A&Ox3, no focal deficits   SKIN: No rashes or lesions      LABS:                        10.5   3.01  )-----------( 199      ( 17 May 2025 06:21 )             32.0         133[L]  |  92[L]  |  30[H]  ----------------------------<  308[H]  4.5   |  23  |  4.1[HH]    Ca    9.0      16 May 2025 08:48  Mg     2.6         TPro  6.1  /  Alb  3.9  /  TBili  0.4  /  DBili  x   /  AST  19  /  ALT  11  /  AlkPhos  134[H]        Urinalysis Basic - ( 16 May 2025 10:35 )    Color: Yellow / Appearance: Cloudy / S.020 / pH: x  Gluc: x / Ketone: x  / Bili: Negative / Urobili: 0.2 mg/dL   Blood: x / Protein: 300 mg/dL / Nitrite: Negative   Leuk Esterase: Large / RBC: 6 /HPF /  /HPF   Sq Epi: x / Non Sq Epi: 5 /HPF / Bacteria: Few /HPF      RADIOLOGY:  CXR  Xray Chest 1 View- PORTABLE-Urgent:   ACC: [id number] EXAM:  XR CHEST PORTABLE URGENT 1V   ORDERED BY: CELESTE FITZPATRICK     PROCEDURE DATE:  2025          INTERPRETATION:  CLINICAL HISTORY / REASON FOR EXAM: Chest pain.    COMPARISON: Chest radiograph from May 18, 2023.    TECHNIQUE/POSITIONING: Satisfactory. Single image, AP chest radiograph.    FINDINGS:    SUPPORT DEVICES: None.    CARDIAC/MEDIASTINUM/HILUM: Unremarkable cardiac silhouette.    LUNG PARENCHYMA/PLEURA: No focal consolidation or pleural effusion. No   pneumothorax.    SKELETON/SOFT TISSUES: Unremarkable.      IMPRESSION:    No radiographic evidence of acute cardiopulmonary disease.    --- End of Report ---            MELISSA BUI MD; Attending Radiologist  This document has been electronically signed. May 16 2025  9:58AM (25 @ 09:06)      CT

## 2025-05-17 NOTE — PROGRESS NOTE ADULT - SUBJECTIVE AND OBJECTIVE BOX
seen and examined  24 h events noted   no distress         PAST HISTORY  --------------------------------------------------------------------------------  No significant changes to PMH, PSH, FHx, SHx, unless otherwise noted    ALLERGIES & MEDICATIONS  --------------------------------------------------------------------------------  Allergies    No Known Allergies    Intolerances      Standing Inpatient Medications  aspirin  chewable 81 milliGRAM(s) Oral daily  atorvastatin 10 milliGRAM(s) Oral at bedtime  cefTRIAXone   IVPB 1000 milliGRAM(s) IV Intermittent every 24 hours  dextrose 5%. 1000 milliLiter(s) IV Continuous <Continuous>  dextrose 50% Injectable 25 Gram(s) IV Push once  doxazosin 1 milliGRAM(s) Oral <User Schedule>  glucagon  Injectable 1 milliGRAM(s) IntraMuscular once  guaiFENesin  milliGRAM(s) Oral every 12 hours  heparin   Injectable 5000 Unit(s) SubCutaneous every 12 hours  hydrALAZINE 150 milliGRAM(s) Oral two times a day  insulin glargine Injectable (LANTUS) 80 Unit(s) SubCutaneous every morning  insulin lispro (ADMELOG) corrective regimen sliding scale   SubCutaneous three times a day before meals  insulin lispro (ADMELOG) corrective regimen sliding scale   SubCutaneous at bedtime  insulin lispro Injectable (ADMELOG) 3 Unit(s) SubCutaneous three times a day before meals  polyethylene glycol 3350 17 Gram(s) Oral daily  regadenoson Injectable 0.4 milliGRAM(s) IV Push once  senna 2 Tablet(s) Oral at bedtime  sevelamer carbonate 1600 milliGRAM(s) Oral three times a day with meals    PRN Inpatient Medications  acetaminophen     Tablet .. 650 milliGRAM(s) Oral every 6 hours PRN  aluminum hydroxide/magnesium hydroxide/simethicone Suspension 30 milliLiter(s) Oral every 4 hours PRN  dextrose Oral Gel 15 Gram(s) Oral once PRN  NIFEdipine XL 90 milliGRAM(s) Oral daily PRN        VITALS/PHYSICAL EXAM  --------------------------------------------------------------------------------  T(C): 36.4 (05-17-25 @ 04:56), Max: 36.7 (05-16-25 @ 19:30)  HR: 76 (05-17-25 @ 04:56) (76 - 83)  BP: 159/66 (05-17-25 @ 04:56) (128/64 - 184/69)  RR: 17 (05-17-25 @ 04:56) (17 - 18)  SpO2: 95% (05-17-25 @ 04:56) (94% - 96%)  Wt(kg): --    Weight (kg): 70 (05-16-25 @ 15:51)      05-16-25 @ 07:01  -  05-17-25 @ 07:00  --------------------------------------------------------  IN: 404 mL / OUT: 0 mL / NET: 404 mL      Physical Exam:  	Gen: NAD  	Pulm: decrease BS  B/L  	CV:  S1S2; no rub  	Abd:  RUQ tenderness   	Vascular access:av    LABS/STUDIES  --------------------------------------------------------------------------------              10.7   4.63  >-----------<  192      [05-16-25 @ 08:48]              31.8     133  |  92  |  30  ----------------------------<  308      [05-16-25 @ 08:48]  4.5   |  23  |  4.1        Ca     9.0     [05-16-25 @ 08:48]      Mg     2.5     [05-16-25 @ 08:48]    TPro  6.1  /  Alb  3.9  /  TBili  0.4  /  DBili  x   /  AST  19  /  ALT  11  /  AlkPhos  134  [05-16-25 @ 08:48]          Creatinine Trend:  SCr 4.1 [05-16 @ 08:48]    Urinalysis - [05-16-25 @ 10:35]      Color Yellow / Appearance Cloudy / SG 1.020 / pH 7.0      Gluc 500 / Ketone   / Bili Negative / Urobili 0.2       Blood Trace / Protein 300 / Leuk Est Large / Nitrite Negative      RBC 6 /  / Hyaline  / Gran  / Sq Epi  / Non Sq Epi 5 / Bacteria Few

## 2025-05-17 NOTE — PROGRESS NOTE ADULT - ATTENDING COMMENTS
I saw and examined the patient at bedside.   She feels ok, no chest pain, her speech is normal, no new weakness, she went for HD today.     A/P:   Chest pain: resolved.   EKG showed Normal Sinus Rhythm, no ST or T changes, Troponin 64>66 stable.   Pending NST, Continue ASA nad Lipitor.     Slurred speech briefly possibly from hypotension due to HD?  CTH showed A focal hypodensity is noted within the right frontal periventricular white matter, not previously seen. Finding may reflect age-indeterminate ischemic change. An MRI of the brain can be obtained for further evaluation if not clinically contraindicated.  - Neuro consult noted no additional inpatient workup.    ESRD   Continue HD                    - Will receive HD today then go back to regular MWF schedule if patient is still admitted  - Cont aspirin and statin     # Asymptomatic Pyuria      # DM  - Lantus 80 units in AM  - insulin 3 units TIDC  - f/u A1C  - FS    # HTN  # DLD  - c/w hydralazine 150 mg PO BID  - patient state she uses nifedipine 90 PO if her SBP is above 200  - c/w home atorvastatin

## 2025-05-17 NOTE — PROGRESS NOTE ADULT - ASSESSMENT
59y Female with h/o ESRD on HD (MWF), IDDM, dyslipidemia, CKD-MBD, HTNwhom presented to the hospital with CP during HD session this morning.  Had 90 minutes of total HD prior to arrival.  Nephrology now consulted for HD needs while in-house.    HD today standard bath uf 2 liters as tolerated   on binders/ check ph   follow BP readings   neurology notes appreciated   check RUQ sono   follow troponin   check UC   h/h at goal/ no SHAR   will follow

## 2025-05-17 NOTE — PROGRESS NOTE ADULT - ASSESSMENT
Patient is a 59y old  Female with PMH of DM, ESRD on HD MWF, HTN, HLD, and HF who presents to ED for chest pain during HD session this morning.  Had 90 minutes of total HD prior to arrival.  Patient stated that she felt chest pressure and exertional shortness of breath. Shortness of breath improves with rest. Patient was alert and oriented however speaking sluggishly on arrival to the ED. Endorses neck pain and chest pressure as well. Denies abdominal pain, dysuria, swelling.      Med Req confirmed with patient at bedside      # Slurred speech briefly   - CTH showed A focal hypodensity is noted within the right frontal periventricular white matter, not previously seen. Finding may reflect age-indeterminate ischemic change. An MRI of the brain can be obtained for further evaluation if not clinically contraindicated.  - Neuro consult noted no additional inpatient workup.    # Chest pain during HD   # ESRD on HD MWF  # Hx of HF (Unknown EF)  - trop 64 -> 66, Creatinine 4.1  - ECG showed prominent T wave   - CXR negative   - TTE Pending  - NM stress test pending  - C/w Telemetry                       - Will receive HD today then go back to regular MWF schedule if patient is still admitted  - Cont aspirin and statin     # Asymptomatic Pyuria  - UA positive for bacteria  - Will hold off on abx and monitor for now as clinically no symptoms  - f/u UCx, blood cxs, received by lab 5/16    # DM  - Lantus 80 units in AM  - insulin 3 units TIDC  - f/u A1C  - FS    # HTN  # DLD  - c/w hydralazine 150 mg PO BID  - patient state she uses nifedipine 90 PO if her SBP is above 200  - c/w home atorvastatin      # DVT prophylaxis: Heparin subq  # GI prophylaxis: Not indicated  # Diet: DASH/TLC renal replacement  # Activity: as tolerated  # Code status: Full Code  # Disposition: tele    Pending: Cultures, PT/OT, TTE, NM stress, clinical improvement

## 2025-05-18 LAB
ALBUMIN SERPL ELPH-MCNC: 4.3 G/DL — SIGNIFICANT CHANGE UP (ref 3.5–5.2)
ALP SERPL-CCNC: 118 U/L — HIGH (ref 30–115)
ALT FLD-CCNC: 10 U/L — SIGNIFICANT CHANGE UP (ref 0–41)
ANION GAP SERPL CALC-SCNC: 14 MMOL/L — SIGNIFICANT CHANGE UP (ref 7–14)
AST SERPL-CCNC: 23 U/L — SIGNIFICANT CHANGE UP (ref 0–41)
BASOPHILS # BLD AUTO: 0.02 K/UL — SIGNIFICANT CHANGE UP (ref 0–0.2)
BASOPHILS NFR BLD AUTO: 1 % — SIGNIFICANT CHANGE UP (ref 0–1)
BILIRUB SERPL-MCNC: 0.3 MG/DL — SIGNIFICANT CHANGE UP (ref 0.2–1.2)
BUN SERPL-MCNC: 29 MG/DL — HIGH (ref 10–20)
CALCIUM SERPL-MCNC: 9.6 MG/DL — SIGNIFICANT CHANGE UP (ref 8.4–10.5)
CHLORIDE SERPL-SCNC: 96 MMOL/L — LOW (ref 98–110)
CO2 SERPL-SCNC: 27 MMOL/L — SIGNIFICANT CHANGE UP (ref 17–32)
CREAT SERPL-MCNC: 5.1 MG/DL — CRITICAL HIGH (ref 0.7–1.5)
D DIMER BLD IA.RAPID-MCNC: 210 NG/ML DDU — SIGNIFICANT CHANGE UP
EGFR: 9 ML/MIN/1.73M2 — LOW
EGFR: 9 ML/MIN/1.73M2 — LOW
EOSINOPHIL # BLD AUTO: 0.01 K/UL — SIGNIFICANT CHANGE UP (ref 0–0.7)
EOSINOPHIL NFR BLD AUTO: 0.5 % — SIGNIFICANT CHANGE UP (ref 0–8)
GLUCOSE BLDC GLUCOMTR-MCNC: 154 MG/DL — HIGH (ref 70–99)
GLUCOSE BLDC GLUCOMTR-MCNC: 171 MG/DL — HIGH (ref 70–99)
GLUCOSE BLDC GLUCOMTR-MCNC: 206 MG/DL — HIGH (ref 70–99)
GLUCOSE BLDC GLUCOMTR-MCNC: 249 MG/DL — HIGH (ref 70–99)
GLUCOSE BLDC GLUCOMTR-MCNC: 261 MG/DL — HIGH (ref 70–99)
GLUCOSE BLDC GLUCOMTR-MCNC: 33 MG/DL — CRITICAL LOW (ref 70–99)
GLUCOSE BLDC GLUCOMTR-MCNC: 94 MG/DL — SIGNIFICANT CHANGE UP (ref 70–99)
GLUCOSE SERPL-MCNC: 111 MG/DL — HIGH (ref 70–99)
HCT VFR BLD CALC: 34 % — LOW (ref 37–47)
HGB BLD-MCNC: 11.4 G/DL — LOW (ref 12–16)
IMM GRANULOCYTES NFR BLD AUTO: 0.5 % — HIGH (ref 0.1–0.3)
LYMPHOCYTES # BLD AUTO: 0.47 K/UL — LOW (ref 1.2–3.4)
LYMPHOCYTES # BLD AUTO: 22.5 % — SIGNIFICANT CHANGE UP (ref 20.5–51.1)
MAGNESIUM SERPL-MCNC: 2.8 MG/DL — HIGH (ref 1.8–2.4)
MCHC RBC-ENTMCNC: 32 PG — HIGH (ref 27–31)
MCHC RBC-ENTMCNC: 33.5 G/DL — SIGNIFICANT CHANGE UP (ref 32–37)
MCV RBC AUTO: 95.5 FL — SIGNIFICANT CHANGE UP (ref 81–99)
MONOCYTES # BLD AUTO: 0.16 K/UL — SIGNIFICANT CHANGE UP (ref 0.1–0.6)
MONOCYTES NFR BLD AUTO: 7.7 % — SIGNIFICANT CHANGE UP (ref 1.7–9.3)
NEUTROPHILS # BLD AUTO: 1.42 K/UL — SIGNIFICANT CHANGE UP (ref 1.4–6.5)
NEUTROPHILS NFR BLD AUTO: 67.8 % — SIGNIFICANT CHANGE UP (ref 42.2–75.2)
NRBC BLD AUTO-RTO: 0 /100 WBCS — SIGNIFICANT CHANGE UP (ref 0–0)
PLATELET # BLD AUTO: 230 K/UL — SIGNIFICANT CHANGE UP (ref 130–400)
PMV BLD: 10.7 FL — HIGH (ref 7.4–10.4)
POTASSIUM SERPL-MCNC: 4.1 MMOL/L — SIGNIFICANT CHANGE UP (ref 3.5–5)
POTASSIUM SERPL-SCNC: 4.1 MMOL/L — SIGNIFICANT CHANGE UP (ref 3.5–5)
PROT SERPL-MCNC: 6.8 G/DL — SIGNIFICANT CHANGE UP (ref 6–8)
RBC # BLD: 3.56 M/UL — LOW (ref 4.2–5.4)
RBC # FLD: 14.1 % — SIGNIFICANT CHANGE UP (ref 11.5–14.5)
SODIUM SERPL-SCNC: 137 MMOL/L — SIGNIFICANT CHANGE UP (ref 135–146)
WBC # BLD: 2.09 K/UL — LOW (ref 4.8–10.8)
WBC # FLD AUTO: 2.09 K/UL — LOW (ref 4.8–10.8)

## 2025-05-18 PROCEDURE — 99231 SBSQ HOSP IP/OBS SF/LOW 25: CPT | Mod: 25

## 2025-05-18 PROCEDURE — 99233 SBSQ HOSP IP/OBS HIGH 50: CPT

## 2025-05-18 PROCEDURE — 71045 X-RAY EXAM CHEST 1 VIEW: CPT | Mod: 26

## 2025-05-18 PROCEDURE — 99291 CRITICAL CARE FIRST HOUR: CPT | Mod: 25

## 2025-05-18 RX ORDER — FUROSEMIDE 10 MG/ML
40 INJECTION INTRAMUSCULAR; INTRAVENOUS ONCE
Refills: 0 | Status: COMPLETED | OUTPATIENT
Start: 2025-05-18 | End: 2025-05-18

## 2025-05-18 RX ORDER — FUROSEMIDE 10 MG/ML
40 INJECTION INTRAMUSCULAR; INTRAVENOUS ONCE
Refills: 0 | Status: DISCONTINUED | OUTPATIENT
Start: 2025-05-18 | End: 2025-05-18

## 2025-05-18 RX ORDER — NIFEDIPINE 30 MG
1 TABLET, EXTENDED RELEASE 24 HR ORAL
Refills: 0 | DISCHARGE

## 2025-05-18 RX ORDER — INSULIN LISPRO 100 U/ML
3 INJECTION, SOLUTION INTRAVENOUS; SUBCUTANEOUS
Refills: 0 | Status: DISCONTINUED | OUTPATIENT
Start: 2025-05-18 | End: 2025-05-19

## 2025-05-18 RX ORDER — DEXTROMETHORPHAN HBR, GUAIFENESIN 200 MG/10ML
1 LIQUID ORAL
Refills: 0 | DISCHARGE

## 2025-05-18 RX ORDER — INSULIN GLARGINE-YFGN 100 [IU]/ML
15 INJECTION, SOLUTION SUBCUTANEOUS AT BEDTIME
Refills: 0 | Status: DISCONTINUED | OUTPATIENT
Start: 2025-05-18 | End: 2025-05-19

## 2025-05-18 RX ORDER — GABAPENTIN 400 MG/1
100 CAPSULE ORAL THREE TIMES A DAY
Refills: 0 | Status: DISCONTINUED | OUTPATIENT
Start: 2025-05-18 | End: 2025-05-24

## 2025-05-18 RX ORDER — SODIUM CHLORIDE 9 G/1000ML
1000 INJECTION, SOLUTION INTRAVENOUS
Refills: 0 | Status: DISCONTINUED | OUTPATIENT
Start: 2025-05-18 | End: 2025-05-18

## 2025-05-18 RX ADMIN — Medication 40 MILLIGRAM(S): at 21:31

## 2025-05-18 RX ADMIN — SEVELAMER HYDROCHLORIDE 1600 MILLIGRAM(S): 800 TABLET ORAL at 11:55

## 2025-05-18 RX ADMIN — DEXTROMETHORPHAN HBR, GUAIFENESIN 600 MILLIGRAM(S): 200 LIQUID ORAL at 17:36

## 2025-05-18 RX ADMIN — ATORVASTATIN CALCIUM 10 MILLIGRAM(S): 80 TABLET, FILM COATED ORAL at 21:31

## 2025-05-18 RX ADMIN — Medication 81 MILLIGRAM(S): at 11:55

## 2025-05-18 RX ADMIN — Medication 2 TABLET(S): at 21:31

## 2025-05-18 RX ADMIN — DEXTROMETHORPHAN HBR, GUAIFENESIN 600 MILLIGRAM(S): 200 LIQUID ORAL at 05:39

## 2025-05-18 RX ADMIN — SEVELAMER HYDROCHLORIDE 1600 MILLIGRAM(S): 800 TABLET ORAL at 10:02

## 2025-05-18 RX ADMIN — POLYETHYLENE GLYCOL 3350 17 GRAM(S): 17 POWDER, FOR SOLUTION ORAL at 21:38

## 2025-05-18 RX ADMIN — GABAPENTIN 100 MILLIGRAM(S): 400 CAPSULE ORAL at 15:17

## 2025-05-18 RX ADMIN — HEPARIN SODIUM 5000 UNIT(S): 1000 INJECTION INTRAVENOUS; SUBCUTANEOUS at 17:36

## 2025-05-18 RX ADMIN — DOXAZOSIN MESYLATE 1 MILLIGRAM(S): 8 TABLET ORAL at 10:13

## 2025-05-18 RX ADMIN — SEVELAMER HYDROCHLORIDE 1600 MILLIGRAM(S): 800 TABLET ORAL at 17:36

## 2025-05-18 RX ADMIN — DOXAZOSIN MESYLATE 1 MILLIGRAM(S): 8 TABLET ORAL at 21:31

## 2025-05-18 RX ADMIN — Medication 150 MILLIGRAM(S): at 17:36

## 2025-05-18 RX ADMIN — FUROSEMIDE 40 MILLIGRAM(S): 10 INJECTION INTRAMUSCULAR; INTRAVENOUS at 11:55

## 2025-05-18 RX ADMIN — Medication 150 MILLIGRAM(S): at 05:39

## 2025-05-18 RX ADMIN — HEPARIN SODIUM 5000 UNIT(S): 1000 INJECTION INTRAVENOUS; SUBCUTANEOUS at 05:39

## 2025-05-18 RX ADMIN — GABAPENTIN 100 MILLIGRAM(S): 400 CAPSULE ORAL at 21:31

## 2025-05-18 RX ADMIN — Medication 1 APPLICATION(S): at 21:32

## 2025-05-18 RX ADMIN — INSULIN GLARGINE-YFGN 15 UNIT(S): 100 INJECTION, SOLUTION SUBCUTANEOUS at 21:31

## 2025-05-18 NOTE — RAPID RESPONSE TEAM SUMMARY - NSSITUATIONBACKGROUNDRRT_GEN_ALL_CORE
RRT called by RN found pt to be unresponsive. Vitals wnl, pt found to have blood glucose of 33. d50 given and patient returned to baseline    Pt reported some chest pain will get EKG RRT called by RN found pt to be unresponsive. Vitals wnl, pt found to have blood glucose of 33. d50 given and patient returned to baseline    Pt reported some chest pain. EKG unremarkable

## 2025-05-18 NOTE — PROGRESS NOTE ADULT - ASSESSMENT
59y old  Female with PMH of DM, ESRD on HD MWF, HTN, HLD, and HF who presents to ED for chest pain during HD session this morning.  Had 90 minutes of total HD prior to arrival.  Patient stated that she felt chest pressure and exertional shortness of breath. Shortness of breath improves with rest. Patient was alert and oriented however speaking sluggishly on arrival to the ED. Endorses neck pain and chest pressure as well. Denies abdominal pain, dysuria, swelling.    # Slurred speech briefly   - CTH showed A focal hypodensity is noted within the right frontal periventricular white matter, not previously seen. Finding may reflect age-indeterminate ischemic change. An MRI of the brain can be obtained for further evaluation if not clinically contraindicated.  - Neuro consult noted no additional inpatient workup.    # Chest pain during HD   # ESRD on HD MWF  # Hx of HF (Unknown EF)  - trop 64 -> 66, Creatinine 4.1  - ECG showed prominent T wave   - CXR negative   - TTE EF 60%. G1DD  - stress test neg for ischemia  - C/w Telemetry                       - MWF schedule if patient is still admitted  - Cont aspirin and statin     # Asymptomatic Pyuria  - UA positive for bacteria  - Will hold off on abx and monitor for now as clinically no symptoms  - f/u UCx E. Coli sens pending  -  blood cxs NGTD    # DM  - hypoglycemic overnight. See RRT note. On d5lr for now (got 80 u lantus and had poct glucose of 33 with unresponsiveness  - received lantus 80mg yest. Per nursing home documentation on 15 lantus and 7 lispro.  - will resume lantus at 15 for tonight. Cont lispro at 3 for now  - monitor FS and adjust as needed    # HTN  # DLD  - c/w hydralazine 150 mg PO BID  - patient state she uses nifedipine 90 PO if her SBP is above 200  - c/w home atorvastatin    # DVT prophylaxis: Heparin subq  # GI prophylaxis: Not indicated  # Diet: DASH/TLC renal replacement  # Activity: as tolerated  # Code status: Full Code  # Disposition: tele    Pending: Cultures, PT/OT, clinical improvement   59y old [place] speaking Female w/ PMHx of ESRD on HD MWF, DM (insulin dependent) HTN, HLD, and HFpEF who presents to ED for chest pain during HD session this morning.  Had 90 minutes of total HD prior to arrival.  Patient stated that she felt chest pressure and exertional shortness of breath. Shortness of breath improves with rest. Patient was alert and oriented however speaking sluggishly on arrival to the ED. Endorses neck pain and chest pressure as well.     # Slurred speech briefly   - CTH showed A focal hypodensity is noted within the right frontal periventricular white matter, not previously seen. Finding may reflect age-indeterminate ischemic change. An MRI of the brain can be obtained for further evaluation if not clinically contraindicated.  - Neuro consult noted no additional inpatient workup.    # Chest pain during HD   # ESRD on HD MWF  # Hx of HF (Unknown EF)  - trop 64 -> 66, Creatinine 4.1  - ECG showed prominent T wave   - CXR negative   - TTE EF 60%. G1DD  - stress test neg for ischemia  - C/w Telemetry                       - MWF schedule if patient is still admitted  - Cont aspirin and statin     # Asymptomatic Pyuria  - UA positive for bacteria  - Will hold off on abx and monitor for now as clinically no symptoms  - f/u UCx E. Coli sens pending  -  blood cxs NGTD    # DM  - hypoglycemic overnight. See RRT note. On d5lr for now (got 80 u lantus and had poct glucose of 33 with unresponsiveness  - received lantus 80mg yest. Per nursing home documentation on 15 lantus and 7 lispro.  - will resume lantus at 15 for tonight. Cont lispro at 3 for now  - monitor FS and adjust as needed    # HTN  # DLD  - c/w hydralazine 150 mg PO BID  - patient state she uses nifedipine 90 PO if her SBP is above 200  - c/w home atorvastatin    # DVT prophylaxis: Heparin subq  # GI prophylaxis: Not indicated  # Diet: DASH/TLC renal replacement  # Activity: as tolerated  # Code status: Full Code  # Disposition: tele    Pending: Cultures, PT/OT, clinical improvement   59y old Hungarian speaking Female w/ PMHx of ESRD on HD MWF, DM (insulin dependent) HTN, HLD, and HFpEF who presents to ED for chest pain during HD session this morning.  Had 90 minutes of total HD prior to arrival.  Patient stated that she felt chest pressure and exertional shortness of breath. Shortness of breath improves with rest. Patient was alert and oriented however speaking sluggishly on arrival to the ED. Endorses neck pain and chest pressure as well.     # Slurred speech briefly   - CTH showed A focal hypodensity is noted within the right frontal periventricular white matter, not previously seen. Finding may reflect age-indeterminate ischemic change. An MRI of the brain can be obtained for further evaluation if not clinically contraindicated.  - Neuro consult noted no additional inpatient workup.    # Chest pain during HD   # ESRD on HD MWF  # Hx of HF (Unknown EF)  - trop 64 -> 66, Creatinine 4.1  - ECG showed prominent T wave   - CXR negative   - TTE EF 60%. G1DD  - stress test neg for ischemia  - C/w Telemetry                       - MWF schedule if patient is still admitted  - Cont aspirin and statin     # UTI  - UA positive for bacteria  - Rocephin  - f/u UCx E. Coli sens pending  -  blood cxs NGTD    # DM  - hypoglycemic overnight. See RRT note. On d5lr for now (got 80 u lantus and had poct glucose of 33 with unresponsiveness  - received lantus 80mg yest. Per nursing home documentation on 15 lantus and 7 lispro.  - will resume lantus at 15 for tonight. Cont lispro at 3 for now  - monitor FS and adjust as needed    # HTN  # DLD  - c/w hydralazine 150 mg PO BID  - patient state she uses nifedipine 90 PO if her SBP is above 200  - c/w home atorvastatin    # DVT prophylaxis: Heparin subq  # GI prophylaxis: Not indicated  # Diet: DASH/TLC renal replacement  # Activity: as tolerated  # Code status: Full Code  # Disposition: tele    Pending: Cultures, PT/OT, clinical improvement

## 2025-05-18 NOTE — PROGRESS NOTE ADULT - SUBJECTIVE AND OBJECTIVE BOX
SUBJECTIVE/OVERNIGHT EVENTS  Today is hospital day 2d. This morning patient was seen and examined at bedside, resting comfortably in bed. No acute or major events overnight. RRT called eariler this morning for nonresponsiveness. blood glucose 33. d50 given with immediate return to baseline.    MEDICATIONS  STANDING MEDICATIONS  aspirin  chewable 81 milliGRAM(s) Oral daily  atorvastatin 10 milliGRAM(s) Oral at bedtime  cefTRIAXone   IVPB 1000 milliGRAM(s) IV Intermittent every 24 hours  dextrose 5% + lactated ringers. 1000 milliLiter(s) IV Continuous <Continuous>  dextrose 5%. 1000 milliLiter(s) IV Continuous <Continuous>  dextrose 50% Injectable 25 Gram(s) IV Push once  doxazosin 1 milliGRAM(s) Oral <User Schedule>  glucagon  Injectable 1 milliGRAM(s) IntraMuscular once  guaiFENesin  milliGRAM(s) Oral every 12 hours  heparin   Injectable 5000 Unit(s) SubCutaneous every 12 hours  hydrALAZINE 150 milliGRAM(s) Oral two times a day  insulin glargine Injectable (LANTUS) 15 Unit(s) SubCutaneous at bedtime  insulin lispro (ADMELOG) corrective regimen sliding scale   SubCutaneous three times a day before meals  insulin lispro (ADMELOG) corrective regimen sliding scale   SubCutaneous at bedtime  insulin lispro Injectable (ADMELOG) 3 Unit(s) SubCutaneous three times a day before meals  polyethylene glycol 3350 17 Gram(s) Oral daily  regadenoson Injectable 0.4 milliGRAM(s) IV Push once  senna 2 Tablet(s) Oral at bedtime  sevelamer carbonate 1600 milliGRAM(s) Oral three times a day with meals    PRN MEDICATIONS  acetaminophen     Tablet .. 650 milliGRAM(s) Oral every 6 hours PRN  aluminum hydroxide/magnesium hydroxide/simethicone Suspension 30 milliLiter(s) Oral every 4 hours PRN  dextrose Oral Gel 15 Gram(s) Oral once PRN  NIFEdipine XL 90 milliGRAM(s) Oral daily PRN    VITALS  T(F): 98.9 (05-17-25 @ 20:23), Max: 98.9 (05-17-25 @ 20:23)  HR: 85 (05-17-25 @ 20:23) (76 - 86)  BP: 149/57 (05-17-25 @ 20:23) (120/54 - 167/75)  RR: 18 (05-17-25 @ 20:23) (17 - 18)  SpO2: 96% (05-17-25 @ 10:50) (96% - 96%)  POCT Blood Glucose.: 206 mg/dL (05-18-25 @ 04:32)  POCT Blood Glucose.: 33 mg/dL (05-18-25 @ 04:26)  POCT Blood Glucose.: 230 mg/dL (05-17-25 @ 21:13)  POCT Blood Glucose.: 262 mg/dL (05-17-25 @ 16:21)  POCT Blood Glucose.: 206 mg/dL (05-17-25 @ 14:29)  POCT Blood Glucose.: 205 mg/dL (05-17-25 @ 07:36)    PHYSICAL EXAM  GENERAL  NAD, lying in bed comfortably    HEAD  Atraumatic    NECK  Supple     HEART  Regular rate and rhythm no murmurs rubs or gallops    LUNGS  Clear to auscultation bilaterally, no wheezing rales or rhonchi    ABDOMEN  soft, nontender, nondistended, +BS    EXTREMITIES  no edema    NERVOUS SYSTEM  A&Ox3       LABS             10.5   3.01  )-----------( 199      ( 05-17-25 @ 06:21 )             32.0     131  |  89  |  41  -------------------------<  171   05-17-25 @ 06:21  5.1  |  22  |  6.0    Ca      9.9     05-17-25 @ 06:21  Mg     2.6     05-17-25 @ 06:21    TPro  6.4  /  Alb  4.0  /  TBili  0.3  /  DBili  x   /  AST  19  /  ALT  8   /  AlkPhos  121  /  GGT  x     05-17-25 @ 06:21      Troponin T, High Sensitivity Result: 70 ng/L (05-16-25 @ 20:00)  Troponin T, High Sensitivity Result: 66 ng/L (05-16-25 @ 10:35)  Troponin T, High Sensitivity Result: 64 ng/L (05-16-25 @ 08:48)    Urinalysis Basic - ( 17 May 2025 06:21 )    Color: x / Appearance: x / SG: x / pH: x  Gluc: 171 mg/dL / Ketone: x  / Bili: x / Urobili: x   Blood: x / Protein: x / Nitrite: x   Leuk Esterase: x / RBC: x / WBC x   Sq Epi: x / Non Sq Epi: x / Bacteria: x          Culture - Blood (collected 16 May 2025 12:25)  Source: Blood Blood-Peripheral  Preliminary Report (17 May 2025 22:01):    No growth at 24 hours    Culture - Blood (collected 16 May 2025 12:25)  Source: Blood Blood-Peripheral  Preliminary Report (17 May 2025 22:01):    No growth at 24 hours    Culture - Urine (collected 16 May 2025 10:35)  Source: Clean Catch Clean Catch (Midstream)  Preliminary Report (17 May 2025 17:48):    >100,000 CFU/ml Escherichia coli      IMAGING SUBJECTIVE/OVERNIGHT EVENTS  Today is hospital day 2d. This morning patient was seen and examined at bedside, resting comfortably in bed. No acute or major events overnight. RRT called eariler this morning for nonresponsiveness. blood glucose 33. d50 given with immediate return to baseline.    [employer] [place]  used for translation. ID [id number] Nancy    Pt this AM states she is feeling better. Reports chest burning in the mid thoracic area. Prior to arrival in ER, she stated she had shoulder pain that travelled to her chest. Endorses minor cough with occasional sputum as well and neuropathic pain travelling down from her neck to her feet.     MEDICATIONS  STANDING MEDICATIONS  aspirin  chewable 81 milliGRAM(s) Oral daily  atorvastatin 10 milliGRAM(s) Oral at bedtime  cefTRIAXone   IVPB 1000 milliGRAM(s) IV Intermittent every 24 hours  dextrose 5% + lactated ringers. 1000 milliLiter(s) IV Continuous <Continuous>  dextrose 5%. 1000 milliLiter(s) IV Continuous <Continuous>  dextrose 50% Injectable 25 Gram(s) IV Push once  doxazosin 1 milliGRAM(s) Oral <User Schedule>  glucagon  Injectable 1 milliGRAM(s) IntraMuscular once  guaiFENesin  milliGRAM(s) Oral every 12 hours  heparin   Injectable 5000 Unit(s) SubCutaneous every 12 hours  hydrALAZINE 150 milliGRAM(s) Oral two times a day  insulin glargine Injectable (LANTUS) 15 Unit(s) SubCutaneous at bedtime  insulin lispro (ADMELOG) corrective regimen sliding scale   SubCutaneous three times a day before meals  insulin lispro (ADMELOG) corrective regimen sliding scale   SubCutaneous at bedtime  insulin lispro Injectable (ADMELOG) 3 Unit(s) SubCutaneous three times a day before meals  polyethylene glycol 3350 17 Gram(s) Oral daily  regadenoson Injectable 0.4 milliGRAM(s) IV Push once  senna 2 Tablet(s) Oral at bedtime  sevelamer carbonate 1600 milliGRAM(s) Oral three times a day with meals    PRN MEDICATIONS  acetaminophen     Tablet .. 650 milliGRAM(s) Oral every 6 hours PRN  aluminum hydroxide/magnesium hydroxide/simethicone Suspension 30 milliLiter(s) Oral every 4 hours PRN  dextrose Oral Gel 15 Gram(s) Oral once PRN  NIFEdipine XL 90 milliGRAM(s) Oral daily PRN    VITALS  T(F): 98.9 (05-17-25 @ 20:23), Max: 98.9 (05-17-25 @ 20:23)  HR: 85 (05-17-25 @ 20:23) (76 - 86)  BP: 149/57 (05-17-25 @ 20:23) (120/54 - 167/75)  RR: 18 (05-17-25 @ 20:23) (17 - 18)  SpO2: 96% (05-17-25 @ 10:50) (96% - 96%)  POCT Blood Glucose.: 206 mg/dL (05-18-25 @ 04:32)  POCT Blood Glucose.: 33 mg/dL (05-18-25 @ 04:26)  POCT Blood Glucose.: 230 mg/dL (05-17-25 @ 21:13)  POCT Blood Glucose.: 262 mg/dL (05-17-25 @ 16:21)  POCT Blood Glucose.: 206 mg/dL (05-17-25 @ 14:29)  POCT Blood Glucose.: 205 mg/dL (05-17-25 @ 07:36)    PHYSICAL EXAM  VITALS:   T(C): 36.4 (05-18-25 @ 12:20), Max: 37.2 (05-17-25 @ 20:23)  HR: 76 (05-18-25 @ 12:20) (62 - 93)  BP: 180/73 (05-18-25 @ 12:20) (120/54 - 197/78)  RR: 16 (05-18-25 @ 12:20) (16 - 19)  SpO2: 97% (05-18-25 @ 08:30) (97% - 100%)    GENERAL: NAD  HEAD:  Atraumatic, normocephalic  EYES: EOMI, PERRLA, conjunctiva and sclera clear  HEART: RRR  LUNGS: Clear to auscultation bilaterally  ABDOMEN: Soft, nontender, nondistended, +BS  EXTREMITIES: 2+ peripheral pulses bilaterally  NERVOUS SYSTEM:  A&Ox3, no focal deficits   SKIN: No rashes or lesions    LABS             10.5   3.01  )-----------( 199      ( 05-17-25 @ 06:21 )             32.0     131  |  89  |  41  -------------------------<  171   05-17-25 @ 06:21  5.1  |  22  |  6.0    Ca      9.9     05-17-25 @ 06:21  Mg     2.6     05-17-25 @ 06:21    TPro  6.4  /  Alb  4.0  /  TBili  0.3  /  DBili  x   /  AST  19  /  ALT  8   /  AlkPhos  121  /  GGT  x     05-17-25 @ 06:21      Troponin T, High Sensitivity Result: 70 ng/L (05-16-25 @ 20:00)  Troponin T, High Sensitivity Result: 66 ng/L (05-16-25 @ 10:35)  Troponin T, High Sensitivity Result: 64 ng/L (05-16-25 @ 08:48)    Urinalysis Basic - ( 17 May 2025 06:21 )    Color: x / Appearance: x / SG: x / pH: x  Gluc: 171 mg/dL / Ketone: x  / Bili: x / Urobili: x   Blood: x / Protein: x / Nitrite: x   Leuk Esterase: x / RBC: x / WBC x   Sq Epi: x / Non Sq Epi: x / Bacteria: x          Culture - Blood (collected 16 May 2025 12:25)  Source: Blood Blood-Peripheral  Preliminary Report (17 May 2025 22:01):    No growth at 24 hours    Culture - Blood (collected 16 May 2025 12:25)  Source: Blood Blood-Peripheral  Preliminary Report (17 May 2025 22:01):    No growth at 24 hours    Culture - Urine (collected 16 May 2025 10:35)  Source: Clean Catch Clean Catch (Midstream)  Preliminary Report (17 May 2025 17:48):    >100,000 CFU/ml Escherichia coli      IMAGING

## 2025-05-18 NOTE — PROGRESS NOTE ADULT - ASSESSMENT
59y Female with h/o ESRD on HD (MWF), IDDM, dyslipidemia, CKD-MBD, HTNwhom presented to the hospital with CP during HD session this morning.  Had 90 minutes of total HD prior to arrival.  Nephrology now consulted for HD needs while in-house.    s/p HD yesterday, next HD Tuesday  on binders/ check ph   follow BP readings   neurology notes appreciated   check RUQ sono   follow troponin   check UC   h/h at goal/ no SHAR   will follow  59y Female with h/o ESRD on HD (MWF), IDDM, dyslipidemia, CKD-MBD, HTN who presented to the hospital with CP during HD session this morning.  Had 90 minutes of total HD prior to arrival.  Nephrology now consulted for HD needs while in-house.    Rapid response overnight for hypoglycemia / cont to monitor BG closely/ limit iv fluids/ insuline dose lowered consider endo eval  s/p HD yesterday, next HD Tuesday  on binders/ check phos  follow BP readings   neurology notes appreciated   check RUQ sono d/t RUQ pain  NST negatigve  check UC   h/h at goal/ no SHAR   will follow

## 2025-05-18 NOTE — PROGRESS NOTE ADULT - ATTENDING COMMENTS
A/P:   #Chest pain, improved  EKG NSR w/ no ischemic changes  Trops 64 >66> 70  TTE 65%, G1DD  NST no evidence of ischemia  possibly GERD? -> trial protonix  check D-dimer and duplex, can consider V/Q scan based on results  continue ASA and lipitor    #Slurred speech briefly possibly from hypotension due to HD  CTH showed A focal hypodensity is noted within the right frontal periventricular white matter, not previously seen. Finding may reflect age-indeterminate ischemic change  - Neuro eval noted. NIHSS 0  - Pt has multiple cerebrovascular risk factors. Continue home ASA/statin and f/u in stroke clinic outpt.     #ESRD   nephro consulted  makes urine  HD per nephro M/W/F    # Asymptomatic Pyuria  - asymptomatic, monitor    # DM w/ neuropathy  - A1c 8.1%  - lispro 3u TID, lantus 15u qhs, sliding scale  - trial gabapentin 100mg TID for neuropathy    # HTN  # DLD  - c/w hydralazine 150 mg PO BID  - patient state she uses nifedipine 90 PO if her SBP is above 200  - c/w home atorvastatin    Pending/Handoff:  check d-dimer, duplex A/P:   #Chest pain, improved  EKG NSR w/ no ischemic changes  Trops 64 >66> 70  TTE 65%, G1DD  NST no evidence of ischemia  possibly GERD? -> trial protonix  check D-dimer and duplex, can consider V/Q scan based on results  continue ASA and lipitor    #Slurred speech briefly possibly from hypotension due to HD  CTH showed A focal hypodensity is noted within the right frontal periventricular white matter, not previously seen. Finding may reflect age-indeterminate ischemic change  - Neuro eval noted. NIHSS 0  - Pt has multiple cerebrovascular risk factors. Continue home ASA/statin and f/u in stroke clinic outpt.     #ESRD   nephro consulted  makes urine  HD per nephro M/W/F    # UTI  - UA positive  - urine cx E coli, f/u sens  - c/w rocephin    # DM w/ neuropathy  - A1c 8.1%  - lispro 3u TID, lantus 15u qhs, sliding scale  - trial gabapentin 100mg TID for neuropathy    # HTN  # DLD  - c/w hydralazine 150 mg PO BID  - patient state she uses nifedipine 90 PO if her SBP is above 200  - c/w home atorvastatin    Pending/Handoff:  check d-dimer, duplex

## 2025-05-18 NOTE — PROGRESS NOTE ADULT - SUBJECTIVE AND OBJECTIVE BOX
Nephrology Progress Note    ANAM NGUYỄN  MRN-[id number]  59y  Female    S:  Patient is seen and examined, events over the last 24h noted.    O:  Allergies:  No Known Allergies    Hospital Medications:   MEDICATIONS  (STANDING):  aspirin  chewable 81 milliGRAM(s) Oral daily  atorvastatin 10 milliGRAM(s) Oral at bedtime  cefTRIAXone   IVPB 1000 milliGRAM(s) IV Intermittent every 24 hours  chlorhexidine 2% Cloths 1 Application(s) Topical <User Schedule>  dextrose 5%. 1000 milliLiter(s) (50 mL/Hr) IV Continuous <Continuous>  dextrose 50% Injectable 25 Gram(s) IV Push once  doxazosin 1 milliGRAM(s) Oral <User Schedule>  glucagon  Injectable 1 milliGRAM(s) IntraMuscular once  guaiFENesin  milliGRAM(s) Oral every 12 hours  heparin   Injectable 5000 Unit(s) SubCutaneous every 12 hours  hydrALAZINE 150 milliGRAM(s) Oral two times a day  insulin glargine Injectable (LANTUS) 15 Unit(s) SubCutaneous at bedtime  insulin lispro (ADMELOG) corrective regimen sliding scale   SubCutaneous three times a day before meals  insulin lispro (ADMELOG) corrective regimen sliding scale   SubCutaneous at bedtime  insulin lispro Injectable (ADMELOG) 3 Unit(s) SubCutaneous three times a day before meals  polyethylene glycol 3350 17 Gram(s) Oral daily  regadenoson Injectable 0.4 milliGRAM(s) IV Push once  senna 2 Tablet(s) Oral at bedtime  sevelamer carbonate 1600 milliGRAM(s) Oral three times a day with meals    MEDICATIONS  (PRN):  acetaminophen     Tablet .. 650 milliGRAM(s) Oral every 6 hours PRN Temp greater or equal to 38C (100.4F), Mild Pain (1 - 3)  aluminum hydroxide/magnesium hydroxide/simethicone Suspension 30 milliLiter(s) Oral every 4 hours PRN Dyspepsia  dextrose Oral Gel 15 Gram(s) Oral once PRN Blood Glucose LESS THAN 70 milliGRAM(s)/deciliter  NIFEdipine XL 90 milliGRAM(s) Oral daily PRN hypertension    Home Medications:  aspirin 81 mg oral tablet: 1 tab(s) orally once a day (16 May 2025 17:28)  doxazosin 1 mg oral tablet: 1 tab(s) orally 2 times a day (16 May 2025 17:28)  guaiFENesin 600 mg oral tablet, extended release: 1 tab(s) orally 2 times a day (18 May 2025 04:42)  hydrALAZINE 100 mg oral tablet: 1 tab(s) orally 2 times a day (18 May 2025 04:42)  hydrALAZINE 50 mg oral tablet: 1 tab(s) orally 2 times a day (18 May 2025 04:42)  Lantus 100 units/mL subcutaneous solution: 15 unit(s) subcutaneous once a day (in the morning) (18 May 2025 04:41)  NIFEdipine 90 mg oral tablet, extended release: 1 tab(s) orally once a day as needed for hypertension take as needed for SBP greater than 200 (18 May 2025 04:42)  NovoLOG FlexPen 100 units/mL injectable solution: 15 unit(s) injectable 3 times a day (before meals) Hold premeal insulin if blood glucose &lt; 100 (18 May 2025 04:42)      VITALS:  Daily     Daily   T(F): 98.9 (05-17-25 @ 20:23), Max: 98.9 (05-17-25 @ 20:23)  HR: 71 (05-18-25 @ 11:06)  BP: 182/71 (05-18-25 @ 11:06)  RR: 19 (05-18-25 @ 05:29)  SpO2: 97% (05-18-25 @ 08:30)  Wt(kg): --  I&O's Detail    17 May 2025 07:01  -  18 May 2025 07:00  --------------------------------------------------------  IN:    Oral Fluid: 240 mL  Total IN: 240 mL    OUT:    Other (mL): 2000 mL  Total OUT: 2000 mL    Total NET: -1760 mL      18 May 2025 07:01  -  18 May 2025 12:13  --------------------------------------------------------  IN:    Oral Fluid: 325 mL  Total IN: 325 mL    OUT:  Total OUT: 0 mL    Total NET: 325 mL        I&O's Summary    17 May 2025 07:01  -  18 May 2025 07:00  --------------------------------------------------------  IN: 240 mL / OUT: 2000 mL / NET: -1760 mL    18 May 2025 07:01  -  18 May 2025 12:13  --------------------------------------------------------  IN: 325 mL / OUT: 0 mL / NET: 325 mL          PHYSICAL EXAM:  Gen: drowsy  Chest: b/l breath sounds  Abd: soft  Extremities: no edema      LABS:    05-18    137  |  96[L]  |  29[H]  ----------------------------<  111[H]  4.1   |  27  |  5.1[HH]    Ca    9.6      18 May 2025 05:54  Mg     2.8     05-18    TPro  6.8  /  Alb  4.3  /  TBili  0.3  /  DBili      /  AST  23  /  ALT  10  /  AlkPhos  118[H]  05-18                            11.4   2.09  )-----------( 230      ( 18 May 2025 05:54 )             34.0     Mean Cell Volume: 95.5 fL (05-18-25 @ 05:54)   Nephrology Progress Note    ANAM NGUYỄN  MRN-[id number]  59y  Female    S:  Patient is seen and examined, events over the last 24h noted.    O:  Allergies:  No Known Allergies    Hospital Medications:   MEDICATIONS  (STANDING):  aspirin  chewable 81 milliGRAM(s) Oral daily  atorvastatin 10 milliGRAM(s) Oral at bedtime  cefTRIAXone   IVPB 1000 milliGRAM(s) IV Intermittent every 24 hours  chlorhexidine 2% Cloths 1 Application(s) Topical <User Schedule>  dextrose 5%. 1000 milliLiter(s) (50 mL/Hr) IV Continuous <Continuous>  dextrose 50% Injectable 25 Gram(s) IV Push once  doxazosin 1 milliGRAM(s) Oral <User Schedule>  glucagon  Injectable 1 milliGRAM(s) IntraMuscular once  guaiFENesin  milliGRAM(s) Oral every 12 hours  heparin   Injectable 5000 Unit(s) SubCutaneous every 12 hours  hydrALAZINE 150 milliGRAM(s) Oral two times a day  insulin glargine Injectable (LANTUS) 15 Unit(s) SubCutaneous at bedtime  insulin lispro (ADMELOG) corrective regimen sliding scale   SubCutaneous three times a day before meals  insulin lispro (ADMELOG) corrective regimen sliding scale   SubCutaneous at bedtime  insulin lispro Injectable (ADMELOG) 3 Unit(s) SubCutaneous three times a day before meals  polyethylene glycol 3350 17 Gram(s) Oral daily  regadenoson Injectable 0.4 milliGRAM(s) IV Push once  senna 2 Tablet(s) Oral at bedtime  sevelamer carbonate 1600 milliGRAM(s) Oral three times a day with meals    MEDICATIONS  (PRN):  acetaminophen     Tablet .. 650 milliGRAM(s) Oral every 6 hours PRN Temp greater or equal to 38C (100.4F), Mild Pain (1 - 3)  aluminum hydroxide/magnesium hydroxide/simethicone Suspension 30 milliLiter(s) Oral every 4 hours PRN Dyspepsia  dextrose Oral Gel 15 Gram(s) Oral once PRN Blood Glucose LESS THAN 70 milliGRAM(s)/deciliter  NIFEdipine XL 90 milliGRAM(s) Oral daily PRN hypertension    Home Medications:  aspirin 81 mg oral tablet: 1 tab(s) orally once a day (16 May 2025 17:28)  doxazosin 1 mg oral tablet: 1 tab(s) orally 2 times a day (16 May 2025 17:28)  guaiFENesin 600 mg oral tablet, extended release: 1 tab(s) orally 2 times a day (18 May 2025 04:42)  hydrALAZINE 100 mg oral tablet: 1 tab(s) orally 2 times a day (18 May 2025 04:42)  hydrALAZINE 50 mg oral tablet: 1 tab(s) orally 2 times a day (18 May 2025 04:42)  Lantus 100 units/mL subcutaneous solution: 15 unit(s) subcutaneous once a day (in the morning) (18 May 2025 04:41)  NIFEdipine 90 mg oral tablet, extended release: 1 tab(s) orally once a day as needed for hypertension take as needed for SBP greater than 200 (18 May 2025 04:42)  NovoLOG FlexPen 100 units/mL injectable solution: 15 unit(s) injectable 3 times a day (before meals) Hold premeal insulin if blood glucose &lt; 100 (18 May 2025 04:42)      VITALS:  Daily     Daily   T(F): 98.9 (05-17-25 @ 20:23), Max: 98.9 (05-17-25 @ 20:23)  HR: 71 (05-18-25 @ 11:06)  BP: 182/71 (05-18-25 @ 11:06)  RR: 19 (05-18-25 @ 05:29)  SpO2: 97% (05-18-25 @ 08:30)  Wt(kg): --  I&O's Detail    17 May 2025 07:01  -  18 May 2025 07:00  --------------------------------------------------------  IN:    Oral Fluid: 240 mL  Total IN: 240 mL    OUT:    Other (mL): 2000 mL  Total OUT: 2000 mL    Total NET: -1760 mL      18 May 2025 07:01  -  18 May 2025 12:13  --------------------------------------------------------  IN:    Oral Fluid: 325 mL  Total IN: 325 mL    OUT:  Total OUT: 0 mL    Total NET: 325 mL        I&O's Summary    17 May 2025 07:01  -  18 May 2025 07:00  --------------------------------------------------------  IN: 240 mL / OUT: 2000 mL / NET: -1760 mL    18 May 2025 07:01  -  18 May 2025 12:13  --------------------------------------------------------  IN: 325 mL / OUT: 0 mL / NET: 325 mL          PHYSICAL EXAM:  Gen: drowsy  Chest: b/l breath sounds  Abd: soft  Extremities: no edema  Vascular access: AVF      LABS:    05-18    137  |  96[L]  |  29[H]  ----------------------------<  111[H]  4.1   |  27  |  5.1[HH]    Ca    9.6      18 May 2025 05:54  Mg     2.8     05-18    TPro  6.8  /  Alb  4.3  /  TBili  0.3  /  DBili      /  AST  23  /  ALT  10  /  AlkPhos  118[H]  05-18                            11.4   2.09  )-----------( 230      ( 18 May 2025 05:54 )             34.0     Mean Cell Volume: 95.5 fL (05-18-25 @ 05:54)

## 2025-05-19 LAB
ALBUMIN SERPL ELPH-MCNC: 4.1 G/DL — SIGNIFICANT CHANGE UP (ref 3.5–5.2)
ALP SERPL-CCNC: 117 U/L — HIGH (ref 30–115)
ALT FLD-CCNC: 17 U/L — SIGNIFICANT CHANGE UP (ref 0–41)
ANION GAP SERPL CALC-SCNC: 18 MMOL/L — HIGH (ref 7–14)
AST SERPL-CCNC: 55 U/L — HIGH (ref 0–41)
BASOPHILS # BLD AUTO: 0.02 K/UL — SIGNIFICANT CHANGE UP (ref 0–0.2)
BASOPHILS NFR BLD AUTO: 0.5 % — SIGNIFICANT CHANGE UP (ref 0–1)
BILIRUB SERPL-MCNC: 0.2 MG/DL — SIGNIFICANT CHANGE UP (ref 0.2–1.2)
BUN SERPL-MCNC: 44 MG/DL — HIGH (ref 10–20)
CALCIUM SERPL-MCNC: 10.1 MG/DL — SIGNIFICANT CHANGE UP (ref 8.4–10.5)
CHLORIDE SERPL-SCNC: 98 MMOL/L — SIGNIFICANT CHANGE UP (ref 98–110)
CO2 SERPL-SCNC: 26 MMOL/L — SIGNIFICANT CHANGE UP (ref 17–32)
CREAT SERPL-MCNC: 6.7 MG/DL — CRITICAL HIGH (ref 0.7–1.5)
EGFR: 7 ML/MIN/1.73M2 — LOW
EGFR: 7 ML/MIN/1.73M2 — LOW
EOSINOPHIL # BLD AUTO: 0.09 K/UL — SIGNIFICANT CHANGE UP (ref 0–0.7)
EOSINOPHIL NFR BLD AUTO: 2.3 % — SIGNIFICANT CHANGE UP (ref 0–8)
GLUCOSE BLDC GLUCOMTR-MCNC: 104 MG/DL — HIGH (ref 70–99)
GLUCOSE BLDC GLUCOMTR-MCNC: 109 MG/DL — HIGH (ref 70–99)
GLUCOSE BLDC GLUCOMTR-MCNC: 138 MG/DL — HIGH (ref 70–99)
GLUCOSE BLDC GLUCOMTR-MCNC: 154 MG/DL — HIGH (ref 70–99)
GLUCOSE BLDC GLUCOMTR-MCNC: 159 MG/DL — HIGH (ref 70–99)
GLUCOSE BLDC GLUCOMTR-MCNC: 29 MG/DL — CRITICAL LOW (ref 70–99)
GLUCOSE BLDC GLUCOMTR-MCNC: 49 MG/DL — CRITICAL LOW (ref 70–99)
GLUCOSE BLDC GLUCOMTR-MCNC: 96 MG/DL — SIGNIFICANT CHANGE UP (ref 70–99)
GLUCOSE SERPL-MCNC: 20 MG/DL — CRITICAL LOW (ref 70–99)
HCT VFR BLD CALC: 33.1 % — LOW (ref 37–47)
HGB BLD-MCNC: 11 G/DL — LOW (ref 12–16)
IMM GRANULOCYTES NFR BLD AUTO: 0.3 % — SIGNIFICANT CHANGE UP (ref 0.1–0.3)
LYMPHOCYTES # BLD AUTO: 1.05 K/UL — LOW (ref 1.2–3.4)
LYMPHOCYTES # BLD AUTO: 27.1 % — SIGNIFICANT CHANGE UP (ref 20.5–51.1)
MAGNESIUM SERPL-MCNC: 2.7 MG/DL — HIGH (ref 1.8–2.4)
MCHC RBC-ENTMCNC: 31.9 PG — HIGH (ref 27–31)
MCHC RBC-ENTMCNC: 33.2 G/DL — SIGNIFICANT CHANGE UP (ref 32–37)
MCV RBC AUTO: 95.9 FL — SIGNIFICANT CHANGE UP (ref 81–99)
MONOCYTES # BLD AUTO: 0.39 K/UL — SIGNIFICANT CHANGE UP (ref 0.1–0.6)
MONOCYTES NFR BLD AUTO: 10.1 % — HIGH (ref 1.7–9.3)
MRSA PCR RESULT.: NEGATIVE — SIGNIFICANT CHANGE UP
NEUTROPHILS # BLD AUTO: 2.31 K/UL — SIGNIFICANT CHANGE UP (ref 1.4–6.5)
NEUTROPHILS NFR BLD AUTO: 59.7 % — SIGNIFICANT CHANGE UP (ref 42.2–75.2)
NRBC BLD AUTO-RTO: 0 /100 WBCS — SIGNIFICANT CHANGE UP (ref 0–0)
PHOSPHATE SERPL-MCNC: 5.4 MG/DL — HIGH (ref 2.1–4.9)
PLATELET # BLD AUTO: 229 K/UL — SIGNIFICANT CHANGE UP (ref 130–400)
PMV BLD: 10.8 FL — HIGH (ref 7.4–10.4)
POTASSIUM SERPL-MCNC: 3.5 MMOL/L — SIGNIFICANT CHANGE UP (ref 3.5–5)
POTASSIUM SERPL-SCNC: 3.5 MMOL/L — SIGNIFICANT CHANGE UP (ref 3.5–5)
PROT SERPL-MCNC: 7.2 G/DL — SIGNIFICANT CHANGE UP (ref 6–8)
RBC # BLD: 3.45 M/UL — LOW (ref 4.2–5.4)
RBC # FLD: 14.7 % — HIGH (ref 11.5–14.5)
SODIUM SERPL-SCNC: 142 MMOL/L — SIGNIFICANT CHANGE UP (ref 135–146)
WBC # BLD: 3.87 K/UL — LOW (ref 4.8–10.8)
WBC # FLD AUTO: 3.87 K/UL — LOW (ref 4.8–10.8)

## 2025-05-19 PROCEDURE — 99232 SBSQ HOSP IP/OBS MODERATE 35: CPT

## 2025-05-19 PROCEDURE — 93970 EXTREMITY STUDY: CPT | Mod: 26

## 2025-05-19 RX ORDER — METHOCARBAMOL 500 MG/1
750 TABLET, FILM COATED ORAL THREE TIMES A DAY
Refills: 0 | Status: DISCONTINUED | OUTPATIENT
Start: 2025-05-19 | End: 2025-05-20

## 2025-05-19 RX ORDER — DEXTROSE 50 % IN WATER 50 %
50 SYRINGE (ML) INTRAVENOUS ONCE
Refills: 0 | Status: COMPLETED | OUTPATIENT
Start: 2025-05-19 | End: 2025-05-19

## 2025-05-19 RX ORDER — ALBUTEROL SULFATE 2.5 MG/3ML
2 VIAL, NEBULIZER (ML) INHALATION EVERY 6 HOURS
Refills: 0 | Status: DISCONTINUED | OUTPATIENT
Start: 2025-05-19 | End: 2025-06-08

## 2025-05-19 RX ORDER — DEXTROSE 50 % IN WATER 50 %
25 SYRINGE (ML) INTRAVENOUS ONCE
Refills: 0 | Status: COMPLETED | OUTPATIENT
Start: 2025-05-19 | End: 2025-05-19

## 2025-05-19 RX ADMIN — DOXAZOSIN MESYLATE 1 MILLIGRAM(S): 8 TABLET ORAL at 22:10

## 2025-05-19 RX ADMIN — GABAPENTIN 100 MILLIGRAM(S): 400 CAPSULE ORAL at 06:08

## 2025-05-19 RX ADMIN — Medication 150 MILLIGRAM(S): at 06:08

## 2025-05-19 RX ADMIN — Medication 100 MILLIGRAM(S): at 15:50

## 2025-05-19 RX ADMIN — GABAPENTIN 100 MILLIGRAM(S): 400 CAPSULE ORAL at 15:50

## 2025-05-19 RX ADMIN — ATORVASTATIN CALCIUM 10 MILLIGRAM(S): 80 TABLET, FILM COATED ORAL at 22:10

## 2025-05-19 RX ADMIN — DEXTROMETHORPHAN HBR, GUAIFENESIN 600 MILLIGRAM(S): 200 LIQUID ORAL at 06:08

## 2025-05-19 RX ADMIN — GABAPENTIN 100 MILLIGRAM(S): 400 CAPSULE ORAL at 22:10

## 2025-05-19 RX ADMIN — Medication 50 MILLILITER(S): at 06:09

## 2025-05-19 RX ADMIN — Medication 1 APPLICATION(S): at 06:08

## 2025-05-19 RX ADMIN — DOXAZOSIN MESYLATE 1 MILLIGRAM(S): 8 TABLET ORAL at 15:50

## 2025-05-19 RX ADMIN — HEPARIN SODIUM 5000 UNIT(S): 1000 INJECTION INTRAVENOUS; SUBCUTANEOUS at 06:07

## 2025-05-19 RX ADMIN — SEVELAMER HYDROCHLORIDE 1600 MILLIGRAM(S): 800 TABLET ORAL at 08:26

## 2025-05-19 RX ADMIN — METHOCARBAMOL 750 MILLIGRAM(S): 500 TABLET, FILM COATED ORAL at 22:10

## 2025-05-19 RX ADMIN — Medication 81 MILLIGRAM(S): at 15:50

## 2025-05-19 RX ADMIN — HEPARIN SODIUM 5000 UNIT(S): 1000 INJECTION INTRAVENOUS; SUBCUTANEOUS at 17:18

## 2025-05-19 RX ADMIN — SEVELAMER HYDROCHLORIDE 1600 MILLIGRAM(S): 800 TABLET ORAL at 15:49

## 2025-05-19 RX ADMIN — METHOCARBAMOL 750 MILLIGRAM(S): 500 TABLET, FILM COATED ORAL at 15:50

## 2025-05-19 RX ADMIN — DEXTROMETHORPHAN HBR, GUAIFENESIN 600 MILLIGRAM(S): 200 LIQUID ORAL at 17:18

## 2025-05-19 RX ADMIN — CEFTRIAXONE 100 MILLIGRAM(S): 500 INJECTION, POWDER, FOR SOLUTION INTRAMUSCULAR; INTRAVENOUS at 01:34

## 2025-05-19 RX ADMIN — Medication 40 MILLIGRAM(S): at 06:08

## 2025-05-19 RX ADMIN — CEFTRIAXONE 100 MILLIGRAM(S): 500 INJECTION, POWDER, FOR SOLUTION INTRAMUSCULAR; INTRAVENOUS at 22:29

## 2025-05-19 RX ADMIN — Medication 100 MILLIGRAM(S): at 22:10

## 2025-05-19 RX ADMIN — Medication 2 TABLET(S): at 22:09

## 2025-05-19 NOTE — PROGRESS NOTE ADULT - ASSESSMENT
59y Female with h/o ESRD on HD (MWF), IDDM, dyslipidemia, CKD-MBD, HTN who presented to the hospital with CP during HD session this morning.  Had 90 minutes of total HD prior to arrival.  Nephrology now consulted for HD needs while in-house.    hd today standard bath uf 2 liters as tolerated / regular days outpatient MWF  follow FS closely / episode of hypoglycemia / endo evaluation / decrease lantus to 10   on binders/ph at goal   follow BP readings   check RUQ sono d/t RUQ pain   NST negative    UC noted / on atb   follow bp readings after hd/ change hydralazine to 100 q 8   h/h at goal/ no SHAR   will follow

## 2025-05-19 NOTE — DISCHARGE NOTE NURSING/CASE MANAGEMENT/SOCIAL WORK - NSDCPEFALRISK_GEN_ALL_CORE
For information on Fall & Injury Prevention, visit: [email or web address] OR  [email or web address] OR  https://www.cdc.gov/steadi/patient.html

## 2025-05-19 NOTE — PROGRESS NOTE ADULT - ASSESSMENT
59y old Sammarinese speaking Female w/ PMHx of ESRD on HD MWF, DM (insulin dependent) HTN, HLD, and HFpEF who presents to ED for chest pain during HD session this morning.  Had 90 minutes of total HD prior to arrival.  Patient stated that she felt chest pressure and exertional shortness of breath. Shortness of breath improves with rest. Patient was alert and oriented however speaking sluggishly on arrival to the ED. Endorses neck pain and chest pressure as well.     #AMS 2/2 hypoglycemia   #DM2, Insulin dependent  - RRT for AMS, FS 30 after 80u lantus, s/p d50 and d5LR   - resumed lantus 15u however pt with persistent hypoglycemia this AM  - dc lantus  - c/w lispro 3 TIDAC/ISS   - c/w gabapentin 100mg TID   - monitor FS     # ESRD on HD MWF  # HTN - uncontrolled   - nephro recs appreciated   - bp improves with HD  - increase hydralazine 100mg q8h  - c/w doxazsin 1mg BID  - c/w sevelamer 1600 mg TID  - patient state she uses nifedipine 90 PO if her SBP is above 200 - will hold for now     # Episode of slurred speech  - poss 2/2 brief hypotension during HD  - CTH showed A focal hypodensity is noted within the right frontal periventricular white matter, not previously seen. Finding may reflect age-indeterminate ischemic change. An MRI of the brain can be obtained for further evaluation if not clinically contraindicated.  - Neuro consult noted no additional inpatient workup.  - c/w ASA/statin    # Chest pain during HD   #Hx HFpEF - not in exacerbation  - trop 64 -> 66, Creatinine 4.1  - ECG showed prominent T wave   - CXR negative   - TTE EF 60%. G1DD  - stress test neg for ischemia  - dc tele   - chest pain possible GERD, started PPI qd     #Cough  - afebrile, no leukocytosis   - c/w supportive care  - advair bid, HFA prn    #Acute uncomplicated cystitis  - UA (+), UCx (+) E coli  - c/w CTX x3d  - bcx ngtd     # DLD  - c/w home atorvastatin    ---------------------  DVT ppx: HSQ  Diet: renal, DASH, CCC  GI ppx/Bowel regimen: ppi, miralax, senna  Activity: AAT  GOC: full  Dispo: home no needs  #Summary/Handoff:  - monitor BP, monitor glucose, f/u duplex

## 2025-05-19 NOTE — PROGRESS NOTE ADULT - ATTENDING COMMENTS
A/P:   #Chest pain, resolved  EKG NSR w/ no ischemic changes  Trops 64 >66> 70  TTE 65%, G1DD  NST no evidence of ischemia  possibly GERD? -> trial protonix  f/u venous duplex  continue ASA and lipitor    #Slurred speech briefly possibly from hypotension due to HD  CTH showed A focal hypodensity is noted within the right frontal periventricular white matter, not previously seen. Finding may reflect age-indeterminate ischemic change  - Neuro eval noted. NIHSS 0  - Pt has multiple cerebrovascular risk factors. Continue home ASA/statin and f/u in stroke clinic outpt.     #ESRD   nephro consulted  makes urine  HD per nephro M/W/F    # UTI  - UA positive  - urine cx E coli, f/u sens  - c/w rocephin    # DM w/ neuropathy  - A1c 8.1%  - lispro 3u TID, lantus 15u qhs, sliding scale  - trial gabapentin 100mg TID for neuropathy    # HTN  # DLD  - c/w hydralazine 150 mg PO BID  - patient state she uses nifedipine 90 PO if her SBP is above 200  - c/w home atorvastatin    Pending/Handoff:  - f/u venous duplex, anticipate dc in 24 hours

## 2025-05-19 NOTE — DISCHARGE NOTE NURSING/CASE MANAGEMENT/SOCIAL WORK - PATIENT PORTAL LINK FT
You can access the FollowMyHealth Patient Portal offered by [facility] by registering at the following website: [email or web address]. By joining [employer]’s FollowMyHealth portal, you will also be able to view your health information using other applications (apps) compatible with our system.

## 2025-05-19 NOTE — PROGRESS NOTE ADULT - SUBJECTIVE AND OBJECTIVE BOX
seen and examined  24 h events noted   no distress       PAST HISTORY  --------------------------------------------------------------------------------  No significant changes to PMH, PSH, FHx, SHx, unless otherwise noted    ALLERGIES & MEDICATIONS  --------------------------------------------------------------------------------  Allergies    No Known Allergies    Intolerances      Standing Inpatient Medications  aspirin  chewable 81 milliGRAM(s) Oral daily  atorvastatin 10 milliGRAM(s) Oral at bedtime  cefTRIAXone   IVPB 1000 milliGRAM(s) IV Intermittent every 24 hours  chlorhexidine 2% Cloths 1 Application(s) Topical <User Schedule>  dextrose 5%. 1000 milliLiter(s) IV Continuous <Continuous>  dextrose 50% Injectable 25 Gram(s) IV Push once  doxazosin 1 milliGRAM(s) Oral <User Schedule>  gabapentin 100 milliGRAM(s) Oral three times a day  glucagon  Injectable 1 milliGRAM(s) IntraMuscular once  guaiFENesin  milliGRAM(s) Oral every 12 hours  heparin   Injectable 5000 Unit(s) SubCutaneous every 12 hours  hydrALAZINE 150 milliGRAM(s) Oral two times a day  insulin glargine Injectable (LANTUS) 15 Unit(s) SubCutaneous at bedtime  insulin lispro (ADMELOG) corrective regimen sliding scale   SubCutaneous three times a day before meals  insulin lispro (ADMELOG) corrective regimen sliding scale   SubCutaneous at bedtime  insulin lispro Injectable (ADMELOG) 3 Unit(s) SubCutaneous three times a day before meals  pantoprazole    Tablet 40 milliGRAM(s) Oral before breakfast  polyethylene glycol 3350 17 Gram(s) Oral daily  regadenoson Injectable 0.4 milliGRAM(s) IV Push once  senna 2 Tablet(s) Oral at bedtime  sevelamer carbonate 1600 milliGRAM(s) Oral three times a day with meals    PRN Inpatient Medications  acetaminophen     Tablet .. 650 milliGRAM(s) Oral every 6 hours PRN  aluminum hydroxide/magnesium hydroxide/simethicone Suspension 30 milliLiter(s) Oral every 4 hours PRN  dextrose Oral Gel 15 Gram(s) Oral once PRN  NIFEdipine XL 90 milliGRAM(s) Oral daily PRN          VITALS/PHYSICAL EXAM  --------------------------------------------------------------------------------  T(C): 36.4 (05-19-25 @ 05:45), Max: 36.6 (05-18-25 @ 21:12)  HR: 71 (05-19-25 @ 05:45) (71 - 93)  BP: 176/60 (05-19-25 @ 05:45) (161/63 - 197/78)  RR: 17 (05-19-25 @ 05:45) (16 - 17)  SpO2: 98% (05-19-25 @ 05:45) (95% - 98%)  Wt(kg): --        05-18-25 @ 07:01  -  05-19-25 @ 07:00  --------------------------------------------------------  IN: 561 mL / OUT: 100 mL / NET: 461 mL      Physical Exam:  	Gen: NAD  	Pulm: CTA B/L  	CV:  S1S2; no rub  	Abd:  RUQ tenderness   	LE:  no edema  	Vascular access:av    LABS/STUDIES  --------------------------------------------------------------------------------              11.0   3.87  >-----------<  229      [05-19-25 @ 05:43]              33.1     142  |  98  |  44  ----------------------------<  20      [05-19-25 @ 05:43]  3.5   |  26  |  6.7        Ca     10.1     [05-19-25 @ 05:43]      Mg     2.7     [05-19-25 @ 05:43]      Phos  5.4     [05-19-25 @ 05:43]    TPro  7.2  /  Alb  4.1  /  TBili  0.2  /  DBili  x   /  AST  55  /  ALT  17  /  AlkPhos  117  [05-19-25 @ 05:43]      Creatinine Trend:  SCr 6.7 [05-19 @ 05:43]  SCr 5.1 [05-18 @ 05:54]  SCr 6.0 [05-17 @ 06:21]  SCr 4.1 [05-16 @ 08:48]    Urinalysis - [05-19-25 @ 05:43]      Color  / Appearance  / SG  / pH       Gluc 20 / Ketone   / Bili  / Urobili        Blood  / Protein  / Leuk Est  / Nitrite       RBC  / WBC  / Hyaline  / Gran  / Sq Epi  / Non Sq Epi  / Bacteria

## 2025-05-19 NOTE — DISCHARGE NOTE NURSING/CASE MANAGEMENT/SOCIAL WORK - FINANCIAL ASSISTANCE
[facility] provides services at a reduced cost to those who are determined to be eligible through [facility]’s financial assistance program. Information regarding [facility]’s financial assistance program can be found by going to [email or web address] or by calling [phone].

## 2025-05-19 NOTE — PROGRESS NOTE ADULT - SUBJECTIVE AND OBJECTIVE BOX
Patient is a 59y old Female who presents with a chief complaint of AMS (19 May 2025 07:55)    Today is hospital day     Overnight events/Interval History:  - Glucose 29 s/p d50 with improvement  - Telemetry: no events  - : [id number]  - At bedside, patient has some shakiness, repeat FS in 150s, also some coughing with mucus. No other complaints. Breathing comfortably on room air. Denies fevers, chills, SOB, chest pain, N/V/D/C, abdominal pain.    ROS:  - All ROS is negative unless indicated in HPI    Code Status:    ALLERGIES:  No Known Allergies    MEDICATIONS:  STANDING MEDICATIONS  aspirin  chewable 81 milliGRAM(s) Oral daily  atorvastatin 10 milliGRAM(s) Oral at bedtime  cefTRIAXone   IVPB 1000 milliGRAM(s) IV Intermittent every 24 hours  chlorhexidine 2% Cloths 1 Application(s) Topical <User Schedule>  dextrose 5%. 1000 milliLiter(s) IV Continuous <Continuous>  dextrose 50% Injectable 25 Gram(s) IV Push once  doxazosin 1 milliGRAM(s) Oral <User Schedule>  fluticasone propionate/ salmeterol 100-50 MICROgram(s) Diskus 1 Dose(s) Inhalation two times a day  gabapentin 100 milliGRAM(s) Oral three times a day  glucagon  Injectable 1 milliGRAM(s) IntraMuscular once  guaiFENesin  milliGRAM(s) Oral every 12 hours  heparin   Injectable 5000 Unit(s) SubCutaneous every 12 hours  hydrALAZINE 100 milliGRAM(s) Oral every 8 hours  insulin lispro (ADMELOG) corrective regimen sliding scale   SubCutaneous three times a day before meals  insulin lispro (ADMELOG) corrective regimen sliding scale   SubCutaneous at bedtime  insulin lispro Injectable (ADMELOG) 3 Unit(s) SubCutaneous three times a day before meals  pantoprazole    Tablet 40 milliGRAM(s) Oral before breakfast  polyethylene glycol 3350 17 Gram(s) Oral daily  regadenoson Injectable 0.4 milliGRAM(s) IV Push once  senna 2 Tablet(s) Oral at bedtime  sevelamer carbonate 1600 milliGRAM(s) Oral three times a day with meals    PRN MEDICATIONS  acetaminophen     Tablet .. 650 milliGRAM(s) Oral every 6 hours PRN  albuterol    90 MICROgram(s) HFA Inhaler 2 Puff(s) Inhalation every 6 hours PRN  aluminum hydroxide/magnesium hydroxide/simethicone Suspension 30 milliLiter(s) Oral every 4 hours PRN  dextrose Oral Gel 15 Gram(s) Oral once PRN      VITALS LAST 24H:  Vital Signs Last 24 Hrs  T(C): 36.4 (19 May 2025 05:45), Max: 36.6 (18 May 2025 21:12)  T(F): 97.6 (19 May 2025 05:45), Max: 97.8 (18 May 2025 21:12)  HR: 71 (19 May 2025 05:45) (71 - 82)  BP: 176/60 (19 May 2025 05:45) (161/63 - 180/73)  BP(mean): 96 (19 May 2025 05:45) (96 - 96)  RR: 17 (19 May 2025 05:45) (16 - 17)  SpO2: 98% (19 May 2025 05:45) (95% - 98%)        PHYSICAL EXAM:  GENERAL: NAD  HEENT:  Moist mucous membranes  CHEST/LUNG: Clear to auscultation bilaterally; normal respiratory effort  HEART: Regular rate and rhythm  ABDOMEN: Soft, nontender, nondistended  EXTREMITIES:  No lower extremity edema bilaterally  NERVOUS SYSTEM:  A&Ox3, no focal deficits     LABS:                        11.0   3.87  )-----------( 229      ( 19 May 2025 05:43 )             33.1     05-19    142  |  98  |  44[H]  ----------------------------<  20[LL]  3.5   |  26  |  6.7[HH]    Ca    10.1      19 May 2025 05:43  Phos  5.4     05-19  Mg     2.7     05-19    TPro  7.2  /  Alb  4.1  /  TBili  0.2  /  DBili  x   /  AST  55[H]  /  ALT  17  /  AlkPhos  117[H]  05-19      Urinalysis Basic - ( 19 May 2025 05:43 )    Color: x / Appearance: x / SG: x / pH: x  Gluc: 20 mg/dL / Ketone: x  / Bili: x / Urobili: x   Blood: x / Protein: x / Nitrite: x   Leuk Esterase: x / RBC: x / WBC x   Sq Epi: x / Non Sq Epi: x / Bacteria: x            Culture - Blood (collected 16 May 2025 12:25)  Source: Blood Blood-Peripheral  Preliminary Report (18 May 2025 22:01):    No growth at 48 Hours    Culture - Blood (collected 16 May 2025 12:25)  Source: Blood Blood-Peripheral  Preliminary Report (18 May 2025 22:01):    No growth at 48 Hours          RADIOLOGY:  CXR      CT

## 2025-05-20 LAB
-  AMOXICILLIN/CLAVULANIC ACID: SIGNIFICANT CHANGE UP
-  AMPICILLIN/SULBACTAM: SIGNIFICANT CHANGE UP
-  AMPICILLIN: SIGNIFICANT CHANGE UP
-  AZTREONAM: SIGNIFICANT CHANGE UP
-  CEFAZOLIN: SIGNIFICANT CHANGE UP
-  CEFEPIME: SIGNIFICANT CHANGE UP
-  CEFOXITIN: SIGNIFICANT CHANGE UP
-  CEFTRIAXONE: SIGNIFICANT CHANGE UP
-  CEFUROXIME: SIGNIFICANT CHANGE UP
-  CIPROFLOXACIN: SIGNIFICANT CHANGE UP
-  ERTAPENEM: SIGNIFICANT CHANGE UP
-  GENTAMICIN: SIGNIFICANT CHANGE UP
-  IMIPENEM: SIGNIFICANT CHANGE UP
-  LEVOFLOXACIN: SIGNIFICANT CHANGE UP
-  MEROPENEM: SIGNIFICANT CHANGE UP
-  NITROFURANTOIN: SIGNIFICANT CHANGE UP
-  PIPERACILLIN/TAZOBACTAM: SIGNIFICANT CHANGE UP
-  TIGECYCLINE: SIGNIFICANT CHANGE UP
-  TOBRAMYCIN: SIGNIFICANT CHANGE UP
-  TRIMETHOPRIM/SULFAMETHOXAZOLE: SIGNIFICANT CHANGE UP
ANION GAP SERPL CALC-SCNC: 15 MMOL/L — HIGH (ref 7–14)
BASOPHILS # BLD AUTO: 0.02 K/UL — SIGNIFICANT CHANGE UP (ref 0–0.2)
BASOPHILS NFR BLD AUTO: 0.8 % — SIGNIFICANT CHANGE UP (ref 0–1)
BUN SERPL-MCNC: 24 MG/DL — HIGH (ref 10–20)
CALCIUM SERPL-MCNC: 9.7 MG/DL — SIGNIFICANT CHANGE UP (ref 8.4–10.5)
CHLORIDE SERPL-SCNC: 99 MMOL/L — SIGNIFICANT CHANGE UP (ref 98–110)
CO2 SERPL-SCNC: 26 MMOL/L — SIGNIFICANT CHANGE UP (ref 17–32)
CREAT SERPL-MCNC: 5.4 MG/DL — CRITICAL HIGH (ref 0.7–1.5)
CULTURE RESULTS: ABNORMAL
EGFR: 9 ML/MIN/1.73M2 — LOW
EGFR: 9 ML/MIN/1.73M2 — LOW
EOSINOPHIL # BLD AUTO: 0.06 K/UL — SIGNIFICANT CHANGE UP (ref 0–0.7)
EOSINOPHIL NFR BLD AUTO: 2.5 % — SIGNIFICANT CHANGE UP (ref 0–8)
GLUCOSE BLDC GLUCOMTR-MCNC: 258 MG/DL — HIGH (ref 70–99)
GLUCOSE BLDC GLUCOMTR-MCNC: 278 MG/DL — HIGH (ref 70–99)
GLUCOSE BLDC GLUCOMTR-MCNC: 292 MG/DL — HIGH (ref 70–99)
GLUCOSE BLDC GLUCOMTR-MCNC: 307 MG/DL — HIGH (ref 70–99)
GLUCOSE BLDC GLUCOMTR-MCNC: 378 MG/DL — HIGH (ref 70–99)
GLUCOSE BLDC GLUCOMTR-MCNC: 50 MG/DL — CRITICAL LOW (ref 70–99)
GLUCOSE BLDC GLUCOMTR-MCNC: 77 MG/DL — SIGNIFICANT CHANGE UP (ref 70–99)
GLUCOSE SERPL-MCNC: 34 MG/DL — CRITICAL LOW (ref 70–99)
HCT VFR BLD CALC: 34.8 % — LOW (ref 37–47)
HGB BLD-MCNC: 11.3 G/DL — LOW (ref 12–16)
IMM GRANULOCYTES NFR BLD AUTO: 0.4 % — HIGH (ref 0.1–0.3)
LYMPHOCYTES # BLD AUTO: 0.89 K/UL — LOW (ref 1.2–3.4)
LYMPHOCYTES # BLD AUTO: 36.5 % — SIGNIFICANT CHANGE UP (ref 20.5–51.1)
MAGNESIUM SERPL-MCNC: 2.8 MG/DL — HIGH (ref 1.8–2.4)
MCHC RBC-ENTMCNC: 31.9 PG — HIGH (ref 27–31)
MCHC RBC-ENTMCNC: 32.5 G/DL — SIGNIFICANT CHANGE UP (ref 32–37)
MCV RBC AUTO: 98.3 FL — SIGNIFICANT CHANGE UP (ref 81–99)
METHOD TYPE: SIGNIFICANT CHANGE UP
MONOCYTES # BLD AUTO: 0.27 K/UL — SIGNIFICANT CHANGE UP (ref 0.1–0.6)
MONOCYTES NFR BLD AUTO: 11.1 % — HIGH (ref 1.7–9.3)
NEUTROPHILS # BLD AUTO: 1.19 K/UL — LOW (ref 1.4–6.5)
NEUTROPHILS NFR BLD AUTO: 48.7 % — SIGNIFICANT CHANGE UP (ref 42.2–75.2)
NRBC BLD AUTO-RTO: 0 /100 WBCS — SIGNIFICANT CHANGE UP (ref 0–0)
ORGANISM # SPEC MICROSCOPIC CNT: ABNORMAL
ORGANISM # SPEC MICROSCOPIC CNT: SIGNIFICANT CHANGE UP
PHOSPHATE SERPL-MCNC: 6.2 MG/DL — HIGH (ref 2.1–4.9)
PLATELET # BLD AUTO: 235 K/UL — SIGNIFICANT CHANGE UP (ref 130–400)
PMV BLD: 10.5 FL — HIGH (ref 7.4–10.4)
POTASSIUM SERPL-MCNC: 4 MMOL/L — SIGNIFICANT CHANGE UP (ref 3.5–5)
POTASSIUM SERPL-SCNC: 4 MMOL/L — SIGNIFICANT CHANGE UP (ref 3.5–5)
RBC # BLD: 3.54 M/UL — LOW (ref 4.2–5.4)
RBC # FLD: 15.4 % — HIGH (ref 11.5–14.5)
SODIUM SERPL-SCNC: 140 MMOL/L — SIGNIFICANT CHANGE UP (ref 135–146)
SPECIMEN SOURCE: SIGNIFICANT CHANGE UP
WBC # BLD: 2.44 K/UL — LOW (ref 4.8–10.8)
WBC # FLD AUTO: 2.44 K/UL — LOW (ref 4.8–10.8)

## 2025-05-20 PROCEDURE — 99233 SBSQ HOSP IP/OBS HIGH 50: CPT

## 2025-05-20 PROCEDURE — 76705 ECHO EXAM OF ABDOMEN: CPT | Mod: 26

## 2025-05-20 RX ORDER — DEXTROSE 50 % IN WATER 50 %
15 SYRINGE (ML) INTRAVENOUS ONCE
Refills: 0 | Status: COMPLETED | OUTPATIENT
Start: 2025-05-20 | End: 2025-05-20

## 2025-05-20 RX ORDER — SODIUM CHLORIDE 9 G/1000ML
1000 INJECTION, SOLUTION INTRAVENOUS
Refills: 0 | Status: DISCONTINUED | OUTPATIENT
Start: 2025-05-20 | End: 2025-05-20

## 2025-05-20 RX ORDER — INSULIN LISPRO 100 U/ML
INJECTION, SOLUTION INTRAVENOUS; SUBCUTANEOUS
Refills: 0 | Status: DISCONTINUED | OUTPATIENT
Start: 2025-05-20 | End: 2025-05-29

## 2025-05-20 RX ORDER — NIFEDIPINE 30 MG
30 TABLET, EXTENDED RELEASE 24 HR ORAL ONCE
Refills: 0 | Status: COMPLETED | OUTPATIENT
Start: 2025-05-20 | End: 2025-05-20

## 2025-05-20 RX ORDER — NIFEDIPINE 30 MG
30 TABLET, EXTENDED RELEASE 24 HR ORAL DAILY
Refills: 0 | Status: DISCONTINUED | OUTPATIENT
Start: 2025-05-21 | End: 2025-06-02

## 2025-05-20 RX ORDER — LABETALOL HYDROCHLORIDE 200 MG/1
10 TABLET, FILM COATED ORAL ONCE
Refills: 0 | Status: COMPLETED | OUTPATIENT
Start: 2025-05-20 | End: 2025-05-20

## 2025-05-20 RX ADMIN — LABETALOL HYDROCHLORIDE 10 MILLIGRAM(S): 200 TABLET, FILM COATED ORAL at 13:01

## 2025-05-20 RX ADMIN — Medication 100 MILLIGRAM(S): at 05:09

## 2025-05-20 RX ADMIN — POLYETHYLENE GLYCOL 3350 17 GRAM(S): 17 POWDER, FOR SOLUTION ORAL at 12:25

## 2025-05-20 RX ADMIN — Medication 1 APPLICATION(S): at 05:09

## 2025-05-20 RX ADMIN — Medication 650 MILLIGRAM(S): at 08:58

## 2025-05-20 RX ADMIN — INSULIN LISPRO 6: 100 INJECTION, SOLUTION INTRAVENOUS; SUBCUTANEOUS at 12:38

## 2025-05-20 RX ADMIN — Medication 1 DOSE(S): at 20:05

## 2025-05-20 RX ADMIN — Medication 15 GRAM(S): at 08:08

## 2025-05-20 RX ADMIN — Medication 30 MILLIGRAM(S): at 10:19

## 2025-05-20 RX ADMIN — GABAPENTIN 100 MILLIGRAM(S): 400 CAPSULE ORAL at 21:55

## 2025-05-20 RX ADMIN — HEPARIN SODIUM 5000 UNIT(S): 1000 INJECTION INTRAVENOUS; SUBCUTANEOUS at 17:01

## 2025-05-20 RX ADMIN — Medication 81 MILLIGRAM(S): at 12:25

## 2025-05-20 RX ADMIN — DOXAZOSIN MESYLATE 1 MILLIGRAM(S): 8 TABLET ORAL at 21:55

## 2025-05-20 RX ADMIN — SODIUM CHLORIDE 30 MILLILITER(S): 9 INJECTION, SOLUTION INTRAVENOUS at 10:19

## 2025-05-20 RX ADMIN — SEVELAMER HYDROCHLORIDE 1600 MILLIGRAM(S): 800 TABLET ORAL at 08:08

## 2025-05-20 RX ADMIN — Medication 2 TABLET(S): at 21:55

## 2025-05-20 RX ADMIN — INSULIN LISPRO 4: 100 INJECTION, SOLUTION INTRAVENOUS; SUBCUTANEOUS at 21:54

## 2025-05-20 RX ADMIN — INSULIN LISPRO 2: 100 INJECTION, SOLUTION INTRAVENOUS; SUBCUTANEOUS at 17:01

## 2025-05-20 RX ADMIN — GABAPENTIN 100 MILLIGRAM(S): 400 CAPSULE ORAL at 05:09

## 2025-05-20 RX ADMIN — SEVELAMER HYDROCHLORIDE 1600 MILLIGRAM(S): 800 TABLET ORAL at 17:01

## 2025-05-20 RX ADMIN — DEXTROMETHORPHAN HBR, GUAIFENESIN 600 MILLIGRAM(S): 200 LIQUID ORAL at 05:09

## 2025-05-20 RX ADMIN — GABAPENTIN 100 MILLIGRAM(S): 400 CAPSULE ORAL at 15:22

## 2025-05-20 RX ADMIN — Medication 1 DOSE(S): at 10:19

## 2025-05-20 RX ADMIN — ATORVASTATIN CALCIUM 10 MILLIGRAM(S): 80 TABLET, FILM COATED ORAL at 21:55

## 2025-05-20 RX ADMIN — HEPARIN SODIUM 5000 UNIT(S): 1000 INJECTION INTRAVENOUS; SUBCUTANEOUS at 05:08

## 2025-05-20 RX ADMIN — Medication 40 MILLIGRAM(S): at 05:09

## 2025-05-20 RX ADMIN — SEVELAMER HYDROCHLORIDE 1600 MILLIGRAM(S): 800 TABLET ORAL at 12:25

## 2025-05-20 RX ADMIN — Medication 650 MILLIGRAM(S): at 09:25

## 2025-05-20 RX ADMIN — Medication 100 MILLIGRAM(S): at 21:55

## 2025-05-20 RX ADMIN — DOXAZOSIN MESYLATE 1 MILLIGRAM(S): 8 TABLET ORAL at 08:55

## 2025-05-20 RX ADMIN — DEXTROMETHORPHAN HBR, GUAIFENESIN 600 MILLIGRAM(S): 200 LIQUID ORAL at 17:02

## 2025-05-20 RX ADMIN — Medication 100 MILLIGRAM(S): at 15:22

## 2025-05-20 RX ADMIN — METHOCARBAMOL 750 MILLIGRAM(S): 500 TABLET, FILM COATED ORAL at 05:09

## 2025-05-20 NOTE — PROGRESS NOTE ADULT - SUBJECTIVE AND OBJECTIVE BOX
SUBJECTIVE/OVERNIGHT EVENTS  Today is hospital day 4d. This morning patient was seen and examined at bedside, resting comfortably in bed. No acute or major events overnight. Patient has no complaints at this time, but endorses lethargy, chronic back pain, and is speaking very sluggishly. Denies fever, chills, nausea, vomiting, diarrhea, constipation, hematochezia, dysuria, hematuria, chest pain, palpitations, sob. Patient was informed of their plan of care at this time.    CODE STATUS: FULL    MEDICATIONS  STANDING MEDICATIONS  aspirin  chewable 81 milliGRAM(s) Oral daily  atorvastatin 10 milliGRAM(s) Oral at bedtime  chlorhexidine 2% Cloths 1 Application(s) Topical <User Schedule>  dextrose 5%. 1000 milliLiter(s) IV Continuous <Continuous>  dextrose 5%. 1000 milliLiter(s) IV Continuous <Continuous>  dextrose 50% Injectable 25 Gram(s) IV Push once  doxazosin 1 milliGRAM(s) Oral <User Schedule>  fluticasone propionate/ salmeterol 100-50 MICROgram(s) Diskus 1 Dose(s) Inhalation two times a day  gabapentin 100 milliGRAM(s) Oral three times a day  glucagon  Injectable 1 milliGRAM(s) IntraMuscular once  guaiFENesin  milliGRAM(s) Oral every 12 hours  heparin   Injectable 5000 Unit(s) SubCutaneous every 12 hours  hydrALAZINE 100 milliGRAM(s) Oral every 8 hours  pantoprazole    Tablet 40 milliGRAM(s) Oral before breakfast  polyethylene glycol 3350 17 Gram(s) Oral daily  regadenoson Injectable 0.4 milliGRAM(s) IV Push once  senna 2 Tablet(s) Oral at bedtime  sevelamer carbonate 1600 milliGRAM(s) Oral three times a day with meals    PRN MEDICATIONS  acetaminophen     Tablet .. 650 milliGRAM(s) Oral every 6 hours PRN  albuterol    90 MICROgram(s) HFA Inhaler 2 Puff(s) Inhalation every 6 hours PRN  aluminum hydroxide/magnesium hydroxide/simethicone Suspension 30 milliLiter(s) Oral every 4 hours PRN  dextrose Oral Gel 15 Gram(s) Oral once PRN    VITALS  T(F): 98.2 (05-20-25 @ 04:45), Max: 98.2 (05-19-25 @ 21:22)  HR: 106 (05-20-25 @ 05:00) (71 - 106)  BP: 121/71 (05-20-25 @ 05:00) (110/56 - 207/88)  RR: 18 (05-20-25 @ 05:00) (17 - 18)  SpO2: 97% (05-20-25 @ 05:00) (97% - 100%)  POCT Blood Glucose.: 258 mg/dL (05-20-25 @ 08:37)  POCT Blood Glucose.: 77 mg/dL (05-20-25 @ 07:21)  POCT Blood Glucose.: 50 mg/dL (05-20-25 @ 06:56)  POCT Blood Glucose.: 138 mg/dL (05-19-25 @ 21:09)  POCT Blood Glucose.: 109 mg/dL (05-19-25 @ 17:07)  POCT Blood Glucose.: 49 mg/dL (05-19-25 @ 16:20)  POCT Blood Glucose.: 96 mg/dL (05-19-25 @ 11:44)  POCT Blood Glucose.: 104 mg/dL (05-19-25 @ 11:32)    PHYSICAL EXAM  GENERAL  ( X ) NAD, lying in bed comfortably     (  ) obtunded     ( X ) lethargic     (  ) somnolent   ( X ) Sluggish/slurred speech    HEAD  ( X ) Atraumatic     (  ) hematoma     (  ) laceration (specify location:       )     NECK  ( X ) Supple     (  ) neck stiffness     (  ) nuchal rigidity     (  )  no JVD     (  ) JVD present ( -- cm)    HEART  Rate -->  ( X ) normal rate    (  ) bradycardic    (  ) tachycardic  Rhythm -->  ( X ) regular    (  ) regularly irregular    (  ) irregularly irregular  Murmurs -->  ( X ) normal s1/s2    (  ) systolic murmur    (  ) diastolic murmur    (  ) continuous murmur     (  ) S3 present    (  ) S4 present    LUNGS  ( X )Unlabored respirations     (  ) tachypnea  ( X ) B/L air entry     (  ) decreased breath sounds in:  (location     )    (  ) no adventitious sound     (  ) crackles     (  ) wheezing      (  ) rhonchi      (specify location:       )  (  ) chest wall tenderness (specify location:       )    ABDOMEN  ( X ) Soft     (  ) tense   |   ( X ) nondistended     (  ) distended   |   ( X ) +BS     (  ) hypoactive bowel sounds     (  ) hyperactive bowel sounds  ( X ) nontender     (  ) RUQ tenderness     (  ) RLQ tenderness     (  ) LLQ tenderness     (  ) epigastric tenderness     (  ) diffuse tenderness  (  ) Splenomegaly      (  ) Hepatomegaly      (  ) Jaundice     (  ) ecchymosis     EXTREMITIES  ( X ) Normal     (  ) Rash     (  ) ecchymosis     (  ) varicose veins      (  ) pitting edema     (  ) non-pitting edema   (  ) ulceration     (  ) gangrene:     (location:     )    NERVOUS SYSTEM  ( X ) A&Ox3     (  ) confused     ( X ) lethargic   ( X ) Sluggish/slurred speech  CN II-XII:     (  ) Intact     (  ) focal deficits  (Specify:     )   Upper extremities:     (  ) strength X/5     (  ) focal deficit (specify:    )  Lower extremities:     (  ) strength  X/5    (  ) focal deficit (specify:    )    SKIN  ( X ) No rashes or lesions     (  ) maculopapular rash     (  ) pustules     (  ) vesicles     (  ) ulcer     (  ) ecchymosis     (specify location:     )    LABS             11.3   2.44  )-----------( 235      ( 05-20-25 @ 06:33 )             34.8     140  |  99  |  24  -------------------------<  34   05-20-25 @ 06:33  4.0  |  26  |  5.4    Ca      9.7     05-20-25 @ 06:33  Phos   6.2     05-20-25 @ 06:33  Mg     2.8     05-20-25 @ 06:33    TPro  7.2  /  Alb  4.1  /  TBili  0.2  /  DBili  x   /  AST  55  /  ALT  17  /  AlkPhos  117  /  GGT  x     05-19-25 @ 05:43        Urinalysis Basic - ( 20 May 2025 06:33 )    Color: x / Appearance: x / SG: x / pH: x  Gluc: 34 mg/dL / Ketone: x  / Bili: x / Urobili: x   Blood: x / Protein: x / Nitrite: x   Leuk Esterase: x / RBC: x / WBC x   Sq Epi: x / Non Sq Epi: x / Bacteria: x          IMAGING  < from: Xray Chest 1 View-PORTABLE IMMEDIATE (Xray Chest 1 View-PORTABLE IMMEDIATE .) (05.18.25 @ 11:42) >  IMPRESSION:    No radiographic evidence of acute cardiopulmonary disease.    < end of copied text >    < from: [facility] Duplex Lower Ext Vein Scan, Ravi (05.19.25 @ 09:35) >    IMPRESSION:  No evidence of deep venous thrombosis in either lower extremity.    < end of copied text >   SUBJECTIVE/OVERNIGHT EVENTS  Today is hospital day 4d. This morning patient was seen and examined at bedside, resting comfortably in bed. No acute or major events overnight.   endorses RUQ pain, weakness and chronic neck/  back pain,    Denies fever, chills, nausea, vomiting, diarrhea, constipation, hematochezia, dysuria, hematuria, chest pain, palpitations, sob. Patient was informed of their plan of care at this time.    CODE STATUS: FULL    MEDICATIONS  STANDING MEDICATIONS  aspirin  chewable 81 milliGRAM(s) Oral daily  atorvastatin 10 milliGRAM(s) Oral at bedtime  chlorhexidine 2% Cloths 1 Application(s) Topical <User Schedule>  dextrose 5%. 1000 milliLiter(s) IV Continuous <Continuous>  dextrose 5%. 1000 milliLiter(s) IV Continuous <Continuous>  dextrose 50% Injectable 25 Gram(s) IV Push once  doxazosin 1 milliGRAM(s) Oral <User Schedule>  fluticasone propionate/ salmeterol 100-50 MICROgram(s) Diskus 1 Dose(s) Inhalation two times a day  gabapentin 100 milliGRAM(s) Oral three times a day  glucagon  Injectable 1 milliGRAM(s) IntraMuscular once  guaiFENesin  milliGRAM(s) Oral every 12 hours  heparin   Injectable 5000 Unit(s) SubCutaneous every 12 hours  hydrALAZINE 100 milliGRAM(s) Oral every 8 hours  pantoprazole    Tablet 40 milliGRAM(s) Oral before breakfast  polyethylene glycol 3350 17 Gram(s) Oral daily  regadenoson Injectable 0.4 milliGRAM(s) IV Push once  senna 2 Tablet(s) Oral at bedtime  sevelamer carbonate 1600 milliGRAM(s) Oral three times a day with meals    PRN MEDICATIONS  acetaminophen     Tablet .. 650 milliGRAM(s) Oral every 6 hours PRN  albuterol    90 MICROgram(s) HFA Inhaler 2 Puff(s) Inhalation every 6 hours PRN  aluminum hydroxide/magnesium hydroxide/simethicone Suspension 30 milliLiter(s) Oral every 4 hours PRN  dextrose Oral Gel 15 Gram(s) Oral once PRN    VITALS  T(F): 98.2 (05-20-25 @ 04:45), Max: 98.2 (05-19-25 @ 21:22)  HR: 106 (05-20-25 @ 05:00) (71 - 106)  BP: 121/71 (05-20-25 @ 05:00) (110/56 - 207/88)  RR: 18 (05-20-25 @ 05:00) (17 - 18)  SpO2: 97% (05-20-25 @ 05:00) (97% - 100%)  POCT Blood Glucose.: 258 mg/dL (05-20-25 @ 08:37)  POCT Blood Glucose.: 77 mg/dL (05-20-25 @ 07:21)    EXAM:    CONSTITUTIONAL: No acute distress, well-developed, well-groomed, AAOx3  HEAD: Atraumatic, normocephalic  EYES: EOM intact, PERRLA, conjunctiva and sclera clear  ENT: Supple, no masses, no thyromegaly, no bruits, no JVD; moist mucous membranes  PULMONARY: Clear to auscultation bilaterally; no wheezes, rales, or rhonchi  CARDIOVASCULAR: Regular rate and rhythm; no murmurs, rubs, or gallops  GASTROINTESTINAL: Soft, mild tenderness at RUQ area, no rebound or gaurding, tender, non-distended; bowel sounds present  MUSCULOSKELETAL: 2+ peripheral pulses; no clubbing, no cyanosis, no edema  NEUROLOGY: AAO x4, moves all exts, non-focal  SKIN: No rashes or lesions; warm and dry    POCT Blood Glucose.: 50 mg/dL (05-20-25 @ 06:56)  POCT Blood Glucose.: 138 mg/dL (05-19-25 @ 21:09)  POCT Blood Glucose.: 109 mg/dL (05-19-25 @ 17:07)  POCT Blood Glucose.: 49 mg/dL (05-19-25 @ 16:20)  POCT Blood Glucose.: 96 mg/dL (05-19-25 @ 11:44)  POCT Blood Glucose.: 104 mg/dL (05-19-25 @ 11:32)    PHYSICAL EXAM        LABS             11.3   2.44  )-----------( 235      ( 05-20-25 @ 06:33 )             34.8     140  |  99  |  24  -------------------------<  34   05-20-25 @ 06:33  4.0  |  26  |  5.4    Ca      9.7     05-20-25 @ 06:33  Phos   6.2     05-20-25 @ 06:33  Mg     2.8     05-20-25 @ 06:33    TPro  7.2  /  Alb  4.1  /  TBili  0.2  /  DBili  x   /  AST  55  /  ALT  17  /  AlkPhos  117  /  GGT  x     05-19-25 @ 05:43        Urinalysis Basic - ( 20 May 2025 06:33 )    Color: x / Appearance: x / SG: x / pH: x  Gluc: 34 mg/dL / Ketone: x  / Bili: x / Urobili: x   Blood: x / Protein: x / Nitrite: x   Leuk Esterase: x / RBC: x / WBC x   Sq Epi: x / Non Sq Epi: x / Bacteria: x          IMAGING  < from: Xray Chest 1 View-PORTABLE IMMEDIATE (Xray Chest 1 View-PORTABLE IMMEDIATE .) (05.18.25 @ 11:42) >  IMPRESSION:    No radiographic evidence of acute cardiopulmonary disease.    < end of copied text >    < from: [facility] Duplex Lower Ext Vein Scan, Bilat (05.19.25 @ 09:35) >    IMPRESSION:  No evidence of deep venous thrombosis in either lower extremity.    < end of copied text >

## 2025-05-20 NOTE — PHYSICAL THERAPY INITIAL EVALUATION ADULT - PERTINENT HX OF CURRENT PROBLEM, REHAB EVAL
Patient is a 59y old Female admitted for AMS. Pt with PMH of DM, ESRD on HD MWF, HTN, HLD, and HF who presents to ED for chest pain during HD session this morning.  Had 90 minutes of total HD prior to arrival.  Patient stated that she felt chest pressure and exertional shortness of breath. Shortness of breath improves with rest. Patient was alert and oriented however speaking sluggishly on arrival to the ED. Endorses neck pain and chest pressure as well. Denies abdominal pain, dysuria, swelling. in the ED /54, HR 68, Temp 97.7, SpO2 98 on 2L NC  labs significant for trop 64 -> 66, Creatinine 4.1  UA showed bacteria  CXR negative  CT head showed A focal hypodensity is noted within the right frontal periventricular white matter, not previously seen. Finding may reflect age-indeterminate ischemic change.
Patient is a 59y old  Female admitted for AMS. Pt with PMH of DM, ESRD on HD MWF, HTN, HLD, and HF who presents to ED for chest pain during HD session this morning.  Had 90 minutes of total HD prior to arrival.  Patient stated that she felt chest pressure and exertional shortness of breath. Shortness of breath improves with rest. Patient was alert and oriented however speaking sluggishly on arrival to the ED. Endorses neck pain and chest pressure as well. Denies abdominal pain, dysuria, swelling.

## 2025-05-20 NOTE — PHYSICAL THERAPY INITIAL EVALUATION ADULT - GENERAL OBSERVATIONS, REHAB EVAL
15:50-16:20. Pt encountered semifowler in bed in NAD, +primafit, daughter present at b/s. Pt agreeable and eager for PT. 15:50-16:20. Pt encountered semifowler in bed in NAD, +primafit, daughter present at b/s. Pt agreeable and eager for PT. Vitals taken at b/s R UE supine: 113/57mmHGg, HR 83 bpm. In sittin/50mmHg, HR 80 bpm. In standing 90/54mmHg, HR 83 bpm. In sitting after standing 95/57mmHg, HR 79 bpm. Demario AVALOS and Dr. James notified.

## 2025-05-20 NOTE — PHYSICAL THERAPY INITIAL EVALUATION ADULT - STANDING BALANCE: STATIC
RW/fair balance

## 2025-05-20 NOTE — PROGRESS NOTE ADULT - ATTENDING COMMENTS
59y old Jordanian speaking Female w/ PMHx of ESRD on HD MWF, DM (insulin dependent) HTN, HLD, and HFpEF who presents to ED for chest pain during HD session. Admitted for workup of chest pain and AMS.    #RUQ pain: pending GRACIELA     #Chest pain, resolved  EKG NSR w/ no ischemic changes  TTE 65%, G1DD  NST no evidence of ischemia  possibly GERD > trial protonix  f/u venous duplex  c/w ASA and lipitor  #TME / Slurred speech briefly from hypotension due to HD RESOLVED   s/p neuro eval, outpt f/u   #ESRD   nephro following   HD per nephro  # DM w/ neuropathy  - was given 80 lantus 2 days ago, which causing episodes of hypoglycemia, hold all Insulin, D5 30cc/hr FS q4h for now   - gabapentin 100mg TID for neuropathy  # HTN  # DLD  -  this morning, c/w hydralazine 100mg tid   - added procardia 30mg, re check BP     pending monitor FS/ HTN / GRACIELA/PT eval 59y old Bruneian speaking Female w/ PMHx of ESRD on HD MWF, DM (insulin dependent) HTN, HLD, and HFpEF who presents to ED for chest pain during HD session. Admitted for workup of chest pain and AMS.    #RUQ pain: pending GRACIELA     #Chest pain, resolved  EKG NSR w/ no ischemic changes  TTE 65%, G1DD  NST no evidence of ischemia  possibly GERD > trial protonix  f/u venous duplex  c/w ASA and lipitor  #TME / Slurred speech briefly from hypotension due to HD RESOLVED   s/p neuro eval, outpt f/u   #ESRD   nephro following   HD per nephro  # DM w/ neuropathy  - was given 80 lantus 2 days ago, which causing episodes of hypoglycemia, hold all Insulin exccept ISS, FS q4h for now   - gabapentin 100mg TID for neuropathy  # HTN  # DLD  -  this morning, c/w hydralazine 100mg tid   - added procardia 30mg, re check BP     pending monitor FS/ HTN / GRACIELA/PT eval    spent 55 mins eval pt and coordinating care

## 2025-05-20 NOTE — PHYSICAL THERAPY INITIAL EVALUATION ADULT - GENERAL OBSERVATIONS, REHAB EVAL
9:35-10:01. Pt encountered semifowler in bed in NAD, Burkinan speaking, no  needed 2* to Burkinan speaking PT conducting PT Eval. Pt reported feeling weakness all over body, felt discomfort from head to waist. Vitals taken at b/s. R UE: 208/80 mmHg, HR 80 bpm. (taken twice). Demario AVALOS covering nurse and Dr. James notified who then went to examine pt. Hold PT at this time. Dr. James was going to order something for BP, for muscle spasm and will order ultrasound for RUQ abdomen pain upon his examination. Pt did provide information on PLOF. See below 9:35-10:01. Pt encountered semifowler in bed in NAD, +murillo. Belarusian speaking, no  needed 2* to Belarusian speaking PT conducting PT Eval. Pt reported feeling weakness all over body, felt discomfort from head to waist. Vitals taken at b/s. R UE: 208/80 mmHg, HR 80 bpm. (taken twice). Demario AVALOS covering nurse and Dr. James notified who then went to examine pt. Hold PT at this time. Dr. James was going to order something for BP, for muscle spasm and will order ultrasound for RUQ abdomen pain upon his examination. Pt did provide information on PLOF. See below

## 2025-05-20 NOTE — PHYSICAL THERAPY INITIAL EVALUATION ADULT - NSACTIVITYREC_GEN_A_PT
ther ex's for B LE's as tolerated to prevent deconditioning: knee flex/ext, ankle pumps 10 reps each. Pt educated on goals of PT.

## 2025-05-20 NOTE — PROGRESS NOTE ADULT - ASSESSMENT
59y old Solomon Islander speaking Female w/ PMHx of ESRD on HD MWF, DM (insulin dependent) HTN, HLD, and HFpEF who presents to ED for chest pain during HD session. Admitted for workup of chest pain and AMS.    #AMS 2/2 hypoglycemia likely 2/2 decreased lantus clearance  #DM2, Insulin dependent  - RRT on 5/18/2025 for AMS, FS 30 after 80u lantus, s/p d50 and d5LR   - Lantus and lispro held  - C/w ISS  - 5/20: Started D5 for hypoglycemia and AMS       - Monitor fingersticks q4  - c/w gabapentin 100mg TID   - D/c robaxin    #ESRD on HD MWF  #HTN    - nephro recs appreciated        - bp improves with HD       - C/w hydralazine 100mg q8h  - c/w doxazosin 1mg BID  - c/w sevelamer 1600 mg TID  - patient state she uses nifedipine 90 PO if her SBP is above 200 - will hold for now     #On admission, AMS possibly 2/2 transient hypotension during HD  - CTH showed A focal hypodensity is noted within the right frontal periventricular white matter, not previously seen. Finding may reflect age-indeterminate ischemic change. An MRI of the brain can be obtained for further evaluation if not clinically contraindicated.  - Neuro consult noted no additional inpatient workup.  - c/w ASA and atorvastatin    #Chest pain during HD   #HFpEF - not in exacerbation  - On admission. trop 64 -> 66, Creatinine 4.1  - ECG showed prominent T wave   - CXR negative   - TTE EF 60%. G1DD  - stress test neg for ischemia  - C/w protonix qd     #Cough  - afebrile, no leukocytosis   - c/w supportive care  - C/w advair bid, HFA prn    #Acute uncomplicated E. coli cystitis  - UA (+), UCx (+) E coli  - S/p CTX  - bcx ngtd     #DLD  - c/w home atorvastatin    #MISC  - DVT ppx: heparin  - Diet: renal, DASH, CCC  - GI ppx: protonix  - Activity: as tolerated    Pending: control of hypoglycemia, possible DC tomorrow   59y old Barbadian speaking Female w/ PMHx of ESRD on HD MWF, DM (insulin dependent) HTN, HLD, and HFpEF who presents to ED for chest pain during HD session. Admitted for workup of chest pain and AMS.    #AMS 2/2 hypoglycemia likely 2/2 decreased lantus clearance 2/2 ESRD  #DM2, Insulin dependent  - RRT on 5/18/2025 for AMS, FS 30 after 80u lantus, s/p d50 and d5LR   - Lantus and lispro held  - C/w ISS  - 5/20: Started D5 for hypoglycemia and AMS       - Monitor fingersticks q4  - c/w gabapentin 100mg TID   - D/c robaxin    #RUQ pain  - F/u RUQ US    #ESRD on HD MWF  #HTN    - nephro recs appreciated        - bp improves with HD       - C/w hydralazine 100mg q8h  - c/w doxazosin 1mg BID  - c/w sevelamer 1600 mg TID  - patient state she uses nifedipine 90 PO if her SBP is above 200 - will hold for now   - S/p nifedipine 30 x1 on 5/20 2/2 HTN to 200s SBP    #On admission, AMS possibly 2/2 transient hypotension during HD  - CTH showed A focal hypodensity is noted within the right frontal periventricular white matter, not previously seen. Finding may reflect age-indeterminate ischemic change. An MRI of the brain can be obtained for further evaluation if not clinically contraindicated.  - Neuro consult noted no additional inpatient workup.  - c/w ASA and atorvastatin    #Chest pain during HD   #HFpEF - not in exacerbation  - On admission. trop 64 -> 66, Creatinine 4.1  - ECG showed prominent T wave   - CXR negative   - TTE EF 60%. G1DD  - stress test neg for ischemia  - C/w protonix qd     #Cough  - afebrile, no leukocytosis   - c/w supportive care  - C/w advair bid, HFA prn    #Acute uncomplicated E. coli cystitis  - UA (+), UCx (+) E coli  - S/p CTX  - bcx ngtd     #DLD  - c/w home atorvastatin    #MISC  - DVT ppx: heparin  - Diet: renal, DASH, CCC  - GI ppx: protonix  - Activity: as tolerated    Pending: control of hypoglycemia, possible DC tomorrow, control of HTN, RUQ US

## 2025-05-20 NOTE — PHYSICAL THERAPY INITIAL EVALUATION ADULT - GAIT DISTANCE, PT EVAL
pt was dizz/lightheaded, reported pain in cervical spine and R shoulder. Demario AVALOS notified who notified Dr. James. (see vitals above)/bed to chair/5 feet

## 2025-05-20 NOTE — PHYSICAL THERAPY INITIAL EVALUATION ADULT - ADDITIONAL COMMENTS
pt lives in [facility], [address], no steps inside. pt lives in [facility], [address], no steps inside. Pt goes to hemodialysis

## 2025-05-20 NOTE — PHYSICAL THERAPY INITIAL EVALUATION ADULT - NAME OF CLINICIAN
Demario AVALOS
Please abstract the following data from this visit with this patient into the appropriate field in Epic:    Colonoscopy done on this date: December 2015 (approximately), by this group: [facility], results were normal.   Mammogram done on this date: December 2016 (approximately), by this group: [facility], results were normal.     
Please note below.     NWD  
Dr. Demario English

## 2025-05-21 LAB
ANION GAP SERPL CALC-SCNC: 20 MMOL/L — HIGH (ref 7–14)
BASOPHILS # BLD AUTO: 0.02 K/UL — SIGNIFICANT CHANGE UP (ref 0–0.2)
BASOPHILS NFR BLD AUTO: 0.5 % — SIGNIFICANT CHANGE UP (ref 0–1)
BUN SERPL-MCNC: 42 MG/DL — HIGH (ref 10–20)
CALCIUM SERPL-MCNC: 9.4 MG/DL — SIGNIFICANT CHANGE UP (ref 8.4–10.5)
CHLORIDE SERPL-SCNC: 93 MMOL/L — LOW (ref 98–110)
CO2 SERPL-SCNC: 21 MMOL/L — SIGNIFICANT CHANGE UP (ref 17–32)
CREAT SERPL-MCNC: 7.5 MG/DL — CRITICAL HIGH (ref 0.7–1.5)
CULTURE RESULTS: SIGNIFICANT CHANGE UP
CULTURE RESULTS: SIGNIFICANT CHANGE UP
EGFR: 6 ML/MIN/1.73M2 — LOW
EGFR: 6 ML/MIN/1.73M2 — LOW
EOSINOPHIL # BLD AUTO: 0.12 K/UL — SIGNIFICANT CHANGE UP (ref 0–0.7)
EOSINOPHIL NFR BLD AUTO: 2.9 % — SIGNIFICANT CHANGE UP (ref 0–8)
GLUCOSE BLDC GLUCOMTR-MCNC: 126 MG/DL — HIGH (ref 70–99)
GLUCOSE BLDC GLUCOMTR-MCNC: 208 MG/DL — HIGH (ref 70–99)
GLUCOSE BLDC GLUCOMTR-MCNC: 256 MG/DL — HIGH (ref 70–99)
GLUCOSE BLDC GLUCOMTR-MCNC: 314 MG/DL — HIGH (ref 70–99)
GLUCOSE BLDC GLUCOMTR-MCNC: 343 MG/DL — HIGH (ref 70–99)
GLUCOSE BLDC GLUCOMTR-MCNC: 403 MG/DL — HIGH (ref 70–99)
GLUCOSE SERPL-MCNC: 352 MG/DL — HIGH (ref 70–99)
HCT VFR BLD CALC: 31.3 % — LOW (ref 37–47)
HGB BLD-MCNC: 10.3 G/DL — LOW (ref 12–16)
IMM GRANULOCYTES NFR BLD AUTO: 0.5 % — HIGH (ref 0.1–0.3)
LYMPHOCYTES # BLD AUTO: 1.49 K/UL — SIGNIFICANT CHANGE UP (ref 1.2–3.4)
LYMPHOCYTES # BLD AUTO: 35.9 % — SIGNIFICANT CHANGE UP (ref 20.5–51.1)
MAGNESIUM SERPL-MCNC: 2.6 MG/DL — HIGH (ref 1.8–2.4)
MCHC RBC-ENTMCNC: 32.7 PG — HIGH (ref 27–31)
MCHC RBC-ENTMCNC: 32.9 G/DL — SIGNIFICANT CHANGE UP (ref 32–37)
MCV RBC AUTO: 99.4 FL — HIGH (ref 81–99)
MONOCYTES # BLD AUTO: 0.42 K/UL — SIGNIFICANT CHANGE UP (ref 0.1–0.6)
MONOCYTES NFR BLD AUTO: 10.1 % — HIGH (ref 1.7–9.3)
NEUTROPHILS # BLD AUTO: 2.08 K/UL — SIGNIFICANT CHANGE UP (ref 1.4–6.5)
NEUTROPHILS NFR BLD AUTO: 50.1 % — SIGNIFICANT CHANGE UP (ref 42.2–75.2)
NRBC BLD AUTO-RTO: 0 /100 WBCS — SIGNIFICANT CHANGE UP (ref 0–0)
PHOSPHATE SERPL-MCNC: 6.3 MG/DL — HIGH (ref 2.1–4.9)
PLATELET # BLD AUTO: 218 K/UL — SIGNIFICANT CHANGE UP (ref 130–400)
PMV BLD: 10.6 FL — HIGH (ref 7.4–10.4)
POTASSIUM SERPL-MCNC: 4.9 MMOL/L — SIGNIFICANT CHANGE UP (ref 3.5–5)
POTASSIUM SERPL-SCNC: 4.9 MMOL/L — SIGNIFICANT CHANGE UP (ref 3.5–5)
RBC # BLD: 3.15 M/UL — LOW (ref 4.2–5.4)
RBC # FLD: 15.5 % — HIGH (ref 11.5–14.5)
SODIUM SERPL-SCNC: 134 MMOL/L — LOW (ref 135–146)
SPECIMEN SOURCE: SIGNIFICANT CHANGE UP
SPECIMEN SOURCE: SIGNIFICANT CHANGE UP
WBC # BLD: 4.15 K/UL — LOW (ref 4.8–10.8)
WBC # FLD AUTO: 4.15 K/UL — LOW (ref 4.8–10.8)

## 2025-05-21 PROCEDURE — 99232 SBSQ HOSP IP/OBS MODERATE 35: CPT

## 2025-05-21 RX ORDER — METHOCARBAMOL 500 MG/1
750 TABLET, FILM COATED ORAL THREE TIMES A DAY
Refills: 0 | Status: DISCONTINUED | OUTPATIENT
Start: 2025-05-21 | End: 2025-05-24

## 2025-05-21 RX ORDER — LIDOCAINE HYDROCHLORIDE 20 MG/ML
1 JELLY TOPICAL DAILY
Refills: 0 | Status: DISCONTINUED | OUTPATIENT
Start: 2025-05-21 | End: 2025-06-08

## 2025-05-21 RX ORDER — INSULIN GLARGINE-YFGN 100 [IU]/ML
6 INJECTION, SOLUTION SUBCUTANEOUS AT BEDTIME
Refills: 0 | Status: DISCONTINUED | OUTPATIENT
Start: 2025-05-21 | End: 2025-05-22

## 2025-05-21 RX ORDER — INSULIN LISPRO 100 U/ML
2 INJECTION, SOLUTION INTRAVENOUS; SUBCUTANEOUS
Refills: 0 | Status: DISCONTINUED | OUTPATIENT
Start: 2025-05-21 | End: 2025-05-22

## 2025-05-21 RX ADMIN — Medication 100 MILLIGRAM(S): at 13:01

## 2025-05-21 RX ADMIN — GABAPENTIN 100 MILLIGRAM(S): 400 CAPSULE ORAL at 05:13

## 2025-05-21 RX ADMIN — DOXAZOSIN MESYLATE 1 MILLIGRAM(S): 8 TABLET ORAL at 08:04

## 2025-05-21 RX ADMIN — DEXTROMETHORPHAN HBR, GUAIFENESIN 600 MILLIGRAM(S): 200 LIQUID ORAL at 17:24

## 2025-05-21 RX ADMIN — HEPARIN SODIUM 5000 UNIT(S): 1000 INJECTION INTRAVENOUS; SUBCUTANEOUS at 05:12

## 2025-05-21 RX ADMIN — GABAPENTIN 100 MILLIGRAM(S): 400 CAPSULE ORAL at 13:00

## 2025-05-21 RX ADMIN — Medication 100 MILLIGRAM(S): at 05:13

## 2025-05-21 RX ADMIN — Medication 100 MILLIGRAM(S): at 22:03

## 2025-05-21 RX ADMIN — INSULIN LISPRO 2 UNIT(S): 100 INJECTION, SOLUTION INTRAVENOUS; SUBCUTANEOUS at 08:10

## 2025-05-21 RX ADMIN — Medication 40 MILLIGRAM(S): at 05:12

## 2025-05-21 RX ADMIN — INSULIN LISPRO 2: 100 INJECTION, SOLUTION INTRAVENOUS; SUBCUTANEOUS at 17:50

## 2025-05-21 RX ADMIN — HEPARIN SODIUM 5000 UNIT(S): 1000 INJECTION INTRAVENOUS; SUBCUTANEOUS at 17:24

## 2025-05-21 RX ADMIN — LIDOCAINE HYDROCHLORIDE 1 PATCH: 20 JELLY TOPICAL at 12:57

## 2025-05-21 RX ADMIN — POLYETHYLENE GLYCOL 3350 17 GRAM(S): 17 POWDER, FOR SOLUTION ORAL at 12:57

## 2025-05-21 RX ADMIN — METHOCARBAMOL 750 MILLIGRAM(S): 500 TABLET, FILM COATED ORAL at 22:02

## 2025-05-21 RX ADMIN — LIDOCAINE HYDROCHLORIDE 1 PATCH: 20 JELLY TOPICAL at 19:54

## 2025-05-21 RX ADMIN — INSULIN LISPRO 4: 100 INJECTION, SOLUTION INTRAVENOUS; SUBCUTANEOUS at 22:02

## 2025-05-21 RX ADMIN — INSULIN LISPRO 2 UNIT(S): 100 INJECTION, SOLUTION INTRAVENOUS; SUBCUTANEOUS at 17:51

## 2025-05-21 RX ADMIN — INSULIN LISPRO 2 UNIT(S): 100 INJECTION, SOLUTION INTRAVENOUS; SUBCUTANEOUS at 12:46

## 2025-05-21 RX ADMIN — Medication 1 DOSE(S): at 22:09

## 2025-05-21 RX ADMIN — GABAPENTIN 100 MILLIGRAM(S): 400 CAPSULE ORAL at 22:02

## 2025-05-21 RX ADMIN — SEVELAMER HYDROCHLORIDE 1600 MILLIGRAM(S): 800 TABLET ORAL at 08:04

## 2025-05-21 RX ADMIN — Medication 1 DOSE(S): at 08:10

## 2025-05-21 RX ADMIN — SEVELAMER HYDROCHLORIDE 1600 MILLIGRAM(S): 800 TABLET ORAL at 17:24

## 2025-05-21 RX ADMIN — INSULIN LISPRO 8: 100 INJECTION, SOLUTION INTRAVENOUS; SUBCUTANEOUS at 08:09

## 2025-05-21 RX ADMIN — METHOCARBAMOL 750 MILLIGRAM(S): 500 TABLET, FILM COATED ORAL at 13:00

## 2025-05-21 RX ADMIN — SEVELAMER HYDROCHLORIDE 1600 MILLIGRAM(S): 800 TABLET ORAL at 12:57

## 2025-05-21 RX ADMIN — INSULIN GLARGINE-YFGN 6 UNIT(S): 100 INJECTION, SOLUTION SUBCUTANEOUS at 22:03

## 2025-05-21 RX ADMIN — Medication 1 APPLICATION(S): at 05:14

## 2025-05-21 RX ADMIN — DEXTROMETHORPHAN HBR, GUAIFENESIN 600 MILLIGRAM(S): 200 LIQUID ORAL at 05:13

## 2025-05-21 RX ADMIN — DOXAZOSIN MESYLATE 1 MILLIGRAM(S): 8 TABLET ORAL at 20:52

## 2025-05-21 RX ADMIN — Medication 81 MILLIGRAM(S): at 12:57

## 2025-05-21 RX ADMIN — Medication 30 MILLIGRAM(S): at 05:12

## 2025-05-21 RX ADMIN — ATORVASTATIN CALCIUM 10 MILLIGRAM(S): 80 TABLET, FILM COATED ORAL at 22:02

## 2025-05-21 NOTE — PROGRESS NOTE ADULT - SUBJECTIVE AND OBJECTIVE BOX
seen and examined  24 h events noted   no distress         PAST HISTORY  --------------------------------------------------------------------------------  No significant changes to PMH, PSH, FHx, SHx, unless otherwise noted    ALLERGIES & MEDICATIONS  --------------------------------------------------------------------------------  Allergies    No Known Allergies    Intolerances      Standing Inpatient Medications  aspirin  chewable 81 milliGRAM(s) Oral daily  atorvastatin 10 milliGRAM(s) Oral at bedtime  chlorhexidine 2% Cloths 1 Application(s) Topical <User Schedule>  dextrose 5%. 1000 milliLiter(s) IV Continuous <Continuous>  dextrose 50% Injectable 25 Gram(s) IV Push once  doxazosin 1 milliGRAM(s) Oral <User Schedule>  fluticasone propionate/ salmeterol 100-50 MICROgram(s) Diskus 1 Dose(s) Inhalation two times a day  gabapentin 100 milliGRAM(s) Oral three times a day  glucagon  Injectable 1 milliGRAM(s) IntraMuscular once  guaiFENesin  milliGRAM(s) Oral every 12 hours  heparin   Injectable 5000 Unit(s) SubCutaneous every 12 hours  hydrALAZINE 100 milliGRAM(s) Oral every 8 hours  insulin glargine Injectable (LANTUS) 6 Unit(s) SubCutaneous at bedtime  insulin lispro (ADMELOG) corrective regimen sliding scale   SubCutaneous Before meals and at bedtime  insulin lispro Injectable (ADMELOG) 2 Unit(s) SubCutaneous three times a day before meals  NIFEdipine XL 30 milliGRAM(s) Oral daily  pantoprazole    Tablet 40 milliGRAM(s) Oral before breakfast  polyethylene glycol 3350 17 Gram(s) Oral daily  senna 2 Tablet(s) Oral at bedtime  sevelamer carbonate 1600 milliGRAM(s) Oral three times a day with meals    PRN Inpatient Medications  acetaminophen     Tablet .. 650 milliGRAM(s) Oral every 6 hours PRN  albuterol    90 MICROgram(s) HFA Inhaler 2 Puff(s) Inhalation every 6 hours PRN  aluminum hydroxide/magnesium hydroxide/simethicone Suspension 30 milliLiter(s) Oral every 4 hours PRN  dextrose Oral Gel 15 Gram(s) Oral once PRN        VITALS/PHYSICAL EXAM  --------------------------------------------------------------------------------  T(C): 36.9 (05-21-25 @ 05:25), Max: 36.9 (05-21-25 @ 05:25)  HR: 76 (05-21-25 @ 05:25) (75 - 83)  BP: 145/65 (05-21-25 @ 05:25) (121/56 - 208/80)  RR: 18 (05-21-25 @ 05:25) (17 - 18)  SpO2: 97% (05-21-25 @ 05:25) (96% - 99%)  Wt(kg): --  Height (cm): 163 (05-19-25 @ 09:11)  Weight (kg): 70 (05-19-25 @ 09:11)  BMI (kg/m2): 26.3 (05-19-25 @ 09:11)  BSA (m2): 1.76 (05-19-25 @ 09:11)      Physical Exam:  	Gen: NAD  	Pulm: CTA B/L  	CV: S1S2; no rub  	Abd:  soft, /nondistended  	LE:  no edema  	Vascular access:av    LABS/STUDIES  --------------------------------------------------------------------------------              10.3   4.15  >-----------<  218      [05-21-25 @ 06:21]              31.3     140  |  99  |  24  ----------------------------<  34      [05-20-25 @ 06:33]  4.0   |  26  |  5.4        Ca     9.7     [05-20-25 @ 06:33]      Mg     2.8     [05-20-25 @ 06:33]      Phos  6.2     [05-20-25 @ 06:33]        Creatinine Trend:  SCr 5.4 [05-20 @ 06:33]  SCr 6.7 [05-19 @ 05:43]  SCr 5.1 [05-18 @ 05:54]  SCr 6.0 [05-17 @ 06:21]  SCr 4.1 [05-16 @ 08:48]    Urinalysis - [05-20-25 @ 06:33]      Color  / Appearance  / SG  / pH       Gluc 34 / Ketone   / Bili  / Urobili        Blood  / Protein  / Leuk Est  / Nitrite       RBC  / WBC  / Hyaline  / Gran  / Sq Epi  / Non Sq Epi  / Bacteria

## 2025-05-21 NOTE — PROGRESS NOTE ADULT - ASSESSMENT
59y Female with h/o ESRD on HD (MWF), IDDM, dyslipidemia, CKD-MBD, HTN who presented to the hospital with CP during HD session this morning.  Had 90 minutes of total HD prior to arrival.  Nephrology now consulted for HD needs while in-house.    hd today standard bath uf 2 liters as tolerated / regular days outpatient MWF  follow FS closely / episode of hypoglycemia / endo evaluation / decrease lantus to 10   on binders/ph at goal   bp better controlled today   check RUQ sono    NST negative   h/h at goal/ no SHAR   will follow

## 2025-05-21 NOTE — PROGRESS NOTE ADULT - ATTENDING COMMENTS
59y old Surinamese speaking Female w/ PMHx of ESRD on HD MWF, DM (insulin dependent) HTN, HLD, and HFpEF who presents to ED for chest pain during HD session. Admitted for workup of chest pain and AMS.    #RUQ pain  - Cholelithiasis seen no other acute findings   - LFTs NL     #Chest pain, resolved  EKG NSR w/ no ischemic changes  TTE 65%, G1DD  NST no evidence of ischemia  NEG Duplex   Possibly GERD > trial protonix  c/w ASA and lipitor    #TME / Slurred speech briefly from hypotension due to HD RESOLVED   s/p neuro eval, outpt f/u     #ESRD   nephro following   HD per nephro MWF   HD today     #DM w/ neuropathy  - was given 80 lantus 2 days ago, which causing episodes of hypoglycemia, hold all Insulin exccept ISS, FS q4h for now   - gabapentin 100mg TID for neuropathy  - Endocrine care appreciated - ordered     # HTN  # DLD  -  this morning, c/w hydralazine 100mg tid   - added procardia 30mg, re check BP     Pending: Glucose stability 140-180 Goal  Dispo: d/c planning

## 2025-05-21 NOTE — PROGRESS NOTE ADULT - ASSESSMENT
59y old Togolese speaking Female w/ PMHx of ESRD on HD MWF, DM (insulin dependent) HTN, HLD, and HFpEF who presents to ED for chest pain during HD session. Admitted for workup of chest pain and AMS.    #AMS 2/2 hypoglycemia likely 2/2 decreased lantus clearance 2/2 ESRD  #DM2, Insulin dependent  - RRT on 5/18/2025 for AMS, FS 30 after 80u lantus, s/p d50 and d5LR   - Restarted Lantus 6 and lispro 2  - C/w ISS  - 5/20: S/p D5 for hypoglycemia and AMS       - Monitor fingersticks q4  - c/w gabapentin 100mg TID   - Restarted robaxin    #Chronic Muscle Spasms in back  - Restarted Robaxin  - C/w Gabapentin   - C/w tylenol prn  - Started lidocaine patch    #RUQ pain- resolved  - RUQ US- Cholelithiasis. No sonographic evidence of acute cholecystitis.  - Pain resolved on 5/21/2025    #ESRD on HD MWF  #HTN    - nephro recs appreciated        - bp improves with HD       - C/w hydralazine 100mg q8h  - c/w doxazosin 1mg BID  - c/w sevelamer 1600 mg TID  - patient state she uses nifedipine 90 PO if her SBP is above 200 - will hold for now   - S/p nifedipine 30 x1 and labetalol 10 IVP x1 on 5/20 2/2 HTN to 200s SBP  - Started nifedipine 30 qd    #On admission, AMS possibly 2/2 transient hypotension during HD- Resolved  - CTH showed A focal hypodensity is noted within the right frontal periventricular white matter, not previously seen. Finding may reflect age-indeterminate ischemic change. An MRI of the brain can be obtained for further evaluation if not clinically contraindicated.  - Neuro consult noted no additional inpatient workup.  - c/w ASA and atorvastatin    #Chest pain during HD- Resolved  #HFpEF - not in exacerbation  - On admission. trop 64 -> 66, Creatinine 4.1  - ECG showed prominent T wave   - CXR negative   - TTE EF 60%. G1DD  - stress test neg for ischemia  - C/w protonix qd     #Cough  - afebrile, no leukocytosis   - C/w Robitussin  - C/w advair bid, HFA prn    #Acute uncomplicated E. coli cystitis- resolved  - UA (+), UCx (+) E coli  - S/p CTX  - bcx ngtd     #DLD  - c/w home atorvastatin    #MISC  - DVT ppx: heparin  - Diet: renal, DASH, CCC  - GI ppx: protonix  - Activity: as tolerated    Pending: control of hypoglycemia, possible DC tomorrow, control of HTN

## 2025-05-21 NOTE — PROGRESS NOTE ADULT - SUBJECTIVE AND OBJECTIVE BOX
SUBJECTIVE/OVERNIGHT EVENTS  Today is hospital day 5d. This morning patient was seen and examined at bedside, resting comfortably in bed. No acute or major events overnight. Patient complains of persistent, uncontrolled muscle spasms in her back a/w nausea. Denies fever, chills, vomiting, diarrhea, constipation, hematochezia, dysuria, hematuria, chest pain, palpitations, sob. Patient was informed of their plan of care at this time.    CODE STATUS: FULL    MEDICATIONS  STANDING MEDICATIONS  aspirin  chewable 81 milliGRAM(s) Oral daily  atorvastatin 10 milliGRAM(s) Oral at bedtime  chlorhexidine 2% Cloths 1 Application(s) Topical <User Schedule>  dextrose 5%. 1000 milliLiter(s) IV Continuous <Continuous>  dextrose 50% Injectable 25 Gram(s) IV Push once  doxazosin 1 milliGRAM(s) Oral <User Schedule>  fluticasone propionate/ salmeterol 100-50 MICROgram(s) Diskus 1 Dose(s) Inhalation two times a day  gabapentin 100 milliGRAM(s) Oral three times a day  glucagon  Injectable 1 milliGRAM(s) IntraMuscular once  guaiFENesin  milliGRAM(s) Oral every 12 hours  heparin   Injectable 5000 Unit(s) SubCutaneous every 12 hours  hydrALAZINE 100 milliGRAM(s) Oral every 8 hours  insulin glargine Injectable (LANTUS) 6 Unit(s) SubCutaneous at bedtime  insulin lispro (ADMELOG) corrective regimen sliding scale   SubCutaneous Before meals and at bedtime  insulin lispro Injectable (ADMELOG) 2 Unit(s) SubCutaneous three times a day before meals  lidocaine   4% Patch 1 Patch Transdermal daily  methocarbamol 750 milliGRAM(s) Oral three times a day  NIFEdipine XL 30 milliGRAM(s) Oral daily  pantoprazole    Tablet 40 milliGRAM(s) Oral before breakfast  polyethylene glycol 3350 17 Gram(s) Oral daily  senna 2 Tablet(s) Oral at bedtime  sevelamer carbonate 1600 milliGRAM(s) Oral three times a day with meals    PRN MEDICATIONS  acetaminophen     Tablet .. 650 milliGRAM(s) Oral every 6 hours PRN  albuterol    90 MICROgram(s) HFA Inhaler 2 Puff(s) Inhalation every 6 hours PRN  aluminum hydroxide/magnesium hydroxide/simethicone Suspension 30 milliLiter(s) Oral every 4 hours PRN  dextrose Oral Gel 15 Gram(s) Oral once PRN    VITALS  T(F): 98.4 (05-21-25 @ 05:25), Max: 98.4 (05-21-25 @ 05:25)  HR: 74 (05-21-25 @ 08:30) (74 - 83)  BP: 160/64 (05-21-25 @ 08:30) (121/56 - 204/73)  RR: 18 (05-21-25 @ 08:30) (17 - 18)  SpO2: 97% (05-21-25 @ 05:25) (96% - 99%)  POCT Blood Glucose.: 403 mg/dL (05-21-25 @ 08:04)  POCT Blood Glucose.: 314 mg/dL (05-21-25 @ 05:10)  POCT Blood Glucose.: 208 mg/dL (05-21-25 @ 02:14)  POCT Blood Glucose.: 307 mg/dL (05-20-25 @ 21:51)  POCT Blood Glucose.: 278 mg/dL (05-20-25 @ 20:28)  POCT Blood Glucose.: 292 mg/dL (05-20-25 @ 16:03)  POCT Blood Glucose.: 378 mg/dL (05-20-25 @ 11:55)    PHYSICAL EXAM  GENERAL  ( X ) NAD, lying in bed comfortably     (  ) obtunded     (  ) lethargic     (  ) somnolent    HEAD  ( X ) Atraumatic     (  ) hematoma     (  ) laceration (specify location:       )     NECK  ( X ) Supple     (  ) neck stiffness     (  ) nuchal rigidity     (  )  no JVD     (  ) JVD present ( -- cm)    HEART  Rate -->  ( X ) normal rate    (  ) bradycardic    (  ) tachycardic  Rhythm -->  ( X ) regular    (  ) regularly irregular    (  ) irregularly irregular  Murmurs -->  ( X ) normal s1/s2    (  ) systolic murmur    (  ) diastolic murmur    (  ) continuous murmur     (  ) S3 present    (  ) S4 present    LUNGS  ( X )Unlabored respirations     (  ) tachypnea  ( X ) B/L air entry     (  ) decreased breath sounds in:  (location     )    (  ) no adventitious sound     (  ) crackles     (  ) wheezing      (  ) rhonchi      (specify location:       )  (  ) chest wall tenderness (specify location:       )    ABDOMEN  ( X ) Soft     (  ) tense   |   ( X ) nondistended     (  ) distended   |   ( X ) +BS     (  ) hypoactive bowel sounds     (  ) hyperactive bowel sounds  ( X ) nontender     (  ) RUQ tenderness     (  ) RLQ tenderness     (  ) LLQ tenderness     (  ) epigastric tenderness     (  ) diffuse tenderness  (  ) Splenomegaly      (  ) Hepatomegaly      (  ) Jaundice     (  ) ecchymosis     EXTREMITIES  ( X ) Normal     (  ) Rash     (  ) ecchymosis     (  ) varicose veins      (  ) pitting edema     (  ) non-pitting edema   (  ) ulceration     (  ) gangrene:     (location:     )    NERVOUS SYSTEM  ( X ) A&Ox3     (  ) confused     (  ) lethargic  CN II-XII:     (  ) Intact     (  ) focal deficits  (Specify:     )   Upper extremities:     (  ) strength X/5     (  ) focal deficit (specify:    )  Lower extremities:     (  ) strength  X/5    (  ) focal deficit (specify:    )    SKIN  ( X ) No rashes or lesions     (  ) maculopapular rash     (  ) pustules     (  ) vesicles     (  ) ulcer     (  ) ecchymosis     (specify location:     )      LABS             10.3   4.15  )-----------( 218      ( 05-21-25 @ 06:21 )             31.3     134  |  93  |  42  -------------------------<  352   05-21-25 @ 06:21  4.9  |  21  |  7.5    Ca      9.4     05-21-25 @ 06:21  Phos   6.3     05-21-25 @ 06:21  Mg     2.6     05-21-25 @ 06:21          Urinalysis Basic - ( 21 May 2025 06:21 )    Color: x / Appearance: x / SG: x / pH: x  Gluc: 352 mg/dL / Ketone: x  / Bili: x / Urobili: x   Blood: x / Protein: x / Nitrite: x   Leuk Esterase: x / RBC: x / WBC x   Sq Epi: x / Non Sq Epi: x / Bacteria: x          IMAGING  < from: US Abdomen Upper Quadrant Right (05.20.25 @ 21:13) >  IMPRESSION:    Cholelithiasis. No sonographic evidence of acute cholecystitis.    < end of copied text >

## 2025-05-22 LAB
ANION GAP SERPL CALC-SCNC: 18 MMOL/L — HIGH (ref 7–14)
ANION GAP SERPL CALC-SCNC: 22 MMOL/L — HIGH (ref 7–14)
BASOPHILS # BLD AUTO: 0.02 K/UL — SIGNIFICANT CHANGE UP (ref 0–0.2)
BASOPHILS NFR BLD AUTO: 0.5 % — SIGNIFICANT CHANGE UP (ref 0–1)
BUN SERPL-MCNC: 34 MG/DL — HIGH (ref 10–20)
BUN SERPL-MCNC: 46 MG/DL — HIGH (ref 10–20)
CALCIUM SERPL-MCNC: 9.2 MG/DL — SIGNIFICANT CHANGE UP (ref 8.4–10.5)
CALCIUM SERPL-MCNC: 9.4 MG/DL — SIGNIFICANT CHANGE UP (ref 8.4–10.5)
CHLORIDE SERPL-SCNC: 92 MMOL/L — LOW (ref 98–110)
CHLORIDE SERPL-SCNC: 96 MMOL/L — LOW (ref 98–110)
CO2 SERPL-SCNC: 22 MMOL/L — SIGNIFICANT CHANGE UP (ref 17–32)
CO2 SERPL-SCNC: 22 MMOL/L — SIGNIFICANT CHANGE UP (ref 17–32)
CREAT SERPL-MCNC: 6.6 MG/DL — CRITICAL HIGH (ref 0.7–1.5)
CREAT SERPL-MCNC: 7.1 MG/DL — CRITICAL HIGH (ref 0.7–1.5)
EGFR: 6 ML/MIN/1.73M2 — LOW
EGFR: 6 ML/MIN/1.73M2 — LOW
EGFR: 7 ML/MIN/1.73M2 — LOW
EGFR: 7 ML/MIN/1.73M2 — LOW
EOSINOPHIL # BLD AUTO: 0.13 K/UL — SIGNIFICANT CHANGE UP (ref 0–0.7)
EOSINOPHIL NFR BLD AUTO: 3.4 % — SIGNIFICANT CHANGE UP (ref 0–8)
GLUCOSE BLDC GLUCOMTR-MCNC: 113 MG/DL — HIGH (ref 70–99)
GLUCOSE BLDC GLUCOMTR-MCNC: 262 MG/DL — HIGH (ref 70–99)
GLUCOSE BLDC GLUCOMTR-MCNC: 306 MG/DL — HIGH (ref 70–99)
GLUCOSE BLDC GLUCOMTR-MCNC: 312 MG/DL — HIGH (ref 70–99)
GLUCOSE BLDC GLUCOMTR-MCNC: 316 MG/DL — HIGH (ref 70–99)
GLUCOSE BLDC GLUCOMTR-MCNC: 479 MG/DL — CRITICAL HIGH (ref 70–99)
GLUCOSE SERPL-MCNC: 308 MG/DL — HIGH (ref 70–99)
GLUCOSE SERPL-MCNC: 388 MG/DL — HIGH (ref 70–99)
HCT VFR BLD CALC: 31.2 % — LOW (ref 37–47)
HGB BLD-MCNC: 10.1 G/DL — LOW (ref 12–16)
IMM GRANULOCYTES NFR BLD AUTO: 0.3 % — SIGNIFICANT CHANGE UP (ref 0.1–0.3)
LYMPHOCYTES # BLD AUTO: 1.3 K/UL — SIGNIFICANT CHANGE UP (ref 1.2–3.4)
LYMPHOCYTES # BLD AUTO: 33.5 % — SIGNIFICANT CHANGE UP (ref 20.5–51.1)
MAGNESIUM SERPL-MCNC: 2.6 MG/DL — HIGH (ref 1.8–2.4)
MCHC RBC-ENTMCNC: 32.4 G/DL — SIGNIFICANT CHANGE UP (ref 32–37)
MCHC RBC-ENTMCNC: 32.5 PG — HIGH (ref 27–31)
MCV RBC AUTO: 100.3 FL — HIGH (ref 81–99)
MONOCYTES # BLD AUTO: 0.32 K/UL — SIGNIFICANT CHANGE UP (ref 0.1–0.6)
MONOCYTES NFR BLD AUTO: 8.2 % — SIGNIFICANT CHANGE UP (ref 1.7–9.3)
NEUTROPHILS # BLD AUTO: 2.1 K/UL — SIGNIFICANT CHANGE UP (ref 1.4–6.5)
NEUTROPHILS NFR BLD AUTO: 54.1 % — SIGNIFICANT CHANGE UP (ref 42.2–75.2)
NRBC BLD AUTO-RTO: 0 /100 WBCS — SIGNIFICANT CHANGE UP (ref 0–0)
PHOSPHATE SERPL-MCNC: 5.6 MG/DL — HIGH (ref 2.1–4.9)
PLATELET # BLD AUTO: 184 K/UL — SIGNIFICANT CHANGE UP (ref 130–400)
PMV BLD: 9.8 FL — SIGNIFICANT CHANGE UP (ref 7.4–10.4)
POTASSIUM SERPL-MCNC: 5.6 MMOL/L — HIGH (ref 3.5–5)
POTASSIUM SERPL-MCNC: 5.8 MMOL/L — HIGH (ref 3.5–5)
POTASSIUM SERPL-SCNC: 5.6 MMOL/L — HIGH (ref 3.5–5)
POTASSIUM SERPL-SCNC: 5.8 MMOL/L — HIGH (ref 3.5–5)
RBC # BLD: 3.11 M/UL — LOW (ref 4.2–5.4)
RBC # FLD: 16.1 % — HIGH (ref 11.5–14.5)
SODIUM SERPL-SCNC: 136 MMOL/L — SIGNIFICANT CHANGE UP (ref 135–146)
SODIUM SERPL-SCNC: 136 MMOL/L — SIGNIFICANT CHANGE UP (ref 135–146)
WBC # BLD: 3.88 K/UL — LOW (ref 4.8–10.8)
WBC # FLD AUTO: 3.88 K/UL — LOW (ref 4.8–10.8)

## 2025-05-22 PROCEDURE — 99232 SBSQ HOSP IP/OBS MODERATE 35: CPT

## 2025-05-22 RX ORDER — INSULIN LISPRO 100 U/ML
5 INJECTION, SOLUTION INTRAVENOUS; SUBCUTANEOUS
Refills: 0 | Status: DISCONTINUED | OUTPATIENT
Start: 2025-05-22 | End: 2025-05-22

## 2025-05-22 RX ORDER — HYDROMORPHONE/SOD CHLOR,ISO/PF 2 MG/10 ML
1 SYRINGE (ML) INJECTION EVERY 6 HOURS
Refills: 0 | Status: DISCONTINUED | OUTPATIENT
Start: 2025-05-22 | End: 2025-05-24

## 2025-05-22 RX ORDER — ACETAMINOPHEN 500 MG/5ML
975 LIQUID (ML) ORAL EVERY 8 HOURS
Refills: 0 | Status: DISCONTINUED | OUTPATIENT
Start: 2025-05-22 | End: 2025-05-27

## 2025-05-22 RX ORDER — INSULIN LISPRO 100 U/ML
5 INJECTION, SOLUTION INTRAVENOUS; SUBCUTANEOUS ONCE
Refills: 0 | Status: COMPLETED | OUTPATIENT
Start: 2025-05-22 | End: 2025-05-22

## 2025-05-22 RX ORDER — INSULIN GLARGINE-YFGN 100 [IU]/ML
15 INJECTION, SOLUTION SUBCUTANEOUS AT BEDTIME
Refills: 0 | Status: DISCONTINUED | OUTPATIENT
Start: 2025-05-22 | End: 2025-05-23

## 2025-05-22 RX ORDER — INSULIN LISPRO 100 U/ML
10 INJECTION, SOLUTION INTRAVENOUS; SUBCUTANEOUS
Refills: 0 | Status: DISCONTINUED | OUTPATIENT
Start: 2025-05-22 | End: 2025-05-23

## 2025-05-22 RX ADMIN — Medication 1 MILLIGRAM(S): at 11:54

## 2025-05-22 RX ADMIN — DEXTROMETHORPHAN HBR, GUAIFENESIN 600 MILLIGRAM(S): 200 LIQUID ORAL at 17:35

## 2025-05-22 RX ADMIN — Medication 1 APPLICATION(S): at 05:34

## 2025-05-22 RX ADMIN — HEPARIN SODIUM 5000 UNIT(S): 1000 INJECTION INTRAVENOUS; SUBCUTANEOUS at 05:33

## 2025-05-22 RX ADMIN — HEPARIN SODIUM 5000 UNIT(S): 1000 INJECTION INTRAVENOUS; SUBCUTANEOUS at 17:36

## 2025-05-22 RX ADMIN — Medication 100 MILLIGRAM(S): at 22:13

## 2025-05-22 RX ADMIN — INSULIN LISPRO 10 UNIT(S): 100 INJECTION, SOLUTION INTRAVENOUS; SUBCUTANEOUS at 17:35

## 2025-05-22 RX ADMIN — Medication 81 MILLIGRAM(S): at 11:56

## 2025-05-22 RX ADMIN — GABAPENTIN 100 MILLIGRAM(S): 400 CAPSULE ORAL at 13:04

## 2025-05-22 RX ADMIN — DOXAZOSIN MESYLATE 1 MILLIGRAM(S): 8 TABLET ORAL at 22:00

## 2025-05-22 RX ADMIN — Medication 1 DOSE(S): at 08:58

## 2025-05-22 RX ADMIN — INSULIN LISPRO 4: 100 INJECTION, SOLUTION INTRAVENOUS; SUBCUTANEOUS at 17:34

## 2025-05-22 RX ADMIN — Medication 975 MILLIGRAM(S): at 13:33

## 2025-05-22 RX ADMIN — Medication 975 MILLIGRAM(S): at 13:04

## 2025-05-22 RX ADMIN — LIDOCAINE HYDROCHLORIDE 1 PATCH: 20 JELLY TOPICAL at 00:00

## 2025-05-22 RX ADMIN — METHOCARBAMOL 750 MILLIGRAM(S): 500 TABLET, FILM COATED ORAL at 22:02

## 2025-05-22 RX ADMIN — GABAPENTIN 100 MILLIGRAM(S): 400 CAPSULE ORAL at 22:00

## 2025-05-22 RX ADMIN — LIDOCAINE HYDROCHLORIDE 1 PATCH: 20 JELLY TOPICAL at 11:56

## 2025-05-22 RX ADMIN — METHOCARBAMOL 750 MILLIGRAM(S): 500 TABLET, FILM COATED ORAL at 05:33

## 2025-05-22 RX ADMIN — Medication 1 DOSE(S): at 20:36

## 2025-05-22 RX ADMIN — INSULIN LISPRO 4: 100 INJECTION, SOLUTION INTRAVENOUS; SUBCUTANEOUS at 21:57

## 2025-05-22 RX ADMIN — Medication 100 MILLIGRAM(S): at 05:32

## 2025-05-22 RX ADMIN — INSULIN GLARGINE-YFGN 15 UNIT(S): 100 INJECTION, SOLUTION SUBCUTANEOUS at 21:56

## 2025-05-22 RX ADMIN — Medication 975 MILLIGRAM(S): at 22:01

## 2025-05-22 RX ADMIN — Medication 40 MILLIGRAM(S): at 05:34

## 2025-05-22 RX ADMIN — INSULIN LISPRO 5 UNIT(S): 100 INJECTION, SOLUTION INTRAVENOUS; SUBCUTANEOUS at 08:52

## 2025-05-22 RX ADMIN — Medication 100 MILLIGRAM(S): at 13:04

## 2025-05-22 RX ADMIN — Medication 1 MILLIGRAM(S): at 12:24

## 2025-05-22 RX ADMIN — Medication 30 MILLIGRAM(S): at 05:33

## 2025-05-22 RX ADMIN — ATORVASTATIN CALCIUM 10 MILLIGRAM(S): 80 TABLET, FILM COATED ORAL at 22:02

## 2025-05-22 RX ADMIN — Medication 2 TABLET(S): at 22:01

## 2025-05-22 RX ADMIN — METHOCARBAMOL 750 MILLIGRAM(S): 500 TABLET, FILM COATED ORAL at 13:04

## 2025-05-22 RX ADMIN — SEVELAMER HYDROCHLORIDE 1600 MILLIGRAM(S): 800 TABLET ORAL at 17:35

## 2025-05-22 RX ADMIN — LIDOCAINE HYDROCHLORIDE 1 PATCH: 20 JELLY TOPICAL at 19:49

## 2025-05-22 RX ADMIN — DOXAZOSIN MESYLATE 1 MILLIGRAM(S): 8 TABLET ORAL at 08:30

## 2025-05-22 RX ADMIN — DEXTROMETHORPHAN HBR, GUAIFENESIN 600 MILLIGRAM(S): 200 LIQUID ORAL at 05:33

## 2025-05-22 RX ADMIN — Medication 975 MILLIGRAM(S): at 22:31

## 2025-05-22 RX ADMIN — POLYETHYLENE GLYCOL 3350 17 GRAM(S): 17 POWDER, FOR SOLUTION ORAL at 11:56

## 2025-05-22 RX ADMIN — SEVELAMER HYDROCHLORIDE 1600 MILLIGRAM(S): 800 TABLET ORAL at 11:56

## 2025-05-22 RX ADMIN — INSULIN LISPRO 8: 100 INJECTION, SOLUTION INTRAVENOUS; SUBCUTANEOUS at 08:13

## 2025-05-22 RX ADMIN — GABAPENTIN 100 MILLIGRAM(S): 400 CAPSULE ORAL at 05:33

## 2025-05-22 RX ADMIN — SEVELAMER HYDROCHLORIDE 1600 MILLIGRAM(S): 800 TABLET ORAL at 08:29

## 2025-05-22 NOTE — PROGRESS NOTE ADULT - ASSESSMENT
59y old Spanish speaking Female w/ PMHx of ESRD on HD MWF, DM (insulin dependent) HTN, HLD, and HFpEF who presents to ED for chest pain during HD session. Admitted for workup of chest pain and AMS.    #AMS 2/2 hypoglycemia likely 2/2 decreased lantus clearance 2/2 ESRD  #DM2, Insulin dependent  - RRT on 5/18/2025 for AMS, FS 30 after 80u lantus, s/p d50 and d5LR   - Increased Lantus to 15 and lispro to 10  - C/w ISS  - 5/20: S/p D5 for hypoglycemia and AMS       - Monitor fingersticks q4  - c/w gabapentin 100mg TID   - C/w robaxin    #Chronic Muscle Spasms in back  - C/w Robaxin  - C/w Gabapentin   - Changed tylenol to 975 q8 standing  - C/w lidocaine patch  - Started dilaudid PO 1 q6 prn for severe pain    #RUQ pain- resolved  - RUQ US- Cholelithiasis. No sonographic evidence of acute cholecystitis.  - Pain resolved on 5/21/2025    #ESRD on HD MWF  #HTN    - nephro recs appreciated        - bp improves with HD       - C/w hydralazine 100mg q8h  - c/w doxazosin 1mg BID  - c/w sevelamer 1600 mg TID  - patient state she uses nifedipine 90 PO if her SBP is above 200 - will hold for now   - S/p nifedipine 30 x1 and labetalol 10 IVP x1 on 5/20 2/2 HTN to 200s SBP  - C/w nifedipine 30 qd    #On admission, AMS possibly 2/2 transient hypotension during HD- Resolved  - CTH showed A focal hypodensity is noted within the right frontal periventricular white matter, not previously seen. Finding may reflect age-indeterminate ischemic change. An MRI of the brain can be obtained for further evaluation if not clinically contraindicated.  - Neuro consult noted no additional inpatient workup.  - c/w ASA and atorvastatin    #Chest pain during HD- Resolved  #HFpEF - not in exacerbation  - On admission. trop 64 -> 66, Creatinine 4.1  - ECG showed prominent T wave   - CXR negative   - TTE EF 60%. G1DD  - stress test neg for ischemia  - C/w protonix qd     #Cough  - afebrile, no leukocytosis   - C/w Robitussin  - C/w advair bid, HFA prn    #Acute uncomplicated E. coli cystitis- resolved  - UA (+), UCx (+) E coli  - S/p CTX  - bcx ngtd     #DLD  - c/w home atorvastatin    #MISC  - DVT ppx: heparin  - Diet: renal, DASH, CCC  - GI ppx: protonix  - Activity: as tolerated    Pending: control of hyperglycemia, control of back pain

## 2025-05-22 NOTE — PROGRESS NOTE ADULT - ATTENDING COMMENTS
59y old [place] speaking Female w/ PMHx of ESRD on HD MWF, DM (insulin dependent) HTN, HLD, and HFpEF who presents to ED for chest pain during HD session. Admitted for workup of chest pain and AMS.    #RUQ pain  - Cholelithiasis seen no other acute findings   - LFTs NL     #Chest pain, resolved  EKG NSR w/ no ischemic changes  TTE 65%, G1DD  NST no evidence of ischemia  NEG Duplex   Possibly GERD > trial protonix  c/w ASA and lipitor    #TME / Slurred speech briefly from hypotension due to HD RESOLVED   s/p neuro eval, outpt f/u     #ESRD   nephro following   HD per nephro MWF   HD tomorrow     #DM w/ neuropathy  - was given 80 Lantus on admission which causing episodes of hypoglycemia, hold all Insulin exccept ISS, FS q4h for now   - gabapentin 100mg TID for neuropathy  - Endocrine care appreciated - ordered   - CAPILLARY BLOOD GLUCOSE  POCT Blood Glucose.: 113 mg/dL (22 May 2025 11:37)  POCT Blood Glucose.: 479 mg/dL (22 May 2025 07:57)  POCT Blood Glucose.: 306 mg/dL (22 May 2025 05:31)  POCT Blood Glucose.: 262 mg/dL (22 May 2025 02:36)  POCT Blood Glucose.: 343 mg/dL (21 May 2025 22:00)  POCT Blood Glucose.: 256 mg/dL (21 May 2025 17:35)    #Neck Pain   - pt says has tingling and numbness sensation of her arms   - check CT Neck r/o Cervical Pathology if neg could be diabetic neuropathy related     #Chronic Low Back Pain   - c/w gabapentin  - c/w methylcarbamol  - c/w tylenol   - add dilaudid 1mg po q6/prn     #constipation   - senna / miralax     # HTN  # DLD  - c/w hydralazine 100mg tid   - added procardia 30mg  - c/w doxazosyn 1mg po daily   - BP stable     Pending: CT Neck / Glucose stability 140-180 Goal  Dispo: d/c planning for 24hrs

## 2025-05-22 NOTE — PROGRESS NOTE ADULT - SUBJECTIVE AND OBJECTIVE BOX
SUBJECTIVE/OVERNIGHT EVENTS  Today is hospital day 6d. This morning patient was seen and examined at bedside, resting comfortably in bed. No acute or major events overnight. Patient complains of severe back pain from  muscle spasms. Denies fever, chills, nausea, vomiting, diarrhea, constipation, hematochezia, dysuria, hematuria, chest pain, palpitations, sob. Patient was informed of their plan of care at this time.    CODE STATUS: FULL     MEDICATIONS  STANDING MEDICATIONS  acetaminophen     Tablet .. 975 milliGRAM(s) Oral every 8 hours  aspirin  chewable 81 milliGRAM(s) Oral daily  atorvastatin 10 milliGRAM(s) Oral at bedtime  chlorhexidine 2% Cloths 1 Application(s) Topical <User Schedule>  dextrose 5%. 1000 milliLiter(s) IV Continuous <Continuous>  dextrose 50% Injectable 25 Gram(s) IV Push once  doxazosin 1 milliGRAM(s) Oral <User Schedule>  fluticasone propionate/ salmeterol 100-50 MICROgram(s) Diskus 1 Dose(s) Inhalation two times a day  gabapentin 100 milliGRAM(s) Oral three times a day  glucagon  Injectable 1 milliGRAM(s) IntraMuscular once  guaiFENesin  milliGRAM(s) Oral every 12 hours  heparin   Injectable 5000 Unit(s) SubCutaneous every 12 hours  hydrALAZINE 100 milliGRAM(s) Oral every 8 hours  insulin glargine Injectable (LANTUS) 15 Unit(s) SubCutaneous at bedtime  insulin lispro (ADMELOG) corrective regimen sliding scale   SubCutaneous Before meals and at bedtime  insulin lispro Injectable (ADMELOG) 10 Unit(s) SubCutaneous three times a day before meals  lidocaine   4% Patch 1 Patch Transdermal daily  methocarbamol 750 milliGRAM(s) Oral three times a day  NIFEdipine XL 30 milliGRAM(s) Oral daily  pantoprazole    Tablet 40 milliGRAM(s) Oral before breakfast  polyethylene glycol 3350 17 Gram(s) Oral daily  senna 2 Tablet(s) Oral at bedtime  sevelamer carbonate 1600 milliGRAM(s) Oral three times a day with meals    PRN MEDICATIONS  albuterol    90 MICROgram(s) HFA Inhaler 2 Puff(s) Inhalation every 6 hours PRN  aluminum hydroxide/magnesium hydroxide/simethicone Suspension 30 milliLiter(s) Oral every 4 hours PRN  dextrose Oral Gel 15 Gram(s) Oral once PRN  HYDROmorphone   Tablet 1 milliGRAM(s) Oral every 6 hours PRN    VITALS  T(F): 98.1 (05-22-25 @ 04:30), Max: 98.9 (05-21-25 @ 12:44)  HR: 82 (05-22-25 @ 04:30) (76 - 91)  BP: 149/60 (05-22-25 @ 04:30) (132/63 - 164/65)  RR: 18 (05-21-25 @ 20:25) (18 - 18)  SpO2: 92% (05-22-25 @ 04:30) (92% - 99%)  POCT Blood Glucose.: 479 mg/dL (05-22-25 @ 07:57)  POCT Blood Glucose.: 306 mg/dL (05-22-25 @ 05:31)  POCT Blood Glucose.: 262 mg/dL (05-22-25 @ 02:36)  POCT Blood Glucose.: 343 mg/dL (05-21-25 @ 22:00)  POCT Blood Glucose.: 256 mg/dL (05-21-25 @ 17:35)  POCT Blood Glucose.: 126 mg/dL (05-21-25 @ 12:21)    PHYSICAL EXAM  GENERAL  ( X ) NAD, lying in bed comfortably     (  ) obtunded     (  ) lethargic     (  ) somnolent    HEAD  ( X ) Atraumatic     (  ) hematoma     (  ) laceration (specify location:       )     NECK  ( X ) Supple     (  ) neck stiffness     (  ) nuchal rigidity     (  )  no JVD     (  ) JVD present ( -- cm)    HEART  Rate -->  ( X ) normal rate    (  ) bradycardic    (  ) tachycardic  Rhythm -->  ( X ) regular    (  ) regularly irregular    (  ) irregularly irregular  Murmurs -->  ( X ) normal s1/s2    (  ) systolic murmur    (  ) diastolic murmur    (  ) continuous murmur     (  ) S3 present    (  ) S4 present    LUNGS  ( X )Unlabored respirations     (  ) tachypnea  ( X ) B/L air entry     (  ) decreased breath sounds in:  (location     )    (  ) no adventitious sound     (  ) crackles     (  ) wheezing      (  ) rhonchi      (specify location:       )  (  ) chest wall tenderness (specify location:       )    ABDOMEN  ( X ) Soft     (  ) tense   |   ( X ) nondistended     (  ) distended   |   ( X ) +BS     (  ) hypoactive bowel sounds     (  ) hyperactive bowel sounds  ( X ) nontender     (  ) RUQ tenderness     (  ) RLQ tenderness     (  ) LLQ tenderness     (  ) epigastric tenderness     (  ) diffuse tenderness  (  ) Splenomegaly      (  ) Hepatomegaly      (  ) Jaundice     (  ) ecchymosis     EXTREMITIES  ( X ) Normal     (  ) Rash     (  ) ecchymosis     (  ) varicose veins      (  ) pitting edema     (  ) non-pitting edema   (  ) ulceration     (  ) gangrene:     (location:     )    NERVOUS SYSTEM  ( X ) A&Ox3     (  ) confused     (  ) lethargic  CN II-XII:     (  ) Intact     (  ) focal deficits  (Specify:     )   Upper extremities:     (  ) strength X/5     (  ) focal deficit (specify:    )  Lower extremities:     (  ) strength  X/5    (  ) focal deficit (specify:    )    SKIN  ( X ) No rashes or lesions     (  ) maculopapular rash     (  ) pustules     (  ) vesicles     (  ) ulcer     (  ) ecchymosis     (specify location:     )    LABS             10.3   4.15  )-----------( 218      ( 05-21-25 @ 06:21 )             31.3     134  |  93  |  42  -------------------------<  352   05-21-25 @ 06:21  4.9  |  21  |  7.5    Ca      9.4     05-21-25 @ 06:21  Phos   5.6     05-22-25 @ 07:05  Mg     2.6     05-22-25 @ 07:05          Urinalysis Basic - ( 21 May 2025 06:21 )    Color: x / Appearance: x / SG: x / pH: x  Gluc: 352 mg/dL / Ketone: x  / Bili: x / Urobili: x   Blood: x / Protein: x / Nitrite: x   Leuk Esterase: x / RBC: x / WBC x   Sq Epi: x / Non Sq Epi: x / Bacteria: x

## 2025-05-22 NOTE — CHART NOTE - NSCHARTNOTEFT_GEN_A_CORE
Per , when she went to speak with patient about discharge to NH, patient stated "They are just waiting for me to die. Just give me the gun and I'll kill myself and them."  1:1 order placed for suicidal ideation  Psych consulted  Palliative consulted given this being his second time expressing suicidal ideation due to adjustment disorder over poor prognosis

## 2025-05-23 LAB
ANION GAP SERPL CALC-SCNC: 18 MMOL/L — HIGH (ref 7–14)
BASOPHILS # BLD AUTO: 0.03 K/UL — SIGNIFICANT CHANGE UP (ref 0–0.2)
BASOPHILS NFR BLD AUTO: 0.9 % — SIGNIFICANT CHANGE UP (ref 0–1)
BUN SERPL-MCNC: 54 MG/DL — HIGH (ref 10–20)
CALCIUM SERPL-MCNC: 9.1 MG/DL — SIGNIFICANT CHANGE UP (ref 8.4–10.5)
CHLORIDE SERPL-SCNC: 94 MMOL/L — LOW (ref 98–110)
CO2 SERPL-SCNC: 21 MMOL/L — SIGNIFICANT CHANGE UP (ref 17–32)
CREAT SERPL-MCNC: 8.9 MG/DL — CRITICAL HIGH (ref 0.7–1.5)
EGFR: 5 ML/MIN/1.73M2 — LOW
EGFR: 5 ML/MIN/1.73M2 — LOW
EOSINOPHIL # BLD AUTO: 0.24 K/UL — SIGNIFICANT CHANGE UP (ref 0–0.7)
EOSINOPHIL NFR BLD AUTO: 6.8 % — SIGNIFICANT CHANGE UP (ref 0–8)
GLUCOSE BLDC GLUCOMTR-MCNC: 190 MG/DL — HIGH (ref 70–99)
GLUCOSE BLDC GLUCOMTR-MCNC: 192 MG/DL — HIGH (ref 70–99)
GLUCOSE BLDC GLUCOMTR-MCNC: 225 MG/DL — HIGH (ref 70–99)
GLUCOSE BLDC GLUCOMTR-MCNC: 261 MG/DL — HIGH (ref 70–99)
GLUCOSE BLDC GLUCOMTR-MCNC: 89 MG/DL — SIGNIFICANT CHANGE UP (ref 70–99)
GLUCOSE SERPL-MCNC: 223 MG/DL — HIGH (ref 70–99)
HCT VFR BLD CALC: 28.3 % — LOW (ref 37–47)
HGB BLD-MCNC: 9.1 G/DL — LOW (ref 12–16)
IMM GRANULOCYTES NFR BLD AUTO: 0.3 % — SIGNIFICANT CHANGE UP (ref 0.1–0.3)
LYMPHOCYTES # BLD AUTO: 1.48 K/UL — SIGNIFICANT CHANGE UP (ref 1.2–3.4)
LYMPHOCYTES # BLD AUTO: 42 % — SIGNIFICANT CHANGE UP (ref 20.5–51.1)
MAGNESIUM SERPL-MCNC: 3 MG/DL — HIGH (ref 1.8–2.4)
MCHC RBC-ENTMCNC: 32.2 G/DL — SIGNIFICANT CHANGE UP (ref 32–37)
MCHC RBC-ENTMCNC: 32.6 PG — HIGH (ref 27–31)
MCV RBC AUTO: 101.4 FL — HIGH (ref 81–99)
MONOCYTES # BLD AUTO: 0.3 K/UL — SIGNIFICANT CHANGE UP (ref 0.1–0.6)
MONOCYTES NFR BLD AUTO: 8.5 % — SIGNIFICANT CHANGE UP (ref 1.7–9.3)
NEUTROPHILS # BLD AUTO: 1.46 K/UL — SIGNIFICANT CHANGE UP (ref 1.4–6.5)
NEUTROPHILS NFR BLD AUTO: 41.5 % — LOW (ref 42.2–75.2)
NRBC BLD AUTO-RTO: 0 /100 WBCS — SIGNIFICANT CHANGE UP (ref 0–0)
PHOSPHATE SERPL-MCNC: 5.9 MG/DL — HIGH (ref 2.1–4.9)
PLATELET # BLD AUTO: 184 K/UL — SIGNIFICANT CHANGE UP (ref 130–400)
PMV BLD: 10.5 FL — HIGH (ref 7.4–10.4)
POTASSIUM SERPL-MCNC: 5.3 MMOL/L — HIGH (ref 3.5–5)
POTASSIUM SERPL-SCNC: 5.3 MMOL/L — HIGH (ref 3.5–5)
RBC # BLD: 2.79 M/UL — LOW (ref 4.2–5.4)
RBC # FLD: 16.4 % — HIGH (ref 11.5–14.5)
SODIUM SERPL-SCNC: 133 MMOL/L — LOW (ref 135–146)
WBC # BLD: 3.52 K/UL — LOW (ref 4.8–10.8)
WBC # FLD AUTO: 3.52 K/UL — LOW (ref 4.8–10.8)

## 2025-05-23 PROCEDURE — 72125 CT NECK SPINE W/O DYE: CPT | Mod: 26

## 2025-05-23 PROCEDURE — 99233 SBSQ HOSP IP/OBS HIGH 50: CPT

## 2025-05-23 PROCEDURE — 99223 1ST HOSP IP/OBS HIGH 75: CPT | Mod: 25

## 2025-05-23 RX ORDER — DARBEPOETIN ALFA 40 UG/ML
25 SOLUTION INTRAVENOUS; SUBCUTANEOUS
Refills: 0 | Status: DISCONTINUED | OUTPATIENT
Start: 2025-05-23 | End: 2025-05-23

## 2025-05-23 RX ORDER — INSULIN LISPRO 100 U/ML
13 INJECTION, SOLUTION INTRAVENOUS; SUBCUTANEOUS
Refills: 0 | Status: DISCONTINUED | OUTPATIENT
Start: 2025-05-23 | End: 2025-05-27

## 2025-05-23 RX ORDER — DARBEPOETIN ALFA 40 UG/ML
25 SOLUTION INTRAVENOUS; SUBCUTANEOUS
Refills: 0 | Status: DISCONTINUED | OUTPATIENT
Start: 2025-05-23 | End: 2025-06-08

## 2025-05-23 RX ORDER — INSULIN GLARGINE-YFGN 100 [IU]/ML
20 INJECTION, SOLUTION SUBCUTANEOUS AT BEDTIME
Refills: 0 | Status: DISCONTINUED | OUTPATIENT
Start: 2025-05-23 | End: 2025-05-24

## 2025-05-23 RX ADMIN — Medication 100 MILLIGRAM(S): at 05:38

## 2025-05-23 RX ADMIN — Medication 40 MILLIGRAM(S): at 05:39

## 2025-05-23 RX ADMIN — HEPARIN SODIUM 5000 UNIT(S): 1000 INJECTION INTRAVENOUS; SUBCUTANEOUS at 05:39

## 2025-05-23 RX ADMIN — DEXTROMETHORPHAN HBR, GUAIFENESIN 600 MILLIGRAM(S): 200 LIQUID ORAL at 05:39

## 2025-05-23 RX ADMIN — Medication 1 DOSE(S): at 20:43

## 2025-05-23 RX ADMIN — SEVELAMER HYDROCHLORIDE 1600 MILLIGRAM(S): 800 TABLET ORAL at 08:31

## 2025-05-23 RX ADMIN — Medication 975 MILLIGRAM(S): at 05:38

## 2025-05-23 RX ADMIN — Medication 1 DOSE(S): at 14:34

## 2025-05-23 RX ADMIN — METHOCARBAMOL 750 MILLIGRAM(S): 500 TABLET, FILM COATED ORAL at 14:22

## 2025-05-23 RX ADMIN — INSULIN LISPRO 13 UNIT(S): 100 INJECTION, SOLUTION INTRAVENOUS; SUBCUTANEOUS at 12:43

## 2025-05-23 RX ADMIN — DEXTROMETHORPHAN HBR, GUAIFENESIN 600 MILLIGRAM(S): 200 LIQUID ORAL at 17:40

## 2025-05-23 RX ADMIN — GABAPENTIN 100 MILLIGRAM(S): 400 CAPSULE ORAL at 21:21

## 2025-05-23 RX ADMIN — DOXAZOSIN MESYLATE 1 MILLIGRAM(S): 8 TABLET ORAL at 12:30

## 2025-05-23 RX ADMIN — LIDOCAINE HYDROCHLORIDE 1 PATCH: 20 JELLY TOPICAL at 00:00

## 2025-05-23 RX ADMIN — Medication 100 MILLIGRAM(S): at 21:21

## 2025-05-23 RX ADMIN — SEVELAMER HYDROCHLORIDE 1600 MILLIGRAM(S): 800 TABLET ORAL at 17:40

## 2025-05-23 RX ADMIN — SEVELAMER HYDROCHLORIDE 1600 MILLIGRAM(S): 800 TABLET ORAL at 12:30

## 2025-05-23 RX ADMIN — Medication 975 MILLIGRAM(S): at 14:22

## 2025-05-23 RX ADMIN — HEPARIN SODIUM 5000 UNIT(S): 1000 INJECTION INTRAVENOUS; SUBCUTANEOUS at 17:40

## 2025-05-23 RX ADMIN — Medication 81 MILLIGRAM(S): at 12:30

## 2025-05-23 RX ADMIN — INSULIN LISPRO 2: 100 INJECTION, SOLUTION INTRAVENOUS; SUBCUTANEOUS at 21:19

## 2025-05-23 RX ADMIN — Medication 2 TABLET(S): at 21:22

## 2025-05-23 RX ADMIN — POLYETHYLENE GLYCOL 3350 17 GRAM(S): 17 POWDER, FOR SOLUTION ORAL at 12:30

## 2025-05-23 RX ADMIN — LIDOCAINE HYDROCHLORIDE 1 PATCH: 20 JELLY TOPICAL at 20:20

## 2025-05-23 RX ADMIN — GABAPENTIN 100 MILLIGRAM(S): 400 CAPSULE ORAL at 14:22

## 2025-05-23 RX ADMIN — Medication 1 APPLICATION(S): at 05:41

## 2025-05-23 RX ADMIN — Medication 975 MILLIGRAM(S): at 21:22

## 2025-05-23 RX ADMIN — Medication 30 MILLIGRAM(S): at 05:38

## 2025-05-23 RX ADMIN — METHOCARBAMOL 750 MILLIGRAM(S): 500 TABLET, FILM COATED ORAL at 21:21

## 2025-05-23 RX ADMIN — LIDOCAINE HYDROCHLORIDE 1 PATCH: 20 JELLY TOPICAL at 14:35

## 2025-05-23 RX ADMIN — GABAPENTIN 100 MILLIGRAM(S): 400 CAPSULE ORAL at 05:40

## 2025-05-23 RX ADMIN — Medication 100 MILLIGRAM(S): at 18:12

## 2025-05-23 RX ADMIN — METHOCARBAMOL 750 MILLIGRAM(S): 500 TABLET, FILM COATED ORAL at 05:38

## 2025-05-23 RX ADMIN — DOXAZOSIN MESYLATE 1 MILLIGRAM(S): 8 TABLET ORAL at 21:21

## 2025-05-23 RX ADMIN — DARBEPOETIN ALFA 25 MICROGRAM(S): 40 SOLUTION INTRAVENOUS; SUBCUTANEOUS at 11:15

## 2025-05-23 RX ADMIN — INSULIN GLARGINE-YFGN 20 UNIT(S): 100 INJECTION, SOLUTION SUBCUTANEOUS at 21:20

## 2025-05-23 RX ADMIN — ATORVASTATIN CALCIUM 10 MILLIGRAM(S): 80 TABLET, FILM COATED ORAL at 21:23

## 2025-05-23 NOTE — CONSULT NOTE ADULT - SUBJECTIVE AND OBJECTIVE BOX
HPI: Patient is a 59F with PMH of ESRD on HD MWF, DM (insulin dependent) HTN, HLD, and HFpEF who presents to ED for chest pain during HD session. Admitted for workup of chest pain and AMS.    Current Inpatient Medication Regimen:  acetaminophen     Tablet .. 975 milliGRAM(s) Oral every 8 hours  albuterol    90 MICROgram(s) HFA Inhaler 2 Puff(s) Inhalation every 6 hours PRN  aluminum hydroxide/magnesium hydroxide/simethicone Suspension 30 milliLiter(s) Oral every 4 hours PRN  aspirin  chewable 81 milliGRAM(s) Oral daily  atorvastatin 10 milliGRAM(s) Oral at bedtime  chlorhexidine 2% Cloths 1 Application(s) Topical <User Schedule>  darbepoetin Injectable ViaL 25 MICROGram(s) IV Push <User Schedule>  dextrose 5%. 1000 milliLiter(s) IV Continuous <Continuous>  dextrose 50% Injectable 25 Gram(s) IV Push once  dextrose Oral Gel 15 Gram(s) Oral once PRN  doxazosin 1 milliGRAM(s) Oral <User Schedule>  fluticasone propionate/ salmeterol 100-50 MICROgram(s) Diskus 1 Dose(s) Inhalation two times a day  gabapentin 100 milliGRAM(s) Oral three times a day  glucagon  Injectable 1 milliGRAM(s) IntraMuscular once  guaiFENesin  milliGRAM(s) Oral every 12 hours  heparin   Injectable 5000 Unit(s) SubCutaneous every 12 hours  hydrALAZINE 100 milliGRAM(s) Oral every 8 hours  HYDROmorphone   Tablet 1 milliGRAM(s) Oral every 6 hours PRN  insulin glargine Injectable (LANTUS) 20 Unit(s) SubCutaneous at bedtime  insulin lispro (ADMELOG) corrective regimen sliding scale   SubCutaneous Before meals and at bedtime  insulin lispro Injectable (ADMELOG) 13 Unit(s) SubCutaneous three times a day before meals  lidocaine   4% Patch 1 Patch Transdermal daily  methocarbamol 750 milliGRAM(s) Oral three times a day  NIFEdipine XL 30 milliGRAM(s) Oral daily  pantoprazole    Tablet 40 milliGRAM(s) Oral before breakfast  polyethylene glycol 3350 17 Gram(s) Oral daily  senna 2 Tablet(s) Oral at bedtime  sevelamer carbonate 1600 milliGRAM(s) Oral three times a day with meals      Home Analgesic Regimen:      Allergies:  No Known Allergies      Past Medical History:  Other specified abnormal findings of blood chemistry    Chronic kidney disease, unspecified CKD stage    Elevated troponin    Afib    Acute UTI        Review of Systems:  General: no fevers or chills  Eyes: no diplopia or blurred vision  ENT: no rhinorrhea  CV: no chest pain  Resp: no cough or dyspnea  GI: no abdominal pain, constipation, or diarrhea  : no urinary incontinence or dysuria  Neuro: no focal weakness or numbness    Physical Exam:  T(C): 36.6 (05-23-25 @ 12:30), Max: 36.6 (05-22-25 @ 22:05)  HR: 77 (05-23-25 @ 12:30) (71 - 81)  BP: 144/67 (05-23-25 @ 12:30) (131/58 - 174/67)  RR: 18 (05-23-25 @ 12:30) (18 - 18)  SpO2: 97% (05-23-25 @ 12:30) (92% - 98%)  Gen: NAD  Eyes: no glasses or scleral icterus  Head: Normocephalic / Atraumatic  CV: no JVD  Lungs: nonlabored breathing  Abdomen: nondistended, soft  : no murillo catheter in place  Back: tenderness to palpation  Neuro: AOx3  Extremities: full ROM in upper/lower extremities  Psych: normal affect      Labs:  CBC  3.52 K/uL[L] [4.80 - 10.80] > 9.1 g/dL[L] [12.0 - 16.0] / 28.3 %[L] [37.0 - 47.0] < 184 K/uL [130 - 400]      BMP  133 mmol/L[L] [135 - 146] | 94 mmol/L[L] [98 - 110] | 54 mg/dL[H] [10 - 20]  5.3 mmol/L[H] [3.5 - 5.0] | 21 mmol/L [17 - 32] | 8.9 mg/dL[HH] [0.7 - 1.5]    223 mg/dL[H] [70 - 99]  CBC  -- > -- / -- < --      BMP  136 mmol/L [135 - 146] | 92 mmol/L[L] [98 - 110] | 46 mg/dL[H] [10 - 20]  5.8 mmol/L[H] [3.5 - 5.0] | 22 mmol/L [17 - 32] | 7.1 mg/dL[HH] [0.7 - 1.5]    308 mg/dL[H] [70 - 99]        Imaging Studies:    ACC: [id number] EXAM:  CT CERVICAL SPINE   ORDERED BY: VIDA MILLS     PROCEDURE DATE:  05/23/2025          INTERPRETATION:  Clinical history: Radicular pain    TECHNIQUE:  CT cervical spine without contrast. Contiguous CT axial   images of the cervical spine with coronal and sagittal reformats.    COMPARISON/CORRELATION: None available    FINDINGS:    The study is motion limited.    There is no obvious acute fracture or facet subluxation. There is   multilevel degenerative loss of disc space height.    At C2-3 there is disc osteophyte indenting the ventral thecal sac.   Uncinate and facet hypertrophy with right foraminal stenosis.    At C3-4 there is disc osteophyte indenting the ventral thecal sac.   Uncinate and facet hypertrophy with left > right foraminal stenosis.    At C4-5 there is disc osteophyte indenting the ventral thecal sac.   Uncinate and facet hypertrophy with left > right foraminal stenosis.    At C5-6 there is disc osteophyte indenting the ventral thecal sac.   Uncinate and facet hypertrophy with left > right foraminal stenosis.    At C6-7 there is disc osteophyte indenting the ventral thecal sac.   Uncinate and facet hypertrophy with moderate right foraminal stenosis.    IMPRESSION:    Motion limited study. No gross evidence of acute osseous abnormality.   Degenerative changes as above.    --- End of Report ---        RUBY PAN MD; Attending Radiologist  This document has been electronically signed. May 23 2025  8:26AM      Opioid Risk Assessment Tool                                                                           Female       Male  Family History  Alcohol                                                              1                3  Illegal drugs                                                       2                3  Rx drugs                                                            4                4    Personal History   Alcohol                                                              3                3  Illegal drugs                                                       4                4  Rx drugs                                                            5                5    Age between 16—45 years                                1                1  History of preadolescent sexual abuse               3                0    Psychological disease  ADD, OCD, bipolar, schizophrenia                      2                2  Depression                                                       1                1    Total Score                                                     0            __    0 - 3 = low risk for future opioid abuse  4 - 7 = moderate risk for future opioid abuse  8+ = high risk for future opioid abuse

## 2025-05-23 NOTE — PROGRESS NOTE ADULT - ASSESSMENT
59y Female with h/o ESRD on HD (MWF), IDDM, dyslipidemia, CKD-MBD, HTN who presented to the hospital with CP during HD session this morning.  Had 90 minutes of total HD prior to arrival.  Nephrology now consulted for HD needs while in-house.    hd today standard bath uf 2 liters as tolerated / regular days outpatient MWF  follow FS closely   low k diet  bp better controlled   ph near goal/ on binders    NST negative   h/h noted / check iron stores / will resume SHAR with HD aranesp 25 weekly   US Abdomen Upper Quadrant Right (05.20.25 @ 21:13) >  Cholelithiasis. No sonographic evidence of acute cholecystitis.  will follow

## 2025-05-23 NOTE — PROGRESS NOTE ADULT - SUBJECTIVE AND OBJECTIVE BOX
seen and examined  24 h events noted   no distress         PAST HISTORY  --------------------------------------------------------------------------------  No significant changes to PMH, PSH, FHx, SHx, unless otherwise noted    ALLERGIES & MEDICATIONS  --------------------------------------------------------------------------------  Allergies    No Known Allergies    Intolerances      Standing Inpatient Medications  acetaminophen     Tablet .. 975 milliGRAM(s) Oral every 8 hours  aspirin  chewable 81 milliGRAM(s) Oral daily  atorvastatin 10 milliGRAM(s) Oral at bedtime  chlorhexidine 2% Cloths 1 Application(s) Topical <User Schedule>  dextrose 5%. 1000 milliLiter(s) IV Continuous <Continuous>  dextrose 50% Injectable 25 Gram(s) IV Push once  doxazosin 1 milliGRAM(s) Oral <User Schedule>  fluticasone propionate/ salmeterol 100-50 MICROgram(s) Diskus 1 Dose(s) Inhalation two times a day  gabapentin 100 milliGRAM(s) Oral three times a day  glucagon  Injectable 1 milliGRAM(s) IntraMuscular once  guaiFENesin  milliGRAM(s) Oral every 12 hours  heparin   Injectable 5000 Unit(s) SubCutaneous every 12 hours  hydrALAZINE 100 milliGRAM(s) Oral every 8 hours  insulin glargine Injectable (LANTUS) 20 Unit(s) SubCutaneous at bedtime  insulin lispro (ADMELOG) corrective regimen sliding scale   SubCutaneous Before meals and at bedtime  insulin lispro Injectable (ADMELOG) 13 Unit(s) SubCutaneous three times a day before meals  lidocaine   4% Patch 1 Patch Transdermal daily  methocarbamol 750 milliGRAM(s) Oral three times a day  NIFEdipine XL 30 milliGRAM(s) Oral daily  pantoprazole    Tablet 40 milliGRAM(s) Oral before breakfast  polyethylene glycol 3350 17 Gram(s) Oral daily  senna 2 Tablet(s) Oral at bedtime  sevelamer carbonate 1600 milliGRAM(s) Oral three times a day with meals    PRN Inpatient Medications  albuterol    90 MICROgram(s) HFA Inhaler 2 Puff(s) Inhalation every 6 hours PRN  aluminum hydroxide/magnesium hydroxide/simethicone Suspension 30 milliLiter(s) Oral every 4 hours PRN  dextrose Oral Gel 15 Gram(s) Oral once PRN  HYDROmorphone   Tablet 1 milliGRAM(s) Oral every 6 hours PRN        VITALS/PHYSICAL EXAM  --------------------------------------------------------------------------------  T(C): 36.3 (05-23-25 @ 05:00), Max: 36.8 (05-22-25 @ 12:50)  HR: 71 (05-23-25 @ 05:00) (71 - 80)  BP: 149/64 (05-23-25 @ 05:00) (128/60 - 160/67)  RR: 18 (05-23-25 @ 05:00) (18 - 18)  SpO2: 95% (05-23-25 @ 05:00) (92% - 95%)  Wt(kg): --        Physical Exam:  	Gen: NAD  	Pulm: decrease BS  B/L  	CV:  S1S2; no rub  	Abd:  soft, nontender/nondistended  	LE:  no edema  	Vascular access:av    LABS/STUDIES  --------------------------------------------------------------------------------              9.1    3.52  >-----------<  184      [05-23-25 @ 06:27]              28.3     136  |  92  |  46  ----------------------------<  308      [05-22-25 @ 17:01]  5.8   |  22  |  7.1        Ca     9.4     [05-22-25 @ 17:01]      Mg     2.6     [05-22-25 @ 07:05]      Phos  5.6     [05-22-25 @ 07:05]      Creatinine Trend:  SCr 7.1 [05-22 @ 17:01]  SCr 6.6 [05-22 @ 07:05]  SCr 7.5 [05-21 @ 06:21]  SCr 5.4 [05-20 @ 06:33]  SCr 6.7 [05-19 @ 05:43]    Urinalysis - [05-22-25 @ 17:01]      Color  / Appearance  / SG  / pH       Gluc 308 / Ketone   / Bili  / Urobili        Blood  / Protein  / Leuk Est  / Nitrite       RBC  / WBC  / Hyaline  / Gran  / Sq Epi  / Non Sq Epi  / Bacteria

## 2025-05-23 NOTE — CONSULT NOTE ADULT - ASSESSMENT
A/P:  Patient is a 59F with PMH of ESRD on HD MWF, DM (insulin dependent) HTN, HLD, and HFpEF who presents to ED for chest pain during HD session. Admitted for workup of chest pain and AMS.    PLAN  Patient complaining of chronic back pain, states the pain radiates down both legs, feels sharp and shooting, worse when ambulating. Patient also endorsing pain in neck radiating down both arms.  - Acetaminophen 975mg q8h scheduled  - d/c PO Dilaudid, start PO tramadol 50mg q6h PRN For severe pain  - Methocarbamol 1000mg q8h scheduled  - NSAIDs avoided in setting of ESRD  - Gabapentin 300mg at bedtime - max dose  - Can f/u outpatient with Dr. Rojas [phone]  - Physical therapy  A/P:  Patient is a 59F with PMH of ESRD on HD MWF, DM (insulin dependent) HTN, HLD, and HFpEF who presents to ED for chest pain during HD session. Admitted for workup of chest pain and AMS.    PLAN  Patient complaining of chronic back pain, states the pain radiates down both legs, feels sharp and shooting, worse when ambulating. Patient also endorsing pain in neck radiating down both arms.  - Acetaminophen 975mg q8h scheduled  - d/c PO Dilaudid, start PO tramadol 50mg q6h PRN For severe pain  - Methocarbamol 1000mg q8h scheduled  - NSAIDs avoided in setting of ESRD  - Gabapentin 300mg at bedtime - max dose  - Can f/u outpatient with Dr. Villanueva [phone]  - Physical therapy

## 2025-05-23 NOTE — CONSULT NOTE ADULT - NS ATTEND AMEND GEN_ALL_CORE FT
Pt seen, examined and discussed with Neurology ACP.    Pt presented with chest pain, now c/o pain/burning sensation from her nape to her toes. No focal/lateralizing deficits. NIHSS 0, with loss of light touch sensation in BLE up to knees b/l. CT head with small right frontal periventricular hypodensity, possibly representing an "age indeterminate" ischemic infarct. No clear h/o stroke-like symptoms, and current neuro exam is non focal. Pt has multiple cerebrovascular risk factors. Continue home ASA/statin and f/u in stroke clinic outpt.
-

## 2025-05-23 NOTE — PROGRESS NOTE ADULT - ASSESSMENT
59y old [place] speaking Female w/ PMHx of ESRD on HD MWF, DM (insulin dependent) HTN, HLD, and HFpEF who presents to ED for chest pain during HD session. Admitted for workup of chest pain and AMS.    #AMS 2/2 hypoglycemia likely 2/2 decreased lantus clearance 2/2 ESRD- resolved  #DM2, Insulin dependent- uncontrolled  - RRT on 5/18/2025 for AMS, FS 30 after 80u lantus, s/p d50 and d5LR   - Increased Lantus to 20 and lispro to 13  - C/w ISS  - 5/20: S/p D5 for hypoglycemia and AMS       - Monitor fingersticks q4  - c/w gabapentin 100mg TID   - C/w robaxin    #Chronic Back pain- severe, uncontrolled  - CT C-spine- The study is motion limited. There is no obvious acute fracture or facet subluxation. There is multilevel degenerative loss of disc space height. At C2-3 there is disc osteophyte indenting the ventral thecal sac. Uncinate and facet hypertrophy with right foraminal stenosis. At C3-4 there is disc osteophyte indenting the ventral thecal sac. Uncinate and facet hypertrophy with left > right foraminal stenosis. At C4-5 there is disc osteophyte indenting the ventral thecal sac. Uncinate and facet hypertrophy with left > right foraminal stenosis. At C5-6 there is disc osteophyte indenting the ventral thecal sac. Uncinate and facet hypertrophy with left > right foraminal stenosis. At C6-7 there is disc osteophyte indenting the ventral thecal sac. Uncinate and facet hypertrophy with moderate right foraminal stenosis.   - C/w Robaxin  - C/w Gabapentin   - C/w tylenol 975 q8 standing  - C/w lidocaine patch  - C/w dilaudid PO 1 q6 prn for severe pain  - F/u pain management c/s  - F/u neurosurgery c/s  - F/u MRI lumbar and c-spine    #RUQ pain- resolved  - RUQ US- Cholelithiasis. No sonographic evidence of acute cholecystitis.  - Pain resolved on 5/21/2025    #ESRD on HD MWF  #HTN    - nephro recs appreciated        - bp improves with HD       - C/w hydralazine 100mg q8h  - c/w doxazosin 1mg BID  - c/w sevelamer 1600 mg TID  - patient state she uses nifedipine 90 PO if her SBP is above 200 - will hold for now   - S/p nifedipine 30 x1 and labetalol 10 IVP x1 on 5/20 2/2 HTN to 200s SBP  - C/w nifedipine 30 qd    #On admission, AMS possibly 2/2 transient hypotension during HD- Resolved  - CTH showed A focal hypodensity is noted within the right frontal periventricular white matter, not previously seen. Finding may reflect age-indeterminate ischemic change. An MRI of the brain can be obtained for further evaluation if not clinically contraindicated.  - Neuro consult noted no additional inpatient workup.  - c/w ASA and atorvastatin    #Chest pain during HD- Resolved  #HFpEF - not in exacerbation  - On admission. trop 64 -> 66, Creatinine 4.1  - ECG showed prominent T wave   - CXR negative   - TTE EF 60%. G1DD  - stress test neg for ischemia  - C/w protonix qd     #Cough  - afebrile, no leukocytosis   - C/w Robitussin  - C/w advair bid, HFA prn    #Acute uncomplicated E. coli cystitis- resolved  - UA (+), UCx (+) E coli  - S/p CTX  - bcx ngtd     #DLD  - c/w home atorvastatin    #MISC  - DVT ppx: heparin  - Diet: renal, DASH, CCC  - GI ppx: protonix  - Activity: as tolerated    Pending: control of hyperglycemia, control of back pain, pain management c/s, neurosurgery c/s, MRI lumbar and c-spine   59y old [place] Islander speaking Female w/ PMHx of ESRD on HD MWF, DM (insulin dependent) HTN, HLD, and HFpEF who presents to ED for chest pain during HD session. Admitted for workup of chest pain and AMS.    #AMS 2/2 hypoglycemia likely 2/2 decreased lantus clearance 2/2 ESRD- resolved  #DM2, Insulin dependent- uncontrolled  - RRT on 5/18/2025 for AMS, FS 30 after 80u lantus, s/p d50 and d5LR   - Increased Lantus to 20 and lispro to 13  - C/w ISS  - 5/20: S/p D5 for hypoglycemia and AMS       - Monitor fingersticks q4  - c/w gabapentin 100mg TID   - C/w robaxin    #Chronic Back pain- severe, uncontrolled  - CT C-spine- The study is motion limited. There is no obvious acute fracture or facet subluxation. There is multilevel degenerative loss of disc space height. At C2-3 there is disc osteophyte indenting the ventral thecal sac. Uncinate and facet hypertrophy with right foraminal stenosis. At C3-4 there is disc osteophyte indenting the ventral thecal sac. Uncinate and facet hypertrophy with left > right foraminal stenosis. At C4-5 there is disc osteophyte indenting the ventral thecal sac. Uncinate and facet hypertrophy with left > right foraminal stenosis. At C5-6 there is disc osteophyte indenting the ventral thecal sac. Uncinate and facet hypertrophy with left > right foraminal stenosis. At C6-7 there is disc osteophyte indenting the ventral thecal sac. Uncinate and facet hypertrophy with moderate right foraminal stenosis.   - C/w Robaxin  - C/w Gabapentin   - C/w tylenol 975 q8 standing  - C/w lidocaine patch  - C/w dilaudid PO 1 q6 prn for severe pain  - F/u pain management c/s  - F/u neurosurgery c/s  - F/u MRI lumbar and c-spine    #RUQ pain- resolved  - RUQ US- Cholelithiasis. No sonographic evidence of acute cholecystitis.  - Pain resolved on 5/21/2025    #ESRD on HD MWF  #HTN    #Hyperkalemia  - nephro recs appreciated        - bp improves with HD       - C/w hydralazine 100mg q8h       - F/u iron studies       - Low K diet       - resume SHAR with HD aranesp 25 weekly  - c/w doxazosin 1mg BID  - c/w sevelamer 1600 mg TID  - patient state she uses nifedipine 90 PO if her SBP is above 200 - will hold for now   - S/p nifedipine 30 x1 and labetalol 10 IVP x1 on 5/20 2/2 HTN to 200s SBP  - C/w nifedipine 30 qd    #On admission, AMS possibly 2/2 transient hypotension during HD- Resolved  - CTH showed A focal hypodensity is noted within the right frontal periventricular white matter, not previously seen. Finding may reflect age-indeterminate ischemic change. An MRI of the brain can be obtained for further evaluation if not clinically contraindicated.  - Neuro consult noted no additional inpatient workup.  - c/w ASA and atorvastatin    #Chest pain during HD- Resolved  #HFpEF - not in exacerbation  - On admission. trop 64 -> 66, Creatinine 4.1  - ECG showed prominent T wave   - CXR negative   - TTE EF 60%. G1DD  - stress test neg for ischemia  - C/w protonix qd     #Cough  - afebrile, no leukocytosis   - C/w Robitussin  - C/w advair bid, HFA prn    #Acute uncomplicated E. coli cystitis- resolved  - UA (+), UCx (+) E coli  - S/p CTX  - bcx ngtd     #DLD  - c/w home atorvastatin    #MISC  - DVT ppx: heparin  - Diet: renal, DASH, CCC  - GI ppx: protonix  - Activity: as tolerated    Pending: control of hyperglycemia, control of back pain, pain management c/s, neurosurgery c/s, MRI lumbar and c-spine

## 2025-05-23 NOTE — CONSULT NOTE ADULT - NS ATTEST RISK PROBLEM GEN_ALL_CORE FT
59-year-old female with a history of ESRD on HD, insulin-dependent DM, HTN, HLD, and HFpEF presents to the ED with chest pain during an HD session and altered mental status (AMS). Her multiple comorbidities, ESRD, and current presentation with chest pain and AMS place her at high risk. While the plan focuses on her chronic back and neck pain, these complaints are overshadowed by the seriousness of her presenting symptoms.

## 2025-05-23 NOTE — PROGRESS NOTE ADULT - SUBJECTIVE AND OBJECTIVE BOX
SUBJECTIVE/OVERNIGHT EVENTS  Today is hospital day 7d. This morning patient was seen and examined at bedside, resting comfortably in bed. No acute or major events overnight. Patient is in dialysis at this time and was unable to be assessed. ROS unable to be obtained.    CODE STATUS: FULL    MEDICATIONS  STANDING MEDICATIONS  acetaminophen     Tablet .. 975 milliGRAM(s) Oral every 8 hours  aspirin  chewable 81 milliGRAM(s) Oral daily  atorvastatin 10 milliGRAM(s) Oral at bedtime  chlorhexidine 2% Cloths 1 Application(s) Topical <User Schedule>  dextrose 5%. 1000 milliLiter(s) IV Continuous <Continuous>  dextrose 50% Injectable 25 Gram(s) IV Push once  doxazosin 1 milliGRAM(s) Oral <User Schedule>  fluticasone propionate/ salmeterol 100-50 MICROgram(s) Diskus 1 Dose(s) Inhalation two times a day  gabapentin 100 milliGRAM(s) Oral three times a day  glucagon  Injectable 1 milliGRAM(s) IntraMuscular once  guaiFENesin  milliGRAM(s) Oral every 12 hours  heparin   Injectable 5000 Unit(s) SubCutaneous every 12 hours  hydrALAZINE 100 milliGRAM(s) Oral every 8 hours  insulin glargine Injectable (LANTUS) 20 Unit(s) SubCutaneous at bedtime  insulin lispro (ADMELOG) corrective regimen sliding scale   SubCutaneous Before meals and at bedtime  insulin lispro Injectable (ADMELOG) 13 Unit(s) SubCutaneous three times a day before meals  lidocaine   4% Patch 1 Patch Transdermal daily  methocarbamol 750 milliGRAM(s) Oral three times a day  NIFEdipine XL 30 milliGRAM(s) Oral daily  pantoprazole    Tablet 40 milliGRAM(s) Oral before breakfast  polyethylene glycol 3350 17 Gram(s) Oral daily  senna 2 Tablet(s) Oral at bedtime  sevelamer carbonate 1600 milliGRAM(s) Oral three times a day with meals    PRN MEDICATIONS  albuterol    90 MICROgram(s) HFA Inhaler 2 Puff(s) Inhalation every 6 hours PRN  aluminum hydroxide/magnesium hydroxide/simethicone Suspension 30 milliLiter(s) Oral every 4 hours PRN  dextrose Oral Gel 15 Gram(s) Oral once PRN  HYDROmorphone   Tablet 1 milliGRAM(s) Oral every 6 hours PRN    VITALS  T(F): 97.4 (05-23-25 @ 05:00), Max: 98.2 (05-22-25 @ 12:50)  HR: 72 (05-23-25 @ 08:45) (71 - 80)  BP: 131/58 (05-23-25 @ 08:45) (128/60 - 160/67)  RR: 18 (05-23-25 @ 08:45) (18 - 18)  SpO2: 98% (05-23-25 @ 07:26) (92% - 98%)  POCT Blood Glucose.: 225 mg/dL (05-23-25 @ 07:55)  POCT Blood Glucose.: 192 mg/dL (05-23-25 @ 00:34)  POCT Blood Glucose.: 312 mg/dL (05-22-25 @ 21:29)  POCT Blood Glucose.: 316 mg/dL (05-22-25 @ 16:54)  POCT Blood Glucose.: 113 mg/dL (05-22-25 @ 11:37)    PHYSICAL EXAM- unable to obtain at this time as patient was in dialysis    LABS             9.1    3.52  )-----------( 184      ( 05-23-25 @ 06:27 )             28.3     133  |  94  |  54  -------------------------<  223   05-23-25 @ 06:27  5.3  |  21  |  8.9    Ca      9.1     05-23-25 @ 06:27  Phos   5.9     05-23-25 @ 06:27  Mg     3.0     05-23-25 @ 06:27          Urinalysis Basic - ( 23 May 2025 06:27 )    Color: x / Appearance: x / SG: x / pH: x  Gluc: 223 mg/dL / Ketone: x  / Bili: x / Urobili: x   Blood: x / Protein: x / Nitrite: x   Leuk Esterase: x / RBC: x / WBC x   Sq Epi: x / Non Sq Epi: x / Bacteria: x          IMAGING  < from: CT Cervical Spine No Cont (05.23.25 @ 00:11) >  FINDINGS:    The study is motion limited.    There is no obvious acute fracture or facet subluxation. There is   multilevel degenerative loss of disc space height.    At C2-3 there is disc osteophyte indenting the ventral thecal sac.   Uncinate and facet hypertrophy with right foraminal stenosis.    At C3-4 there is disc osteophyte indenting the ventral thecal sac.   Uncinate and facet hypertrophy with left > right foraminal stenosis.    At C4-5 there is disc osteophyte indenting the ventral thecal sac.   Uncinate and facet hypertrophy with left > right foraminal stenosis.    At C5-6 there is disc osteophyte indenting the ventral thecal sac.   Uncinate and facet hypertrophy with left > right foraminal stenosis.    At C6-7 there is disc osteophyte indenting the ventral thecal sac.   Uncinate and facet hypertrophy with moderate right foraminal stenosis.    IMPRESSION:    Motion limited study. No gross evidence of acute osseous abnormality.   Degenerative changes as above.    < end of copied text >

## 2025-05-24 LAB
ANION GAP SERPL CALC-SCNC: 15 MMOL/L — HIGH (ref 7–14)
BASOPHILS # BLD AUTO: 0.03 K/UL — SIGNIFICANT CHANGE UP (ref 0–0.2)
BASOPHILS NFR BLD AUTO: 0.6 % — SIGNIFICANT CHANGE UP (ref 0–1)
BUN SERPL-MCNC: 33 MG/DL — HIGH (ref 10–20)
CALCIUM SERPL-MCNC: 9.2 MG/DL — SIGNIFICANT CHANGE UP (ref 8.4–10.5)
CHLORIDE SERPL-SCNC: 96 MMOL/L — LOW (ref 98–110)
CO2 SERPL-SCNC: 26 MMOL/L — SIGNIFICANT CHANGE UP (ref 17–32)
CREAT SERPL-MCNC: 6.2 MG/DL — CRITICAL HIGH (ref 0.7–1.5)
EGFR: 7 ML/MIN/1.73M2 — LOW
EGFR: 7 ML/MIN/1.73M2 — LOW
EOSINOPHIL # BLD AUTO: 0.09 K/UL — SIGNIFICANT CHANGE UP (ref 0–0.7)
EOSINOPHIL NFR BLD AUTO: 1.7 % — SIGNIFICANT CHANGE UP (ref 0–8)
GLUCOSE BLDC GLUCOMTR-MCNC: 154 MG/DL — HIGH (ref 70–99)
GLUCOSE BLDC GLUCOMTR-MCNC: 216 MG/DL — HIGH (ref 70–99)
GLUCOSE BLDC GLUCOMTR-MCNC: 361 MG/DL — HIGH (ref 70–99)
GLUCOSE BLDC GLUCOMTR-MCNC: 54 MG/DL — CRITICAL LOW (ref 70–99)
GLUCOSE BLDC GLUCOMTR-MCNC: 78 MG/DL — SIGNIFICANT CHANGE UP (ref 70–99)
GLUCOSE SERPL-MCNC: 204 MG/DL — HIGH (ref 70–99)
HCT VFR BLD CALC: 30 % — LOW (ref 37–47)
HGB BLD-MCNC: 9.7 G/DL — LOW (ref 12–16)
IMM GRANULOCYTES NFR BLD AUTO: 0.2 % — SIGNIFICANT CHANGE UP (ref 0.1–0.3)
LYMPHOCYTES # BLD AUTO: 1.13 K/UL — LOW (ref 1.2–3.4)
LYMPHOCYTES # BLD AUTO: 21.9 % — SIGNIFICANT CHANGE UP (ref 20.5–51.1)
MAGNESIUM SERPL-MCNC: 2.5 MG/DL — HIGH (ref 1.8–2.4)
MCHC RBC-ENTMCNC: 32.3 G/DL — SIGNIFICANT CHANGE UP (ref 32–37)
MCHC RBC-ENTMCNC: 32.8 PG — HIGH (ref 27–31)
MCV RBC AUTO: 101.4 FL — HIGH (ref 81–99)
MONOCYTES # BLD AUTO: 0.43 K/UL — SIGNIFICANT CHANGE UP (ref 0.1–0.6)
MONOCYTES NFR BLD AUTO: 8.3 % — SIGNIFICANT CHANGE UP (ref 1.7–9.3)
NEUTROPHILS # BLD AUTO: 3.48 K/UL — SIGNIFICANT CHANGE UP (ref 1.4–6.5)
NEUTROPHILS NFR BLD AUTO: 67.3 % — SIGNIFICANT CHANGE UP (ref 42.2–75.2)
NRBC BLD AUTO-RTO: 0 /100 WBCS — SIGNIFICANT CHANGE UP (ref 0–0)
PHOSPHATE SERPL-MCNC: 5 MG/DL — HIGH (ref 2.1–4.9)
PLATELET # BLD AUTO: 184 K/UL — SIGNIFICANT CHANGE UP (ref 130–400)
PMV BLD: 10.6 FL — HIGH (ref 7.4–10.4)
POTASSIUM SERPL-MCNC: 4.5 MMOL/L — SIGNIFICANT CHANGE UP (ref 3.5–5)
POTASSIUM SERPL-SCNC: 4.5 MMOL/L — SIGNIFICANT CHANGE UP (ref 3.5–5)
RBC # BLD: 2.96 M/UL — LOW (ref 4.2–5.4)
RBC # FLD: 17.2 % — HIGH (ref 11.5–14.5)
SODIUM SERPL-SCNC: 137 MMOL/L — SIGNIFICANT CHANGE UP (ref 135–146)
WBC # BLD: 5.17 K/UL — SIGNIFICANT CHANGE UP (ref 4.8–10.8)
WBC # FLD AUTO: 5.17 K/UL — SIGNIFICANT CHANGE UP (ref 4.8–10.8)

## 2025-05-24 PROCEDURE — 99232 SBSQ HOSP IP/OBS MODERATE 35: CPT

## 2025-05-24 RX ORDER — METHOCARBAMOL 500 MG/1
1000 TABLET, FILM COATED ORAL THREE TIMES A DAY
Refills: 0 | Status: DISCONTINUED | OUTPATIENT
Start: 2025-05-24 | End: 2025-05-27

## 2025-05-24 RX ORDER — INSULIN GLARGINE-YFGN 100 [IU]/ML
25 INJECTION, SOLUTION SUBCUTANEOUS AT BEDTIME
Refills: 0 | Status: DISCONTINUED | OUTPATIENT
Start: 2025-05-24 | End: 2025-05-26

## 2025-05-24 RX ORDER — TRAMADOL HYDROCHLORIDE 50 MG/1
50 TABLET, FILM COATED ORAL EVERY 6 HOURS
Refills: 0 | Status: DISCONTINUED | OUTPATIENT
Start: 2025-05-24 | End: 2025-05-27

## 2025-05-24 RX ORDER — GABAPENTIN 400 MG/1
300 CAPSULE ORAL AT BEDTIME
Refills: 0 | Status: DISCONTINUED | OUTPATIENT
Start: 2025-05-24 | End: 2025-05-27

## 2025-05-24 RX ADMIN — INSULIN LISPRO 6: 100 INJECTION, SOLUTION INTRAVENOUS; SUBCUTANEOUS at 18:23

## 2025-05-24 RX ADMIN — GABAPENTIN 300 MILLIGRAM(S): 400 CAPSULE ORAL at 21:41

## 2025-05-24 RX ADMIN — DOXAZOSIN MESYLATE 1 MILLIGRAM(S): 8 TABLET ORAL at 09:04

## 2025-05-24 RX ADMIN — Medication 81 MILLIGRAM(S): at 13:15

## 2025-05-24 RX ADMIN — INSULIN LISPRO 13 UNIT(S): 100 INJECTION, SOLUTION INTRAVENOUS; SUBCUTANEOUS at 08:21

## 2025-05-24 RX ADMIN — SEVELAMER HYDROCHLORIDE 1600 MILLIGRAM(S): 800 TABLET ORAL at 13:15

## 2025-05-24 RX ADMIN — METHOCARBAMOL 750 MILLIGRAM(S): 500 TABLET, FILM COATED ORAL at 06:47

## 2025-05-24 RX ADMIN — METHOCARBAMOL 1000 MILLIGRAM(S): 500 TABLET, FILM COATED ORAL at 21:41

## 2025-05-24 RX ADMIN — Medication 100 MILLIGRAM(S): at 06:47

## 2025-05-24 RX ADMIN — Medication 100 MILLIGRAM(S): at 21:41

## 2025-05-24 RX ADMIN — Medication 30 MILLIGRAM(S): at 06:47

## 2025-05-24 RX ADMIN — Medication 975 MILLIGRAM(S): at 21:41

## 2025-05-24 RX ADMIN — ATORVASTATIN CALCIUM 10 MILLIGRAM(S): 80 TABLET, FILM COATED ORAL at 21:41

## 2025-05-24 RX ADMIN — DOXAZOSIN MESYLATE 1 MILLIGRAM(S): 8 TABLET ORAL at 21:41

## 2025-05-24 RX ADMIN — SEVELAMER HYDROCHLORIDE 1600 MILLIGRAM(S): 800 TABLET ORAL at 17:57

## 2025-05-24 RX ADMIN — POLYETHYLENE GLYCOL 3350 17 GRAM(S): 17 POWDER, FOR SOLUTION ORAL at 13:13

## 2025-05-24 RX ADMIN — LIDOCAINE HYDROCHLORIDE 1 PATCH: 20 JELLY TOPICAL at 13:13

## 2025-05-24 RX ADMIN — DEXTROMETHORPHAN HBR, GUAIFENESIN 600 MILLIGRAM(S): 200 LIQUID ORAL at 06:47

## 2025-05-24 RX ADMIN — METHOCARBAMOL 1000 MILLIGRAM(S): 500 TABLET, FILM COATED ORAL at 13:14

## 2025-05-24 RX ADMIN — SEVELAMER HYDROCHLORIDE 1600 MILLIGRAM(S): 800 TABLET ORAL at 08:23

## 2025-05-24 RX ADMIN — Medication 1 APPLICATION(S): at 06:51

## 2025-05-24 RX ADMIN — Medication 100 MILLIGRAM(S): at 13:15

## 2025-05-24 RX ADMIN — GABAPENTIN 100 MILLIGRAM(S): 400 CAPSULE ORAL at 06:47

## 2025-05-24 RX ADMIN — Medication 1 DOSE(S): at 08:23

## 2025-05-24 RX ADMIN — Medication 975 MILLIGRAM(S): at 13:14

## 2025-05-24 RX ADMIN — INSULIN GLARGINE-YFGN 25 UNIT(S): 100 INJECTION, SOLUTION SUBCUTANEOUS at 21:40

## 2025-05-24 RX ADMIN — Medication 2 TABLET(S): at 21:40

## 2025-05-24 RX ADMIN — Medication 1 DOSE(S): at 21:42

## 2025-05-24 RX ADMIN — DEXTROMETHORPHAN HBR, GUAIFENESIN 600 MILLIGRAM(S): 200 LIQUID ORAL at 17:56

## 2025-05-24 RX ADMIN — INSULIN LISPRO 13 UNIT(S): 100 INJECTION, SOLUTION INTRAVENOUS; SUBCUTANEOUS at 18:24

## 2025-05-24 RX ADMIN — HEPARIN SODIUM 5000 UNIT(S): 1000 INJECTION INTRAVENOUS; SUBCUTANEOUS at 17:56

## 2025-05-24 RX ADMIN — HEPARIN SODIUM 5000 UNIT(S): 1000 INJECTION INTRAVENOUS; SUBCUTANEOUS at 06:48

## 2025-05-24 RX ADMIN — Medication 40 MILLIGRAM(S): at 06:48

## 2025-05-24 RX ADMIN — Medication 975 MILLIGRAM(S): at 06:47

## 2025-05-24 NOTE — PROGRESS NOTE ADULT - ATTENDING COMMENTS
Pt seen and examined. Case and Plan discussed at rounds and agree with resident note as reviewed.    Acute Issues:  Chest Pain - Atypical w/ NEG CV w/up on this admission f/u CV in clinic upon d/c / c/w all meds  Iatrogenic Hypogycemia - s/p Lantus 80un sq - Persistent Hypoglycemia resolved over a couple days now doing well back on home dose of Lantus 15Un qd and Lispro 12Un sq qac  Routine HD for ESR MWF   HD Access Patent     Chronic LBP  Chronic Neck Pain   Radiculopathy in B/L Arms  - CT ++ Multilevel Spine Degenerative Disk Dz   - Check MRI Neck and Lumbar  - If MRI concerning will get Neurosx to assess   - Pain Management recs appreciated and ordered     Dispo: pending MRI N/L / Home w/ VNS and HD MWF

## 2025-05-24 NOTE — PROGRESS NOTE ADULT - ASSESSMENT
59y old [place]i speaking Female w/ PMHx of ESRD on HD MWF, DM (insulin dependent) HTN, HLD, and HFpEF who presents to ED for chest pain during HD session. Admitted for workup of chest pain and AMS.    #AMS 2/2 hypoglycemia likely 2/2 decreased lantus clearance 2/2 ESRD- resolved  #DM2, Insulin dependent- uncontrolled  - RRT on 5/18/2025 for AMS, FS 30 after 80u lantus, s/p d50 and d5LR   - Increased Lantus to 25  - C/w lispro 13  - C/w ISS  - 5/20: S/p D5 for hypoglycemia and AMS       - Monitor fingersticks q4  - Changed gabapentin to 300 qhs  - Increased robaxin to 1000 TID    #Chronic Back pain- severe, uncontrolled  - CT C-spine- The study is motion limited. There is no obvious acute fracture or facet subluxation. There is multilevel degenerative loss of disc space height. At C2-3 there is disc osteophyte indenting the ventral thecal sac. Uncinate and facet hypertrophy with right foraminal stenosis. At C3-4 there is disc osteophyte indenting the ventral thecal sac. Uncinate and facet hypertrophy with left > right foraminal stenosis. At C4-5 there is disc osteophyte indenting the ventral thecal sac. Uncinate and facet hypertrophy with left > right foraminal stenosis. At C5-6 there is disc osteophyte indenting the ventral thecal sac. Uncinate and facet hypertrophy with left > right foraminal stenosis. At C6-7 there is disc osteophyte indenting the ventral thecal sac. Uncinate and facet hypertrophy with moderate right foraminal stenosis.   - Pain management recs appreciated       - C/w tylenol 975 q8       - D/c dilaudid       - Started tramadol 50 q6 prn       - Increase Robaxin to 1000 q8       - Change gabapentin to 300 qhs       - O/p F/u with Dr. Villanueva  - F/u neurosurgery c/s  - F/u MRI lumbar and c-spine- to be done on Sunday (day prior to HD)    #RUQ pain- resolved  - RUQ US- Cholelithiasis. No sonographic evidence of acute cholecystitis.  - Pain resolved on 5/21/2025    #ESRD on HD MWF  #HTN    #Hyperkalemia  - nephro recs appreciated        - bp improves with HD       - C/w hydralazine 100mg q8h       - F/u iron studies       - Low K diet       - C/w aranesp 25 weekly with HD  - c/w doxazosin 1mg BID  - c/w sevelamer 1600 mg TID  - patient state she uses nifedipine 90 PO if her SBP is above 200 - will hold for now   - S/p nifedipine 30 x1 and labetalol 10 IVP x1 on 5/20 2/2 HTN to 200s SBP  - C/w nifedipine 30 qd    #On admission, AMS possibly 2/2 transient hypotension during HD- Resolved  - CTH showed A focal hypodensity is noted within the right frontal periventricular white matter, not previously seen. Finding may reflect age-indeterminate ischemic change. An MRI of the brain can be obtained for further evaluation if not clinically contraindicated.  - Neuro consult noted no additional inpatient workup.  - c/w ASA and atorvastatin    #Chest pain during HD- Resolved  #HFpEF - not in exacerbation  - On admission. trop 64 -> 66, Creatinine 4.1  - ECG showed prominent T wave   - CXR negative   - TTE EF 60%. G1DD  - stress test neg for ischemia  - C/w protonix qd     #Cough  - afebrile, no leukocytosis   - C/w Robitussin  - C/w advair bid, HFA prn    #Acute uncomplicated E. coli cystitis- resolved  - UA (+), UCx (+) E coli  - S/p CTX  - bcx ngtd     #DLD  - c/w home atorvastatin    #MISC  - DVT ppx: heparin  - Diet: renal, DASH, CCC  - GI ppx: protonix  - Activity: as tolerated    Pending: control of hyperglycemia, control of back pain, neurosurgery c/s, MRI lumbar and c-spine, iron studies

## 2025-05-24 NOTE — PROGRESS NOTE ADULT - SUBJECTIVE AND OBJECTIVE BOX
SUBJECTIVE/OVERNIGHT EVENTS  Today is hospital day 8d. This morning patient was seen and examined at bedside, resting comfortably in bed. No acute or major events overnight. Patient states that her pain is more controlled but still present and severe. Denies fever, chills, nausea, vomiting, diarrhea, constipation, hematochezia, dysuria, hematuria, chest pain, palpitations, sob. Patient was informed of their plan of care at this time.    CODE STATUS: FULL    MEDICATIONS  STANDING MEDICATIONS  acetaminophen     Tablet .. 975 milliGRAM(s) Oral every 8 hours  aspirin  chewable 81 milliGRAM(s) Oral daily  atorvastatin 10 milliGRAM(s) Oral at bedtime  chlorhexidine 2% Cloths 1 Application(s) Topical <User Schedule>  darbepoetin Injectable ViaL 25 MICROGram(s) IV Push <User Schedule>  dextrose 5%. 1000 milliLiter(s) IV Continuous <Continuous>  dextrose 50% Injectable 25 Gram(s) IV Push once  doxazosin 1 milliGRAM(s) Oral <User Schedule>  fluticasone propionate/ salmeterol 100-50 MICROgram(s) Diskus 1 Dose(s) Inhalation two times a day  gabapentin 300 milliGRAM(s) Oral at bedtime  glucagon  Injectable 1 milliGRAM(s) IntraMuscular once  guaiFENesin  milliGRAM(s) Oral every 12 hours  heparin   Injectable 5000 Unit(s) SubCutaneous every 12 hours  hydrALAZINE 100 milliGRAM(s) Oral every 8 hours  insulin glargine Injectable (LANTUS) 20 Unit(s) SubCutaneous at bedtime  insulin lispro (ADMELOG) corrective regimen sliding scale   SubCutaneous Before meals and at bedtime  insulin lispro Injectable (ADMELOG) 13 Unit(s) SubCutaneous three times a day before meals  lidocaine   4% Patch 1 Patch Transdermal daily  methocarbamol 1000 milliGRAM(s) Oral three times a day  NIFEdipine XL 30 milliGRAM(s) Oral daily  pantoprazole    Tablet 40 milliGRAM(s) Oral before breakfast  polyethylene glycol 3350 17 Gram(s) Oral daily  senna 2 Tablet(s) Oral at bedtime  sevelamer carbonate 1600 milliGRAM(s) Oral three times a day with meals    PRN MEDICATIONS  albuterol    90 MICROgram(s) HFA Inhaler 2 Puff(s) Inhalation every 6 hours PRN  aluminum hydroxide/magnesium hydroxide/simethicone Suspension 30 milliLiter(s) Oral every 4 hours PRN  dextrose Oral Gel 15 Gram(s) Oral once PRN  traMADol 50 milliGRAM(s) Oral every 6 hours PRN    VITALS  T(F): 97.8 (05-24-25 @ 05:00), Max: 98.6 (05-23-25 @ 20:04)  HR: 75 (05-24-25 @ 05:00) (72 - 88)  BP: 155/72 (05-24-25 @ 05:00) (123/62 - 174/67)  RR: 18 (05-24-25 @ 05:00) (18 - 19)  SpO2: 98% (05-24-25 @ 05:00) (97% - 99%)  POCT Blood Glucose.: 216 mg/dL (05-24-25 @ 07:57)  POCT Blood Glucose.: 261 mg/dL (05-23-25 @ 21:05)  POCT Blood Glucose.: 89 mg/dL (05-23-25 @ 16:49)  POCT Blood Glucose.: 190 mg/dL (05-23-25 @ 12:37)    PHYSICAL EXAM  GENERAL  ( X ) NAD, lying in bed comfortably     (  ) obtunded     (  ) lethargic     (  ) somnolent    HEAD  ( X ) Atraumatic     (  ) hematoma     (  ) laceration (specify location:       )     NECK  ( X ) Supple     (  ) neck stiffness     (  ) nuchal rigidity     (  )  no JVD     (  ) JVD present ( -- cm)    HEART  Rate -->  ( X ) normal rate    (  ) bradycardic    (  ) tachycardic  Rhythm -->  ( X ) regular    (  ) regularly irregular    (  ) irregularly irregular  Murmurs -->  ( X ) normal s1/s2    (  ) systolic murmur    (  ) diastolic murmur    (  ) continuous murmur     (  ) S3 present    (  ) S4 present    LUNGS  ( X )Unlabored respirations     (  ) tachypnea  ( X ) B/L air entry     (  ) decreased breath sounds in:  (location     )    (  ) no adventitious sound     (  ) crackles     (  ) wheezing      (  ) rhonchi      (specify location:       )  (  ) chest wall tenderness (specify location:       )    ABDOMEN  ( X ) Soft     (  ) tense   |   ( X ) nondistended     (  ) distended   |   ( X ) +BS     (  ) hypoactive bowel sounds     (  ) hyperactive bowel sounds  ( X ) nontender     (  ) RUQ tenderness     (  ) RLQ tenderness     (  ) LLQ tenderness     (  ) epigastric tenderness     (  ) diffuse tenderness  (  ) Splenomegaly      (  ) Hepatomegaly      (  ) Jaundice     (  ) ecchymosis     EXTREMITIES  ( X ) Normal     (  ) Rash     (  ) ecchymosis     (  ) varicose veins      (  ) pitting edema     (  ) non-pitting edema   (  ) ulceration     (  ) gangrene:     (location:     )    NERVOUS SYSTEM  ( X ) A&Ox3     (  ) confused     (  ) lethargic  CN II-XII:     (  ) Intact     (  ) focal deficits  (Specify:     )   Upper extremities:     (  ) strength X/5     (  ) focal deficit (specify:    )  Lower extremities:     (  ) strength  X/5    (  ) focal deficit (specify:    )    SKIN  ( X ) No rashes or lesions     (  ) maculopapular rash     (  ) pustules     (  ) vesicles     (  ) ulcer     (  ) ecchymosis     (specify location:     )    LABS             9.1    3.52  )-----------( 184      ( 05-23-25 @ 06:27 )             28.3     133  |  94  |  54  -------------------------<  223   05-23-25 @ 06:27  5.3  |  21  |  8.9    Ca      9.1     05-23-25 @ 06:27  Phos   5.9     05-23-25 @ 06:27  Mg     3.0     05-23-25 @ 06:27          Urinalysis Basic - ( 23 May 2025 06:27 )    Color: x / Appearance: x / SG: x / pH: x  Gluc: 223 mg/dL / Ketone: x  / Bili: x / Urobili: x   Blood: x / Protein: x / Nitrite: x   Leuk Esterase: x / RBC: x / WBC x   Sq Epi: x / Non Sq Epi: x / Bacteria: x          IMAGING  < from: CT Cervical Spine No Cont (05.23.25 @ 00:11) >  FINDINGS:    The study is motion limited.    There is no obvious acute fracture or facet subluxation. There is   multilevel degenerative loss of disc space height.    At C2-3 there is disc osteophyte indenting the ventral thecal sac.   Uncinate and facet hypertrophy with right foraminal stenosis.    At C3-4 there is disc osteophyte indenting the ventral thecal sac.   Uncinate and facet hypertrophy with left > right foraminal stenosis.    At C4-5 there is disc osteophyte indenting the ventral thecal sac.   Uncinate and facet hypertrophy with left > right foraminal stenosis.    At C5-6 there is disc osteophyte indenting the ventral thecal sac.   Uncinate and facet hypertrophy with left > right foraminal stenosis.    At C6-7 there is disc osteophyte indenting the ventral thecal sac.   Uncinate and facet hypertrophy with moderate right foraminal stenosis.    IMPRESSION:    Motion limited study. No gross evidence of acute osseous abnormality.   Degenerative changes as above.    < end of copied text >

## 2025-05-25 LAB
ANION GAP SERPL CALC-SCNC: 18 MMOL/L — HIGH (ref 7–14)
BASOPHILS # BLD AUTO: 0.03 K/UL — SIGNIFICANT CHANGE UP (ref 0–0.2)
BASOPHILS NFR BLD AUTO: 0.9 % — SIGNIFICANT CHANGE UP (ref 0–1)
BUN SERPL-MCNC: 50 MG/DL — HIGH (ref 10–20)
CALCIUM SERPL-MCNC: 9.5 MG/DL — SIGNIFICANT CHANGE UP (ref 8.4–10.5)
CHLORIDE SERPL-SCNC: 97 MMOL/L — LOW (ref 98–110)
CO2 SERPL-SCNC: 22 MMOL/L — SIGNIFICANT CHANGE UP (ref 17–32)
CREAT SERPL-MCNC: 8 MG/DL — CRITICAL HIGH (ref 0.7–1.5)
EGFR: 5 ML/MIN/1.73M2 — LOW
EGFR: 5 ML/MIN/1.73M2 — LOW
EOSINOPHIL # BLD AUTO: 0.15 K/UL — SIGNIFICANT CHANGE UP (ref 0–0.7)
EOSINOPHIL NFR BLD AUTO: 4.5 % — SIGNIFICANT CHANGE UP (ref 0–8)
GLUCOSE BLDC GLUCOMTR-MCNC: 102 MG/DL — HIGH (ref 70–99)
GLUCOSE BLDC GLUCOMTR-MCNC: 126 MG/DL — HIGH (ref 70–99)
GLUCOSE BLDC GLUCOMTR-MCNC: 161 MG/DL — HIGH (ref 70–99)
GLUCOSE BLDC GLUCOMTR-MCNC: 214 MG/DL — HIGH (ref 70–99)
GLUCOSE BLDC GLUCOMTR-MCNC: 356 MG/DL — HIGH (ref 70–99)
GLUCOSE BLDC GLUCOMTR-MCNC: 40 MG/DL — CRITICAL LOW (ref 70–99)
GLUCOSE BLDC GLUCOMTR-MCNC: 41 MG/DL — CRITICAL LOW (ref 70–99)
GLUCOSE BLDC GLUCOMTR-MCNC: 60 MG/DL — LOW (ref 70–99)
GLUCOSE SERPL-MCNC: 104 MG/DL — HIGH (ref 70–99)
HCT VFR BLD CALC: 32 % — LOW (ref 37–47)
HGB BLD-MCNC: 10.3 G/DL — LOW (ref 12–16)
IMM GRANULOCYTES NFR BLD AUTO: 0.3 % — SIGNIFICANT CHANGE UP (ref 0.1–0.3)
LYMPHOCYTES # BLD AUTO: 1.09 K/UL — LOW (ref 1.2–3.4)
LYMPHOCYTES # BLD AUTO: 32.4 % — SIGNIFICANT CHANGE UP (ref 20.5–51.1)
MAGNESIUM SERPL-MCNC: 3.1 MG/DL — CRITICAL HIGH (ref 1.8–2.4)
MCHC RBC-ENTMCNC: 32.2 G/DL — SIGNIFICANT CHANGE UP (ref 32–37)
MCHC RBC-ENTMCNC: 32.7 PG — HIGH (ref 27–31)
MCV RBC AUTO: 101.6 FL — HIGH (ref 81–99)
MONOCYTES # BLD AUTO: 0.32 K/UL — SIGNIFICANT CHANGE UP (ref 0.1–0.6)
MONOCYTES NFR BLD AUTO: 9.5 % — HIGH (ref 1.7–9.3)
NEUTROPHILS # BLD AUTO: 1.76 K/UL — SIGNIFICANT CHANGE UP (ref 1.4–6.5)
NEUTROPHILS NFR BLD AUTO: 52.4 % — SIGNIFICANT CHANGE UP (ref 42.2–75.2)
NRBC BLD AUTO-RTO: 0 /100 WBCS — SIGNIFICANT CHANGE UP (ref 0–0)
PHOSPHATE SERPL-MCNC: 6 MG/DL — HIGH (ref 2.1–4.9)
PLATELET # BLD AUTO: 172 K/UL — SIGNIFICANT CHANGE UP (ref 130–400)
PMV BLD: 10.3 FL — SIGNIFICANT CHANGE UP (ref 7.4–10.4)
POTASSIUM SERPL-MCNC: 5.1 MMOL/L — HIGH (ref 3.5–5)
POTASSIUM SERPL-SCNC: 5.1 MMOL/L — HIGH (ref 3.5–5)
RBC # BLD: 3.15 M/UL — LOW (ref 4.2–5.4)
RBC # FLD: 17.4 % — HIGH (ref 11.5–14.5)
SODIUM SERPL-SCNC: 137 MMOL/L — SIGNIFICANT CHANGE UP (ref 135–146)
WBC # BLD: 3.36 K/UL — LOW (ref 4.8–10.8)
WBC # FLD AUTO: 3.36 K/UL — LOW (ref 4.8–10.8)

## 2025-05-25 PROCEDURE — 99232 SBSQ HOSP IP/OBS MODERATE 35: CPT

## 2025-05-25 PROCEDURE — 72142 MRI NECK SPINE W/DYE: CPT | Mod: 26

## 2025-05-25 PROCEDURE — 72149 MRI LUMBAR SPINE W/DYE: CPT | Mod: 26

## 2025-05-25 RX ORDER — DEXTROSE 50 % IN WATER 50 %
25 SYRINGE (ML) INTRAVENOUS ONCE
Refills: 0 | Status: COMPLETED | OUTPATIENT
Start: 2025-05-25 | End: 2025-05-25

## 2025-05-25 RX ADMIN — Medication 975 MILLIGRAM(S): at 06:15

## 2025-05-25 RX ADMIN — ATORVASTATIN CALCIUM 10 MILLIGRAM(S): 80 TABLET, FILM COATED ORAL at 22:09

## 2025-05-25 RX ADMIN — INSULIN LISPRO 6: 100 INJECTION, SOLUTION INTRAVENOUS; SUBCUTANEOUS at 17:36

## 2025-05-25 RX ADMIN — SEVELAMER HYDROCHLORIDE 1600 MILLIGRAM(S): 800 TABLET ORAL at 17:33

## 2025-05-25 RX ADMIN — Medication 975 MILLIGRAM(S): at 22:30

## 2025-05-25 RX ADMIN — DOXAZOSIN MESYLATE 1 MILLIGRAM(S): 8 TABLET ORAL at 08:39

## 2025-05-25 RX ADMIN — Medication 81 MILLIGRAM(S): at 12:34

## 2025-05-25 RX ADMIN — SEVELAMER HYDROCHLORIDE 1600 MILLIGRAM(S): 800 TABLET ORAL at 08:39

## 2025-05-25 RX ADMIN — LIDOCAINE HYDROCHLORIDE 1 PATCH: 20 JELLY TOPICAL at 12:35

## 2025-05-25 RX ADMIN — DOXAZOSIN MESYLATE 1 MILLIGRAM(S): 8 TABLET ORAL at 22:12

## 2025-05-25 RX ADMIN — METHOCARBAMOL 1000 MILLIGRAM(S): 500 TABLET, FILM COATED ORAL at 22:09

## 2025-05-25 RX ADMIN — Medication 975 MILLIGRAM(S): at 22:09

## 2025-05-25 RX ADMIN — Medication 40 MILLIGRAM(S): at 06:38

## 2025-05-25 RX ADMIN — Medication 1 APPLICATION(S): at 06:38

## 2025-05-25 RX ADMIN — POLYETHYLENE GLYCOL 3350 17 GRAM(S): 17 POWDER, FOR SOLUTION ORAL at 12:34

## 2025-05-25 RX ADMIN — DEXTROMETHORPHAN HBR, GUAIFENESIN 600 MILLIGRAM(S): 200 LIQUID ORAL at 17:33

## 2025-05-25 RX ADMIN — DEXTROMETHORPHAN HBR, GUAIFENESIN 600 MILLIGRAM(S): 200 LIQUID ORAL at 06:15

## 2025-05-25 RX ADMIN — HEPARIN SODIUM 5000 UNIT(S): 1000 INJECTION INTRAVENOUS; SUBCUTANEOUS at 06:16

## 2025-05-25 RX ADMIN — Medication 1 DOSE(S): at 08:41

## 2025-05-25 RX ADMIN — INSULIN LISPRO 13 UNIT(S): 100 INJECTION, SOLUTION INTRAVENOUS; SUBCUTANEOUS at 08:38

## 2025-05-25 RX ADMIN — SEVELAMER HYDROCHLORIDE 1600 MILLIGRAM(S): 800 TABLET ORAL at 12:35

## 2025-05-25 RX ADMIN — GABAPENTIN 300 MILLIGRAM(S): 400 CAPSULE ORAL at 22:12

## 2025-05-25 RX ADMIN — INSULIN GLARGINE-YFGN 25 UNIT(S): 100 INJECTION, SOLUTION SUBCUTANEOUS at 22:10

## 2025-05-25 RX ADMIN — METHOCARBAMOL 1000 MILLIGRAM(S): 500 TABLET, FILM COATED ORAL at 13:40

## 2025-05-25 RX ADMIN — Medication 100 MILLIGRAM(S): at 13:44

## 2025-05-25 RX ADMIN — INSULIN LISPRO 13 UNIT(S): 100 INJECTION, SOLUTION INTRAVENOUS; SUBCUTANEOUS at 17:35

## 2025-05-25 RX ADMIN — TRAMADOL HYDROCHLORIDE 50 MILLIGRAM(S): 50 TABLET, FILM COATED ORAL at 09:22

## 2025-05-25 RX ADMIN — Medication 100 MILLIGRAM(S): at 06:15

## 2025-05-25 RX ADMIN — Medication 100 MILLIGRAM(S): at 22:10

## 2025-05-25 RX ADMIN — Medication 25 MILLILITER(S): at 12:30

## 2025-05-25 RX ADMIN — Medication 30 MILLIGRAM(S): at 06:14

## 2025-05-25 RX ADMIN — Medication 975 MILLIGRAM(S): at 13:40

## 2025-05-25 RX ADMIN — HEPARIN SODIUM 5000 UNIT(S): 1000 INJECTION INTRAVENOUS; SUBCUTANEOUS at 17:32

## 2025-05-25 RX ADMIN — METHOCARBAMOL 1000 MILLIGRAM(S): 500 TABLET, FILM COATED ORAL at 06:14

## 2025-05-25 RX ADMIN — Medication 2 TABLET(S): at 22:10

## 2025-05-25 NOTE — CHART NOTE - NSCHARTNOTEFT_GEN_A_CORE
Alerted by RN that patient was having chest and back pain. Patient assessed at bedside with use of  (ID #[id number]), patient describes right arm, shoulder, back and chest pain that is worse with movement of R arm. Tenderness to palpation present along chest wall, R shoulder blade and R lateral deltoid. Pain likely 2/2 musculoskeletal, had negative cardiac ischemic workup this admission thus likely not cardiac in nature. Given Tramadol, will reassess. Alerted by RN that patient was having chest and back pain. Vitals: HR 75, /56. Patient assessed at bedside with use of  (ID #[id number]), patient describes right arm, shoulder, back and chest pain that is worse with movement of R arm. Tenderness to palpation present along chest wall, R shoulder blade and R lateral deltoid. Pain likely 2/2 musculoskeletal, had negative cardiac ischemic workup this admission thus likely not cardiac in nature. Given Tramadol, will reassess.

## 2025-05-25 NOTE — PROGRESS NOTE ADULT - TIME BILLING
direct pt care and IDR / coordinated care w/ Pain Management
direct pt care and IDR / coordinated care w/ Pain Management
direct pt care and IDR / Reviewed CT Neck Findings / FS control adjusted Insulin / Consult:Neurosx/pain management  Get MRI N/L

## 2025-05-25 NOTE — PROGRESS NOTE ADULT - ASSESSMENT
59y old [place] speaking Female w/ PMHx of ESRD on HD MWF, DM (insulin dependent) HTN, HLD, and HFpEF who presents to ED for chest pain during HD session. Admitted for workup of chest pain and AMS.    #AMS 2/2 hypoglycemia likely 2/2 decreased lantus clearance 2/2 ESRD- resolved  #DM2, Insulin dependent- uncontrolled  - RRT on 5/18/2025 for AMS, FS 30 after 80u lantus, s/p d50 and d5LR   - Decrease Lantus to 22 Units from 25Un   - C/w lispro 13  - C/w ISS  - 5/20: S/p D5 for hypoglycemia and AMS       - Monitor fingersticks q4  - Changed gabapentin to 300 qhs  - Increased robaxin to 1000 TID    #Chronic Back pain- severe, uncontrolled  - CT C-spine- The study is motion limited. There is no obvious acute fracture or facet subluxation. There is multilevel degenerative loss of disc space height. At C2-3 there is disc osteophyte indenting the ventral thecal sac. Uncinate and facet hypertrophy with right foraminal stenosis. At C3-4 there is disc osteophyte indenting the ventral thecal sac. Uncinate and facet hypertrophy with left > right foraminal stenosis. At C4-5 there is disc osteophyte indenting the ventral thecal sac. Uncinate and facet hypertrophy with left > right foraminal stenosis. At C5-6 there is disc osteophyte indenting the ventral thecal sac. Uncinate and facet hypertrophy with left > right foraminal stenosis. At C6-7 there is disc osteophyte indenting the ventral thecal sac. Uncinate and facet hypertrophy with moderate right foraminal stenosis.   - Pain management recs appreciated       - C/w tylenol 975 q8       - D/c dilaudid       - Started tramadol 50 q6 prn       - Increase Robaxin to 1000 q8       - Change gabapentin to 300 qhs       - O/p F/u with Dr. Villanueva  - F/u neurosurgery c/s  - F/u MRI lumbar and c-spine- to be done on Sunday (day prior to HD)    #RUQ pain- resolved  - RUQ US- Cholelithiasis. No sonographic evidence of acute cholecystitis.  - Pain resolved on 5/21/2025    #ESRD on HD MWF  #HTN    #Hyperkalemia  - nephro recs appreciated        - bp improves with HD       - C/w hydralazine 100mg q8h       - F/u iron studies       - Low K diet       - C/w aranesp 25 weekly with HD  - c/w doxazosin 1mg BID  - c/w sevelamer 1600 mg TID  - patient state she uses nifedipine 90 PO if her SBP is above 200 - will hold for now   - S/p nifedipine 30 x1 and labetalol 10 IVP x1 on 5/20 2/2 HTN to 200s SBP  - C/w nifedipine 30 qd    #On admission, AMS possibly 2/2 transient hypotension during HD- Resolved  - CTH showed A focal hypodensity is noted within the right frontal periventricular white matter, not previously seen. Finding may reflect age-indeterminate ischemic change. An MRI of the brain can be obtained for further evaluation if not clinically contraindicated.  - Neuro consult noted no additional inpatient workup.  - c/w ASA and atorvastatin    #Chest pain during HD- Resolved  #HFpEF - not in exacerbation  - On admission. trop 64 -> 66, Creatinine 4.1  - ECG showed prominent T wave   - CXR negative   - TTE EF 60%. G1DD  - stress test neg for ischemia  - C/w protonix qd     #Cough  - afebrile, no leukocytosis   - C/w Robitussin  - C/w advair bid, HFA prn    #Acute uncomplicated E. coli cystitis- resolved  - UA (+), UCx (+) E coli  - S/p CTX  - bcx ngtd     #DLD  - c/w home atorvastatin    #MISC  - DVT ppx: heparin  - Diet: renal, DASH, CCC  - GI ppx: protonix  - Activity: as tolerated    Pending: control of hyperglycemia, control of back pain, neurosurgery c/s, MRI lumbar and c-spine, iron studies

## 2025-05-25 NOTE — PROGRESS NOTE ADULT - SUBJECTIVE AND OBJECTIVE BOX
SUBJECTIVE/OVERNIGHT EVENTS  Today is hospital day 9d.   Pt was hypoglycemic this morning and having atypical cp/MSK    CODE STATUS: FULL    MEDICATIONS    MEDICATIONS  (STANDING):  acetaminophen     Tablet .. 975 milliGRAM(s) Oral every 8 hours  aspirin  chewable 81 milliGRAM(s) Oral daily  atorvastatin 10 milliGRAM(s) Oral at bedtime  chlorhexidine 2% Cloths 1 Application(s) Topical <User Schedule>  darbepoetin Injectable ViaL 25 MICROGram(s) IV Push <User Schedule>  dextrose 5%. 1000 milliLiter(s) (50 mL/Hr) IV Continuous <Continuous>  dextrose 50% Injectable 25 Gram(s) IV Push once  doxazosin 1 milliGRAM(s) Oral <User Schedule>  fluticasone propionate/ salmeterol 100-50 MICROgram(s) Diskus 1 Dose(s) Inhalation two times a day  gabapentin 300 milliGRAM(s) Oral at bedtime  glucagon  Injectable 1 milliGRAM(s) IntraMuscular once  guaiFENesin  milliGRAM(s) Oral every 12 hours  heparin   Injectable 5000 Unit(s) SubCutaneous every 12 hours  hydrALAZINE 100 milliGRAM(s) Oral every 8 hours  insulin glargine Injectable (LANTUS) 25 Unit(s) SubCutaneous at bedtime  insulin lispro (ADMELOG) corrective regimen sliding scale   SubCutaneous Before meals and at bedtime  insulin lispro Injectable (ADMELOG) 13 Unit(s) SubCutaneous three times a day before meals  lidocaine   4% Patch 1 Patch Transdermal daily  methocarbamol 1000 milliGRAM(s) Oral three times a day  NIFEdipine XL 30 milliGRAM(s) Oral daily  pantoprazole    Tablet 40 milliGRAM(s) Oral before breakfast  polyethylene glycol 3350 17 Gram(s) Oral daily  senna 2 Tablet(s) Oral at bedtime  sevelamer carbonate 1600 milliGRAM(s) Oral three times a day with meals    MEDICATIONS  (PRN):  albuterol    90 MICROgram(s) HFA Inhaler 2 Puff(s) Inhalation every 6 hours PRN Shortness of Breath and/or Wheezing  aluminum hydroxide/magnesium hydroxide/simethicone Suspension 30 milliLiter(s) Oral every 4 hours PRN Dyspepsia  dextrose Oral Gel 15 Gram(s) Oral once PRN Blood Glucose LESS THAN 70 milliGRAM(s)/deciliter  traMADol 50 milliGRAM(s) Oral every 6 hours PRN Severe Pain (7 - 10)      VITALS    T(C): 36.5 (25 May 2025 13:39), Max: 36.9 (24 May 2025 19:30)  T(F): 97.7 (25 May 2025 13:39), Max: 98.5 (24 May 2025 19:30)  HR: 80 (25 May 2025 13:39) (72 - 85)  BP: 135/54 (25 May 2025 13:39) (125/55 - 158/65)  RR: 18 (25 May 2025 13:39) (18 - 18)  SpO2: 98% (25 May 2025 13:39) (97% - 100%)    Parameters below as of 25 May 2025 09:19  Patient On (Oxygen Delivery Method): room air      PHYSICAL EXAM:  GENERAL: NAD, well-developed  HEAD:  Atraumatic, Normocephalic  EYES: EOMI, PERRLA, conjunctiva and sclera clear  NECK: Supple, No JVD  CHEST/LUNG: Clear to auscultation bilaterally; No wheeze  HEART: Regular rate and rhythm; No murmurs, rubs, or gallops  ABDOMEN: Soft, Nontender, Nondistended; Bowel sounds present  EXTREMITIES:  2+ Peripheral Pulses, No clubbing, cyanosis, or edema  PSYCH: AAOx3  NEUROLOGY: non-focal  SKIN: No rashes or lesions    LABS                        10.3   3.36  )-----------( 172      ( 25 May 2025 07:50 )             32.0     05-25    137  |  97[L]  |  50[H]  ----------------------------<  104[H]  5.1[H]   |  22  |  8.0[HH]    Ca    9.5      25 May 2025 07:50  Phos  6.0     05-25  Mg     3.1     05-25               9.1    3.52  )-----------( 184      ( 05-23-25 @ 06:27 )             28.3     133  |  94  |  54  -------------------------<  223   05-23-25 @ 06:27  5.3  |  21  |  8.9    Ca      9.1     05-23-25 @ 06:27  Phos   5.9     05-23-25 @ 06:27  Mg     3.0     05-23-25 @ 06:27    Urinalysis Basic - ( 23 May 2025 06:27 )    Color: x / Appearance: x / SG: x / pH: x  Gluc: 223 mg/dL / Ketone: x  / Bili: x / Urobili: x   Blood: x / Protein: x / Nitrite: x   Leuk Esterase: x / RBC: x / WBC x   Sq Epi: x / Non Sq Epi: x / Bacteria: x    IMAGING  < from: CT Cervical Spine No Cont (05.23.25 @ 00:11) >  FINDINGS:    The study is motion limited.    There is no obvious acute fracture or facet subluxation. There is   multilevel degenerative loss of disc space height.    At C2-3 there is disc osteophyte indenting the ventral thecal sac.   Uncinate and facet hypertrophy with right foraminal stenosis.    At C3-4 there is disc osteophyte indenting the ventral thecal sac.   Uncinate and facet hypertrophy with left > right foraminal stenosis.    At C4-5 there is disc osteophyte indenting the ventral thecal sac.   Uncinate and facet hypertrophy with left > right foraminal stenosis.    At C5-6 there is disc osteophyte indenting the ventral thecal sac.   Uncinate and facet hypertrophy with left > right foraminal stenosis.    At C6-7 there is disc osteophyte indenting the ventral thecal sac.   Uncinate and facet hypertrophy with moderate right foraminal stenosis.    IMPRESSION:    Motion limited study. No gross evidence of acute osseous abnormality.   Degenerative changes as above.    < end of copied text >

## 2025-05-26 LAB
ANION GAP SERPL CALC-SCNC: 19 MMOL/L — HIGH (ref 7–14)
BASOPHILS # BLD AUTO: 0.04 K/UL — SIGNIFICANT CHANGE UP (ref 0–0.2)
BASOPHILS NFR BLD AUTO: 0.7 % — SIGNIFICANT CHANGE UP (ref 0–1)
BUN SERPL-MCNC: 65 MG/DL — CRITICAL HIGH (ref 10–20)
CALCIUM SERPL-MCNC: 9.2 MG/DL — SIGNIFICANT CHANGE UP (ref 8.4–10.5)
CHLORIDE SERPL-SCNC: 96 MMOL/L — LOW (ref 98–110)
CO2 SERPL-SCNC: 22 MMOL/L — SIGNIFICANT CHANGE UP (ref 17–32)
CREAT SERPL-MCNC: 9.4 MG/DL — CRITICAL HIGH (ref 0.7–1.5)
EGFR: 4 ML/MIN/1.73M2 — LOW
EGFR: 4 ML/MIN/1.73M2 — LOW
EOSINOPHIL # BLD AUTO: 0.18 K/UL — SIGNIFICANT CHANGE UP (ref 0–0.7)
EOSINOPHIL NFR BLD AUTO: 3.3 % — SIGNIFICANT CHANGE UP (ref 0–8)
GLUCOSE BLDC GLUCOMTR-MCNC: 132 MG/DL — HIGH (ref 70–99)
GLUCOSE BLDC GLUCOMTR-MCNC: 154 MG/DL — HIGH (ref 70–99)
GLUCOSE BLDC GLUCOMTR-MCNC: 326 MG/DL — HIGH (ref 70–99)
GLUCOSE BLDC GLUCOMTR-MCNC: 58 MG/DL — LOW (ref 70–99)
GLUCOSE BLDC GLUCOMTR-MCNC: 94 MG/DL — SIGNIFICANT CHANGE UP (ref 70–99)
GLUCOSE BLDC GLUCOMTR-MCNC: 95 MG/DL — SIGNIFICANT CHANGE UP (ref 70–99)
GLUCOSE SERPL-MCNC: 50 MG/DL — CRITICAL LOW (ref 70–99)
HCT VFR BLD CALC: 30.1 % — LOW (ref 37–47)
HGB BLD-MCNC: 9.6 G/DL — LOW (ref 12–16)
IMM GRANULOCYTES NFR BLD AUTO: 0.6 % — HIGH (ref 0.1–0.3)
LYMPHOCYTES # BLD AUTO: 1.02 K/UL — LOW (ref 1.2–3.4)
LYMPHOCYTES # BLD AUTO: 18.8 % — LOW (ref 20.5–51.1)
MAGNESIUM SERPL-MCNC: 3.3 MG/DL — CRITICAL HIGH (ref 1.8–2.4)
MCHC RBC-ENTMCNC: 31.9 G/DL — LOW (ref 32–37)
MCHC RBC-ENTMCNC: 33 PG — HIGH (ref 27–31)
MCV RBC AUTO: 103.4 FL — HIGH (ref 81–99)
MONOCYTES # BLD AUTO: 0.38 K/UL — SIGNIFICANT CHANGE UP (ref 0.1–0.6)
MONOCYTES NFR BLD AUTO: 7 % — SIGNIFICANT CHANGE UP (ref 1.7–9.3)
NEUTROPHILS # BLD AUTO: 3.79 K/UL — SIGNIFICANT CHANGE UP (ref 1.4–6.5)
NEUTROPHILS NFR BLD AUTO: 69.6 % — SIGNIFICANT CHANGE UP (ref 42.2–75.2)
NRBC BLD AUTO-RTO: 0 /100 WBCS — SIGNIFICANT CHANGE UP (ref 0–0)
PHOSPHATE SERPL-MCNC: 7 MG/DL — HIGH (ref 2.1–4.9)
PLATELET # BLD AUTO: 184 K/UL — SIGNIFICANT CHANGE UP (ref 130–400)
PMV BLD: 10.6 FL — HIGH (ref 7.4–10.4)
POTASSIUM SERPL-MCNC: 5.6 MMOL/L — HIGH (ref 3.5–5)
POTASSIUM SERPL-SCNC: 5.6 MMOL/L — HIGH (ref 3.5–5)
RBC # BLD: 2.91 M/UL — LOW (ref 4.2–5.4)
RBC # FLD: 18 % — HIGH (ref 11.5–14.5)
SODIUM SERPL-SCNC: 137 MMOL/L — SIGNIFICANT CHANGE UP (ref 135–146)
WBC # BLD: 5.44 K/UL — SIGNIFICANT CHANGE UP (ref 4.8–10.8)
WBC # FLD AUTO: 5.44 K/UL — SIGNIFICANT CHANGE UP (ref 4.8–10.8)

## 2025-05-26 PROCEDURE — 99232 SBSQ HOSP IP/OBS MODERATE 35: CPT

## 2025-05-26 RX ORDER — SODIUM ZIRCONIUM CYCLOSILICATE 5 G/5G
10 POWDER, FOR SUSPENSION ORAL ONCE
Refills: 0 | Status: COMPLETED | OUTPATIENT
Start: 2025-05-26 | End: 2025-05-26

## 2025-05-26 RX ORDER — INSULIN GLARGINE-YFGN 100 [IU]/ML
22 INJECTION, SOLUTION SUBCUTANEOUS AT BEDTIME
Refills: 0 | Status: DISCONTINUED | OUTPATIENT
Start: 2025-05-26 | End: 2025-05-27

## 2025-05-26 RX ADMIN — INSULIN LISPRO 4: 100 INJECTION, SOLUTION INTRAVENOUS; SUBCUTANEOUS at 22:26

## 2025-05-26 RX ADMIN — SEVELAMER HYDROCHLORIDE 1600 MILLIGRAM(S): 800 TABLET ORAL at 12:56

## 2025-05-26 RX ADMIN — Medication 1 DOSE(S): at 20:56

## 2025-05-26 RX ADMIN — LIDOCAINE HYDROCHLORIDE 1 PATCH: 20 JELLY TOPICAL at 15:54

## 2025-05-26 RX ADMIN — DEXTROMETHORPHAN HBR, GUAIFENESIN 600 MILLIGRAM(S): 200 LIQUID ORAL at 20:05

## 2025-05-26 RX ADMIN — Medication 100 MILLIGRAM(S): at 15:51

## 2025-05-26 RX ADMIN — Medication 100 MILLIGRAM(S): at 22:28

## 2025-05-26 RX ADMIN — SEVELAMER HYDROCHLORIDE 1600 MILLIGRAM(S): 800 TABLET ORAL at 07:47

## 2025-05-26 RX ADMIN — DEXTROMETHORPHAN HBR, GUAIFENESIN 600 MILLIGRAM(S): 200 LIQUID ORAL at 05:50

## 2025-05-26 RX ADMIN — INSULIN LISPRO 13 UNIT(S): 100 INJECTION, SOLUTION INTRAVENOUS; SUBCUTANEOUS at 12:58

## 2025-05-26 RX ADMIN — Medication 100 MILLIGRAM(S): at 05:50

## 2025-05-26 RX ADMIN — POLYETHYLENE GLYCOL 3350 17 GRAM(S): 17 POWDER, FOR SOLUTION ORAL at 13:01

## 2025-05-26 RX ADMIN — ATORVASTATIN CALCIUM 10 MILLIGRAM(S): 80 TABLET, FILM COATED ORAL at 22:28

## 2025-05-26 RX ADMIN — GABAPENTIN 300 MILLIGRAM(S): 400 CAPSULE ORAL at 22:28

## 2025-05-26 RX ADMIN — METHOCARBAMOL 1000 MILLIGRAM(S): 500 TABLET, FILM COATED ORAL at 15:52

## 2025-05-26 RX ADMIN — HEPARIN SODIUM 5000 UNIT(S): 1000 INJECTION INTRAVENOUS; SUBCUTANEOUS at 05:50

## 2025-05-26 RX ADMIN — Medication 1 APPLICATION(S): at 05:49

## 2025-05-26 RX ADMIN — Medication 40 MILLIGRAM(S): at 05:50

## 2025-05-26 RX ADMIN — Medication 81 MILLIGRAM(S): at 12:57

## 2025-05-26 RX ADMIN — METHOCARBAMOL 1000 MILLIGRAM(S): 500 TABLET, FILM COATED ORAL at 22:28

## 2025-05-26 RX ADMIN — INSULIN GLARGINE-YFGN 22 UNIT(S): 100 INJECTION, SOLUTION SUBCUTANEOUS at 22:27

## 2025-05-26 RX ADMIN — Medication 975 MILLIGRAM(S): at 06:26

## 2025-05-26 RX ADMIN — Medication 1 DOSE(S): at 08:00

## 2025-05-26 RX ADMIN — HEPARIN SODIUM 5000 UNIT(S): 1000 INJECTION INTRAVENOUS; SUBCUTANEOUS at 20:05

## 2025-05-26 RX ADMIN — Medication 2 TABLET(S): at 22:28

## 2025-05-26 RX ADMIN — METHOCARBAMOL 1000 MILLIGRAM(S): 500 TABLET, FILM COATED ORAL at 05:51

## 2025-05-26 RX ADMIN — Medication 30 MILLIGRAM(S): at 05:50

## 2025-05-26 RX ADMIN — Medication 975 MILLIGRAM(S): at 05:50

## 2025-05-26 RX ADMIN — LIDOCAINE HYDROCHLORIDE 1 PATCH: 20 JELLY TOPICAL at 21:50

## 2025-05-26 RX ADMIN — DOXAZOSIN MESYLATE 1 MILLIGRAM(S): 8 TABLET ORAL at 20:56

## 2025-05-26 RX ADMIN — Medication 975 MILLIGRAM(S): at 23:16

## 2025-05-26 RX ADMIN — Medication 975 MILLIGRAM(S): at 22:28

## 2025-05-26 RX ADMIN — SODIUM ZIRCONIUM CYCLOSILICATE 10 GRAM(S): 5 POWDER, FOR SUSPENSION ORAL at 17:08

## 2025-05-26 RX ADMIN — Medication 975 MILLIGRAM(S): at 13:33

## 2025-05-26 RX ADMIN — Medication 975 MILLIGRAM(S): at 13:03

## 2025-05-26 RX ADMIN — SEVELAMER HYDROCHLORIDE 1600 MILLIGRAM(S): 800 TABLET ORAL at 18:01

## 2025-05-26 NOTE — PROGRESS NOTE ADULT - ATTENDING COMMENTS
59y old [place] speaking Female w/ PMHx of ESRD on HD MWF, DM (insulin dependent) HTN, HLD, and HFpEF who presents to ED for chest pain during HD session. Admitted for workup of chest pain and AMS.    #AMS 2/2 hypoglycemia likely 2/2 decreased lantus clearance 2/2 ESRD- resolved  #DM2, Insulin dependent- uncontrolled  - RRT on 5/18/2025 for AMS, FS 30 after 80u lantus, s/p d50 and d5LR   - Decrease Lantus to 22 Units from 25Un   - C/w lispro 13  - C/w ISS  - 5/20: S/p D5 for hypoglycemia and AMS       - Monitor fingersticks q4  - Changed gabapentin to 300 qhs  - Increased robaxin to 1000 TID    #Chronic Back pain- severe, uncontrolled  - CT C-spine- The study is motion limited. There is no obvious acute fracture or facet subluxation. There is multilevel degenerative loss of disc space height. At C2-3 there is disc osteophyte indenting the ventral thecal sac. Uncinate and facet hypertrophy with right foraminal stenosis. At C3-4 there is disc osteophyte indenting the ventral thecal sac. Uncinate and facet hypertrophy with left > right foraminal stenosis. At C4-5 there is disc osteophyte indenting the ventral thecal sac. Uncinate and facet hypertrophy with left > right foraminal stenosis. At C5-6 there is disc osteophyte indenting the ventral thecal sac. Uncinate and facet hypertrophy with left > right foraminal stenosis. At C6-7 there is disc osteophyte indenting the ventral thecal sac. Uncinate and facet hypertrophy with moderate right foraminal stenosis.   - Pain management recs appreciated       - C/w tylenol 975 q8       - D/c dilaudid       - Started tramadol 50 q6 prn       - Increase Robaxin to 1000 q8       - Change gabapentin to 300 qhs       - O/p F/u with Dr. Villanueva  - F/u neurosurgery c/s  - F/u MRI lumbar and c-spine- to be done on Sunday (day prior to HD)    #RUQ pain- resolved  - RUQ US- Cholelithiasis. No sonographic evidence of acute cholecystitis.  - Pain resolved on 5/21/2025    #ESRD on HD MWF  #HTN    #Hyperkalemia  - nephro recs appreciated        - bp improves with HD       - C/w hydralazine 100mg q8h       - F/u iron studies       - Low K diet       - C/w aranesp 25 weekly with HD  - c/w doxazosin 1mg BID  - c/w sevelamer 1600 mg TID  - patient state she uses nifedipine 90 PO if her SBP is above 200 - will hold for now   - S/p nifedipine 30 x1 and labetalol 10 IVP x1 on 5/20 2/2 HTN to 200s SBP  - C/w nifedipine 30 qd    #On admission, AMS possibly 2/2 transient hypotension during HD- Resolved  - CTH showed A focal hypodensity is noted within the right frontal periventricular white matter, not previously seen. Finding may reflect age-indeterminate ischemic change. An MRI of the brain can be obtained for further evaluation if not clinically contraindicated.  - Neuro consult noted no additional inpatient workup.  - c/w ASA and atorvastatin    #Chest pain during HD- Resolved  #HFpEF - not in exacerbation  - On admission. trop 64 -> 66, Creatinine 4.1  - ECG showed prominent T wave   - CXR negative   - TTE EF 60%. G1DD  - stress test neg for ischemia  - C/w protonix qd     #Acute uncomplicated E. coli cystitis- resolved  - UA (+), UCx (+) E coli  - S/p CTX  - bcx ngtd     #DLD  - c/w home atorvastatin    #DM  CAPILLARY BLOOD GLUCOSE  POCT Blood Glucose.: 154 mg/dL (26 May 2025 12:37)  POCT Blood Glucose.: 94 mg/dL (26 May 2025 08:27)  POCT Blood Glucose.: 58 mg/dL (26 May 2025 07:42)  POCT Blood Glucose.: 102 mg/dL (25 May 2025 21:39)  POCT Blood Glucose.: 214 mg/dL (25 May 2025 19:06)  POCT Blood Glucose.: 356 mg/dL (25 May 2025 17:04)    - REDUCE LANTUS BY 3 UNITS AT NIGHTS     Pending: control of back pain, neurosurgery c/s, MRI lumbar and c-spine, iron studies  Dispo: Prep for d/c in 24hrs vs Neurosx cons if MRIs acutely abnormal     Attestation Statements:  Time-based billing (NON-critical care).   35 minutes spent on total encounter. The necessity of the time spent during the encounter on this date of service was due to:   direct pt care and IDR / coordinated care w/ Pain Management.

## 2025-05-26 NOTE — PROGRESS NOTE ADULT - SUBJECTIVE AND OBJECTIVE BOX
Nephrology Progress Note    ANAM NGUYỄN  MRN-[id number]  59y  Female    S:  Patient is seen and examined, events over the last 24h noted.    O:  Allergies:  No Known Allergies    Hospital Medications:   MEDICATIONS  (STANDING):  acetaminophen     Tablet .. 975 milliGRAM(s) Oral every 8 hours  aspirin  chewable 81 milliGRAM(s) Oral daily  atorvastatin 10 milliGRAM(s) Oral at bedtime  chlorhexidine 2% Cloths 1 Application(s) Topical <User Schedule>  darbepoetin Injectable ViaL 25 MICROGram(s) IV Push <User Schedule>  dextrose 5%. 1000 milliLiter(s) (50 mL/Hr) IV Continuous <Continuous>  dextrose 50% Injectable 25 Gram(s) IV Push once  doxazosin 1 milliGRAM(s) Oral <User Schedule>  fluticasone propionate/ salmeterol 100-50 MICROgram(s) Diskus 1 Dose(s) Inhalation two times a day  gabapentin 300 milliGRAM(s) Oral at bedtime  glucagon  Injectable 1 milliGRAM(s) IntraMuscular once  guaiFENesin  milliGRAM(s) Oral every 12 hours  heparin   Injectable 5000 Unit(s) SubCutaneous every 12 hours  hydrALAZINE 100 milliGRAM(s) Oral every 8 hours  insulin glargine Injectable (LANTUS) 22 Unit(s) SubCutaneous at bedtime  insulin lispro (ADMELOG) corrective regimen sliding scale   SubCutaneous Before meals and at bedtime  insulin lispro Injectable (ADMELOG) 13 Unit(s) SubCutaneous three times a day before meals  lidocaine   4% Patch 1 Patch Transdermal daily  methocarbamol 1000 milliGRAM(s) Oral three times a day  NIFEdipine XL 30 milliGRAM(s) Oral daily  pantoprazole    Tablet 40 milliGRAM(s) Oral before breakfast  polyethylene glycol 3350 17 Gram(s) Oral daily  senna 2 Tablet(s) Oral at bedtime  sevelamer carbonate 1600 milliGRAM(s) Oral three times a day with meals    MEDICATIONS  (PRN):  albuterol    90 MICROgram(s) HFA Inhaler 2 Puff(s) Inhalation every 6 hours PRN Shortness of Breath and/or Wheezing  aluminum hydroxide/magnesium hydroxide/simethicone Suspension 30 milliLiter(s) Oral every 4 hours PRN Dyspepsia  dextrose Oral Gel 15 Gram(s) Oral once PRN Blood Glucose LESS THAN 70 milliGRAM(s)/deciliter  traMADol 50 milliGRAM(s) Oral every 6 hours PRN Severe Pain (7 - 10)    Home Medications:  aspirin 81 mg oral tablet: 1 tab(s) orally once a day (16 May 2025 17:28)  doxazosin 1 mg oral tablet: 1 tab(s) orally 2 times a day (16 May 2025 17:28)  guaiFENesin 600 mg oral tablet, extended release: 1 tab(s) orally 2 times a day (18 May 2025 04:42)  hydrALAZINE 100 mg oral tablet: 1 tab(s) orally 2 times a day (18 May 2025 04:42)  hydrALAZINE 50 mg oral tablet: 1 tab(s) orally 2 times a day (18 May 2025 04:42)  Lantus 100 units/mL subcutaneous solution: 15 unit(s) subcutaneous once a day (in the morning) (18 May 2025 04:41)  NIFEdipine 90 mg oral tablet, extended release: 1 tab(s) orally once a day as needed for hypertension take as needed for SBP greater than 200 (18 May 2025 04:42)  NovoLOG FlexPen 100 units/mL injectable solution: 15 unit(s) injectable 3 times a day (before meals) Hold premeal insulin if blood glucose &lt; 100 (18 May 2025 04:42)      VITALS:  Daily     Daily Weight in k.8 (26 May 2025 08:36)  T(F): 98 (25 @ 14:12), Max: 98 (25 @ 14:12)  HR: 85 (25 @ 14:12)  BP: 110/64 (25 @ 14:12)  RR: 18 (25 @ 14:12)  SpO2: 97% (25 @ 14:12)  Wt(kg): --  I&O's Detail    26 May 2025 07:01  -  26 May 2025 19:39  --------------------------------------------------------  IN:  Total IN: 0 mL    OUT:    Other (mL): 2000 mL  Total OUT: 2000 mL    Total NET: -2000 mL        I&O's Summary    26 May 2025 07:01  -  26 May 2025 19:39  --------------------------------------------------------  IN: 0 mL / OUT: 2000 mL / NET: -2000 mL          PHYSICAL EXAM:  Gen: NAD  Chest: b/l breath sounds  Abd: soft  Extremities: mild edema  Vascular access: AVF      LABS:        137  |  96[L]  |  65[HH]  ----------------------------<  50[LL]  5.6[H]   |  22  |  9.4[HH]    Ca    9.2      26 May 2025 07:00  Phos  7.0       Mg     3.3         Phosphorus: 7.0 mg/dL (25 @ 07:00)  Phosphorus: 6.0 mg/dL (25 @ 07:50)                            9.6    5.44  )-----------( 184      ( 26 May 2025 07:00 )             30.1     Mean Cell Volume: 103.4 fL (25 @ 07:00)      Culture Results:   No growth at 5 days ( @ 12:25)  Culture Results:   No growth at 5 days ( @ 12:25)    Creatinine trend:  Creatinine: 9.4 mg/dL (25 @ 07:00)  Creatinine: 8.0 mg/dL (25 @ 07:50)  Creatinine: 6.2 mg/dL (25 @ 08:37)  Creatinine: 8.9 mg/dL (25 @ 06:27)  Creatinine: 7.1 mg/dL (25 @ 17:01)

## 2025-05-26 NOTE — PROGRESS NOTE ADULT - ASSESSMENT
59y Female with h/o ESRD on HD (MWF), IDDM, dyslipidemia, CKD-MBD, HTN who presented to the hospital with CP during HD session this morning.  Had 90 minutes of total HD prior to arrival.  Nephrology now consulted for HD needs while in-house.    hd today standard bath uf 2 liters as tolerated / regular days outpatient MWF  follow FS closely  renal diet  bp better controlled   phos high, increase sevelamer to 3 tabs tid with meals   NST negative   h/h noted / check iron stores / will resume SHAR with HD aranesp 25 weekly   US Abdomen Upper Quadrant Right (05.20.25 @ 21:13) >  Cholelithiasis. No sonographic evidence of acute cholecystitis.  will follow

## 2025-05-26 NOTE — PROGRESS NOTE ADULT - SUBJECTIVE AND OBJECTIVE BOX
SUBJECTIVE/OVERNIGHT EVENTS  Today is hospital day 10d. This morning patient was seen and examined at bedside, resting comfortably in bed. No acute or major events overnight.    MEDICATIONS  STANDING MEDICATIONS  acetaminophen     Tablet .. 975 milliGRAM(s) Oral every 8 hours  aspirin  chewable 81 milliGRAM(s) Oral daily  atorvastatin 10 milliGRAM(s) Oral at bedtime  chlorhexidine 2% Cloths 1 Application(s) Topical <User Schedule>  darbepoetin Injectable ViaL 25 MICROGram(s) IV Push <User Schedule>  dextrose 5%. 1000 milliLiter(s) IV Continuous <Continuous>  dextrose 50% Injectable 25 Gram(s) IV Push once  doxazosin 1 milliGRAM(s) Oral <User Schedule>  fluticasone propionate/ salmeterol 100-50 MICROgram(s) Diskus 1 Dose(s) Inhalation two times a day  gabapentin 300 milliGRAM(s) Oral at bedtime  glucagon  Injectable 1 milliGRAM(s) IntraMuscular once  guaiFENesin  milliGRAM(s) Oral every 12 hours  heparin   Injectable 5000 Unit(s) SubCutaneous every 12 hours  hydrALAZINE 100 milliGRAM(s) Oral every 8 hours  insulin glargine Injectable (LANTUS) 22 Unit(s) SubCutaneous at bedtime  insulin lispro (ADMELOG) corrective regimen sliding scale   SubCutaneous Before meals and at bedtime  insulin lispro Injectable (ADMELOG) 13 Unit(s) SubCutaneous three times a day before meals  lidocaine   4% Patch 1 Patch Transdermal daily  methocarbamol 1000 milliGRAM(s) Oral three times a day  NIFEdipine XL 30 milliGRAM(s) Oral daily  pantoprazole    Tablet 40 milliGRAM(s) Oral before breakfast  polyethylene glycol 3350 17 Gram(s) Oral daily  senna 2 Tablet(s) Oral at bedtime  sevelamer carbonate 1600 milliGRAM(s) Oral three times a day with meals    PRN MEDICATIONS  albuterol    90 MICROgram(s) HFA Inhaler 2 Puff(s) Inhalation every 6 hours PRN  aluminum hydroxide/magnesium hydroxide/simethicone Suspension 30 milliLiter(s) Oral every 4 hours PRN  dextrose Oral Gel 15 Gram(s) Oral once PRN  traMADol 50 milliGRAM(s) Oral every 6 hours PRN    VITALS  T(F): 97.5 (05-26-25 @ 05:21), Max: 97.7 (05-25-25 @ 13:39)  HR: 72 (05-26-25 @ 08:45) (72 - 85)  BP: 117/54 (05-26-25 @ 08:45) (117/54 - 150/65)  RR: 18 (05-26-25 @ 08:45) (18 - 18)  SpO2: 98% (05-26-25 @ 05:21) (98% - 98%)  POCT Blood Glucose.: 94 mg/dL (05-26-25 @ 08:27)  POCT Blood Glucose.: 58 mg/dL (05-26-25 @ 07:42)  POCT Blood Glucose.: 102 mg/dL (05-25-25 @ 21:39)  POCT Blood Glucose.: 214 mg/dL (05-25-25 @ 19:06)  POCT Blood Glucose.: 356 mg/dL (05-25-25 @ 17:04)  POCT Blood Glucose.: 161 mg/dL (05-25-25 @ 12:54)  POCT Blood Glucose.: 60 mg/dL (05-25-25 @ 12:23)  POCT Blood Glucose.: 41 mg/dL (05-25-25 @ 11:53)    PHYSICAL EXAM  GENERAL  ( + ) NAD, lying in bed comfortably     (  ) obtunded     (  ) lethargic     (  ) somnolent    HEAD  ( + ) Atraumatic     (  ) hematoma     (  ) laceration (specify location:       )     NECK  ( + ) Supple     (  ) neck stiffness     (  ) nuchal rigidity     (  )  no JVD     (  ) JVD present ( -- cm)    HEART  Rate -->  ( + ) normal rate    (  ) bradycardic    (  ) tachycardic  Rhythm -->  ( + ) regular    (  ) regularly irregular    (  ) irregularly irregular  Murmurs -->  (  ) normal s1/s2    (  ) systolic murmur    (  ) diastolic murmur    (  ) continuous murmur     (  ) S3 present    (  ) S4 present    LUNGS  ( + )Unlabored respirations     (  ) tachypnea  (  ) B/L air entry     (  ) decreased breath sounds in:  (location     )    (  ) no adventitious sound     (  ) crackles     (  ) wheezing      (  ) rhonchi      (specify location:       )  (  ) chest wall tenderness (specify location:       )    ABDOMEN  ( + ) Soft     (  ) tense   |   (  ) nondistended     (  ) distended   |   (  ) +BS     (  ) hypoactive bowel sounds     (  ) hyperactive bowel sounds  (  ) nontender     (  ) RUQ tenderness     (  ) RLQ tenderness     (  ) LLQ tenderness     (  ) epigastric tenderness     (  ) diffuse tenderness  (  ) Splenomegaly      (  ) Hepatomegaly      (  ) Jaundice     (  ) ecchymosis     EXTREMITIES  (+  ) Normal     (  ) Rash     (  ) ecchymosis     (  ) varicose veins      (  ) pitting edema     (  ) non-pitting edema   (  ) ulceration     (  ) gangrene:     (location:     )    NERVOUS SYSTEM  ( + ) A&Ox3     (  ) confused     (  ) lethargic  CN II-XII:     (  ) Intact     (  ) focal deficits  (Specify:     )   Upper extremities:     (  ) strength X/5     (  ) focal deficit (specify:    )  Lower extremities:     (  ) strength  X/5    (  ) focal deficit (specify:    )    SKIN  ( + ) No rashes or lesions     (  ) maculopapular rash     (  ) pustules     (  ) vesicles     (  ) ulcer     (  ) ecchymosis     (specify location:     )    LABS             9.6    5.44  )-----------( 184      ( 05-26-25 @ 07:00 )             30.1     137  |  96  |  65  -------------------------<  50   05-26-25 @ 07:00  5.6  |  22  |  9.4    Ca      9.2     05-26-25 @ 07:00  Phos   7.0     05-26-25 @ 07:00  Mg     3.3     05-26-25 @ 07:00    Urinalysis Basic - ( 26 May 2025 07:00 )    Color: x / Appearance: x / SG: x / pH: x  Gluc: 50 mg/dL / Ketone: x  / Bili: x / Urobili: x   Blood: x / Protein: x / Nitrite: x   Leuk Esterase: x / RBC: x / WBC x   Sq Epi: x / Non Sq Epi: x / Bacteria: x

## 2025-05-26 NOTE — PROGRESS NOTE ADULT - ASSESSMENT
59y old Marshallese speaking Female w/ PMHx of ESRD on HD MWF, DM (insulin dependent) HTN, HLD, and HFpEF who presents to ED for chest pain during HD session. Admitted for workup of chest pain and AMS.    #Chest pain during HD- Resolved  #HFpEF - not in exacerbation  - On admission. trop 64 -> 66, Creatinine 4.1  - ECG showed prominent T wave   - CXR negative   - TTE EF 60%. G1DD  - stress test neg for ischemia  - C/w protonix qd     #AMS 2/2 hypoglycemia likely 2/2 decreased lantus clearance 2/2 ESRD- resolved  #DM2, Insulin dependent- uncontrolled  - RRT on 5/18/2025 for AMS, FS 30 after 80u lantus, s/p d50 and d5LR   - Decrease Lantus to 22 Units from 25Un   - C/w lispro 13  - C/w ISS  - 5/20: S/p D5 for hypoglycemia and AMS  - Monitor fingersticks q4  - Changed gabapentin to 300 qhs  - Increased robaxin to 1000 TID    #Chronic Back pain- severe, uncontrolled  - CT C-spine- The study is motion limited. There is no obvious acute fracture or facet subluxation. There is multilevel degenerative loss of disc space height. At C2-3 there is disc osteophyte indenting the ventral thecal sac. Uncinate and facet hypertrophy with right foraminal stenosis. At C3-4 there is disc osteophyte indenting the ventral thecal sac. Uncinate and facet hypertrophy with left > right foraminal stenosis. At C4-5 there is disc osteophyte indenting the ventral thecal sac. Uncinate and facet hypertrophy with left > right foraminal stenosis. At C5-6 there is disc osteophyte indenting the ventral thecal sac. Uncinate and facet hypertrophy with left > right foraminal stenosis. At C6-7 there is disc osteophyte indenting the ventral thecal sac. Uncinate and facet hypertrophy with moderate right foraminal stenosis.   - Pain management recs appreciated  - C/w tylenol 975 q8  - D/c dilaudid  - c/w tramadol 50 q6 prn  - c/w Robaxin 1000 q8  - c/w gabapentin 300 qhs  - o/p F/u with Dr. Villanueva  - f/u neurosurgery c/s  - f/u MRI lumbar and c-spine    #RUQ pain- resolved  - RUQ US- Cholelithiasis. No sonographic evidence of acute cholecystitis.  - Pain resolved on 5/21/2025    #ESRD on HD MWF  #HTN    #Hyperkalemia  - nephro recs appreciated   - bp improves with HD  - c/w hydralazine 100mg q8h  - f/u iron studies  - Low K diet  - c/w aranesp 25 weekly with HD  - c/w doxazosin 1mg BID  - c/w sevelamer 1600 mg TID  - patient state she uses nifedipine 90 PO if her SBP is above 200 - will hold for now   - s/p nifedipine 30 x1 and labetalol 10 IVP x1 on 5/20 2/2 HTN to 200s SBP  - c/w nifedipine 30 qd    #On admission, AMS possibly 2/2 transient hypotension during HD- Resolved  - CTH showed A focal hypodensity is noted within the right frontal periventricular white matter, not previously seen. Finding may reflect age-indeterminate ischemic change. An MRI of the brain can be obtained for further evaluation if not clinically contraindicated.  - Neuro consult noted no additional inpatient workup.  - c/w ASA and atorvastatin    #Cough  - afebrile, no leukocytosis   - c/w Robitussin  - c/w advair bid, HFA prn    #Acute uncomplicated E. coli cystitis- resolved  - UA (+), UCx (+) E coli  - S/p CTX  - bcx ngtd     #DLD  - c/w home atorvastatin    DVT ppx: heparin  GI ppx: PPI   Diet: renal, DASH  Activity: as tolerated  Pending: control of hyperglycemia, control of back pain, neurosurgery c/s, MRI lumbar and c-spine, iron studies   59y old Nauruan speaking Female w/ PMHx of ESRD on HD MWF, DM (insulin dependent) HTN, HLD, and HFpEF who presents to ED for chest pain during HD session. Admitted for workup of chest pain and AMS.    #Chest pain during HD- Resolved  #HFpEF - not in exacerbation  - On admission. trop 64 -> 66, Creatinine 4.1  - ECG showed prominent T wave   - CXR negative   - TTE EF 60%. G1DD  - stress test neg for ischemia  - C/w protonix qd     #AMS 2/2 hypoglycemia likely 2/2 decreased lantus clearance 2/2 ESRD- resolved  #DM2, Insulin dependent- uncontrolled  - RRT on 5/18/2025 for AMS, FS 30 after 80u lantus, s/p d50 and d5LR   - Decrease Lantus to 22 Units from 25Un   - C/w lispro 13  - C/w ISS  - 5/20: S/p D5 for hypoglycemia and AMS  - Monitor fingersticks q4  - Changed gabapentin to 300 qhs  - Increased robaxin to 1000 TID    #Chronic Back pain- severe, uncontrolled  - CT C-spine- The study is motion limited. There is no obvious acute fracture or facet subluxation. There is multilevel degenerative loss of disc space height. At C2-3 there is disc osteophyte indenting the ventral thecal sac. Uncinate and facet hypertrophy with right foraminal stenosis. At C3-4 there is disc osteophyte indenting the ventral thecal sac. Uncinate and facet hypertrophy with left > right foraminal stenosis. At C4-5 there is disc osteophyte indenting the ventral thecal sac. Uncinate and facet hypertrophy with left > right foraminal stenosis. At C5-6 there is disc osteophyte indenting the ventral thecal sac. Uncinate and facet hypertrophy with left > right foraminal stenosis. At C6-7 there is disc osteophyte indenting the ventral thecal sac. Uncinate and facet hypertrophy with moderate right foraminal stenosis.   - Pain management recs appreciated  - C/w tylenol 975 q8  - D/c dilaudid  - c/w tramadol 50 q6 prn  - c/w Robaxin 1000 q8  - c/w gabapentin 300 qhs  - o/p F/u with Dr. Villanueva  - f/u neurosurgery c/s  - f/u MRI lumbar and c-spine    #RUQ pain- resolved  - RUQ US- Cholelithiasis. No sonographic evidence of acute cholecystitis.  - Pain resolved on 5/21/2025    #ESRD on HD MWF  #HTN    #Hyperkalemia  - nephro recs appreciated   - bp improves with HD  - c/w hydralazine 100mg q8h  - f/u iron studies  - Low K diet  - c/w aranesp 25 weekly with HD  - c/w doxazosin 1mg BID  - c/w sevelamer 1600 mg TID  - patient state she uses nifedipine 90 PO if her SBP is above 200 - will hold for now   - s/p nifedipine 30 x1 and labetalol 10 IVP x1 on 5/20 2/2 HTN to 200s SBP  - c/w nifedipine 30 qd    #On admission, AMS possibly 2/2 transient hypotension during HD- Resolved  - CTH showed A focal hypodensity is noted within the right frontal periventricular white matter, not previously seen. Finding may reflect age-indeterminate ischemic change. An MRI of the brain can be obtained for further evaluation if not clinically contraindicated.  - Neuro consult noted no additional inpatient workup.  - c/w ASA and atorvastatin    #Cough  - afebrile, no leukocytosis   - c/w Robitussin  - c/w advair bid, HFA prn    #Acute uncomplicated E. coli cystitis- resolved  - UA (+), UCx (+) E coli  - S/p CTX  - bcx ngtd     #DLD  - c/w home atorvastatin    DVT ppx: heparin  GI ppx: PPI   Diet: renal, DASH  Activity: as tolerated  Pending: control of hyperglycemia, control of back pain, neurosurgery c/s, MRI lumbar and c-spine, iron studies

## 2025-05-27 LAB
ALBUMIN SERPL ELPH-MCNC: 4.2 G/DL — SIGNIFICANT CHANGE UP (ref 3.5–5.2)
ALP SERPL-CCNC: 102 U/L — SIGNIFICANT CHANGE UP (ref 30–115)
ALT FLD-CCNC: 29 U/L — SIGNIFICANT CHANGE UP (ref 0–41)
ANION GAP SERPL CALC-SCNC: 16 MMOL/L — HIGH (ref 7–14)
APTT BLD: 32.3 SEC — SIGNIFICANT CHANGE UP (ref 27–39.2)
AST SERPL-CCNC: 40 U/L — SIGNIFICANT CHANGE UP (ref 0–41)
BASOPHILS # BLD AUTO: 0.03 K/UL — SIGNIFICANT CHANGE UP (ref 0–0.2)
BASOPHILS NFR BLD AUTO: 0.7 % — SIGNIFICANT CHANGE UP (ref 0–1)
BILIRUB SERPL-MCNC: 0.3 MG/DL — SIGNIFICANT CHANGE UP (ref 0.2–1.2)
BUN SERPL-MCNC: 33 MG/DL — HIGH (ref 10–20)
CALCIUM SERPL-MCNC: 9.5 MG/DL — SIGNIFICANT CHANGE UP (ref 8.4–10.5)
CHLORIDE SERPL-SCNC: 95 MMOL/L — LOW (ref 98–110)
CO2 SERPL-SCNC: 24 MMOL/L — SIGNIFICANT CHANGE UP (ref 17–32)
CREAT SERPL-MCNC: 5.6 MG/DL — CRITICAL HIGH (ref 0.7–1.5)
EGFR: 8 ML/MIN/1.73M2 — LOW
EGFR: 8 ML/MIN/1.73M2 — LOW
EOSINOPHIL # BLD AUTO: 0.19 K/UL — SIGNIFICANT CHANGE UP (ref 0–0.7)
EOSINOPHIL NFR BLD AUTO: 4.3 % — SIGNIFICANT CHANGE UP (ref 0–8)
GAS PNL BLDA: SIGNIFICANT CHANGE UP
GAS PNL BLDA: SIGNIFICANT CHANGE UP
GLUCOSE BLDC GLUCOMTR-MCNC: 102 MG/DL — HIGH (ref 70–99)
GLUCOSE BLDC GLUCOMTR-MCNC: 111 MG/DL — HIGH (ref 70–99)
GLUCOSE BLDC GLUCOMTR-MCNC: 125 MG/DL — HIGH (ref 70–99)
GLUCOSE BLDC GLUCOMTR-MCNC: 128 MG/DL — HIGH (ref 70–99)
GLUCOSE BLDC GLUCOMTR-MCNC: 131 MG/DL — HIGH (ref 70–99)
GLUCOSE BLDC GLUCOMTR-MCNC: 141 MG/DL — HIGH (ref 70–99)
GLUCOSE BLDC GLUCOMTR-MCNC: 185 MG/DL — HIGH (ref 70–99)
GLUCOSE BLDC GLUCOMTR-MCNC: 19 MG/DL — CRITICAL LOW (ref 70–99)
GLUCOSE BLDC GLUCOMTR-MCNC: 229 MG/DL — HIGH (ref 70–99)
GLUCOSE BLDC GLUCOMTR-MCNC: 88 MG/DL — SIGNIFICANT CHANGE UP (ref 70–99)
GLUCOSE SERPL-MCNC: 151 MG/DL — HIGH (ref 70–99)
HCT VFR BLD CALC: 34.6 % — LOW (ref 37–47)
HGB BLD-MCNC: 11.5 G/DL — LOW (ref 12–16)
IMM GRANULOCYTES NFR BLD AUTO: 0.5 % — HIGH (ref 0.1–0.3)
INR BLD: 0.81 RATIO — SIGNIFICANT CHANGE UP (ref 0.65–1.3)
LACTATE SERPL-SCNC: 0.7 MMOL/L — SIGNIFICANT CHANGE UP (ref 0.7–2)
LYMPHOCYTES # BLD AUTO: 0.71 K/UL — LOW (ref 1.2–3.4)
LYMPHOCYTES # BLD AUTO: 16 % — LOW (ref 20.5–51.1)
MAGNESIUM SERPL-MCNC: 3.1 MG/DL — CRITICAL HIGH (ref 1.8–2.4)
MCHC RBC-ENTMCNC: 33.2 G/DL — SIGNIFICANT CHANGE UP (ref 32–37)
MCHC RBC-ENTMCNC: 33.5 PG — HIGH (ref 27–31)
MCV RBC AUTO: 100.9 FL — HIGH (ref 81–99)
MONOCYTES # BLD AUTO: 0.28 K/UL — SIGNIFICANT CHANGE UP (ref 0.1–0.6)
MONOCYTES NFR BLD AUTO: 6.3 % — SIGNIFICANT CHANGE UP (ref 1.7–9.3)
NEUTROPHILS # BLD AUTO: 3.21 K/UL — SIGNIFICANT CHANGE UP (ref 1.4–6.5)
NEUTROPHILS NFR BLD AUTO: 72.2 % — SIGNIFICANT CHANGE UP (ref 42.2–75.2)
NRBC BLD AUTO-RTO: 0 /100 WBCS — SIGNIFICANT CHANGE UP (ref 0–0)
PLATELET # BLD AUTO: 190 K/UL — SIGNIFICANT CHANGE UP (ref 130–400)
PMV BLD: 10.7 FL — HIGH (ref 7.4–10.4)
POTASSIUM SERPL-MCNC: 4.6 MMOL/L — SIGNIFICANT CHANGE UP (ref 3.5–5)
POTASSIUM SERPL-SCNC: 4.6 MMOL/L — SIGNIFICANT CHANGE UP (ref 3.5–5)
PROT SERPL-MCNC: 6.6 G/DL — SIGNIFICANT CHANGE UP (ref 6–8)
PROTHROM AB SERPL-ACNC: 9.5 SEC — LOW (ref 9.95–12.87)
RBC # BLD: 3.43 M/UL — LOW (ref 4.2–5.4)
RBC # FLD: 17.6 % — HIGH (ref 11.5–14.5)
SODIUM SERPL-SCNC: 135 MMOL/L — SIGNIFICANT CHANGE UP (ref 135–146)
TROPONIN T, HIGH SENSITIVITY RESULT: 84 NG/L — CRITICAL HIGH (ref 6–13)
WBC # BLD: 4.44 K/UL — LOW (ref 4.8–10.8)
WBC # FLD AUTO: 4.44 K/UL — LOW (ref 4.8–10.8)

## 2025-05-27 PROCEDURE — 70450 CT HEAD/BRAIN W/O DYE: CPT | Mod: 26,59

## 2025-05-27 PROCEDURE — 70498 CT ANGIOGRAPHY NECK: CPT | Mod: 26

## 2025-05-27 PROCEDURE — 70496 CT ANGIOGRAPHY HEAD: CPT | Mod: 26

## 2025-05-27 PROCEDURE — 0042T: CPT

## 2025-05-27 PROCEDURE — 93010 ELECTROCARDIOGRAM REPORT: CPT

## 2025-05-27 PROCEDURE — 99291 CRITICAL CARE FIRST HOUR: CPT | Mod: GC

## 2025-05-27 PROCEDURE — 71045 X-RAY EXAM CHEST 1 VIEW: CPT | Mod: 26,77

## 2025-05-27 PROCEDURE — 71045 X-RAY EXAM CHEST 1 VIEW: CPT | Mod: 26

## 2025-05-27 RX ORDER — FENTANYL CITRATE-0.9 % NACL/PF 100MCG/2ML
0.5 SYRINGE (ML) INTRAVENOUS
Qty: 2500 | Refills: 0 | Status: DISCONTINUED | OUTPATIENT
Start: 2025-05-27 | End: 2025-05-27

## 2025-05-27 RX ORDER — PROPOFOL 10 MG/ML
10 INJECTION, EMULSION INTRAVENOUS
Qty: 500 | Refills: 0 | Status: DISCONTINUED | OUTPATIENT
Start: 2025-05-27 | End: 2025-05-29

## 2025-05-27 RX ORDER — ACETAMINOPHEN 500 MG/5ML
975 LIQUID (ML) ORAL EVERY 8 HOURS
Refills: 0 | Status: DISCONTINUED | OUTPATIENT
Start: 2025-05-27 | End: 2025-06-08

## 2025-05-27 RX ORDER — SODIUM CHLORIDE 9 G/1000ML
1000 INJECTION, SOLUTION INTRAVENOUS
Refills: 0 | Status: DISCONTINUED | OUTPATIENT
Start: 2025-05-27 | End: 2025-05-27

## 2025-05-27 RX ORDER — PROPOFOL 10 MG/ML
20 INJECTION, EMULSION INTRAVENOUS ONCE
Refills: 0 | Status: DISCONTINUED | OUTPATIENT
Start: 2025-05-27 | End: 2025-05-29

## 2025-05-27 RX ORDER — DEXMEDETOMIDINE HYDROCHLORIDE IN SODIUM CHLORIDE 4 UG/ML
0.4 INJECTION INTRAVENOUS
Qty: 200 | Refills: 0 | Status: DISCONTINUED | OUTPATIENT
Start: 2025-05-27 | End: 2025-05-28

## 2025-05-27 RX ORDER — SEVELAMER HYDROCHLORIDE 800 MG/1
2400 TABLET ORAL
Refills: 0 | Status: DISCONTINUED | OUTPATIENT
Start: 2025-05-27 | End: 2025-06-08

## 2025-05-27 RX ORDER — SEVELAMER HYDROCHLORIDE 800 MG/1
2400 TABLET ORAL
Refills: 0 | Status: DISCONTINUED | OUTPATIENT
Start: 2025-05-27 | End: 2025-05-27

## 2025-05-27 RX ORDER — ATORVASTATIN CALCIUM 80 MG/1
80 TABLET, FILM COATED ORAL AT BEDTIME
Refills: 0 | Status: DISCONTINUED | OUTPATIENT
Start: 2025-05-27 | End: 2025-06-08

## 2025-05-27 RX ORDER — DEXMEDETOMIDINE HYDROCHLORIDE IN SODIUM CHLORIDE 4 UG/ML
0.4 INJECTION INTRAVENOUS
Qty: 200 | Refills: 0 | Status: DISCONTINUED | OUTPATIENT
Start: 2025-05-27 | End: 2025-05-27

## 2025-05-27 RX ORDER — FENTANYL CITRATE-0.9 % NACL/PF 100MCG/2ML
0.5 SYRINGE (ML) INTRAVENOUS
Qty: 2500 | Refills: 0 | Status: DISCONTINUED | OUTPATIENT
Start: 2025-05-27 | End: 2025-05-29

## 2025-05-27 RX ADMIN — DEXMEDETOMIDINE HYDROCHLORIDE IN SODIUM CHLORIDE 7 MICROGRAM(S)/KG/HR: 4 INJECTION INTRAVENOUS at 11:17

## 2025-05-27 RX ADMIN — ATORVASTATIN CALCIUM 80 MILLIGRAM(S): 80 TABLET, FILM COATED ORAL at 21:01

## 2025-05-27 RX ADMIN — Medication 100 MILLIGRAM(S): at 21:08

## 2025-05-27 RX ADMIN — Medication 3.5 MICROGRAM(S)/KG/HR: at 07:33

## 2025-05-27 RX ADMIN — METHOCARBAMOL 1000 MILLIGRAM(S): 500 TABLET, FILM COATED ORAL at 05:32

## 2025-05-27 RX ADMIN — Medication 81 MILLIGRAM(S): at 11:17

## 2025-05-27 RX ADMIN — Medication 40 MILLIGRAM(S): at 06:33

## 2025-05-27 RX ADMIN — DEXTROMETHORPHAN HBR, GUAIFENESIN 600 MILLIGRAM(S): 200 LIQUID ORAL at 18:12

## 2025-05-27 RX ADMIN — DEXMEDETOMIDINE HYDROCHLORIDE IN SODIUM CHLORIDE 7 MICROGRAM(S)/KG/HR: 4 INJECTION INTRAVENOUS at 07:33

## 2025-05-27 RX ADMIN — POLYETHYLENE GLYCOL 3350 17 GRAM(S): 17 POWDER, FOR SOLUTION ORAL at 11:17

## 2025-05-27 RX ADMIN — Medication 15 MILLILITER(S): at 18:11

## 2025-05-27 RX ADMIN — SEVELAMER HYDROCHLORIDE 2400 MILLIGRAM(S): 800 TABLET ORAL at 13:05

## 2025-05-27 RX ADMIN — DOXAZOSIN MESYLATE 1 MILLIGRAM(S): 8 TABLET ORAL at 14:05

## 2025-05-27 RX ADMIN — DOXAZOSIN MESYLATE 1 MILLIGRAM(S): 8 TABLET ORAL at 21:01

## 2025-05-27 RX ADMIN — Medication 30 MILLIGRAM(S): at 05:30

## 2025-05-27 RX ADMIN — DEXMEDETOMIDINE HYDROCHLORIDE IN SODIUM CHLORIDE 7 MICROGRAM(S)/KG/HR: 4 INJECTION INTRAVENOUS at 21:01

## 2025-05-27 RX ADMIN — HEPARIN SODIUM 5000 UNIT(S): 1000 INJECTION INTRAVENOUS; SUBCUTANEOUS at 18:12

## 2025-05-27 RX ADMIN — SEVELAMER HYDROCHLORIDE 2400 MILLIGRAM(S): 800 TABLET ORAL at 18:11

## 2025-05-27 RX ADMIN — Medication 2 TABLET(S): at 21:08

## 2025-05-27 RX ADMIN — Medication 10 MILLIGRAM(S): at 10:21

## 2025-05-27 NOTE — CONSULT NOTE ADULT - ASSESSMENT
59y old  Female with PMH of DM, ESRD on HD MWF, HTN, HLD, and HF who presents to ED for chest pain during HD session this morning. Endocrinology consulted for uncontrolled blood sugar, possibly contributing to AMS requiring intubation    # 59y old  Female with PMH of DM, ESRD on HD MWF, HTN, HLD, and HF who presents to ED for chest pain during HD session this morning. Endocrinology consulted for uncontrolled blood sugar, possibly contributing to AMS requiring intubation    # Hypoglycemia  # DM, poorly controlled  - hypoglycemia this morning, possibly contributing to AMS requiring intubation  - FS 19 at time of intubation  - continue current ISS regimen, if blood sugar starts trending up >200 can start lantus 8U daily  - will likely readjust basal insulin and add Tradjenta on dc      Please recall PRN

## 2025-05-27 NOTE — CONSULT NOTE ADULT - SUBJECTIVE AND OBJECTIVE BOX
ENDOCRINE INITIAL CONSULT -     HPI:  Patient is a 59y old  Female with PMH of DM, ESRD on HD MWF, HTN, HLD, and HF who presents to ED for chest pain during HD session this morning.  Had 90 minutes of total HD prior to arrival.  Patient stated that she felt chest pressure and exertional shortness of breath. Shortness of breath improves with rest. Patient was alert and oriented however speaking sluggishly on arrival to the ED. Endorses neck pain and chest pressure as well. Denies abdominal pain, dysuria, swelling.    in the ED /54, HR 68, Temp 97.7, SpO2 98 on 2L NC  labs significant for trop 64 -> 66, Creatinine 4.1  UA showed bacteria  CXR negative  CT head showed A focal hypodensity is noted within the right frontal periventricular white matter, not previously seen. Finding may reflect age-indeterminate ischemic change.       Patient admitted for workup of chest pain during HD (16 May 2025 15:38)      ENDOCRINE HISTORY     Endocrinologist:  Social History:  Family History:  Surgical History:  Insurance:  Resides In:    PAST MEDICAL & SURGICAL HISTORY:  Chronic kidney disease, unspecified CKD stage          FAMILY HISTORY:      Social History:      Home Medications:  aspirin 81 mg oral tablet: 1 tab(s) orally once a day (16 May 2025 17:28)  doxazosin 1 mg oral tablet: 1 tab(s) orally 2 times a day (16 May 2025 17:28)  guaiFENesin 600 mg oral tablet, extended release: 1 tab(s) orally 2 times a day (18 May 2025 04:42)  hydrALAZINE 100 mg oral tablet: 1 tab(s) orally 2 times a day (18 May 2025 04:42)  hydrALAZINE 50 mg oral tablet: 1 tab(s) orally 2 times a day (18 May 2025 04:42)  Lantus 100 units/mL subcutaneous solution: 15 unit(s) subcutaneous once a day (in the morning) (18 May 2025 04:41)  NIFEdipine 90 mg oral tablet, extended release: 1 tab(s) orally once a day as needed for hypertension take as needed for SBP greater than 200 (18 May 2025 04:42)  NovoLOG FlexPen 100 units/mL injectable solution: 15 unit(s) injectable 3 times a day (before meals) Hold premeal insulin if blood glucose &lt; 100 (18 May 2025 04:42)      MEDICATIONS  (STANDING):  aspirin  chewable 81 milliGRAM(s) Oral daily  atorvastatin 80 milliGRAM(s) Oral at bedtime  chlorhexidine 0.12% Liquid 15 milliLiter(s) Oral Mucosa every 12 hours  chlorhexidine 2% Cloths 1 Application(s) Topical <User Schedule>  darbepoetin Injectable ViaL 25 MICROGram(s) IV Push <User Schedule>  dexMEDEtomidine Infusion 0.4 MICROgram(s)/kG/Hr (7 mL/Hr) IV Continuous <Continuous>  dextrose 5% + sodium chloride 0.45%. 1000 milliLiter(s) (50 mL/Hr) IV Continuous <Continuous>  dextrose 5%. 1000 milliLiter(s) (50 mL/Hr) IV Continuous <Continuous>  dextrose 50% Injectable 25 Gram(s) IV Push once  doxazosin 1 milliGRAM(s) Oral <User Schedule>  fentaNYL   Infusion 0.5 MICROgram(s)/kG/Hr (3.5 mL/Hr) IV Continuous <Continuous>  fluticasone propionate/ salmeterol 100-50 MICROgram(s) Diskus 1 Dose(s) Inhalation two times a day  glucagon  Injectable 1 milliGRAM(s) IntraMuscular once  guaiFENesin  milliGRAM(s) Oral every 12 hours  heparin   Injectable 5000 Unit(s) SubCutaneous every 12 hours  hydrALAZINE 100 milliGRAM(s) Oral every 8 hours  insulin lispro (ADMELOG) corrective regimen sliding scale   SubCutaneous Before meals and at bedtime  lidocaine   4% Patch 1 Patch Transdermal daily  NIFEdipine XL 30 milliGRAM(s) Oral daily  polyethylene glycol 3350 17 Gram(s) Oral daily  propofol Infusion 10 MICROgram(s)/kG/Min (4.2 mL/Hr) IV Continuous <Continuous>  propofol Injectable 20 milliGRAM(s) IV Push once  senna 2 Tablet(s) Oral at bedtime  sevelamer carbonate Powder 2400 milliGRAM(s) Oral three times a day with meals    MEDICATIONS  (PRN):  acetaminophen     Tablet .. 975 milliGRAM(s) Oral every 8 hours PRN Mild Pain (1 - 3)  albuterol    90 MICROgram(s) HFA Inhaler 2 Puff(s) Inhalation every 6 hours PRN Shortness of Breath and/or Wheezing  dextrose Oral Gel 15 Gram(s) Oral once PRN Blood Glucose LESS THAN 70 milliGRAM(s)/deciliter      Allergies    No Known Allergies    Intolerances        REVIEW OF SYSTEMS  Constitutional: No fever  Eyes: No blurry vision  Neuro: No tremors  HEENT: No pain  Cardiovascular: No chest pain, palpitations  Respiratory: No SOB, no cough  GI: No nausea, vomiting, abdominal pain  : No dysuria  Skin: no rash  Psych: no depression  Endocrine: no polyuria, polydipsia  Hem/lymph: no swelling  Osteoporosis: no fractures  ALL OTHER SYSTEMS REVIEWED AND NEGATIVE     UNABLE TO OBTAIN     PHYSICAL EXAM   Vital Signs Last 24 Hrs  T(C): 36.3 (27 May 2025 14:00), Max: 36.8 (26 May 2025 20:30)  T(F): 97.3 (27 May 2025 14:00), Max: 98.2 (26 May 2025 20:30)  HR: 64 (27 May 2025 14:15) (58 - 80)  BP: 140/64 (27 May 2025 13:00) (131/60 - 216/95)  BP(mean): 92 (27 May 2025 13:00) (86 - 136)  RR: 14 (27 May 2025 14:00) (14 - 18)  SpO2: 100% (27 May 2025 14:15) (91% - 100%)    Parameters below as of 27 May 2025 14:00  Patient On (Oxygen Delivery Method): ventilator      GENERAL: NAD, well-groomed, well-developed  EYES: No proptosis, no lid lag, anicteric  HEENT:  Atraumatic, Normocephalic, moist mucous membranes  THYROID: Normal size, no palpable nodules  RESPIRATORY: Clear to auscultation bilaterally; No rales, rhonchi, wheezing  CARDIOVASCULAR: Regular rate and rhythm; No murmurs  GI: Soft, nontender, non distended, normal bowel sounds  SKIN: Dry, intact  MUSCULOSKELETAL: Moving extremities spontaneously, no peripheral edema  NEURO: sensation intact, extraocular movements intact, no tremor  PSYCH: Answering questions appropriately, normal affect, normal mood  CUSHING'S SIGNS: no striae, no acanthosis, no dorsocervical fatpad    CAPILLARY BLOOD GLUCOSE  19 (27 May 2025 07:12)      POCT Blood Glucose.: 128 mg/dL (27 May 2025 10:34)  POCT Blood Glucose.: 111 mg/dL (27 May 2025 08:24)  POCT Blood Glucose.: 125 mg/dL (27 May 2025 07:08)  POCT Blood Glucose.: 131 mg/dL (27 May 2025 06:58)  POCT Blood Glucose.: 141 mg/dL (27 May 2025 06:49)  POCT Blood Glucose.: 185 mg/dL (27 May 2025 06:41)  POCT Blood Glucose.: 229 mg/dL (27 May 2025 06:40)  POCT Blood Glucose.: 19 mg/dL (27 May 2025 06:37)  POCT Blood Glucose.: 326 mg/dL (26 May 2025 22:13)  POCT Blood Glucose.: 132 mg/dL (26 May 2025 18:43)  POCT Blood Glucose.: 95 mg/dL (26 May 2025 16:56)      A1C with Estimated Average Glucose Result: 8.1 % (05-17-25 @ 06:21)                            11.5   4.44  )-----------( 190      ( 27 May 2025 07:00 )             34.6       05-27    135  |  95[L]  |  33[H]  ----------------------------<  151[H]  4.6   |  24  |  5.6[HH]    Ca    9.5      27 May 2025 07:00  Phos  7.0     05-26  Mg     3.1     05-27    TPro  6.6  /  Alb  4.2  /  TBili  0.3  /  DBili  x   /  AST  40  /  ALT  29  /  AlkPhos  102  05-27          LIPIDS    RADIOLOGY ENDOCRINE INITIAL CONSULT -     HPI:  Patient is a 59y old  Female with PMH of DM, ESRD on HD MWF, HTN, HLD, and HF who presents to ED for chest pain during HD session this morning.  Had 90 minutes of total HD prior to arrival.  Patient stated that she felt chest pressure and exertional shortness of breath. Shortness of breath improves with rest. Patient was alert and oriented however speaking sluggishly on arrival to the ED. Endorses neck pain and chest pressure as well. Denies abdominal pain, dysuria, swelling.    in the ED /54, HR 68, Temp 97.7, SpO2 98 on 2L NC  labs significant for trop 64 -> 66, Creatinine 4.1  UA showed bacteria  CXR negative  CT head showed A focal hypodensity is noted within the right frontal periventricular white matter, not previously seen. Finding may reflect age-indeterminate ischemic change.       Patient admitted for workup of chest pain during HD (16 May 2025 15:38)      ENDOCRINE HISTORY     Endocrinologist:  Social History:  Family History:  Surgical History:  Insurance:  Resides In:    PAST MEDICAL & SURGICAL HISTORY:  Chronic kidney disease, unspecified CKD stage          FAMILY HISTORY:      Social History:      Home Medications:  aspirin 81 mg oral tablet: 1 tab(s) orally once a day (16 May 2025 17:28)  doxazosin 1 mg oral tablet: 1 tab(s) orally 2 times a day (16 May 2025 17:28)  guaiFENesin 600 mg oral tablet, extended release: 1 tab(s) orally 2 times a day (18 May 2025 04:42)  hydrALAZINE 100 mg oral tablet: 1 tab(s) orally 2 times a day (18 May 2025 04:42)  hydrALAZINE 50 mg oral tablet: 1 tab(s) orally 2 times a day (18 May 2025 04:42)  Lantus 100 units/mL subcutaneous solution: 15 unit(s) subcutaneous once a day (in the morning) (18 May 2025 04:41)  NIFEdipine 90 mg oral tablet, extended release: 1 tab(s) orally once a day as needed for hypertension take as needed for SBP greater than 200 (18 May 2025 04:42)  NovoLOG FlexPen 100 units/mL injectable solution: 15 unit(s) injectable 3 times a day (before meals) Hold premeal insulin if blood glucose &lt; 100 (18 May 2025 04:42)      MEDICATIONS  (STANDING):  aspirin  chewable 81 milliGRAM(s) Oral daily  atorvastatin 80 milliGRAM(s) Oral at bedtime  chlorhexidine 0.12% Liquid 15 milliLiter(s) Oral Mucosa every 12 hours  chlorhexidine 2% Cloths 1 Application(s) Topical <User Schedule>  darbepoetin Injectable ViaL 25 MICROGram(s) IV Push <User Schedule>  dexMEDEtomidine Infusion 0.4 MICROgram(s)/kG/Hr (7 mL/Hr) IV Continuous <Continuous>  dextrose 5% + sodium chloride 0.45%. 1000 milliLiter(s) (50 mL/Hr) IV Continuous <Continuous>  dextrose 5%. 1000 milliLiter(s) (50 mL/Hr) IV Continuous <Continuous>  dextrose 50% Injectable 25 Gram(s) IV Push once  doxazosin 1 milliGRAM(s) Oral <User Schedule>  fentaNYL   Infusion 0.5 MICROgram(s)/kG/Hr (3.5 mL/Hr) IV Continuous <Continuous>  fluticasone propionate/ salmeterol 100-50 MICROgram(s) Diskus 1 Dose(s) Inhalation two times a day  glucagon  Injectable 1 milliGRAM(s) IntraMuscular once  guaiFENesin  milliGRAM(s) Oral every 12 hours  heparin   Injectable 5000 Unit(s) SubCutaneous every 12 hours  hydrALAZINE 100 milliGRAM(s) Oral every 8 hours  insulin lispro (ADMELOG) corrective regimen sliding scale   SubCutaneous Before meals and at bedtime  lidocaine   4% Patch 1 Patch Transdermal daily  NIFEdipine XL 30 milliGRAM(s) Oral daily  polyethylene glycol 3350 17 Gram(s) Oral daily  propofol Infusion 10 MICROgram(s)/kG/Min (4.2 mL/Hr) IV Continuous <Continuous>  propofol Injectable 20 milliGRAM(s) IV Push once  senna 2 Tablet(s) Oral at bedtime  sevelamer carbonate Powder 2400 milliGRAM(s) Oral three times a day with meals    MEDICATIONS  (PRN):  acetaminophen     Tablet .. 975 milliGRAM(s) Oral every 8 hours PRN Mild Pain (1 - 3)  albuterol    90 MICROgram(s) HFA Inhaler 2 Puff(s) Inhalation every 6 hours PRN Shortness of Breath and/or Wheezing  dextrose Oral Gel 15 Gram(s) Oral once PRN Blood Glucose LESS THAN 70 milliGRAM(s)/deciliter      Allergies    No Known Allergies    Intolerances        REVIEW OF SYSTEMS      UNABLE TO OBTAIN     PHYSICAL EXAM   Vital Signs Last 24 Hrs  T(C): 36.3 (27 May 2025 14:00), Max: 36.8 (26 May 2025 20:30)  T(F): 97.3 (27 May 2025 14:00), Max: 98.2 (26 May 2025 20:30)  HR: 64 (27 May 2025 14:15) (58 - 80)  BP: 140/64 (27 May 2025 13:00) (131/60 - 216/95)  BP(mean): 92 (27 May 2025 13:00) (86 - 136)  RR: 14 (27 May 2025 14:00) (14 - 18)  SpO2: 100% (27 May 2025 14:15) (91% - 100%)    Parameters below as of 27 May 2025 14:00  Patient On (Oxygen Delivery Method): ventilator      GENERAL: ill appearing, intubated    CAPILLARY BLOOD GLUCOSE  19 (27 May 2025 07:12)      POCT Blood Glucose.: 128 mg/dL (27 May 2025 10:34)  POCT Blood Glucose.: 111 mg/dL (27 May 2025 08:24)  POCT Blood Glucose.: 125 mg/dL (27 May 2025 07:08)  POCT Blood Glucose.: 131 mg/dL (27 May 2025 06:58)  POCT Blood Glucose.: 141 mg/dL (27 May 2025 06:49)  POCT Blood Glucose.: 185 mg/dL (27 May 2025 06:41)  POCT Blood Glucose.: 229 mg/dL (27 May 2025 06:40)  POCT Blood Glucose.: 19 mg/dL (27 May 2025 06:37)  POCT Blood Glucose.: 326 mg/dL (26 May 2025 22:13)  POCT Blood Glucose.: 132 mg/dL (26 May 2025 18:43)  POCT Blood Glucose.: 95 mg/dL (26 May 2025 16:56)      A1C with Estimated Average Glucose Result: 8.1 % (05-17-25 @ 06:21)                            11.5   4.44  )-----------( 190      ( 27 May 2025 07:00 )             34.6       05-27    135  |  95[L]  |  33[H]  ----------------------------<  151[H]  4.6   |  24  |  5.6[HH]    Ca    9.5      27 May 2025 07:00  Phos  7.0     05-26  Mg     3.1     05-27    TPro  6.6  /  Alb  4.2  /  TBili  0.3  /  DBili  x   /  AST  40  /  ALT  29  /  AlkPhos  102  05-27          LIPIDS    RADIOLOGY

## 2025-05-27 NOTE — CONSULT NOTE ADULT - CONSULT REQUESTED DATE/TIME
27-May-2025 06:50
27-May-2025 14:37
16-May-2025 13:11
23-May-2025 09:41
16-May-2025 14:00
27-May-2025 09:36

## 2025-05-27 NOTE — CONSULT NOTE ADULT - SUBJECTIVE AND OBJECTIVE BOX
Patient is a 59y old  Female who presents with a chief complaint of AMS (27 May 2025 07:18)      HPI:  Patient is a 59y old  Female with PMH of DM, ESRD on HD MWF, HTN, HLD, and HF who presents to ED for chest pain during HD session this morning.  Had 90 minutes of total HD prior to arrival.  Patient stated that she felt chest pressure and exertional shortness of breath. Shortness of breath improves with rest. Patient was alert and oriented however speaking sluggishly on arrival to the ED. Endorses neck pain and chest pressure as well. Denies abdominal pain, dysuria, swelling.    in the ED /54, HR 68, Temp 97.7, SpO2 98 on 2L NC  labs significant for trop 64 -> 66, Creatinine 4.1  UA showed bacteria  CXR negative  CT head showed A focal hypodensity is noted within the right frontal periventricular white matter, not previously seen. Finding may reflect age-indeterminate ischemic change.       Patient admitted for workup of chest pain during HD (16 May 2025 15:38)      PAST MEDICAL & SURGICAL HISTORY:  Chronic kidney disease, unspecified CKD stage          SOCIAL HX:   Unable to obtain    FAMILY HISTORY:  :  No known cardiovacular family hisotry     Review Of Systems:     All ROS are negative except per HPI       Allergies    No Known Allergies    Intolerances        PHYSICAL EXAM    ICU Vital Signs Last 24 Hrs  T(C): 34.9 (27 May 2025 09:00), Max: 36.8 (26 May 2025 20:30)  T(F): 94.8 (27 May 2025 09:00), Max: 98.2 (26 May 2025 20:30)  HR: 59 (27 May 2025 09:00) (59 - 85)  BP: 191/88 (27 May 2025 09:00) (110/64 - 216/95)  BP(mean): 126 (27 May 2025 09:00) (126 - 136)  RR: 14 (27 May 2025 09:00) (14 - 18)  SpO2: 100% (27 May 2025 09:00) (91% - 100%)    O2 Parameters below as of 27 May 2025 09:00  Patient On (Oxygen Delivery Method): ventilator              ENT: Airway patent, intubated  EYES: Pupils equal, Round and reactive to light.  CARDIAC: Normal rate, Regular rhythm.    RESPIRATORY: No wheezing, Bilateral BS, Not tachypneic, No use of accessory muscles  GASTROINTESTINAL: Abdomen soft, Non-tender, No guarding,   NEUROLOGICAL:Sedated  SKIN: Skin normal color for race, Warm and dry and intact.       05-26-25 @ 07:01  -  05-27-25 @ 07:00  --------------------------------------------------------  IN:  Total IN: 0 mL    OUT:    Other (mL): 2000 mL  Total OUT: 2000 mL    Total NET: -2000 mL      05-27-25 @ 07:01  -  05-27-25 @ 09:36  --------------------------------------------------------  IN:    Dexmedetomidine: 45.5 mL    dextrose 5% + sodium chloride 0.45%: 110 mL    FentaNYL: 70 mL  Total IN: 225.5 mL    OUT:  Total OUT: 0 mL    Total NET: 225.5 mL          LABS:                          11.5   4.44  )-----------( 190      ( 27 May 2025 07:00 )             34.6                                               05-27    135  |  95[L]  |  33[H]  ----------------------------<  151[H]  4.6   |  24  |  5.6[HH]    Ca    9.5      27 May 2025 07:00  Phos  7.0     05-26  Mg     3.1     05-27    TPro  6.6  /  Alb  4.2  /  TBili  0.3  /  DBili  x   /  AST  40  /  ALT  29  /  AlkPhos  102  05-27      PT/INR - ( 27 May 2025 07:00 )   PT: 9.50 sec;   INR: 0.81 ratio         PTT - ( 27 May 2025 07:00 )  PTT:32.3 sec                                       Urinalysis Basic - ( 27 May 2025 07:00 )    Color: x / Appearance: x / SG: x / pH: x  Gluc: 151 mg/dL / Ketone: x  / Bili: x / Urobili: x   Blood: x / Protein: x / Nitrite: x   Leuk Esterase: x / RBC: x / WBC x   Sq Epi: x / Non Sq Epi: x / Bacteria: x                                                  LIVER FUNCTIONS - ( 27 May 2025 07:00 )  Alb: 4.2 g/dL / Pro: 6.6 g/dL / ALK PHOS: 102 U/L / ALT: 29 U/L / AST: 40 U/L / GGT: x                                                                                               Mode: AC/ CMV (Assist Control/ Continuous Mandatory Ventilation)  RR (machine): 14  TV (machine): 330  FiO2: 40  PEEP: 8  ITime: 1  MAP: 15  PIP: 30                                      ABG - ( 27 May 2025 06:48 )  pH, Arterial: 7.33  pH, Blood: x     /  pCO2: 51    /  pO2: 325   / HCO3: 27    / Base Excess: 0.4   /  SaO2: 100.0     Repeat ABG at 9:35 am:   7.39/43/168  Lac 0.5        MEDICATIONS  (STANDING):  acetaminophen     Tablet .. 975 milliGRAM(s) Oral every 8 hours  aspirin  chewable 81 milliGRAM(s) Oral daily  atorvastatin 10 milliGRAM(s) Oral at bedtime  chlorhexidine 0.12% Liquid 15 milliLiter(s) Oral Mucosa every 12 hours  chlorhexidine 2% Cloths 1 Application(s) Topical <User Schedule>  darbepoetin Injectable ViaL 25 MICROGram(s) IV Push <User Schedule>  dexMEDEtomidine Infusion 0.4 MICROgram(s)/kG/Hr (7 mL/Hr) IV Continuous <Continuous>  dextrose 5% + sodium chloride 0.45%. 1000 milliLiter(s) (50 mL/Hr) IV Continuous <Continuous>  dextrose 5%. 1000 milliLiter(s) (50 mL/Hr) IV Continuous <Continuous>  dextrose 50% Injectable 25 Gram(s) IV Push once  doxazosin 1 milliGRAM(s) Oral <User Schedule>  fentaNYL   Infusion 0.5 MICROgram(s)/kG/Hr (3.5 mL/Hr) IV Continuous <Continuous>  fluticasone propionate/ salmeterol 100-50 MICROgram(s) Diskus 1 Dose(s) Inhalation two times a day  gabapentin 300 milliGRAM(s) Oral at bedtime  glucagon  Injectable 1 milliGRAM(s) IntraMuscular once  guaiFENesin  milliGRAM(s) Oral every 12 hours  heparin   Injectable 5000 Unit(s) SubCutaneous every 12 hours  hydrALAZINE 100 milliGRAM(s) Oral every 8 hours  insulin lispro (ADMELOG) corrective regimen sliding scale   SubCutaneous Before meals and at bedtime  insulin lispro Injectable (ADMELOG) 13 Unit(s) SubCutaneous three times a day before meals  lidocaine   4% Patch 1 Patch Transdermal daily  methocarbamol 1000 milliGRAM(s) Oral three times a day  NIFEdipine XL 30 milliGRAM(s) Oral daily  polyethylene glycol 3350 17 Gram(s) Oral daily  propofol Infusion 10 MICROgram(s)/kG/Min (4.2 mL/Hr) IV Continuous <Continuous>  propofol Injectable 20 milliGRAM(s) IV Push once  senna 2 Tablet(s) Oral at bedtime  sevelamer carbonate 1600 milliGRAM(s) Oral three times a day with meals    MEDICATIONS  (PRN):  albuterol    90 MICROgram(s) HFA Inhaler 2 Puff(s) Inhalation every 6 hours PRN Shortness of Breath and/or Wheezing  dextrose Oral Gel 15 Gram(s) Oral once PRN Blood Glucose LESS THAN 70 milliGRAM(s)/deciliter  traMADol 50 milliGRAM(s) Oral every 6 hours PRN Severe Pain (7 - 10)

## 2025-05-27 NOTE — PROGRESS NOTE ADULT - ASSESSMENT
59y Female with h/o ESRD on HD (MWF), IDDM, dyslipidemia, CKD-MBD, HTN who presented to the hospital with CP during HD session this morning.  Had 90 minutes of total HD prior to arrival.  Nephrology now consulted for HD needs while in-house.    sp HD yesterday  / regular days outpatient MWF  no need for HD today   sp intubation on MV   follow FS closely  renal diet  bp elevated / controlled yesterday on hdralaizne nifedipine resume   phos high, increase sevelamer to 3 tabs tid with meals   NST negative   h/h noted / check iron stores / will resume HSAR with HD aranesp 25 weekly   US Abdomen Upper Quadrant Right (05.20.25 @ 21:13) >  Cholelithiasis. No sonographic evidence of acute cholecystitis.  renal team will follow

## 2025-05-27 NOTE — CONSULT NOTE ADULT - CONSULT REASON
Stroke alert
AHRF - intubated
AMS 2/2 hyper/hypoglycemia
ESRD
Age indeterminate ischemic change
Patient with severe chronic back pain, workup pending, refractory to current pain regimen. Please evaluate and give recs

## 2025-05-27 NOTE — RAPID RESPONSE TEAM SUMMARY - NSSITUATIONBACKGROUNDRRT_GEN_ALL_CORE
RR was called due to AMS. Patient was found to be unresponsive to verbal, physical stimuli or sternal rub. BG was measured and noted to be 19. D5 x2 was administered via IV. BG recheck was 229. On exam patient is appeared to have agonal breathing with a pulse at 70 bpm. Patient was still noted to altered. On exam, patient pupils were nonreactive, dilated, still unresponsive, noted to be posturing in the upper and lower extremities bilaterally. CODE STROKE was called and anesthesia at bedside for RSI.     Family was updated: Rhianna (daughter) who re-established that the patient is full code. All questions answered.     Patient upgraded to the MICU for further monitoring   RR was called due to AMS. Patient was found to be unresponsive to verbal, physical stimuli or sternal rub. BG was measured and noted to be 19. D5 x2 was administered via IV. BG recheck was 229. On exam patient is appeared to have agonal breathing with a pulse at 70 bpm. Patient was still noted to altered. On exam, patient pupils were nonreactive, dilated, still unresponsive, noted to be posturing in the upper and lower extremities bilaterally. CODE STROKE was called and anesthesia at bedside for RSI.     Family was updated: Rhianna (daughter) who re-established that the patient is full code. All questions answered.     Patient upgraded to the MICU for further monitoring  Pt going for stroke imaging now

## 2025-05-27 NOTE — CHART NOTE - NSCHARTNOTEFT_GEN_A_CORE
MICU DOWN GRADE NOTE      Patient is a 59y old  Female who presents with a chief complaint of AMS (26 May 2025 19:39)    INTERVAL HPI/OVERNIGHT EVENTS:  RR was called due to AMS. Patient was found to be unresponsive to verbal, physical stimuli or sternal rub. BG was measured and noted to be 19. D5 x2 was administered via IV. BG recheck was 229. On exam patient is appeared to have agonal breathing with a pulse at 70 bpm. Patient was still noted to altered and unresponsive. On exam, patient pupils were nonreactive, dilated, still unresponsive, noted to be posturing in the upper and lower extremities bilaterally. CODE STROKE was called and anesthesia at bedside for RSI. Patient was intubated for airway concerns. GOC clarified: FULL code. Zoll pads placed. Family was updated: Rhianna (daughter) who re-established that the patient is full code. All questions answered. Patient upgraded to the MICU for further monitoring.     ----  HPI:  59y old Afghan speaking Female w/ PMHx of ESRD on HD MWF, DM (insulin dependent) HTN, HLD, and HFpEF who presents to ED for chest pain during HD session. Admitted for workup of chest pain and AMS.    #Chest pain during HD- Resolved  #HFpEF - not in exacerbation  - On admission. trop 64 -> 66, Creatinine 4.1  - ECG showed prominent T wave   - CXR negative   - TTE EF 60%. G1DD  - stress test neg for ischemia  - C/w protonix qd     #AMS 2/2 hypoglycemia likely 2/2 decreased lantus clearance 2/2 ESRD- resolved  #DM2, Insulin dependent- uncontrolled  - RRT on 5/18/2025 for AMS, FS 30 after 80u lantus, s/p d50 and d5LR   - Decrease Lantus to 22 Units from 25Un   - C/w lispro 13  - C/w ISS  - 5/20: S/p D5 for hypoglycemia and AMS  - Monitor fingersticks q4  - Changed gabapentin to 300 qhs  - Increased robaxin to 1000 TID    #Chronic Back pain- severe, uncontrolled  - CT C-spine- The study is motion limited. There is no obvious acute fracture or facet subluxation. There is multilevel degenerative loss of disc space height. At C2-3 there is disc osteophyte indenting the ventral thecal sac. Uncinate and facet hypertrophy with right foraminal stenosis. At C3-4 there is disc osteophyte indenting the ventral thecal sac. Uncinate and facet hypertrophy with left > right foraminal stenosis. At C4-5 there is disc osteophyte indenting the ventral thecal sac. Uncinate and facet hypertrophy with left > right foraminal stenosis. At C5-6 there is disc osteophyte indenting the ventral thecal sac. Uncinate and facet hypertrophy with left > right foraminal stenosis. At C6-7 there is disc osteophyte indenting the ventral thecal sac. Uncinate and facet hypertrophy with moderate right foraminal stenosis.   - Pain management recs appreciated  - C/w tylenol 975 q8  - D/c dilaudid  - c/w tramadol 50 q6 prn  - c/w Robaxin 1000 q8  - c/w gabapentin 300 qhs  - o/p F/u with Dr. Villanueva  - f/u neurosurgery c/s  - f/u MRI lumbar and c-spine    #RUQ pain- resolved  - RUQ US- Cholelithiasis. No sonographic evidence of acute cholecystitis.  - Pain resolved on 5/21/2025    #ESRD on HD MWF  #HTN    #Hyperkalemia  - nephro recs appreciated   - bp improves with HD  - c/w hydralazine 100mg q8h  - f/u iron studies  - Low K diet  - c/w aranesp 25 weekly with HD  - c/w doxazosin 1mg BID  - c/w sevelamer 1600 mg TID  - patient state she uses nifedipine 90 PO if her SBP is above 200 - will hold for now   - s/p nifedipine 30 x1 and labetalol 10 IVP x1 on 5/20 2/2 HTN to 200s SBP  - c/w nifedipine 30 qd    #On admission, AMS possibly 2/2 transient hypotension during HD- Resolved  - CTH showed A focal hypodensity is noted within the right frontal periventricular white matter, not previously seen. Finding may reflect age-indeterminate ischemic change. An MRI of the brain can be obtained for further evaluation if not clinically contraindicated.  - Neuro consult noted no additional inpatient workup.  - c/w ASA and atorvastatin    #Cough  - afebrile, no leukocytosis   - c/w Robitussin  - c/w advair bid, HFA prn    #Acute uncomplicated E. coli cystitis- resolved  - UA (+), UCx (+) E coli  - S/p CTX  - bcx ngtd     #DLD  - c/w home atorvastatin    DVT ppx: heparin  GI ppx: PPI   Diet: renal, DASH  Activity: as tolerated  Pending: control of hyperglycemia, control of back pain, neurosurgery c/s, MRI lumbar and c-spine, iron studies    REVIEW OF SYSTEMS:  CONSTITUTIONAL: No fever, chills + AMS  HEENT:  No blurry vision No sinus or throat pain  NECK: No pain or stiffness  RESPIRATORY: No cough, wheezing, chills or hemoptysis; No shortness of breath  CARDIOVASCULAR: No chest pain, palpitations  GASTROINTESTINAL: No abdominal pain. No nausea, vomiting, or diarrhea  GENITOURINARY: No dysuria  NEUROLOGICAL: +unresponsive  SKIN: No itching, burning, rashes, or lesions   MUSCULOSKELETAL: No joint pain or swelling; No muscle, back, or extremity pain    MEDICATIONS:  acetaminophen     Tablet .. 975 milliGRAM(s) Oral every 8 hours  albuterol    90 MICROgram(s) HFA Inhaler 2 Puff(s) Inhalation every 6 hours PRN  aluminum hydroxide/magnesium hydroxide/simethicone Suspension 30 milliLiter(s) Oral every 4 hours PRN  aspirin  chewable 81 milliGRAM(s) Oral daily  atorvastatin 10 milliGRAM(s) Oral at bedtime  chlorhexidine 2% Cloths 1 Application(s) Topical <User Schedule>  darbepoetin Injectable ViaL 25 MICROGram(s) IV Push <User Schedule>  dextrose 5%. 1000 milliLiter(s) IV Continuous <Continuous>  dextrose 50% Injectable 25 Gram(s) IV Push once  dextrose Oral Gel 15 Gram(s) Oral once PRN  doxazosin 1 milliGRAM(s) Oral <User Schedule>  fluticasone propionate/ salmeterol 100-50 MICROgram(s) Diskus 1 Dose(s) Inhalation two times a day  gabapentin 300 milliGRAM(s) Oral at bedtime  glucagon  Injectable 1 milliGRAM(s) IntraMuscular once  guaiFENesin  milliGRAM(s) Oral every 12 hours  heparin   Injectable 5000 Unit(s) SubCutaneous every 12 hours  hydrALAZINE 100 milliGRAM(s) Oral every 8 hours  insulin glargine Injectable (LANTUS) 22 Unit(s) SubCutaneous at bedtime  insulin lispro (ADMELOG) corrective regimen sliding scale   SubCutaneous Before meals and at bedtime  insulin lispro Injectable (ADMELOG) 13 Unit(s) SubCutaneous three times a day before meals  lidocaine   4% Patch 1 Patch Transdermal daily  methocarbamol 1000 milliGRAM(s) Oral three times a day  NIFEdipine XL 30 milliGRAM(s) Oral daily  pantoprazole    Tablet 40 milliGRAM(s) Oral before breakfast  polyethylene glycol 3350 17 Gram(s) Oral daily  senna 2 Tablet(s) Oral at bedtime  sevelamer carbonate 1600 milliGRAM(s) Oral three times a day with meals  traMADol 50 milliGRAM(s) Oral every 6 hours PRN      T(C): 36.4 (05-27-25 @ 04:00), Max: 36.8 (05-26-25 @ 20:30)  HR: 75 (05-27-25 @ 04:00) (72 - 85)  BP: 189/69 (05-27-25 @ 04:00) (110/64 - 189/69)  RR: 18 (05-26-25 @ 20:30) (18 - 18)  SpO2: 91% (05-27-25 @ 04:00) (91% - 98%)  Wt(kg): --Vital Signs Last 24 Hrs  T(C): 36.4 (27 May 2025 04:00), Max: 36.8 (26 May 2025 20:30)  T(F): 97.5 (27 May 2025 04:00), Max: 98.2 (26 May 2025 20:30)  HR: 75 (27 May 2025 04:00) (72 - 85)  BP: 189/69 (27 May 2025 04:00) (110/64 - 189/69)  BP(mean): --  RR: 18 (26 May 2025 20:30) (18 - 18)  SpO2: 91% (27 May 2025 04:00) (91% - 98%)    Parameters below as of 26 May 2025 20:30  Patient On (Oxygen Delivery Method): room air        PHYSICAL EXAM:  GENERAL: NAD, well-groomed, well-developed  HEAD:  Atraumatic, Normocephalic  EYES: EOMI, PERRLA, conjunctiva and sclera clear  ENMT:  Moist mucous membranes  NECK: Supple, No JVD,  CHEST/LUNG: Clear to auscultation bilaterally; No rales, rhonchi, wheezing, or rubs  HEART: Regular rate and rhythm; No murmurs, rubs, or gallops  ABDOMEN: Soft, Nontender, Nondistended; Bowel sounds present  NEURO: Alert & Oriented X3  EXTREMITIES: No LE edema, no calf tenderness  LYMPH: No lymphadenopathy noted  SKIN: No rashes or lesions    Consultant(s) Notes Reviewed:  [x ] YES  [ ] NO  Care Discussed with Consultants/Other Providers [ x] YES  [ ] NO    LABS:                        9.6    5.44  )-----------( 184      ( 26 May 2025 07:00 )             30.1     05-26    137  |  96[L]  |  65[HH]  ----------------------------<  50[LL]  5.6[H]   |  22  |  9.4[HH]    Ca    9.2      26 May 2025 07:00  Phos  7.0     05-26  Mg     3.3     05-26        Urinalysis Basic - ( 26 May 2025 07:00 )    Color: x / Appearance: x / SG: x / pH: x  Gluc: 50 mg/dL / Ketone: x  / Bili: x / Urobili: x   Blood: x / Protein: x / Nitrite: x   Leuk Esterase: x / RBC: x / WBC x   Sq Epi: x / Non Sq Epi: x / Bacteria: x      CAPILLARY BLOOD GLUCOSE      POCT Blood Glucose.: 131 mg/dL (27 May 2025 06:58)  POCT Blood Glucose.: 141 mg/dL (27 May 2025 06:49)  POCT Blood Glucose.: 185 mg/dL (27 May 2025 06:41)  POCT Blood Glucose.: 229 mg/dL (27 May 2025 06:40)  POCT Blood Glucose.: 19 mg/dL (27 May 2025 06:37)  POCT Blood Glucose.: 326 mg/dL (26 May 2025 22:13)  POCT Blood Glucose.: 132 mg/dL (26 May 2025 18:43)  POCT Blood Glucose.: 95 mg/dL (26 May 2025 16:56)  POCT Blood Glucose.: 154 mg/dL (26 May 2025 12:37)  POCT Blood Glucose.: 94 mg/dL (26 May 2025 08:27)  POCT Blood Glucose.: 58 mg/dL (26 May 2025 07:42)      ABG - ( 27 May 2025 06:48 )  pH, Arterial: 7.33  pH, Blood: x     /  pCO2: 51    /  pO2: 325   / HCO3: 27    / Base Excess: 0.4   /  SaO2: 100.0             Urinalysis Basic - ( 26 May 2025 07:00 )    Color: x / Appearance: x / SG: x / pH: x  Gluc: 50 mg/dL / Ketone: x  / Bili: x / Urobili: x   Blood: x / Protein: x / Nitrite: x   Leuk Esterase: x / RBC: x / WBC x   Sq Epi: x / Non Sq Epi: x / Bacteria: x        RADIOLOGY & ADDITIONAL TESTS:    Imaging Personally Reviewed:  [x ] YES  [ ] NO

## 2025-05-27 NOTE — CHART NOTE - NSCHARTNOTEFT_GEN_A_CORE
Pt not seen today by me as has been upgraded to the ICU for Hypoglycemia/Metabolic Encephalopathy/On Lantus and is ESRD on HD.  r/o Medication Induced Hypoglycemia vs Adrenal Insuf vs Sepsis   In the interim rule out provoked stroke or seizures from hypoglycemia   Thank you for the ICU Care and pls update the family Pt not seen today by me as has been upgraded to the ICU for Hypoglycemia/Metabolic Encephalopathy/On Lantus and is ESRD on HD.  r/o Medication Induced Hypoglycemia vs Adrenal Insuf vs Sepsis   In the interim rule out provoked stroke or seizures from hypoglycemia   Thank you for the ICU Care and pls update the family and needs GOC Discussion w/ HCP

## 2025-05-27 NOTE — PROGRESS NOTE ADULT - SUBJECTIVE AND OBJECTIVE BOX
Nephrology progress note    Patient is seen and examined, events over the last 24 h noted .  Lying in bed   rapid response noted   now on MV     Allergies:  No Known Allergies    Hospital Medications:   MEDICATIONS  (STANDING):  acetaminophen     Tablet .. 975 milliGRAM(s) Oral every 8 hours  aspirin  chewable 81 milliGRAM(s) Oral daily  atorvastatin 80 milliGRAM(s) Oral at bedtime  darbepoetin Injectable ViaL 25 MICROGram(s) IV Push <User Schedule>  dexMEDEtomidine Infusion 0.4 MICROgram(s)/kG/Hr (7 mL/Hr) IV Continuous <Continuous>  doxazosin 1 milliGRAM(s) Oral <User Schedule>  fentaNYL   Infusion 0.5 MICROgram(s)/kG/Hr (3.5 mL/Hr) IV Continuous <Continuous>  fluticasone propionate/ salmeterol 100-50 MICROgram(s) Diskus 1 Dose(s) Inhalation two times a day  glucagon  Injectable 1 milliGRAM(s) IntraMuscular once  guaiFENesin  milliGRAM(s) Oral every 12 hours  heparin   Injectable 5000 Unit(s) SubCutaneous every 12 hours  hydrALAZINE 100 milliGRAM(s) Oral every 8 hours  hydrALAZINE Injectable 10 milliGRAM(s) IV Push once  insulin lispro (ADMELOG) corrective regimen sliding scale   SubCutaneous Before meals and at bedtime  lidocaine   4% Patch 1 Patch Transdermal daily  NIFEdipine XL 30 milliGRAM(s) Oral daily  polyethylene glycol 3350 17 Gram(s) Oral daily  propofol Infusion 10 MICROgram(s)/kG/Min (4.2 mL/Hr) IV Continuous <Continuous>  propofol Injectable 20 milliGRAM(s) IV Push once  senna 2 Tablet(s) Oral at bedtime  sevelamer carbonate 1600 milliGRAM(s) Oral three times a day with meals        VITALS:  T(F): 94.8 (05-27-25 @ 09:00), Max: 98.2 (05-26-25 @ 20:30)  HR: 59 (05-27-25 @ 09:00)  BP: 191/88 (05-27-25 @ 09:00)  RR: 14 (05-27-25 @ 09:00)  SpO2: 100% (05-27-25 @ 09:00)      05-26 @ 07:01  -  05-27 @ 07:00  --------------------------------------------------------  IN: 0 mL / OUT: 2000 mL / NET: -2000 mL    05-27 @ 07:01  -  05-27 @ 10:12  --------------------------------------------------------  IN: 225.5 mL / OUT: 0 mL / NET: 225.5 mL          PHYSICAL EXAM:  Constitutional: intubated on mV   Respiratory: CTAB,  Cardiovascular: S1, S2, RRR  Gastrointestinal: BS+, soft, NT/ND  Extremities: No cyanosis or clubbing. No peripheral edema  Skin: No rashes    LABS:  05-27    135  |  95[L]  |  33[H]  ----------------------------<  151[H]  4.6   |  24  |  5.6[HH]    Ca    9.5      27 May 2025 07:00  Phos  7.0     05-26  Mg     3.1     05-27    TPro  6.6  /  Alb  4.2  /  TBili  0.3  /  DBili      /  AST  40  /  ALT  29  /  AlkPhos  102  05-27                          11.5   4.44  )-----------( 190      ( 27 May 2025 07:00 )             34.6       Urine Studies:  Urinalysis Basic - ( 27 May 2025 07:00 )    Color:  / Appearance:  / SG:  / pH:   Gluc: 151 mg/dL / Ketone:   / Bili:  / Urobili:    Blood:  / Protein:  / Nitrite:    Leuk Esterase:  / RBC:  / WBC    Sq Epi:  / Non Sq Epi:  / Bacteria:                   RADIOLOGY & ADDITIONAL STUDIES:

## 2025-05-27 NOTE — AIRWAY PLACEMENT NOTE ADULT - INDICATIONS:
airway protection for unresponsiveness/change in mental status/Route for mechanical ventilation/Respiratory distress

## 2025-05-27 NOTE — CONSULT NOTE ADULT - SUBJECTIVE AND OBJECTIVE BOX
***STROKE ALERT CONSULT NOTE    Chief Complaint: stroke alert    Last known normal time: 2:30    HPI:  Patient is a 59y old English-speaking Female with PMH of DM, ESRD on HD MWF, HTN, HLD, and HF, presents to ED for chest pain and SOB during HD session, admitted for workup of chest pain and AMS. RR was called due to AMS. Patient was found to be unresponsive to verbal, physical stimuli or sternal rub. BG was measured and noted to be 19. /70. NIHSS 26. On Aspirin 81 daily. Patient was intubated for airway protection and being transported to CT.       MEDICATIONS  (STANDING):  acetaminophen     Tablet .. 975 milliGRAM(s) Oral every 8 hours  aspirin  chewable 81 milliGRAM(s) Oral daily  atorvastatin 10 milliGRAM(s) Oral at bedtime  chlorhexidine 0.12% Liquid 15 milliLiter(s) Oral Mucosa every 12 hours  chlorhexidine 2% Cloths 1 Application(s) Topical <User Schedule>  darbepoetin Injectable ViaL 25 MICROGram(s) IV Push <User Schedule>  dexMEDEtomidine Infusion 0.4 MICROgram(s)/kG/Hr (7 mL/Hr) IV Continuous <Continuous>  dextrose 5% + sodium chloride 0.45%. 1000 milliLiter(s) (50 mL/Hr) IV Continuous <Continuous>  dextrose 5%. 1000 milliLiter(s) (50 mL/Hr) IV Continuous <Continuous>  dextrose 50% Injectable 25 Gram(s) IV Push once  doxazosin 1 milliGRAM(s) Oral <User Schedule>  fentaNYL   Infusion 0.5 MICROgram(s)/kG/Hr (3.5 mL/Hr) IV Continuous <Continuous>  fluticasone propionate/ salmeterol 100-50 MICROgram(s) Diskus 1 Dose(s) Inhalation two times a day  gabapentin 300 milliGRAM(s) Oral at bedtime  glucagon  Injectable 1 milliGRAM(s) IntraMuscular once  guaiFENesin  milliGRAM(s) Oral every 12 hours  heparin   Injectable 5000 Unit(s) SubCutaneous every 12 hours  hydrALAZINE 100 milliGRAM(s) Oral every 8 hours  insulin glargine Injectable (LANTUS) 22 Unit(s) SubCutaneous at bedtime  insulin lispro (ADMELOG) corrective regimen sliding scale   SubCutaneous Before meals and at bedtime  insulin lispro Injectable (ADMELOG) 13 Unit(s) SubCutaneous three times a day before meals  lidocaine   4% Patch 1 Patch Transdermal daily  methocarbamol 1000 milliGRAM(s) Oral three times a day  NIFEdipine XL 30 milliGRAM(s) Oral daily  pantoprazole    Tablet 40 milliGRAM(s) Oral before breakfast  polyethylene glycol 3350 17 Gram(s) Oral daily  propofol Infusion 10 MICROgram(s)/kG/Min (4.2 mL/Hr) IV Continuous <Continuous>  senna 2 Tablet(s) Oral at bedtime  sevelamer carbonate 1600 milliGRAM(s) Oral three times a day with meals    MEDICATIONS  (PRN):  albuterol    90 MICROgram(s) HFA Inhaler 2 Puff(s) Inhalation every 6 hours PRN Shortness of Breath and/or Wheezing  aluminum hydroxide/magnesium hydroxide/simethicone Suspension 30 milliLiter(s) Oral every 4 hours PRN Dyspepsia  dextrose Oral Gel 15 Gram(s) Oral once PRN Blood Glucose LESS THAN 70 milliGRAM(s)/deciliter  traMADol 50 milliGRAM(s) Oral every 6 hours PRN Severe Pain (7 - 10)      Vital Signs Last 24 Hrs  T(C): 36.4 (27 May 2025 04:00), Max: 36.8 (26 May 2025 20:30)  T(F): 97.5 (27 May 2025 04:00), Max: 98.2 (26 May 2025 20:30)  HR: 75 (27 May 2025 04:00) (72 - 85)  BP: 189/69 (27 May 2025 04:00) (110/64 - 189/69)  BP(mean): --  RR: 18 (26 May 2025 20:30) (18 - 18)  SpO2: 91% (27 May 2025 04:00) (91% - 98%)    Parameters below as of 26 May 2025 20:30  Patient On (Oxygen Delivery Method): room air        Neurologic Examination:  General: Appearance is consistent with chronologic age. No abnormal facies.   General: The patient is unresponsive to verbal or noxious stimuli. Could not assess language.   Cranial nerves: no nystagmus, pupils nonreactive and dilated. No facial asymmetry. Foaming around mouth.  Motor examination: Normal bulk. No abnormal movement.  Reflexes: 2+ b/l biceps, triceps, brachioradialis, patella and Achilles.  Plantar response downgoing b/l.  Clonus absent.  Sensory examination: withdraws to pain only in RLE  Cerebellum: could not assess    NIH Stroke Scale: 26      Labs:   CBC Full  -  ( 26 May 2025 07:00 )  WBC Count : 5.44 K/uL  RBC Count : 2.91 M/uL  Hemoglobin : 9.6 g/dL  Hematocrit : 30.1 %  Platelet Count - Automated : 184 K/uL  Mean Cell Volume : 103.4 fL  Mean Cell Hemoglobin : 33.0 pg  Mean Cell Hemoglobin Concentration : 31.9 g/dL  Auto Neutrophil # : x  Auto Lymphocyte # : x  Auto Monocyte # : x  Auto Eosinophil # : x  Auto Basophil # : x  Auto Neutrophil % : x  Auto Lymphocyte % : x  Auto Monocyte % : x  Auto Eosinophil % : x  Auto Basophil % : x    05-26    137  |  96[L]  |  65[HH]  ----------------------------<  50[LL]  5.6[H]   |  22  |  9.4[HH]    Ca    9.2      26 May 2025 07:00  Phos  7.0     05-26  Mg     3.3     05-26        POCT Blood Glucose.: 125 mg/dL (05-27-25 @ 07:08)  POCT Blood Glucose.: 131 mg/dL (05-27-25 @ 06:58)  POCT Blood Glucose.: 141 mg/dL (05-27-25 @ 06:49)  POCT Blood Glucose.: 185 mg/dL (05-27-25 @ 06:41)  POCT Blood Glucose.: 229 mg/dL (05-27-25 @ 06:40)  POCT Blood Glucose.: 19 mg/dL (05-27-25 @ 06:37)  POCT Blood Glucose.: 326 mg/dL (05-26-25 @ 22:13)  POCT Blood Glucose.: 132 mg/dL (05-26-25 @ 18:43)  POCT Blood Glucose.: 95 mg/dL (05-26-25 @ 16:56)  POCT Blood Glucose.: 154 mg/dL (05-26-25 @ 12:37)  POCT Blood Glucose.: 94 mg/dL (05-26-25 @ 08:27)  POCT Blood Glucose.: 58 mg/dL (05-26-25 @ 07:42)      Neuroimaging:

## 2025-05-27 NOTE — CONSULT NOTE ADULT - ASSESSMENT
IMPRESSION:    Acute hypoxemic respiratory failure - on MV  AMS 2/2 hypoglycemia  Chronic back pain (severe L4,L5 spinal stenosis)  ESRD on HD  Mod/severe R ICA stenosis  HO HTN, HLD, DM  HO DKA  HO hypertensive urgencies     PLAN    CNS: SP stroke code. Follow up CT head. Fu neurology. Daily SAT. Sedation Precedex, Fentanyl. Target RASS -1 to -2. Hold pain meds today.    HEENT: Oral care. ET care    PULMONARY: HOB @ 45 degrees, aspiration precautions. ABG 7.39/43/168. No vent changes. Daily CXR    CARDIAC: D5 1/2 NS at 50 cc. I=O. ECHO EF 54%. Resume BP meds.     GASTROENTEROLOGY: OG Feeding. Bowel Regimen    RENAL: Monitor CMP & UO. Correct electrolytes as needed. HD per nephro    INFECTIOUS: Monitor off ABx. Monitor VS.    HEMATOLOGY: DVT ppx. LE duplex 5/19 negative. Monitor CBC and Coags.    ENDOCRINOLOGY: Check FS q 1 hours. on D5. Endo eval.     MUSCULOSKELETAL: Bed rest    DISPOSITION: MICU

## 2025-05-27 NOTE — CONSULT NOTE ADULT - ATTENDING COMMENTS
Ms. Ponce was seen and examined at bedside today, patient has insulin-dependent diabetes and ESRD and has been having difficulty with hypoglycemia and wide swings in glucose concentration.  Patient was found to be unresponsive this morning, and a rapid response/stroke code event ensued demonstrating recurrent hypoglycemia.  Patient failed to improve with administration of D50 and Narcan, and thus was intubated for airway protection.  Patient was also noted to have moderate to severe ICA stenosis on CT angiogram during her stroke code, though this appears less likely the etiology of her altered mental status.  We will consult endocrinology for improved management of her variable blood glucose levels, and hold some of her sedating medications for now.  Plan to SBT/SAT later today with possible extubation if significantly improving.  I agree with the fellow note, with the exceptions listed in my attestation above.  The remainder of impression and plan per fellow note.
59y old  Female with PMH of DM, ESRD on HD MWF, HTN, HLD, and HF who presents to ED for chest pain during HD session this morning.     #Hypoglycemia  - Patient has ESRD on insulin  - Episode of significant hypoglycemia  down to 19 on Poc, serum has also been low a few days ago currently intubated, a1c 8   - Blood glucose readings under fair control for now but also on D5, once weaned off D5 and blood glucose readings go about 200 mg p.o. today can consider starting sliding scale of 1 unit for every 50 mg/dL above 150 mg deciliter  - If blood glucose readings continue to remain above 200 then consider adding basal insulin 8 units  - DC regimen to be determined
Patient was seen on rounds with residents and/or ACPs.   Note reviewed and edited as needed based on independent history taking.  History  acetaminophen     Tablet .. 975 milliGRAM(s) Oral every 8 hours  albuterol    90 MICROgram(s) HFA Inhaler 2 Puff(s) Inhalation every 6 hours PRN  aspirin  chewable 81 milliGRAM(s) Oral daily  atorvastatin 80 milliGRAM(s) Oral at bedtime  chlorhexidine 0.12% Liquid 15 milliLiter(s) Oral Mucosa every 12 hours  chlorhexidine 2% Cloths 1 Application(s) Topical <User Schedule>  darbepoetin Injectable ViaL 25 MICROGram(s) IV Push <User Schedule>  dexMEDEtomidine Infusion 0.4 MICROgram(s)/kG/Hr IV Continuous <Continuous>  dextrose 5% + sodium chloride 0.45%. 1000 milliLiter(s) IV Continuous <Continuous>  dextrose 5%. 1000 milliLiter(s) IV Continuous <Continuous>  dextrose 50% Injectable 25 Gram(s) IV Push once  dextrose Oral Gel 15 Gram(s) Oral once PRN  doxazosin 1 milliGRAM(s) Oral <User Schedule>  fentaNYL   Infusion 0.5 MICROgram(s)/kG/Hr IV Continuous <Continuous>  fluticasone propionate/ salmeterol 100-50 MICROgram(s) Diskus 1 Dose(s) Inhalation two times a day  glucagon  Injectable 1 milliGRAM(s) IntraMuscular once  guaiFENesin  milliGRAM(s) Oral every 12 hours  heparin   Injectable 5000 Unit(s) SubCutaneous every 12 hours  hydrALAZINE 100 milliGRAM(s) Oral every 8 hours  insulin lispro (ADMELOG) corrective regimen sliding scale   SubCutaneous Before meals and at bedtime  lidocaine   4% Patch 1 Patch Transdermal daily  NIFEdipine XL 30 milliGRAM(s) Oral daily  polyethylene glycol 3350 17 Gram(s) Oral daily  propofol Infusion 10 MICROgram(s)/kG/Min IV Continuous <Continuous>  propofol Injectable 20 milliGRAM(s) IV Push once  senna 2 Tablet(s) Oral at bedtime  sevelamer carbonate 2400 milliGRAM(s) Oral three times a day with meals      T(C): 35 (05-27-25 @ 10:00), Max: 36.8 (05-26-25 @ 20:30)  HR: 58 (05-27-25 @ 10:00) (58 - 85)  BP: 198/88 (05-27-25 @ 10:00) (110/64 - 216/95)  RR: 14 (05-27-25 @ 10:00) (14 - 18)  SpO2: 100% (05-27-25 @ 10:00) (91% - 100%)  Exam  Results:                      11.5   4.44  )-----------( 190      ( 27 May 2025 07:00 )             34.6   05-27    135  |  95[L]  |  33[H]  ----------------------------<  151[H]  4.6   |  24  |  5.6[HH]    Ca    9.5      27 May 2025 07:00  Phos  7.0     05-26  Mg     3.1     05-27    TPro  6.6  /  Alb  4.2  /  TBili  0.3  /  DBili  x   /  AST  40  /  ALT  29  /  AlkPhos  102  05-27    Imp:  Plan reviewed.   Plan -  Clinical condition and prognosis discussed with patient and/or family  time spent  80    minutes  I have spent above time on the encounter which excludes teaching time and/or separately reported services and includes taking history and exam and reviewing imaging and tests and establishing diagnosis and complex decision making for management.

## 2025-05-27 NOTE — AIRWAY PLACEMENT NOTE ADULT - AIRWAY COMMENTS:
Patient intubated w/ 7.5 ETT w/ no complications. Patient remains hemodynamically stable post intubation.

## 2025-05-27 NOTE — CONSULT NOTE ADULT - ASSESSMENT
58yo Citizen of the [place]-speaking Female with PMH of DM, ESRD on HD MWF, HTN, HLD, and HF, presents to ED for chest pain and SOB during HD session, admitted for workup of chest pain and AMS. RR was called due to AMS at around 6am when patient found unresponsive to verbal or noxious stimuli. Blood glucose 19 at 6am. /70. NIHSS 26. On Aspirin 81 daily. Patient intubated for airway protection. CT____    Recommendation:  -  60yo Norwegian-speaking Female with PMH of DM, ESRD on HD MWF, HTN, HLD, and HF, presents to ED for chest pain and SOB during HD session, admitted for workup of chest pain and AMS. RR was called due to AMS at around 6am when patient found unresponsive to verbal or noxious stimuli. Blood glucose 19 at 6am. /70. NIHSS 26. On Aspirin 81 daily. Patient intubated for airway protection.    Recommendation:  - EEG  - MRI 60yo Bulgarian-speaking Female with PMH of DM, ESRD on HD MWF, HTN, HLD, and HF, presents to ED for chest pain and SOB during HD session, admitted for workup of chest pain and AMS. RR was called due to AMS at around 6am when patient found unresponsive to verbal or noxious stimuli. Blood glucose 19 at 6am. /70. NIHSS 26. On Aspirin 81 daily. Patient intubated for airway protection. CTH negative, CTA head and neck with no LVO. No tnk as pt likely with AMS 2/2 to hypoglycemia. Low suspicion for stroke or seizure at this time, episode likely metabolic induced, however it pt remains altered once weaned off of sedation, would consider MRI brain non con and EEG.     Recommendation:  -if not returned to baseline once sedation adequately weaned off, obtain MRI brain non con and EEG.

## 2025-05-28 LAB
ANION GAP SERPL CALC-SCNC: 17 MMOL/L — HIGH (ref 7–14)
BASE EXCESS BLDA CALC-SCNC: 1.2 MMOL/L — SIGNIFICANT CHANGE UP (ref -2–3)
BASE EXCESS BLDA CALC-SCNC: 4.5 MMOL/L — HIGH (ref -2–3)
BASOPHILS # BLD AUTO: 0.03 K/UL — SIGNIFICANT CHANGE UP (ref 0–0.2)
BASOPHILS NFR BLD AUTO: 0.5 % — SIGNIFICANT CHANGE UP (ref 0–1)
BUN SERPL-MCNC: 39 MG/DL — HIGH (ref 10–20)
CALCIUM SERPL-MCNC: 9.6 MG/DL — SIGNIFICANT CHANGE UP (ref 8.4–10.5)
CHLORIDE SERPL-SCNC: 94 MMOL/L — LOW (ref 98–110)
CO2 SERPL-SCNC: 20 MMOL/L — SIGNIFICANT CHANGE UP (ref 17–32)
CREAT SERPL-MCNC: 6.5 MG/DL — CRITICAL HIGH (ref 0.7–1.5)
EGFR: 7 ML/MIN/1.73M2 — LOW
EGFR: 7 ML/MIN/1.73M2 — LOW
EOSINOPHIL # BLD AUTO: 0.16 K/UL — SIGNIFICANT CHANGE UP (ref 0–0.7)
EOSINOPHIL NFR BLD AUTO: 2.8 % — SIGNIFICANT CHANGE UP (ref 0–8)
GAS PNL BLDA: SIGNIFICANT CHANGE UP
GAS PNL BLDA: SIGNIFICANT CHANGE UP
GLUCOSE BLDC GLUCOMTR-MCNC: 135 MG/DL — HIGH (ref 70–99)
GLUCOSE BLDC GLUCOMTR-MCNC: 182 MG/DL — HIGH (ref 70–99)
GLUCOSE BLDC GLUCOMTR-MCNC: 217 MG/DL — HIGH (ref 70–99)
GLUCOSE BLDC GLUCOMTR-MCNC: 261 MG/DL — HIGH (ref 70–99)
GLUCOSE BLDC GLUCOMTR-MCNC: 261 MG/DL — HIGH (ref 70–99)
GLUCOSE BLDC GLUCOMTR-MCNC: 310 MG/DL — HIGH (ref 70–99)
GLUCOSE BLDC GLUCOMTR-MCNC: 88 MG/DL — SIGNIFICANT CHANGE UP (ref 70–99)
GLUCOSE BLDC GLUCOMTR-MCNC: 96 MG/DL — SIGNIFICANT CHANGE UP (ref 70–99)
GLUCOSE BLDC GLUCOMTR-MCNC: 96 MG/DL — SIGNIFICANT CHANGE UP (ref 70–99)
GLUCOSE SERPL-MCNC: 41 MG/DL — CRITICAL LOW (ref 70–99)
HCO3 BLDA-SCNC: 28 MMOL/L — SIGNIFICANT CHANGE UP (ref 21–28)
HCO3 BLDA-SCNC: 28 MMOL/L — SIGNIFICANT CHANGE UP (ref 21–28)
HCT VFR BLD CALC: 33.1 % — LOW (ref 37–47)
HGB BLD-MCNC: 10.7 G/DL — LOW (ref 12–16)
IMM GRANULOCYTES NFR BLD AUTO: 0.4 % — HIGH (ref 0.1–0.3)
IRON SATN MFR SERPL: 17 % — SIGNIFICANT CHANGE UP (ref 15–50)
IRON SATN MFR SERPL: 29 UG/DL — LOW (ref 35–150)
LYMPHOCYTES # BLD AUTO: 0.7 K/UL — LOW (ref 1.2–3.4)
LYMPHOCYTES # BLD AUTO: 12.4 % — LOW (ref 20.5–51.1)
MAGNESIUM SERPL-MCNC: 3.1 MG/DL — CRITICAL HIGH (ref 1.8–2.4)
MCHC RBC-ENTMCNC: 32.3 G/DL — SIGNIFICANT CHANGE UP (ref 32–37)
MCHC RBC-ENTMCNC: 33.4 PG — HIGH (ref 27–31)
MCV RBC AUTO: 103.4 FL — HIGH (ref 81–99)
MONOCYTES # BLD AUTO: 0.57 K/UL — SIGNIFICANT CHANGE UP (ref 0.1–0.6)
MONOCYTES NFR BLD AUTO: 10.1 % — HIGH (ref 1.7–9.3)
NEUTROPHILS # BLD AUTO: 4.16 K/UL — SIGNIFICANT CHANGE UP (ref 1.4–6.5)
NEUTROPHILS NFR BLD AUTO: 73.8 % — SIGNIFICANT CHANGE UP (ref 42.2–75.2)
NRBC BLD AUTO-RTO: 0 /100 WBCS — SIGNIFICANT CHANGE UP (ref 0–0)
PCO2 BLDA: 39 MMHG — SIGNIFICANT CHANGE UP (ref 32–45)
PCO2 BLDA: 50 MMHG — HIGH (ref 32–45)
PH BLDA: 7.35 — SIGNIFICANT CHANGE UP (ref 7.35–7.45)
PH BLDA: 7.47 — HIGH (ref 7.35–7.45)
PLATELET # BLD AUTO: 163 K/UL — SIGNIFICANT CHANGE UP (ref 130–400)
PMV BLD: 10.6 FL — HIGH (ref 7.4–10.4)
PO2 BLDA: 139 MMHG — HIGH (ref 83–108)
PO2 BLDA: 175 MMHG — HIGH (ref 83–108)
POTASSIUM SERPL-MCNC: 5.3 MMOL/L — HIGH (ref 3.5–5)
POTASSIUM SERPL-SCNC: 5.3 MMOL/L — HIGH (ref 3.5–5)
RBC # BLD: 3.2 M/UL — LOW (ref 4.2–5.4)
RBC # FLD: 18.2 % — HIGH (ref 11.5–14.5)
SAO2 % BLDA: 98.7 % — HIGH (ref 94–98)
SAO2 % BLDA: 99.2 % — HIGH (ref 94–98)
SODIUM SERPL-SCNC: 131 MMOL/L — LOW (ref 135–146)
TIBC SERPL-MCNC: 175 UG/DL — LOW (ref 220–430)
UIBC SERPL-MCNC: 146 UG/DL — SIGNIFICANT CHANGE UP (ref 110–370)
WBC # BLD: 5.64 K/UL — SIGNIFICANT CHANGE UP (ref 4.8–10.8)
WBC # FLD AUTO: 5.64 K/UL — SIGNIFICANT CHANGE UP (ref 4.8–10.8)

## 2025-05-28 PROCEDURE — 93010 ELECTROCARDIOGRAM REPORT: CPT

## 2025-05-28 PROCEDURE — 99291 CRITICAL CARE FIRST HOUR: CPT | Mod: GC

## 2025-05-28 RX ORDER — SODIUM ZIRCONIUM CYCLOSILICATE 5 G/5G
5 POWDER, FOR SUSPENSION ORAL ONCE
Refills: 0 | Status: COMPLETED | OUTPATIENT
Start: 2025-05-28 | End: 2025-05-28

## 2025-05-28 RX ORDER — ONDANSETRON HCL/PF 4 MG/2 ML
4 VIAL (ML) INJECTION EVERY 4 HOURS
Refills: 0 | Status: DISCONTINUED | OUTPATIENT
Start: 2025-05-28 | End: 2025-06-08

## 2025-05-28 RX ORDER — METHOCARBAMOL 500 MG/1
1000 TABLET, FILM COATED ORAL THREE TIMES A DAY
Refills: 0 | Status: DISCONTINUED | OUTPATIENT
Start: 2025-05-28 | End: 2025-06-08

## 2025-05-28 RX ORDER — DEXMEDETOMIDINE HYDROCHLORIDE IN SODIUM CHLORIDE 4 UG/ML
0.4 INJECTION INTRAVENOUS
Qty: 400 | Refills: 0 | Status: DISCONTINUED | OUTPATIENT
Start: 2025-05-28 | End: 2025-05-29

## 2025-05-28 RX ORDER — GABAPENTIN 400 MG/1
300 CAPSULE ORAL AT BEDTIME
Refills: 0 | Status: DISCONTINUED | OUTPATIENT
Start: 2025-05-28 | End: 2025-06-08

## 2025-05-28 RX ORDER — NICARDIPINE HCL 30 MG
5 CAPSULE ORAL
Qty: 40 | Refills: 0 | Status: DISCONTINUED | OUTPATIENT
Start: 2025-05-28 | End: 2025-05-29

## 2025-05-28 RX ORDER — LORAZEPAM 4 MG/ML
0.25 VIAL (ML) INJECTION ONCE
Refills: 0 | Status: DISCONTINUED | OUTPATIENT
Start: 2025-05-28 | End: 2025-05-28

## 2025-05-28 RX ORDER — TRAMADOL HYDROCHLORIDE 50 MG/1
50 TABLET, FILM COATED ORAL EVERY 6 HOURS
Refills: 0 | Status: DISCONTINUED | OUTPATIENT
Start: 2025-05-28 | End: 2025-05-29

## 2025-05-28 RX ORDER — DEXTROSE 50 % IN WATER 50 %
25 SYRINGE (ML) INTRAVENOUS ONCE
Refills: 0 | Status: COMPLETED | OUTPATIENT
Start: 2025-05-28 | End: 2025-05-28

## 2025-05-28 RX ORDER — LABETALOL HYDROCHLORIDE 200 MG/1
10 TABLET, FILM COATED ORAL ONCE
Refills: 0 | Status: COMPLETED | OUTPATIENT
Start: 2025-05-28 | End: 2025-05-28

## 2025-05-28 RX ADMIN — DOXAZOSIN MESYLATE 1 MILLIGRAM(S): 8 TABLET ORAL at 11:29

## 2025-05-28 RX ADMIN — INSULIN LISPRO 4: 100 INJECTION, SOLUTION INTRAVENOUS; SUBCUTANEOUS at 23:55

## 2025-05-28 RX ADMIN — Medication 81 MILLIGRAM(S): at 11:30

## 2025-05-28 RX ADMIN — Medication 10 MILLIGRAM(S): at 16:07

## 2025-05-28 RX ADMIN — Medication 1 APPLICATION(S): at 06:14

## 2025-05-28 RX ADMIN — LIDOCAINE HYDROCHLORIDE 1 PATCH: 20 JELLY TOPICAL at 05:25

## 2025-05-28 RX ADMIN — HEPARIN SODIUM 5000 UNIT(S): 1000 INJECTION INTRAVENOUS; SUBCUTANEOUS at 17:18

## 2025-05-28 RX ADMIN — Medication 25 GRAM(S): at 06:10

## 2025-05-28 RX ADMIN — DEXMEDETOMIDINE HYDROCHLORIDE IN SODIUM CHLORIDE 7 MICROGRAM(S)/KG/HR: 4 INJECTION INTRAVENOUS at 01:50

## 2025-05-28 RX ADMIN — Medication 25 MG/HR: at 22:57

## 2025-05-28 RX ADMIN — INSULIN LISPRO 2: 100 INJECTION, SOLUTION INTRAVENOUS; SUBCUTANEOUS at 07:55

## 2025-05-28 RX ADMIN — HEPARIN SODIUM 5000 UNIT(S): 1000 INJECTION INTRAVENOUS; SUBCUTANEOUS at 06:15

## 2025-05-28 RX ADMIN — POLYETHYLENE GLYCOL 3350 17 GRAM(S): 17 POWDER, FOR SOLUTION ORAL at 11:29

## 2025-05-28 RX ADMIN — SODIUM ZIRCONIUM CYCLOSILICATE 5 GRAM(S): 5 POWDER, FOR SUSPENSION ORAL at 11:29

## 2025-05-28 RX ADMIN — DEXMEDETOMIDINE HYDROCHLORIDE IN SODIUM CHLORIDE 7 MICROGRAM(S)/KG/HR: 4 INJECTION INTRAVENOUS at 21:29

## 2025-05-28 RX ADMIN — LABETALOL HYDROCHLORIDE 10 MILLIGRAM(S): 200 TABLET, FILM COATED ORAL at 17:18

## 2025-05-28 RX ADMIN — Medication 4 MILLIGRAM(S): at 18:58

## 2025-05-28 RX ADMIN — Medication 15 MILLILITER(S): at 06:14

## 2025-05-28 RX ADMIN — Medication 100 MILLIGRAM(S): at 06:15

## 2025-05-28 RX ADMIN — DOXAZOSIN MESYLATE 1 MILLIGRAM(S): 8 TABLET ORAL at 22:58

## 2025-05-28 RX ADMIN — DEXMEDETOMIDINE HYDROCHLORIDE IN SODIUM CHLORIDE 7 MICROGRAM(S)/KG/HR: 4 INJECTION INTRAVENOUS at 02:56

## 2025-05-28 RX ADMIN — Medication 10 MILLIGRAM(S): at 19:46

## 2025-05-28 NOTE — PROCEDURAL SAFETY CHECKLIST WITH OR WITHOUT SEDATION - FIRE RISK ASSESSMENT ADDRESSED
Alert-The patient is alert, awake and responds to voice. The patient is oriented to time, place, and person. The triage nurse is able to obtain subjective information.
n/a

## 2025-05-28 NOTE — PROGRESS NOTE ADULT - ASSESSMENT
IMPRESSION:    Acute hypoxemic respiratory failure - on MV  AMS 2/2 hypoglycemia  Chronic back pain (severe L4,L5 spinal stenosis)  ESRD on HD  Mod/severe R ICA stenosis  HO HTN, HLD, DM  HO DKA  HO hypertensive urgencies     PLAN    CNS: SP stroke code. Follow up CT head. Fu neurology. Daily SAT. Sedation Precedex, Fentanyl. Target RASS -1 to -2. Resume pain meds today.    HEENT: Oral care. ET care    PULMONARY: HOB @ 45 degrees, aspiration precautions.  No vent changes. Daily CXR. SBT    CARDIAC: D5 1/2 NS at 50 cc. I=O. ECHO EF 54%. Resume BP meds.     GASTROENTEROLOGY: OG Feeding. Bowel Regimen    RENAL: Monitor CMP & UO. Correct electrolytes as needed. HD per nephro    INFECTIOUS: Monitor off ABx. Monitor VS.    HEMATOLOGY: DVT ppx. LE duplex 5/19 negative. Monitor CBC and Coags.    ENDOCRINOLOGY: Check FS q 1 hours. on D5. Endo eval.     MUSCULOSKELETAL: Bed rest    DISPOSITION: MICU IMPRESSION:    Acute hypoxemic respiratory failure - on MV  AMS 2/2 hypoglycemia  Chronic back pain (severe L4,L5 spinal stenosis)  ESRD on HD  Mod/severe R ICA stenosis  HO HTN, HLD, DM  HO DKA  HO hypertensive urgencies     PLAN    CNS: SP stroke code. Follow up CT head. Fu neurology. Daily SAT. Sedation Precedex, Fentanyl. Target RASS -1 to -2. Resume pain meds today.    HEENT: Oral care. ET care    PULMONARY: HOB @ 45 degrees, aspiration precautions.  No vent changes. Daily CXR. SBT    CARDIAC:  I=O. ECHO EF 54%. Resume BP meds.     GASTROENTEROLOGY: OG Feeding. Bowel Regimen    RENAL: Monitor CMP & UO. Correct electrolytes as needed. HD per nephro    INFECTIOUS: Monitor off ABx. Monitor VS.    HEMATOLOGY: DVT ppx. LE duplex 5/19 negative. Monitor CBC and Coags.    ENDOCRINOLOGY: Check FS q 1 hours. on D5. Endo eval.     MUSCULOSKELETAL: IAT    DISPOSITION: MICU

## 2025-05-28 NOTE — PROGRESS NOTE ADULT - ASSESSMENT
Acute hypoxemic respiratory failure - on MV  AMS 2/2 hypoglycemia  Chronic back pain (severe L4,L5 spinal stenosis)  ESRD on HD  Mod/severe R ICA stenosis  HO HTN, HLD, DM  HO DKA  HO hypertensive urgencies    PLAN    CNS: Follow up CT head. As per neurology, obtain MRI if not able to recover mental status after extubation. Plan for extubation today    HEENT: Oral care. ET care    PULMONARY: HOB @ 45 degrees, aspiration precautions. Plan for extubation today    CARDIAC:  I=O. ECHO EF 54%. Resume BP meds.     GASTROENTEROLOGY: OG Feeding. Bowel Regimen    RENAL: Monitor CMP & UO. Correct electrolytes as needed. HD per nephro    INFECTIOUS: Monitor off ABx. Monitor VS.    HEMATOLOGY: DVT ppx. LE duplex 5/19 negative. Monitor CBC and Coags.    ENDOCRINOLOGY: Check FS q 1 hours. on D5. FU endo    MUSCULOSKELETAL: IAT    DISPOSITION: MICU

## 2025-05-28 NOTE — PROGRESS NOTE ADULT - ASSESSMENT
59y Female with h/o ESRD on HD (MW), IDDM, dyslipidemia, CKD-MBD, HTN who presented to the hospital with CP during HD session this morning.  Had 90 minutes of total HD prior to arrival.  Nephrology now consulted for HD needs while in-house.    HD today 3h UF 2l  regular days outpatient MWF  sp intubation on MV   follow FS closely appreciate endo eval   renal diet  bp noted keep current   phos high, increased sevelamer to 3 tabs tid with meals   NST negative   h/h noted / check iron stores / resumed  SHAR with HD aranesp 25 weekly   US Abdomen Upper Quadrant Right (05.20.25 @ 21:13) >  Cholelithiasis. No sonographic evidence of acute cholecystitis.  renal team will follow

## 2025-05-28 NOTE — PROGRESS NOTE ADULT - SUBJECTIVE AND OBJECTIVE BOX
Nephrology progress note    Patient is seen and examined, events over the last 24 h noted .  Lying in bed     Allergies:  No Known Allergies    Hospital Medications:   MEDICATIONS  (STANDING):  aspirin  chewable 81 milliGRAM(s) Oral daily  atorvastatin 80 milliGRAM(s) Oral at bedtime  darbepoetin Injectable ViaL 25 MICROGram(s) IV Push <User Schedule>  dexMEDEtomidine Infusion 0.4 MICROgram(s)/kG/Hr (7 mL/Hr) IV Continuous <Continuous>  dextrose 5%. 1000 milliLiter(s) (50 mL/Hr) IV Continuous <Continuous>  dextrose 50% Injectable 25 Gram(s) IV Push once  doxazosin 1 milliGRAM(s) Oral <User Schedule>  fentaNYL   Infusion 0.5 MICROgram(s)/kG/Hr (3.5 mL/Hr) IV Continuous <Continuous>  fluticasone propionate/ salmeterol 100-50 MICROgram(s) Diskus 1 Dose(s) Inhalation two times a day  gabapentin 300 milliGRAM(s) Oral at bedtime  glucagon  Injectable 1 milliGRAM(s) IntraMuscular once  heparin   Injectable 5000 Unit(s) SubCutaneous every 12 hours  hydrALAZINE 100 milliGRAM(s) Oral every 8 hours  insulin lispro (ADMELOG) corrective regimen sliding scale   SubCutaneous Before meals and at bedtime  lidocaine   4% Patch 1 Patch Transdermal daily  methocarbamol 1000 milliGRAM(s) Oral three times a day  NIFEdipine XL 30 milliGRAM(s) Oral daily  polyethylene glycol 3350 17 Gram(s) Oral daily  propofol Infusion 10 MICROgram(s)/kG/Min (4.2 mL/Hr) IV Continuous <Continuous>  propofol Injectable 20 milliGRAM(s) IV Push once  senna 2 Tablet(s) Oral at bedtime  sevelamer carbonate Powder 2400 milliGRAM(s) Oral three times a day with meals  sodium zirconium cyclosilicate 5 Gram(s) Oral once        VITALS:  T(F): 97.6 (05-28-25 @ 08:00), Max: 98.1 (05-27-25 @ 16:00)  HR: 63 (05-28-25 @ 09:00)  BP: 150/68 (05-28-25 @ 09:00)  RR: 19 (05-28-25 @ 09:00)  SpO2: 100% (05-28-25 @ 09:00)      05-26 @ 07:01 - 05-27 @ 07:00  --------------------------------------------------------  IN: 0 mL / OUT: 2000 mL / NET: -2000 mL    05-27 @ 07:01 - 05-28 @ 07:00  --------------------------------------------------------  IN: 1582.9 mL / OUT: 150 mL / NET: 1432.9 mL    05-28 @ 07:01 - 05-28 @ 10:32  --------------------------------------------------------  IN: 59.4 mL / OUT: 0 mL / NET: 59.4 mL          PHYSICAL EXAM:  Constitutional: intubated on MV   Respiratory: CTAB, no wheezes, rales or rhonchi  Cardiovascular: S1, S2, RRR  Gastrointestinal: BS+, soft, NT/ND  Extremities: No cyanosis or clubbing. No peripheral edema  Skin: No rashes    LABS:  05-28    131[L]  |  94[L]  |  39[H]  ----------------------------<  41[LL]  5.3[H]   |  20  |  6.5[HH]    Ca    9.6      28 May 2025 04:22  Mg     3.1     05-28    TPro  6.6  /  Alb  4.2  /  TBili  0.3  /  DBili      /  AST  40  /  ALT  29  /  AlkPhos  102  05-27                          10.7   5.64  )-----------( 163      ( 28 May 2025 04:22 )             33.1       Urine Studies:  Urinalysis Basic - ( 28 May 2025 04:22 )    Color:  / Appearance:  / SG:  / pH:   Gluc: 41 mg/dL / Ketone:   / Bili:  / Urobili:    Blood:  / Protein:  / Nitrite:    Leuk Esterase:  / RBC:  / WBC    Sq Epi:  / Non Sq Epi:  / Bacteria:           Iron 29, TIBC 175, %sat 17      [05-28-25 @ 04:22]          RADIOLOGY & ADDITIONAL STUDIES:

## 2025-05-28 NOTE — PROGRESS NOTE ADULT - ATTENDING COMMENTS
Ms Rosario was seen & examined again today at bedside, she is following commands when on less sedation, and may be ready for SBT and extubation today after HD session.  Her hypoglycemia recurred overnight, requiring an additional D50 administration.  Appreciate endocrinology recommendations.  Requires improved blood glucose management given wild swings during the day and night.  I agree with the fellow note, with the exceptions listed in my attestation above.  The remainder of impression and plan per fellow note.

## 2025-05-28 NOTE — PROGRESS NOTE ADULT - SUBJECTIVE AND OBJECTIVE BOX
Patient is a 59y old  Female who presents with a chief complaint of AMS (27 May 2025 14:36)        Over Night Events:        ROS:     All ROS are negative except HPI         PHYSICAL EXAM    ICU Vital Signs Last 24 Hrs  T(C): 35.9 (28 May 2025 05:00), Max: 36.7 (27 May 2025 16:00)  T(F): 96.6 (28 May 2025 05:00), Max: 98.1 (27 May 2025 16:00)  HR: 59 (28 May 2025 07:00) (55 - 67)  BP: 116/58 (28 May 2025 07:00) (116/58 - 216/95)  BP(mean): 84 (28 May 2025 07:00) (82 - 136)  ABP: --  ABP(mean): --  RR: 18 (28 May 2025 07:00) (14 - 18)  SpO2: 100% (28 May 2025 07:00) (99% - 100%)    O2 Parameters below as of 27 May 2025 18:00  Patient On (Oxygen Delivery Method): ventilator            CONSTITUTIONAL:  Well nourished.  NAD    ENT:   Airway patent,   Mouth with normal mucosa.   No thrush    EYES:   Pupils equal,   Round and reactive to light.    CARDIAC:   Normal rate,   Regular rhythm.    No edema      Vascular:  Normal systolic impulse  No Carotid bruits    RESPIRATORY:   No wheezing  Bilateral BS  Normal chest expansion  Not tachypneic,  No use of accessory muscles    GASTROINTESTINAL:  Abdomen soft,   Non-tender,   No guarding,   + BS    MUSCULOSKELETAL:   Range of motion is not limited,  No clubbing, cyanosis    NEUROLOGICAL:   Alert and oriented   No motor  deficits.    SKIN:   Skin normal color for race,   Warm and dry and intact.   No evidence of rash.    PSYCHIATRIC:   Normal mood and affect.   No apparent risk to self or others.    HEMATOLOGICAL:  No cervical  lymphadenopathy.  no inguinal lymphadenopathy      05-27-25 @ 07:01  -  05-28-25 @ 07:00  --------------------------------------------------------  IN:    Dexmedetomidine: 64.5 mL    Dexmedetomidine: 192.2 mL    dextrose 5% + sodium chloride 0.45%: 140 mL    FentaNYL: 151 mL    FentaNYL: 234 mL    Nepro: 595 mL    Oral Fluid: 180 mL  Total IN: 1556.7 mL    OUT:    Indwelling Catheter - Urethral (mL): 110 mL    Voided (mL): 40 mL  Total OUT: 150 mL    Total NET: 1406.7 mL          LABS:                            10.7   5.64  )-----------( 163      ( 28 May 2025 04:22 )             33.1                                               05-28    131[L]  |  94[L]  |  39[H]  ----------------------------<  41[LL]  5.3[H]   |  20  |  6.5[HH]    Ca    9.6      28 May 2025 04:22  Mg     3.1     05-28    TPro  6.6  /  Alb  4.2  /  TBili  0.3  /  DBili  x   /  AST  40  /  ALT  29  /  AlkPhos  102  05-27      PT/INR - ( 27 May 2025 07:00 )   PT: 9.50 sec;   INR: 0.81 ratio         PTT - ( 27 May 2025 07:00 )  PTT:32.3 sec                                       Urinalysis Basic - ( 28 May 2025 04:22 )    Color: x / Appearance: x / SG: x / pH: x  Gluc: 41 mg/dL / Ketone: x  / Bili: x / Urobili: x   Blood: x / Protein: x / Nitrite: x   Leuk Esterase: x / RBC: x / WBC x   Sq Epi: x / Non Sq Epi: x / Bacteria: x                                                  LIVER FUNCTIONS - ( 27 May 2025 07:00 )  Alb: 4.2 g/dL / Pro: 6.6 g/dL / ALK PHOS: 102 U/L / ALT: 29 U/L / AST: 40 U/L / GGT: x                                                                                               Mode: AC/ CMV (Assist Control/ Continuous Mandatory Ventilation)  RR (machine): 14  TV (machine): 330  FiO2: 40  PEEP: 8  ITime: 0.8  MAP: 10  PIP: 20                                      ABG - ( 28 May 2025 03:45 )  pH, Arterial: 7.35  pH, Blood: x     /  pCO2: 50    /  pO2: 175   / HCO3: 28    / Base Excess: 1.2   /  SaO2: 99.2                MEDICATIONS  (STANDING):  aspirin  chewable 81 milliGRAM(s) Oral daily  atorvastatin 80 milliGRAM(s) Oral at bedtime  chlorhexidine 0.12% Liquid 15 milliLiter(s) Oral Mucosa every 12 hours  chlorhexidine 2% Cloths 1 Application(s) Topical <User Schedule>  darbepoetin Injectable ViaL 25 MICROGram(s) IV Push <User Schedule>  dexMEDEtomidine Infusion 0.4 MICROgram(s)/kG/Hr (7 mL/Hr) IV Continuous <Continuous>  dextrose 5%. 1000 milliLiter(s) (50 mL/Hr) IV Continuous <Continuous>  dextrose 50% Injectable 25 Gram(s) IV Push once  doxazosin 1 milliGRAM(s) Oral <User Schedule>  fentaNYL   Infusion 0.5 MICROgram(s)/kG/Hr (3.5 mL/Hr) IV Continuous <Continuous>  fluticasone propionate/ salmeterol 100-50 MICROgram(s) Diskus 1 Dose(s) Inhalation two times a day  glucagon  Injectable 1 milliGRAM(s) IntraMuscular once  guaiFENesin  milliGRAM(s) Oral every 12 hours  heparin   Injectable 5000 Unit(s) SubCutaneous every 12 hours  hydrALAZINE 100 milliGRAM(s) Oral every 8 hours  insulin lispro (ADMELOG) corrective regimen sliding scale   SubCutaneous Before meals and at bedtime  lidocaine   4% Patch 1 Patch Transdermal daily  NIFEdipine XL 30 milliGRAM(s) Oral daily  polyethylene glycol 3350 17 Gram(s) Oral daily  propofol Infusion 10 MICROgram(s)/kG/Min (4.2 mL/Hr) IV Continuous <Continuous>  propofol Injectable 20 milliGRAM(s) IV Push once  senna 2 Tablet(s) Oral at bedtime  sevelamer carbonate Powder 2400 milliGRAM(s) Oral three times a day with meals    MEDICATIONS  (PRN):  acetaminophen     Tablet .. 975 milliGRAM(s) Oral every 8 hours PRN Mild Pain (1 - 3)  albuterol    90 MICROgram(s) HFA Inhaler 2 Puff(s) Inhalation every 6 hours PRN Shortness of Breath and/or Wheezing  dextrose Oral Gel 15 Gram(s) Oral once PRN Blood Glucose LESS THAN 70 milliGRAM(s)/deciliter      New X-rays reviewed:                                                                                  ECHO    CXR interpreted by me:       Patient is a 59y old  Female who presents with a chief complaint of AMS (27 May 2025 14:36)        Over Night Events: Ill on MV. On Fentanyl and precedex. BG 41 SP D50      ROS:     All ROS are negative except HPI         PHYSICAL EXAM    ICU Vital Signs Last 24 Hrs  T(C): 35.9 (28 May 2025 05:00), Max: 36.7 (27 May 2025 16:00)  T(F): 96.6 (28 May 2025 05:00), Max: 98.1 (27 May 2025 16:00)  HR: 59 (28 May 2025 07:00) (55 - 67)  BP: 116/58 (28 May 2025 07:00) (116/58 - 216/95)  BP(mean): 84 (28 May 2025 07:00) (82 - 136)  RR: 18 (28 May 2025 07:00) (14 - 18)  SpO2: 100% (28 May 2025 07:00) (99% - 100%)    O2 Parameters below as of 27 May 2025 18:00  Patient On (Oxygen Delivery Method): ventilator            CONSTITUTIONAL:  Well nourished.  NAD    ENT:   Airway patent, intubated  Mouth with normal mucosa.     EYES:   Pupils equal,   Round and reactive to light.    CARDIAC:   Normal rate,   Regular rhythm.    No edema    Vascular:  Normal systolic impulse  No Carotid bruits    RESPIRATORY:   No wheezing  Bilateral BS  Normal chest expansion  Not tachypneic,  No use of accessory muscles    GASTROINTESTINAL:  Abdomen soft,   Non-tender,   No guarding,   + BS    MUSCULOSKELETAL:   Range of motion is not limited,  No clubbing, cyanosis    NEUROLOGICAL:   Sedated    SKIN:   Skin normal color for race,   Warm and dry and intact.   No evidence of rash.      05-27-25 @ 07:01  -  05-28-25 @ 07:00  --------------------------------------------------------  IN:    Dexmedetomidine: 64.5 mL    Dexmedetomidine: 192.2 mL    dextrose 5% + sodium chloride 0.45%: 140 mL    FentaNYL: 151 mL    FentaNYL: 234 mL    Nepro: 595 mL    Oral Fluid: 180 mL  Total IN: 1556.7 mL    OUT:    Indwelling Catheter - Urethral (mL): 110 mL    Voided (mL): 40 mL  Total OUT: 150 mL    Total NET: 1406.7 mL          LABS:                            10.7   5.64  )-----------( 163      ( 28 May 2025 04:22 )             33.1                                               05-28    131[L]  |  94[L]  |  39[H]  ----------------------------<  41[LL]  5.3[H]   |  20  |  6.5[HH]    Ca    9.6      28 May 2025 04:22  Mg     3.1     05-28    TPro  6.6  /  Alb  4.2  /  TBili  0.3  /  DBili  x   /  AST  40  /  ALT  29  /  AlkPhos  102  05-27      PT/INR - ( 27 May 2025 07:00 )   PT: 9.50 sec;   INR: 0.81 ratio         PTT - ( 27 May 2025 07:00 )  PTT:32.3 sec                                       Urinalysis Basic - ( 28 May 2025 04:22 )    Color: x / Appearance: x / SG: x / pH: x  Gluc: 41 mg/dL / Ketone: x  / Bili: x / Urobili: x   Blood: x / Protein: x / Nitrite: x   Leuk Esterase: x / RBC: x / WBC x   Sq Epi: x / Non Sq Epi: x / Bacteria: x                                                  LIVER FUNCTIONS - ( 27 May 2025 07:00 )  Alb: 4.2 g/dL / Pro: 6.6 g/dL / ALK PHOS: 102 U/L / ALT: 29 U/L / AST: 40 U/L / GGT: x                                                                                               Mode: AC/ CMV (Assist Control/ Continuous Mandatory Ventilation)  RR (machine): 14  TV (machine): 330  FiO2: 40  PEEP: 8  ITime: 0.8  MAP: 10  PIP: 20                                      ABG - ( 28 May 2025 03:45 )  pH, Arterial: 7.35  pH, Blood: x     /  pCO2: 50    /  pO2: 175   / HCO3: 28    / Base Excess: 1.2   /  SaO2: 99.2                MEDICATIONS  (STANDING):  aspirin  chewable 81 milliGRAM(s) Oral daily  atorvastatin 80 milliGRAM(s) Oral at bedtime  chlorhexidine 0.12% Liquid 15 milliLiter(s) Oral Mucosa every 12 hours  chlorhexidine 2% Cloths 1 Application(s) Topical <User Schedule>  darbepoetin Injectable ViaL 25 MICROGram(s) IV Push <User Schedule>  dexMEDEtomidine Infusion 0.4 MICROgram(s)/kG/Hr (7 mL/Hr) IV Continuous <Continuous>  dextrose 5%. 1000 milliLiter(s) (50 mL/Hr) IV Continuous <Continuous>  dextrose 50% Injectable 25 Gram(s) IV Push once  doxazosin 1 milliGRAM(s) Oral <User Schedule>  fentaNYL   Infusion 0.5 MICROgram(s)/kG/Hr (3.5 mL/Hr) IV Continuous <Continuous>  fluticasone propionate/ salmeterol 100-50 MICROgram(s) Diskus 1 Dose(s) Inhalation two times a day  glucagon  Injectable 1 milliGRAM(s) IntraMuscular once  guaiFENesin  milliGRAM(s) Oral every 12 hours  heparin   Injectable 5000 Unit(s) SubCutaneous every 12 hours  hydrALAZINE 100 milliGRAM(s) Oral every 8 hours  insulin lispro (ADMELOG) corrective regimen sliding scale   SubCutaneous Before meals and at bedtime  lidocaine   4% Patch 1 Patch Transdermal daily  NIFEdipine XL 30 milliGRAM(s) Oral daily  polyethylene glycol 3350 17 Gram(s) Oral daily  propofol Infusion 10 MICROgram(s)/kG/Min (4.2 mL/Hr) IV Continuous <Continuous>  propofol Injectable 20 milliGRAM(s) IV Push once  senna 2 Tablet(s) Oral at bedtime  sevelamer carbonate Powder 2400 milliGRAM(s) Oral three times a day with meals    MEDICATIONS  (PRN):  acetaminophen     Tablet .. 975 milliGRAM(s) Oral every 8 hours PRN Mild Pain (1 - 3)  albuterol    90 MICROgram(s) HFA Inhaler 2 Puff(s) Inhalation every 6 hours PRN Shortness of Breath and/or Wheezing  dextrose Oral Gel 15 Gram(s) Oral once PRN Blood Glucose LESS THAN 70 milliGRAM(s)/deciliter      New X-rays reviewed:                                                                                  ECHO    CXR interpreted by me:

## 2025-05-28 NOTE — PROGRESS NOTE ADULT - SUBJECTIVE AND OBJECTIVE BOX
24H events:    Patient is a 59y old Female who presents with a chief complaint of AMS (28 May 2025 10:31)    Primary diagnosis of Elevated troponin        Today is 12d of hospitalization. This morning patient was seen and examined at bedside, resting comfortably in bed.    No acute or major events overnight.      PAST MEDICAL & SURGICAL HISTORY  Chronic kidney disease, unspecified CKD stage      SOCIAL HISTORY:  Social History:      ALLERGIES:  No Known Allergies    MEDICATIONS:  STANDING MEDICATIONS  aspirin  chewable 81 milliGRAM(s) Oral daily  atorvastatin 80 milliGRAM(s) Oral at bedtime  chlorhexidine 0.12% Liquid 15 milliLiter(s) Oral Mucosa every 12 hours  chlorhexidine 2% Cloths 1 Application(s) Topical <User Schedule>  darbepoetin Injectable ViaL 25 MICROGram(s) IV Push <User Schedule>  dexMEDEtomidine Infusion 0.4 MICROgram(s)/kG/Hr IV Continuous <Continuous>  dextrose 5%. 1000 milliLiter(s) IV Continuous <Continuous>  dextrose 50% Injectable 25 Gram(s) IV Push once  doxazosin 1 milliGRAM(s) Oral <User Schedule>  fentaNYL   Infusion 0.5 MICROgram(s)/kG/Hr IV Continuous <Continuous>  fluticasone propionate/ salmeterol 100-50 MICROgram(s) Diskus 1 Dose(s) Inhalation two times a day  gabapentin 300 milliGRAM(s) Oral at bedtime  glucagon  Injectable 1 milliGRAM(s) IntraMuscular once  heparin   Injectable 5000 Unit(s) SubCutaneous every 12 hours  hydrALAZINE 100 milliGRAM(s) Oral every 8 hours  insulin lispro (ADMELOG) corrective regimen sliding scale   SubCutaneous Before meals and at bedtime  lidocaine   4% Patch 1 Patch Transdermal daily  methocarbamol 1000 milliGRAM(s) Oral three times a day  NIFEdipine XL 30 milliGRAM(s) Oral daily  polyethylene glycol 3350 17 Gram(s) Oral daily  propofol Infusion 10 MICROgram(s)/kG/Min IV Continuous <Continuous>  propofol Injectable 20 milliGRAM(s) IV Push once  senna 2 Tablet(s) Oral at bedtime  sevelamer carbonate Powder 2400 milliGRAM(s) Oral three times a day with meals    PRN MEDICATIONS  acetaminophen     Tablet .. 975 milliGRAM(s) Oral every 8 hours PRN  albuterol    90 MICROgram(s) HFA Inhaler 2 Puff(s) Inhalation every 6 hours PRN  dextrose Oral Gel 15 Gram(s) Oral once PRN  traMADol 50 milliGRAM(s) Oral every 6 hours PRN    VITALS:   T(F): 97.6  HR: 58  BP: 159/71  RR: 19  SpO2: 100%    PHYSICAL EXAM:  GENERAL: intubated  HEAD:  Atraumatic, normocephalic  HEART: Regular rate and rhythm  LUNGS: Unlabored respirations. Clear to auscultation bilaterally, no crackles, wheezing, or rhonchi  ABDOMEN: Soft, nontender, nondistended  EXTREMITIES: 2+ peripheral pulses bilaterally. No clubbing, cyanosis, or edema  NERVOUS SYSTEM: moving all extremities, no focal deficits      LABS:                        10.7   5.64  )-----------( 163      ( 28 May 2025 04:22 )             33.1     05-28    131[L]  |  94[L]  |  39[H]  ----------------------------<  41[LL]  5.3[H]   |  20  |  6.5[HH]    Ca    9.6      28 May 2025 04:22  Mg     3.1     05-28    TPro  6.6  /  Alb  4.2  /  TBili  0.3  /  DBili  x   /  AST  40  /  ALT  29  /  AlkPhos  102  05-27    PT/INR - ( 27 May 2025 07:00 )   PT: 9.50 sec;   INR: 0.81 ratio         PTT - ( 27 May 2025 07:00 )  PTT:32.3 sec  Urinalysis Basic - ( 28 May 2025 04:22 )    Color: x / Appearance: x / SG: x / pH: x  Gluc: 41 mg/dL / Ketone: x  / Bili: x / Urobili: x   Blood: x / Protein: x / Nitrite: x   Leuk Esterase: x / RBC: x / WBC x   Sq Epi: x / Non Sq Epi: x / Bacteria: x      ABG - ( 28 May 2025 03:45 )  pH, Arterial: 7.35  pH, Blood: x     /  pCO2: 50    /  pO2: 175   / HCO3: 28    / Base Excess: 1.2   /  SaO2: 99.2                                   History of hyperthyroidism

## 2025-05-29 LAB
ALBUMIN SERPL ELPH-MCNC: 3.6 G/DL — SIGNIFICANT CHANGE UP (ref 3.5–5.2)
ALP SERPL-CCNC: 103 U/L — SIGNIFICANT CHANGE UP (ref 30–115)
ALT FLD-CCNC: 34 U/L — SIGNIFICANT CHANGE UP (ref 0–41)
ANION GAP SERPL CALC-SCNC: 16 MMOL/L — HIGH (ref 7–14)
ANION GAP SERPL CALC-SCNC: 19 MMOL/L — HIGH (ref 7–14)
ANION GAP SERPL CALC-SCNC: 24 MMOL/L — HIGH (ref 7–14)
AST SERPL-CCNC: 45 U/L — HIGH (ref 0–41)
B-OH-BUTYR SERPL-SCNC: 5.1 MMOL/L — HIGH
BASOPHILS # BLD AUTO: 0.04 K/UL — SIGNIFICANT CHANGE UP (ref 0–0.2)
BASOPHILS NFR BLD AUTO: 0.6 % — SIGNIFICANT CHANGE UP (ref 0–1)
BILIRUB SERPL-MCNC: 0.3 MG/DL — SIGNIFICANT CHANGE UP (ref 0.2–1.2)
BUN SERPL-MCNC: 29 MG/DL — HIGH (ref 10–20)
BUN SERPL-MCNC: 32 MG/DL — HIGH (ref 10–20)
BUN SERPL-MCNC: 37 MG/DL — HIGH (ref 10–20)
CALCIUM SERPL-MCNC: 9.2 MG/DL — SIGNIFICANT CHANGE UP (ref 8.4–10.5)
CALCIUM SERPL-MCNC: 9.2 MG/DL — SIGNIFICANT CHANGE UP (ref 8.4–10.5)
CALCIUM SERPL-MCNC: 9.4 MG/DL — SIGNIFICANT CHANGE UP (ref 8.4–10.5)
CHLORIDE SERPL-SCNC: 92 MMOL/L — LOW (ref 98–110)
CHLORIDE SERPL-SCNC: 93 MMOL/L — LOW (ref 98–110)
CHLORIDE SERPL-SCNC: 96 MMOL/L — LOW (ref 98–110)
CO2 SERPL-SCNC: 18 MMOL/L — SIGNIFICANT CHANGE UP (ref 17–32)
CO2 SERPL-SCNC: 22 MMOL/L — SIGNIFICANT CHANGE UP (ref 17–32)
CO2 SERPL-SCNC: 24 MMOL/L — SIGNIFICANT CHANGE UP (ref 17–32)
CREAT SERPL-MCNC: 5.6 MG/DL — CRITICAL HIGH (ref 0.7–1.5)
CREAT SERPL-MCNC: 6.3 MG/DL — CRITICAL HIGH (ref 0.7–1.5)
CREAT SERPL-MCNC: 7 MG/DL — CRITICAL HIGH (ref 0.7–1.5)
EGFR: 6 ML/MIN/1.73M2 — LOW
EGFR: 6 ML/MIN/1.73M2 — LOW
EGFR: 7 ML/MIN/1.73M2 — LOW
EGFR: 7 ML/MIN/1.73M2 — LOW
EGFR: 8 ML/MIN/1.73M2 — LOW
EGFR: 8 ML/MIN/1.73M2 — LOW
EOSINOPHIL # BLD AUTO: 0.04 K/UL — SIGNIFICANT CHANGE UP (ref 0–0.7)
EOSINOPHIL NFR BLD AUTO: 0.6 % — SIGNIFICANT CHANGE UP (ref 0–8)
FERRITIN SERPL-MCNC: 538 NG/ML — HIGH (ref 13–330)
FOLATE SERPL-MCNC: 12.7 NG/ML — SIGNIFICANT CHANGE UP
GAS PNL BLDA: SIGNIFICANT CHANGE UP
GLUCOSE BLDC GLUCOMTR-MCNC: 114 MG/DL — HIGH (ref 70–99)
GLUCOSE BLDC GLUCOMTR-MCNC: 134 MG/DL — HIGH (ref 70–99)
GLUCOSE BLDC GLUCOMTR-MCNC: 149 MG/DL — HIGH (ref 70–99)
GLUCOSE BLDC GLUCOMTR-MCNC: 170 MG/DL — HIGH (ref 70–99)
GLUCOSE BLDC GLUCOMTR-MCNC: 202 MG/DL — HIGH (ref 70–99)
GLUCOSE BLDC GLUCOMTR-MCNC: 213 MG/DL — HIGH (ref 70–99)
GLUCOSE BLDC GLUCOMTR-MCNC: 220 MG/DL — HIGH (ref 70–99)
GLUCOSE BLDC GLUCOMTR-MCNC: 220 MG/DL — HIGH (ref 70–99)
GLUCOSE BLDC GLUCOMTR-MCNC: 239 MG/DL — HIGH (ref 70–99)
GLUCOSE BLDC GLUCOMTR-MCNC: 244 MG/DL — HIGH (ref 70–99)
GLUCOSE BLDC GLUCOMTR-MCNC: 247 MG/DL — HIGH (ref 70–99)
GLUCOSE BLDC GLUCOMTR-MCNC: 261 MG/DL — HIGH (ref 70–99)
GLUCOSE BLDC GLUCOMTR-MCNC: 267 MG/DL — HIGH (ref 70–99)
GLUCOSE BLDC GLUCOMTR-MCNC: 275 MG/DL — HIGH (ref 70–99)
GLUCOSE BLDC GLUCOMTR-MCNC: 275 MG/DL — HIGH (ref 70–99)
GLUCOSE BLDC GLUCOMTR-MCNC: 81 MG/DL — SIGNIFICANT CHANGE UP (ref 70–99)
GLUCOSE SERPL-MCNC: 137 MG/DL — HIGH (ref 70–99)
GLUCOSE SERPL-MCNC: 186 MG/DL — HIGH (ref 70–99)
GLUCOSE SERPL-MCNC: 269 MG/DL — HIGH (ref 70–99)
HCT VFR BLD CALC: 28.5 % — LOW (ref 37–47)
HGB BLD-MCNC: 9.4 G/DL — LOW (ref 12–16)
IMM GRANULOCYTES NFR BLD AUTO: 0.3 % — SIGNIFICANT CHANGE UP (ref 0.1–0.3)
LACTATE SERPL-SCNC: 0.8 MMOL/L — SIGNIFICANT CHANGE UP (ref 0.7–2)
LYMPHOCYTES # BLD AUTO: 0.56 K/UL — LOW (ref 1.2–3.4)
LYMPHOCYTES # BLD AUTO: 8.8 % — LOW (ref 20.5–51.1)
MAGNESIUM SERPL-MCNC: 2.8 MG/DL — HIGH (ref 1.8–2.4)
MCHC RBC-ENTMCNC: 33 G/DL — SIGNIFICANT CHANGE UP (ref 32–37)
MCHC RBC-ENTMCNC: 33.6 PG — HIGH (ref 27–31)
MCV RBC AUTO: 101.8 FL — HIGH (ref 81–99)
MONOCYTES # BLD AUTO: 0.42 K/UL — SIGNIFICANT CHANGE UP (ref 0.1–0.6)
MONOCYTES NFR BLD AUTO: 6.6 % — SIGNIFICANT CHANGE UP (ref 1.7–9.3)
NEUTROPHILS # BLD AUTO: 5.29 K/UL — SIGNIFICANT CHANGE UP (ref 1.4–6.5)
NEUTROPHILS NFR BLD AUTO: 83.1 % — HIGH (ref 42.2–75.2)
NRBC BLD AUTO-RTO: 0 /100 WBCS — SIGNIFICANT CHANGE UP (ref 0–0)
PHOSPHATE SERPL-MCNC: 5.4 MG/DL — HIGH (ref 2.1–4.9)
PLATELET # BLD AUTO: 164 K/UL — SIGNIFICANT CHANGE UP (ref 130–400)
PMV BLD: 11.1 FL — HIGH (ref 7.4–10.4)
POTASSIUM SERPL-MCNC: 4.5 MMOL/L — SIGNIFICANT CHANGE UP (ref 3.5–5)
POTASSIUM SERPL-MCNC: 4.5 MMOL/L — SIGNIFICANT CHANGE UP (ref 3.5–5)
POTASSIUM SERPL-MCNC: 4.6 MMOL/L — SIGNIFICANT CHANGE UP (ref 3.5–5)
POTASSIUM SERPL-SCNC: 4.5 MMOL/L — SIGNIFICANT CHANGE UP (ref 3.5–5)
POTASSIUM SERPL-SCNC: 4.5 MMOL/L — SIGNIFICANT CHANGE UP (ref 3.5–5)
POTASSIUM SERPL-SCNC: 4.6 MMOL/L — SIGNIFICANT CHANGE UP (ref 3.5–5)
PROT SERPL-MCNC: 5.9 G/DL — LOW (ref 6–8)
RBC # BLD: 2.8 M/UL — LOW (ref 4.2–5.4)
RBC # FLD: 17.9 % — HIGH (ref 11.5–14.5)
SODIUM SERPL-SCNC: 134 MMOL/L — LOW (ref 135–146)
SODIUM SERPL-SCNC: 134 MMOL/L — LOW (ref 135–146)
SODIUM SERPL-SCNC: 136 MMOL/L — SIGNIFICANT CHANGE UP (ref 135–146)
VIT B12 SERPL-MCNC: 644 PG/ML — SIGNIFICANT CHANGE UP (ref 232–1245)
WBC # BLD: 6.37 K/UL — SIGNIFICANT CHANGE UP (ref 4.8–10.8)
WBC # FLD AUTO: 6.37 K/UL — SIGNIFICANT CHANGE UP (ref 4.8–10.8)

## 2025-05-29 PROCEDURE — 99233 SBSQ HOSP IP/OBS HIGH 50: CPT | Mod: GC

## 2025-05-29 PROCEDURE — 71045 X-RAY EXAM CHEST 1 VIEW: CPT | Mod: 26

## 2025-05-29 PROCEDURE — 93010 ELECTROCARDIOGRAM REPORT: CPT

## 2025-05-29 RX ORDER — ONDANSETRON HCL/PF 4 MG/2 ML
4 VIAL (ML) INJECTION ONCE
Refills: 0 | Status: DISCONTINUED | OUTPATIENT
Start: 2025-05-29 | End: 2025-05-30

## 2025-05-29 RX ORDER — INSULIN GLARGINE-YFGN 100 [IU]/ML
6 INJECTION, SOLUTION SUBCUTANEOUS AT BEDTIME
Refills: 0 | Status: DISCONTINUED | OUTPATIENT
Start: 2025-05-29 | End: 2025-05-29

## 2025-05-29 RX ORDER — INSULIN LISPRO 100 U/ML
2 INJECTION, SOLUTION INTRAVENOUS; SUBCUTANEOUS
Refills: 0 | Status: DISCONTINUED | OUTPATIENT
Start: 2025-05-29 | End: 2025-05-29

## 2025-05-29 RX ORDER — SODIUM CHLORIDE 9 G/1000ML
1000 INJECTION, SOLUTION INTRAVENOUS
Refills: 0 | Status: DISCONTINUED | OUTPATIENT
Start: 2025-05-29 | End: 2025-05-30

## 2025-05-29 RX ORDER — DEXTROSE 50 % IN WATER 50 %
50 SYRINGE (ML) INTRAVENOUS ONCE
Refills: 0 | Status: COMPLETED | OUTPATIENT
Start: 2025-05-29 | End: 2025-05-29

## 2025-05-29 RX ORDER — ONDANSETRON HCL/PF 4 MG/2 ML
4 VIAL (ML) INJECTION ONCE
Refills: 0 | Status: DISCONTINUED | OUTPATIENT
Start: 2025-05-29 | End: 2025-05-29

## 2025-05-29 RX ADMIN — METHOCARBAMOL 1000 MILLIGRAM(S): 500 TABLET, FILM COATED ORAL at 13:42

## 2025-05-29 RX ADMIN — TRAMADOL HYDROCHLORIDE 50 MILLIGRAM(S): 50 TABLET, FILM COATED ORAL at 14:23

## 2025-05-29 RX ADMIN — Medication 1 DOSE(S): at 12:20

## 2025-05-29 RX ADMIN — INSULIN LISPRO 2: 100 INJECTION, SOLUTION INTRAVENOUS; SUBCUTANEOUS at 08:24

## 2025-05-29 RX ADMIN — Medication 1 UNIT(S)/HR: at 21:32

## 2025-05-29 RX ADMIN — Medication 4 UNIT(S)/HR: at 14:04

## 2025-05-29 RX ADMIN — Medication 2 UNIT(S)/HR: at 20:11

## 2025-05-29 RX ADMIN — SODIUM CHLORIDE 100 MILLILITER(S): 9 INJECTION, SOLUTION INTRAVENOUS at 21:41

## 2025-05-29 RX ADMIN — Medication 100 MILLIGRAM(S): at 07:34

## 2025-05-29 RX ADMIN — METHOCARBAMOL 1000 MILLIGRAM(S): 500 TABLET, FILM COATED ORAL at 21:22

## 2025-05-29 RX ADMIN — POLYETHYLENE GLYCOL 3350 17 GRAM(S): 17 POWDER, FOR SOLUTION ORAL at 12:01

## 2025-05-29 RX ADMIN — Medication 1 APPLICATION(S): at 07:50

## 2025-05-29 RX ADMIN — SODIUM CHLORIDE 100 MILLILITER(S): 9 INJECTION, SOLUTION INTRAVENOUS at 18:11

## 2025-05-29 RX ADMIN — Medication 4 MILLIGRAM(S): at 05:11

## 2025-05-29 RX ADMIN — SODIUM CHLORIDE 100 MILLILITER(S): 9 INJECTION, SOLUTION INTRAVENOUS at 12:30

## 2025-05-29 RX ADMIN — Medication 100 MILLIGRAM(S): at 21:21

## 2025-05-29 RX ADMIN — SEVELAMER HYDROCHLORIDE 2400 MILLIGRAM(S): 800 TABLET ORAL at 12:00

## 2025-05-29 RX ADMIN — LIDOCAINE HYDROCHLORIDE 1 PATCH: 20 JELLY TOPICAL at 12:24

## 2025-05-29 RX ADMIN — HEPARIN SODIUM 5000 UNIT(S): 1000 INJECTION INTRAVENOUS; SUBCUTANEOUS at 18:23

## 2025-05-29 RX ADMIN — METHOCARBAMOL 1000 MILLIGRAM(S): 500 TABLET, FILM COATED ORAL at 08:37

## 2025-05-29 RX ADMIN — Medication 4 MILLIGRAM(S): at 13:47

## 2025-05-29 RX ADMIN — INSULIN LISPRO 2 UNIT(S): 100 INJECTION, SOLUTION INTRAVENOUS; SUBCUTANEOUS at 08:25

## 2025-05-29 RX ADMIN — SEVELAMER HYDROCHLORIDE 2400 MILLIGRAM(S): 800 TABLET ORAL at 18:24

## 2025-05-29 RX ADMIN — Medication 100 MILLIGRAM(S): at 13:43

## 2025-05-29 RX ADMIN — Medication 50 MILLILITER(S): at 21:21

## 2025-05-29 RX ADMIN — DOXAZOSIN MESYLATE 1 MILLIGRAM(S): 8 TABLET ORAL at 08:45

## 2025-05-29 RX ADMIN — Medication 2 UNIT(S)/HR: at 23:01

## 2025-05-29 RX ADMIN — Medication 1 DOSE(S): at 21:35

## 2025-05-29 RX ADMIN — Medication 81 MILLIGRAM(S): at 12:02

## 2025-05-29 RX ADMIN — DOXAZOSIN MESYLATE 1 MILLIGRAM(S): 8 TABLET ORAL at 21:22

## 2025-05-29 RX ADMIN — LIDOCAINE HYDROCHLORIDE 1 PATCH: 20 JELLY TOPICAL at 18:31

## 2025-05-29 RX ADMIN — TRAMADOL HYDROCHLORIDE 50 MILLIGRAM(S): 50 TABLET, FILM COATED ORAL at 16:09

## 2025-05-29 RX ADMIN — Medication 30 MILLIGRAM(S): at 07:34

## 2025-05-29 RX ADMIN — HEPARIN SODIUM 5000 UNIT(S): 1000 INJECTION INTRAVENOUS; SUBCUTANEOUS at 05:11

## 2025-05-29 RX ADMIN — GABAPENTIN 300 MILLIGRAM(S): 400 CAPSULE ORAL at 21:22

## 2025-05-29 RX ADMIN — ATORVASTATIN CALCIUM 80 MILLIGRAM(S): 80 TABLET, FILM COATED ORAL at 21:21

## 2025-05-29 NOTE — PROGRESS NOTE ADULT - ASSESSMENT
59y Female with h/o ESRD on HD ([facility]), IDDM, dyslipidemia, CKD-MBD, HTN who presented to the hospital with CP during HD session this morning.  Had 90 minutes of total HD prior to arrival.  Nephrology now consulted for HD needs while in-house.    HD in AM   regular days outpatient MWF  sp intubation on MV / extubated now   follow FS closely appreciate endo eval   renal diet  bp noted keep current   phos high, increased sevelamer to 3 tabs tid with meals   NST negative   h/h noted / check iron stores / resumed  SHAR with HD aranesp 25 weekly   US Abdomen Upper Quadrant Right (05.20.25 @ 21:13) >  Cholelithiasis. No sonographic evidence of acute cholecystitis.  renal team will follow

## 2025-05-29 NOTE — DIETITIAN INITIAL EVALUATION ADULT - NAME AND PHONE
Johana [phone] or TEAMS    Nutrition Interventions: meals, snacks, supplements; Nutrition Monitoring: Diet order, PO intake, weights, labs, NFPF, body composition, BM and tolerance to medical food supplements

## 2025-05-29 NOTE — DIETITIAN INITIAL EVALUATION ADULT - OTHER INFO
Pertinent Medical Information: Per H&P, pt is a 60 y/o female with PMHx of DM, ESRD on HD MWF, HTN, HLD, and HF who presents to ED for chest pain during HD session this morning. Had 90 minutes of total HD prior to arrival. Patient stated that she felt chest pressure and exertional shortness of breath. Shortness of breath improves with rest. Patient was alert and oriented however speaking sluggishly on arrival to the ED. Endorses neck pain and chest pressure as well. Denies abdominal pain, dysuria, swelling. Stroke code called on 5/27; pt then intubated; now extubated on 5/28.  # Acute hypoxemic respiratory failure, s/p extubation   # AMS 2/2 hypoglycemia - improved  # Chronic back pain (severe L4,L5 spinal stenosis)  # Mod/severe R ICA stenosis    Pertinent Subjective Information: Per PCA, pt has poor appetite; consumed <30% of breakfast d/t nausea. Pt had fair appetite prior to today -- consuming 51-75% of meals provided in-house. Discussed medical nutrition supplements with family. Family agreed supplements would be beneficial to optimize kcal/pro intake. Pt tolerating diet texture/consistency.    Weight hx: #/80.9 KG -- checked 2 month ago per family. 13% unintentional weight loss in 2 months compared to current dosing weight. Pt meets 1 criteria for malnutrition at this time.

## 2025-05-29 NOTE — DIETITIAN INITIAL EVALUATION ADULT - NS FNS DIET ORDER
Diet, DASH/TLC:   Sodium & Cholesterol Restricted  Consistent Carbohydrate {Evening Snack} (CSTCHOSN)  No Concentrated Potassium (05-29-25 @ 05:16)

## 2025-05-29 NOTE — PROGRESS NOTE ADULT - SUBJECTIVE AND OBJECTIVE BOX
Nephrology progress note  Patient is seen and examined, events over the last 24 h noted .  Lying in bed   extubated     Allergies:  No Known Allergies    Hospital Medications:   MEDICATIONS  (STANDING):  aspirin  chewable 81 milliGRAM(s) Oral daily  atorvastatin 80 milliGRAM(s) Oral at bedtime  darbepoetin Injectable ViaL 25 MICROGram(s) IV Push <User Schedule>  doxazosin 1 milliGRAM(s) Oral <User Schedule>  fluticasone propionate/ salmeterol 100-50 MICROgram(s) Diskus 1 Dose(s) Inhalation two times a day  gabapentin 300 milliGRAM(s) Oral at bedtime  glucagon  Injectable 1 milliGRAM(s) IntraMuscular once  heparin   Injectable 5000 Unit(s) SubCutaneous every 12 hours  hydrALAZINE 100 milliGRAM(s) Oral every 8 hours  insulin glargine Injectable (LANTUS) 6 Unit(s) SubCutaneous at bedtime  insulin lispro (ADMELOG) corrective regimen sliding scale   SubCutaneous Before meals and at bedtime  insulin lispro Injectable (ADMELOG) 2 Unit(s) SubCutaneous three times a day before meals  lidocaine   4% Patch 1 Patch Transdermal daily  methocarbamol 1000 milliGRAM(s) Oral three times a day  NIFEdipine XL 30 milliGRAM(s) Oral daily  polyethylene glycol 3350 17 Gram(s) Oral daily  senna 2 Tablet(s) Oral at bedtime  sevelamer carbonate Powder 2400 milliGRAM(s) Oral three times a day with meals        VITALS:  T(F): 97 (05-29-25 @ 08:00), Max: 98 (05-28-25 @ 12:00)  HR: 81 (05-29-25 @ 09:00)  BP: 189/136 (05-28-25 @ 20:00)  RR: 16 (05-29-25 @ 09:00)  SpO2: 98% (05-29-25 @ 09:00)      05-27 @ 07:01  -  05-28 @ 07:00  --------------------------------------------------------  IN: 1582.9 mL / OUT: 150 mL / NET: 1432.9 mL    05-28 @ 07:01  -  05-29 @ 07:00  --------------------------------------------------------  IN: 370.6 mL / OUT: 2880 mL / NET: -2509.4 mL          PHYSICAL EXAM:  Constitutional: NAD  Respiratory: CTAB, no wheezes, rales or rhonchi  Cardiovascular: S1, S2, RRR  Gastrointestinal: BS+, soft, NT/ND  Extremities: No cyanosis or clubbing. No peripheral edema  :  No murillo.   Skin: No rashes    LABS:    05-29    134[L]  |  93[L]  |  29[H]  ----------------------------<  186[H]  4.6   |  22  |  5.6[HH]    Ca    9.4      29 May 2025 04:50  Phos  5.4     05-29  Mg     2.8     05-29    TPro  5.9[L]  /  Alb  3.6  /  TBili  0.3  /  DBili      /  AST  45[H]  /  ALT  34  /  AlkPhos  103  05-29                          9.4    6.37  )-----------( 164      ( 29 May 2025 04:50 )             28.5       Urine Studies:  Urinalysis Basic - ( 29 May 2025 04:50 )    Color:  / Appearance:  / SG:  / pH:   Gluc: 186 mg/dL / Ketone:   / Bili:  / Urobili:    Blood:  / Protein:  / Nitrite:    Leuk Esterase:  / RBC:  / WBC    Sq Epi:  / Non Sq Epi:  / Bacteria:           Iron 29, TIBC 175, %sat 17      [05-28-25 @ 04:22]  Ferritin 538      [05-28-25 @ 04:22]          RADIOLOGY & ADDITIONAL STUDIES:

## 2025-05-29 NOTE — DIETITIAN INITIAL EVALUATION ADULT - PERTINENT MEDS FT
MEDICATIONS  (STANDING):  aspirin  chewable 81 milliGRAM(s) Oral daily  atorvastatin 80 milliGRAM(s) Oral at bedtime  chlorhexidine 2% Cloths 1 Application(s) Topical <User Schedule>  darbepoetin Injectable ViaL 25 MICROGram(s) IV Push <User Schedule>  dextrose 5% + lactated ringers 1000 milliLiter(s) (100 mL/Hr) IV Continuous <Continuous>  dextrose 5%. 1000 milliLiter(s) (50 mL/Hr) IV Continuous <Continuous>  dextrose 50% Injectable 25 Gram(s) IV Push once  doxazosin 1 milliGRAM(s) Oral <User Schedule>  fluticasone propionate/ salmeterol 100-50 MICROgram(s) Diskus 1 Dose(s) Inhalation two times a day  gabapentin 300 milliGRAM(s) Oral at bedtime  glucagon  Injectable 1 milliGRAM(s) IntraMuscular once  heparin   Injectable 5000 Unit(s) SubCutaneous every 12 hours  hydrALAZINE 100 milliGRAM(s) Oral every 8 hours  insulin regular Infusion 4 Unit(s)/Hr (4 mL/Hr) IV Continuous <Continuous>  lidocaine   4% Patch 1 Patch Transdermal daily  methocarbamol 1000 milliGRAM(s) Oral three times a day  NIFEdipine XL 30 milliGRAM(s) Oral daily  ondansetron Injectable 4 milliGRAM(s) IV Push once  polyethylene glycol 3350 17 Gram(s) Oral daily  senna 2 Tablet(s) Oral at bedtime  sevelamer carbonate Powder 2400 milliGRAM(s) Oral three times a day with meals    MEDICATIONS  (PRN):  acetaminophen     Tablet .. 975 milliGRAM(s) Oral every 8 hours PRN Mild Pain (1 - 3)  albuterol    90 MICROgram(s) HFA Inhaler 2 Puff(s) Inhalation every 6 hours PRN Shortness of Breath and/or Wheezing  dextrose Oral Gel 15 Gram(s) Oral once PRN Blood Glucose LESS THAN 70 milliGRAM(s)/deciliter  ondansetron Injectable 4 milliGRAM(s) IV Push every 4 hours PRN Nausea and/or Vomiting  traMADol 50 milliGRAM(s) Oral every 6 hours PRN Severe Pain (7 - 10)

## 2025-05-29 NOTE — PROGRESS NOTE ADULT - SUBJECTIVE AND OBJECTIVE BOX
Patient is a 59y old  Female who presents with a chief complaint of AMS (29 May 2025 10:23)    HPI:  Patient is a 59y old  Female with PMH of DM, ESRD on HD MWF, HTN, HLD, and HF who presents to ED for chest pain during HD session this morning.  Had 90 minutes of total HD prior to arrival.  Patient stated that she felt chest pressure and exertional shortness of breath. Shortness of breath improves with rest. Patient was alert and oriented however speaking sluggishly on arrival to the ED. Endorses neck pain and chest pressure as well. Denies abdominal pain, dysuria, swelling.    in the ED /54, HR 68, Temp 97.7, SpO2 98 on 2L NC  labs significant for trop 64 -> 66, Creatinine 4.1  UA showed bacteria  CXR negative  CT head showed A focal hypodensity is noted within the right frontal periventricular white matter, not previously seen. Finding may reflect age-indeterminate ischemic change.       Patient admitted for workup of chest pain during HD (16 May 2025 15:38)       INTERVAL HPI/OVERNIGHT EVENTS:   Patient extubated yesterday. Hypertensive overnight in the 180s systolic, started on nicardipine gtt. BP improved, now off nicardipine gtt. Home BP meds given. Afebrile. No other acute complaints.       ICU Vital Signs Last 24 Hrs  T(C): 36.1 (29 May 2025 08:00), Max: 36.7 (28 May 2025 19:00)  T(F): 97 (29 May 2025 08:00), Max: 98 (28 May 2025 19:00)  HR: 81 (29 May 2025 09:00) (60 - 91)  BP: 189/136 (28 May 2025 20:00) (146/65 - 247/156)  BP(mean): 159 (28 May 2025 20:00) (93 - 182)  ABP: 150/54 (29 May 2025 09:00) (135/50 - 204/57)  ABP(mean): 81 (29 May 2025 09:00) (77 - 97)  RR: 16 (29 May 2025 09:00) (6 - 30)  SpO2: 98% (29 May 2025 09:00) (88% - 100%)    O2 Parameters below as of 28 May 2025 19:00  Patient On (Oxygen Delivery Method): room air          I&O's Summary    28 May 2025 07:01  -  29 May 2025 07:00  --------------------------------------------------------  IN: 370.6 mL / OUT: 2880 mL / NET: -2509.4 mL      Mode: standby      Daily     Daily     Adult Advanced Hemodynamics Last 24 Hrs  CVP(mm Hg): --  CVP(cm H2O): --  CO: --  CI: --  PA: --  PA(mean): --  PCWP: --  SVR: --  SVRI: --  PVR: --  PVRI: --    EKG/Telemetry Events:    MEDICATIONS  (STANDING):  aspirin  chewable 81 milliGRAM(s) Oral daily  atorvastatin 80 milliGRAM(s) Oral at bedtime  chlorhexidine 2% Cloths 1 Application(s) Topical <User Schedule>  darbepoetin Injectable ViaL 25 MICROGram(s) IV Push <User Schedule>  dextrose 5% + lactated ringers 1000 milliLiter(s) (100 mL/Hr) IV Continuous <Continuous>  dextrose 5%. 1000 milliLiter(s) (50 mL/Hr) IV Continuous <Continuous>  dextrose 50% Injectable 25 Gram(s) IV Push once  doxazosin 1 milliGRAM(s) Oral <User Schedule>  fluticasone propionate/ salmeterol 100-50 MICROgram(s) Diskus 1 Dose(s) Inhalation two times a day  gabapentin 300 milliGRAM(s) Oral at bedtime  glucagon  Injectable 1 milliGRAM(s) IntraMuscular once  heparin   Injectable 5000 Unit(s) SubCutaneous every 12 hours  hydrALAZINE 100 milliGRAM(s) Oral every 8 hours  insulin regular Infusion 4 Unit(s)/Hr (4 mL/Hr) IV Continuous <Continuous>  lidocaine   4% Patch 1 Patch Transdermal daily  methocarbamol 1000 milliGRAM(s) Oral three times a day  NIFEdipine XL 30 milliGRAM(s) Oral daily  polyethylene glycol 3350 17 Gram(s) Oral daily  senna 2 Tablet(s) Oral at bedtime  sevelamer carbonate Powder 2400 milliGRAM(s) Oral three times a day with meals    MEDICATIONS  (PRN):  acetaminophen     Tablet .. 975 milliGRAM(s) Oral every 8 hours PRN Mild Pain (1 - 3)  albuterol    90 MICROgram(s) HFA Inhaler 2 Puff(s) Inhalation every 6 hours PRN Shortness of Breath and/or Wheezing  dextrose Oral Gel 15 Gram(s) Oral once PRN Blood Glucose LESS THAN 70 milliGRAM(s)/deciliter  ondansetron Injectable 4 milliGRAM(s) IV Push every 4 hours PRN Nausea and/or Vomiting  traMADol 50 milliGRAM(s) Oral every 6 hours PRN Severe Pain (7 - 10)      PHYSICAL EXAM:  GENERAL: Awake and alert.   HEAD:  Atraumatic, Normocephalic  EYES: EOMI, PERRLA, conjunctiva and sclera clear  NECK: Supple, No JVD, Normal thyroid, no enlarged nodes  NERVOUS SYSTEM:  Alert & Awake.   CHEST/LUNG: On 2L NC. B/L good air entry; No rales, rhonchi, or wheezing  HEART: S1S2 normal, no S3, Regular rate and rhythm; No murmurs  ABDOMEN: Soft, Nontender, Nondistended; Bowel sounds present  EXTREMITIES:  2+ Peripheral Pulses, No clubbing, cyanosis, or edema  LYMPH: No lymphadenopathy noted  SKIN: No rashes or lesions    LABS:                        9.4    6.37  )-----------( 164      ( 29 May 2025 04:50 )             28.5     05-29    134[L]  |  92[L]  |  32[H]  ----------------------------<  269[H]  4.5   |  18  |  6.3[HH]    Ca    9.2      29 May 2025 09:55  Phos  5.4     05-29  Mg     2.8     05-29    TPro  5.9[L]  /  Alb  3.6  /  TBili  0.3  /  DBili  x   /  AST  45[H]  /  ALT  34  /  AlkPhos  103  05-29    LIVER FUNCTIONS - ( 29 May 2025 04:50 )  Alb: 3.6 g/dL / Pro: 5.9 g/dL / ALK PHOS: 103 U/L / ALT: 34 U/L / AST: 45 U/L / GGT: x             CAPILLARY BLOOD GLUCOSE      POCT Blood Glucose.: 220 mg/dL (29 May 2025 11:04)  POCT Blood Glucose.: 275 mg/dL (29 May 2025 10:07)  POCT Blood Glucose.: 275 mg/dL (29 May 2025 09:16)  POCT Blood Glucose.: 267 mg/dL (29 May 2025 07:55)  POCT Blood Glucose.: 310 mg/dL (28 May 2025 23:52)  POCT Blood Glucose.: 217 mg/dL (28 May 2025 18:06)  POCT Blood Glucose.: 182 mg/dL (28 May 2025 16:14)  POCT Blood Glucose.: 96 mg/dL (28 May 2025 13:44)  POCT Blood Glucose.: 96 mg/dL (28 May 2025 12:50)    ABG - ( 29 May 2025 11:30 )  pH, Arterial: 7.36  pH, Blood: x     /  pCO2: 43    /  pO2: 123   / HCO3: 24    / Base Excess: -1.3  /  SaO2: 99.1                    Urinalysis Basic - ( 29 May 2025 09:55 )    Color: x / Appearance: x / SG: x / pH: x  Gluc: 269 mg/dL / Ketone: x  / Bili: x / Urobili: x   Blood: x / Protein: x / Nitrite: x   Leuk Esterase: x / RBC: x / WBC x   Sq Epi: x / Non Sq Epi: x / Bacteria: x          RADIOLOGY & ADDITIONAL TESTS:  CXR:        Care Discussed with Consultants/Other Providers [ x] YES  [ ] NO

## 2025-05-29 NOTE — DIETITIAN INITIAL EVALUATION ADULT - ORAL INTAKE PTA/DIET HISTORY
Pt unable to participate in nutrition assessment interview during time of visit d/t nausea. Per PCA, pt did not vomit yet this morning. Observed bucket on pts lap this morning. No family observed at bedside. Discussed with daughter and family at number listed in EMR. Per family, pt had a good appetite prior to admit; consumed 3 meals daily. Denies taking vitamin or mineral supplements. Denies drinking protein shake supplements. NKFA; denies any restrictive cultural or Church food preferences. No chewing or swallowing difficulties reported with regular textured food.

## 2025-05-29 NOTE — DIETITIAN INITIAL EVALUATION ADULT - NSFNSGIIOFT_GEN_A_CORE
BMs not documented; GI: WDL per flow sheet     05-28-25 @ 07:01  -  05-29-25 @ 07:00  --------------------------------------------------------  OUT:    Emesis (mL): 800 mL  Total OUT: 800 mL    Total NET: -800 mL

## 2025-05-29 NOTE — PROGRESS NOTE ADULT - ASSESSMENT
Acute hypoxemic respiratory failure, s/p extubation 5/28  AMS 2/2 hypoglycemia  Chronic back pain (severe L4,L5 spinal stenosis)  ESRD on HD  Mod/severe R ICA stenosis  HO HTN, HLD, DM  HO DKA  HO hypertensive urgencies    PLAN    CNS: s/p extubation yesterday. Resume pain meds today. Mental status back to baseline.     HEENT: Oral care. ET care    PULMONARY: HOB @ 45 degrees, aspiration precautions.    CARDIAC:  I=O. ECHO EF 54%. s/p nicardipine gtt. Home BP meds resumed.     GASTROENTEROLOGY: PO feeds. Bowel regimen.     RENAL: Monitor CMP & UO. Correct electrolytes as needed. HD yesterday, continue as per Nephro.     INFECTIOUS: Monitor off ABx. Monitor VS.    HEMATOLOGY: DVT ppx. LE duplex 5/19 negative. Monitor CBC and Coags.    ENDOCRINOLOGY: Check FS q 1 hours. On D5. Repeat afternoon BMP. Beta Hydroxybutyrate.      MUSCULOSKELETAL: IAT    DISPOSITION: MICU for now, possible downgrade to floor

## 2025-05-29 NOTE — DIETITIAN INITIAL EVALUATION ADULT - OTHER CALCULATIONS
23-Dec-2024 21:27 Using ABW: ENERGY: 6930-7887 kcal/day (25-30 kcal/kg); PROTEIN: 84-98 g/day (1.2-1.4 g/kg); FLUID: 1mL/kcal -- with consideration for age, BMI, critical illness setting Using ABW: ENERGY: 9449-2907 kcal/day (25-30 kcal/kg); PROTEIN:  g/day (1.2-1.5 g/kg); FLUID: 1mL/kcal -- with consideration for age, BMI, critical illness setting, HD

## 2025-05-29 NOTE — PROGRESS NOTE ADULT - SUBJECTIVE AND OBJECTIVE BOX
Patient is a 59y old  Female who presents with a chief complaint of AMS (28 May 2025 12:28)        Over Night Events:        ROS:     All ROS are negative except HPI         PHYSICAL EXAM    ICU Vital Signs Last 24 Hrs  T(C): 36.1 (29 May 2025 08:00), Max: 36.7 (28 May 2025 12:00)  T(F): 97 (29 May 2025 08:00), Max: 98 (28 May 2025 12:00)  HR: 82 (29 May 2025 08:00) (58 - 91)  BP: 189/136 (28 May 2025 20:00) (143/59 - 247/156)  BP(mean): 159 (28 May 2025 20:00) (93 - 182)  ABP: 165/52 (29 May 2025 08:00) (135/50 - 204/57)  ABP(mean): 86 (29 May 2025 08:00) (77 - 97)  RR: 16 (29 May 2025 08:00) (6 - 30)  SpO2: 98% (29 May 2025 08:00) (88% - 100%)    O2 Parameters below as of 28 May 2025 19:00  Patient On (Oxygen Delivery Method): room air              ENT: Airway patent,  EYES: Pupils equal, Round and reactive to light.  CARDIAC: Normal rate, Regular rhythm.    RESPIRATORY: No wheezing, Bilateral BS, Not tachypneic, No use of accessory muscles  GASTROINTESTINAL: Abdomen soft, Non-tender, No guarding,   NEUROLOGICAL: Alert and oriented   SKIN: Skin normal color for race, Warm and dry and intact.         05-28-25 @ 07:01  -  05-29-25 @ 07:00  --------------------------------------------------------  IN:    Dexmedetomidine: 44.2 mL    Dexmedetomidine: 48.9 mL    FentaNYL: 14 mL    NiCARdipine: 23.5 mL    Oral Fluid: 240 mL  Total IN: 370.6 mL    OUT:    Emesis (mL): 800 mL    Indwelling Catheter - Urethral (mL): 80 mL    Other (mL): 2000 mL  Total OUT: 2880 mL    Total NET: -2509.4 mL          LABS:                            9.4    6.37  )-----------( 164      ( 29 May 2025 04:50 )             28.5                                               05-29    134[L]  |  93[L]  |  29[H]  ----------------------------<  186[H]  4.6   |  22  |  5.6[HH]    Ca    9.4      29 May 2025 04:50  Phos  5.4     05-29  Mg     2.8     05-29    TPro  5.9[L]  /  Alb  3.6  /  TBili  0.3  /  DBili  x   /  AST  45[H]  /  ALT  34  /  AlkPhos  103  05-29                                             Urinalysis Basic - ( 29 May 2025 04:50 )    Color: x / Appearance: x / SG: x / pH: x  Gluc: 186 mg/dL / Ketone: x  / Bili: x / Urobili: x   Blood: x / Protein: x / Nitrite: x   Leuk Esterase: x / RBC: x / WBC x   Sq Epi: x / Non Sq Epi: x / Bacteria: x                                                  LIVER FUNCTIONS - ( 29 May 2025 04:50 )  Alb: 3.6 g/dL / Pro: 5.9 g/dL / ALK PHOS: 103 U/L / ALT: 34 U/L / AST: 45 U/L / GGT: x                                                                                               Mode: standby                                      ABG - ( 28 May 2025 15:22 )  pH, Arterial: 7.47  pH, Blood: x     /  pCO2: 39    /  pO2: 139   / HCO3: 28    / Base Excess: 4.5   /  SaO2: 98.7                MEDICATIONS  (STANDING):  aspirin  chewable 81 milliGRAM(s) Oral daily  atorvastatin 80 milliGRAM(s) Oral at bedtime  chlorhexidine 2% Cloths 1 Application(s) Topical <User Schedule>  darbepoetin Injectable ViaL 25 MICROGram(s) IV Push <User Schedule>  dextrose 5%. 1000 milliLiter(s) (50 mL/Hr) IV Continuous <Continuous>  dextrose 50% Injectable 25 Gram(s) IV Push once  doxazosin 1 milliGRAM(s) Oral <User Schedule>  fluticasone propionate/ salmeterol 100-50 MICROgram(s) Diskus 1 Dose(s) Inhalation two times a day  gabapentin 300 milliGRAM(s) Oral at bedtime  glucagon  Injectable 1 milliGRAM(s) IntraMuscular once  heparin   Injectable 5000 Unit(s) SubCutaneous every 12 hours  hydrALAZINE 100 milliGRAM(s) Oral every 8 hours  insulin glargine Injectable (LANTUS) 6 Unit(s) SubCutaneous at bedtime  insulin lispro (ADMELOG) corrective regimen sliding scale   SubCutaneous Before meals and at bedtime  insulin lispro Injectable (ADMELOG) 2 Unit(s) SubCutaneous three times a day before meals  lidocaine   4% Patch 1 Patch Transdermal daily  methocarbamol 1000 milliGRAM(s) Oral three times a day  niCARdipine Infusion 5 mG/Hr (25 mL/Hr) IV Continuous <Continuous>  NIFEdipine XL 30 milliGRAM(s) Oral daily  polyethylene glycol 3350 17 Gram(s) Oral daily  senna 2 Tablet(s) Oral at bedtime  sevelamer carbonate Powder 2400 milliGRAM(s) Oral three times a day with meals    MEDICATIONS  (PRN):  acetaminophen     Tablet .. 975 milliGRAM(s) Oral every 8 hours PRN Mild Pain (1 - 3)  albuterol    90 MICROgram(s) HFA Inhaler 2 Puff(s) Inhalation every 6 hours PRN Shortness of Breath and/or Wheezing  dextrose Oral Gel 15 Gram(s) Oral once PRN Blood Glucose LESS THAN 70 milliGRAM(s)/deciliter  ondansetron Injectable 4 milliGRAM(s) IV Push every 4 hours PRN Nausea and/or Vomiting  traMADol 50 milliGRAM(s) Oral every 6 hours PRN Severe Pain (7 - 10)

## 2025-05-29 NOTE — DIETITIAN INITIAL EVALUATION ADULT - ADD RECOMMEND
1. ADD Glucerna Shake 2X/DAILY to optimize kcal/pro intake -- provides 440 kcal, 20g pro total  2. Continue with current diet order  3. Encourage PO intake     High risk f/u 1. ADD NEPRO carbsteady 1X/DAILY to optimize kcal/pro intake -- provides 425 kcal, 19.1 g pro total  2. Continue with current diet order  3. Encourage PO intake     High risk f/u

## 2025-05-29 NOTE — DIETITIAN INITIAL EVALUATION ADULT - PERTINENT LABORATORY DATA
05-29    134[L]  |  92[L]  |  32[H]  ----------------------------<  269[H]  4.5   |  18  |  6.3[HH]    Ca    9.2      29 May 2025 09:55  Phos  5.4     05-29  Mg     2.8     05-29    TPro  5.9[L]  /  Alb  3.6  /  TBili  0.3  /  DBili  x   /  AST  45[H]  /  ALT  34  /  AlkPhos  103  05-29  POCT Blood Glucose.: 213 mg/dL (05-29-25 @ 12:15)  A1C with Estimated Average Glucose Result: 8.1 % (05-17-25 @ 06:21)

## 2025-05-29 NOTE — PROGRESS NOTE ADULT - ASSESSMENT
IMPRESSION:    AMS 2/2 hypoglycemia - improved  Chronic back pain (severe L4,L5 spinal stenosis)  ESRD on HD  Mod/severe R ICA stenosis  HO HTN, HLD, DM  HO DKA  HO hypertensive urgencies     PLAN    CNS: SP stroke code. Follow up CT head. Fu neurology. Resume pain meds today. MS back to baseline    HEENT: Oral care.    PULMONARY: HOB @ 45 degrees, aspiration precautions.      CARDIAC:  I=O. ECHO EF 54%. Resume BP meds.     GASTROENTEROLOGY: PO Feeding. Bowel Regimen    RENAL: Monitor CMP & UO. Correct electrolytes as needed. HD per nephro    INFECTIOUS: Monitor off ABx. Monitor VS.    HEMATOLOGY: DVT ppx. LE duplex 5/19 negative. Monitor CBC and Coags.    ENDOCRINOLOGY: Check FS q 1 hours. on D5. Endo eval appreciated. Repeat BMP and BH    MUSCULOSKELETAL: IAT    DISPOSITION: POssible DGTF

## 2025-05-29 NOTE — PROGRESS NOTE ADULT - ATTENDING COMMENTS
Ms Ponce was seen & examined at bedside this AM, patient was successfully extubated yesterday and is now tolerating nasal cannula oxygen well.  Patient's blood glucose readings remain highly variable, and D5 infusion continues as before.  Appreciate ongoing endocrine recommendations.  As patient is very nauseated this AM with vomiting and ongoing anion gap despite controlled BG, will recheck BHB.  If negative, patient is stable for transfer to the general medical floor for further management of her brittle diabetes.  I agree with the fellow note, with the exceptions listed in my attestation above.  The remainder of impression and plan per fellow note.

## 2025-05-30 LAB
ALBUMIN SERPL ELPH-MCNC: 3.4 G/DL — LOW (ref 3.5–5.2)
ALP SERPL-CCNC: 104 U/L — SIGNIFICANT CHANGE UP (ref 30–115)
ALT FLD-CCNC: 27 U/L — SIGNIFICANT CHANGE UP (ref 0–41)
ANION GAP SERPL CALC-SCNC: 13 MMOL/L — SIGNIFICANT CHANGE UP (ref 7–14)
ANION GAP SERPL CALC-SCNC: 13 MMOL/L — SIGNIFICANT CHANGE UP (ref 7–14)
AST SERPL-CCNC: 30 U/L — SIGNIFICANT CHANGE UP (ref 0–41)
BASOPHILS # BLD AUTO: 0.02 K/UL — SIGNIFICANT CHANGE UP (ref 0–0.2)
BASOPHILS NFR BLD AUTO: 0.4 % — SIGNIFICANT CHANGE UP (ref 0–1)
BILIRUB SERPL-MCNC: 0.2 MG/DL — SIGNIFICANT CHANGE UP (ref 0.2–1.2)
BUN SERPL-MCNC: 31 MG/DL — HIGH (ref 10–20)
BUN SERPL-MCNC: 41 MG/DL — HIGH (ref 10–20)
CALCIUM SERPL-MCNC: 8.9 MG/DL — SIGNIFICANT CHANGE UP (ref 8.4–10.5)
CALCIUM SERPL-MCNC: 8.9 MG/DL — SIGNIFICANT CHANGE UP (ref 8.4–10.5)
CHLORIDE SERPL-SCNC: 100 MMOL/L — SIGNIFICANT CHANGE UP (ref 98–110)
CHLORIDE SERPL-SCNC: 97 MMOL/L — LOW (ref 98–110)
CO2 SERPL-SCNC: 26 MMOL/L — SIGNIFICANT CHANGE UP (ref 17–32)
CO2 SERPL-SCNC: 27 MMOL/L — SIGNIFICANT CHANGE UP (ref 17–32)
CREAT SERPL-MCNC: 6.1 MG/DL — CRITICAL HIGH (ref 0.7–1.5)
CREAT SERPL-MCNC: 7.3 MG/DL — CRITICAL HIGH (ref 0.7–1.5)
EGFR: 6 ML/MIN/1.73M2 — LOW
EGFR: 6 ML/MIN/1.73M2 — LOW
EGFR: 7 ML/MIN/1.73M2 — LOW
EGFR: 7 ML/MIN/1.73M2 — LOW
EOSINOPHIL # BLD AUTO: 0.17 K/UL — SIGNIFICANT CHANGE UP (ref 0–0.7)
EOSINOPHIL NFR BLD AUTO: 3.5 % — SIGNIFICANT CHANGE UP (ref 0–8)
GLUCOSE BLDC GLUCOMTR-MCNC: 110 MG/DL — HIGH (ref 70–99)
GLUCOSE BLDC GLUCOMTR-MCNC: 124 MG/DL — HIGH (ref 70–99)
GLUCOSE BLDC GLUCOMTR-MCNC: 154 MG/DL — HIGH (ref 70–99)
GLUCOSE BLDC GLUCOMTR-MCNC: 156 MG/DL — HIGH (ref 70–99)
GLUCOSE BLDC GLUCOMTR-MCNC: 156 MG/DL — HIGH (ref 70–99)
GLUCOSE BLDC GLUCOMTR-MCNC: 166 MG/DL — HIGH (ref 70–99)
GLUCOSE BLDC GLUCOMTR-MCNC: 185 MG/DL — HIGH (ref 70–99)
GLUCOSE BLDC GLUCOMTR-MCNC: 200 MG/DL — HIGH (ref 70–99)
GLUCOSE BLDC GLUCOMTR-MCNC: 235 MG/DL — HIGH (ref 70–99)
GLUCOSE BLDC GLUCOMTR-MCNC: 259 MG/DL — HIGH (ref 70–99)
GLUCOSE BLDC GLUCOMTR-MCNC: 279 MG/DL — HIGH (ref 70–99)
GLUCOSE BLDC GLUCOMTR-MCNC: 336 MG/DL — HIGH (ref 70–99)
GLUCOSE BLDC GLUCOMTR-MCNC: 394 MG/DL — HIGH (ref 70–99)
GLUCOSE BLDC GLUCOMTR-MCNC: 395 MG/DL — HIGH (ref 70–99)
GLUCOSE BLDC GLUCOMTR-MCNC: 419 MG/DL — HIGH (ref 70–99)
GLUCOSE BLDC GLUCOMTR-MCNC: 95 MG/DL — SIGNIFICANT CHANGE UP (ref 70–99)
GLUCOSE SERPL-MCNC: 104 MG/DL — HIGH (ref 70–99)
GLUCOSE SERPL-MCNC: 193 MG/DL — HIGH (ref 70–99)
HCT VFR BLD CALC: 25.8 % — LOW (ref 37–47)
HGB BLD-MCNC: 8.3 G/DL — LOW (ref 12–16)
IMM GRANULOCYTES NFR BLD AUTO: 0.2 % — SIGNIFICANT CHANGE UP (ref 0.1–0.3)
LYMPHOCYTES # BLD AUTO: 0.77 K/UL — LOW (ref 1.2–3.4)
LYMPHOCYTES # BLD AUTO: 16.1 % — LOW (ref 20.5–51.1)
MAGNESIUM SERPL-MCNC: 3.1 MG/DL — CRITICAL HIGH (ref 1.8–2.4)
MCHC RBC-ENTMCNC: 32.2 G/DL — SIGNIFICANT CHANGE UP (ref 32–37)
MCHC RBC-ENTMCNC: 32.8 PG — HIGH (ref 27–31)
MCV RBC AUTO: 102 FL — HIGH (ref 81–99)
MONOCYTES # BLD AUTO: 0.43 K/UL — SIGNIFICANT CHANGE UP (ref 0.1–0.6)
MONOCYTES NFR BLD AUTO: 9 % — SIGNIFICANT CHANGE UP (ref 1.7–9.3)
NEUTROPHILS # BLD AUTO: 3.39 K/UL — SIGNIFICANT CHANGE UP (ref 1.4–6.5)
NEUTROPHILS NFR BLD AUTO: 70.8 % — SIGNIFICANT CHANGE UP (ref 42.2–75.2)
NRBC BLD AUTO-RTO: 0 /100 WBCS — SIGNIFICANT CHANGE UP (ref 0–0)
PLATELET # BLD AUTO: 169 K/UL — SIGNIFICANT CHANGE UP (ref 130–400)
PMV BLD: 10.3 FL — SIGNIFICANT CHANGE UP (ref 7.4–10.4)
POTASSIUM SERPL-MCNC: 4.5 MMOL/L — SIGNIFICANT CHANGE UP (ref 3.5–5)
POTASSIUM SERPL-MCNC: 4.5 MMOL/L — SIGNIFICANT CHANGE UP (ref 3.5–5)
POTASSIUM SERPL-SCNC: 4.5 MMOL/L — SIGNIFICANT CHANGE UP (ref 3.5–5)
POTASSIUM SERPL-SCNC: 4.5 MMOL/L — SIGNIFICANT CHANGE UP (ref 3.5–5)
PROT SERPL-MCNC: 5.5 G/DL — LOW (ref 6–8)
RBC # BLD: 2.53 M/UL — LOW (ref 4.2–5.4)
RBC # FLD: 17.8 % — HIGH (ref 11.5–14.5)
SODIUM SERPL-SCNC: 137 MMOL/L — SIGNIFICANT CHANGE UP (ref 135–146)
SODIUM SERPL-SCNC: 139 MMOL/L — SIGNIFICANT CHANGE UP (ref 135–146)
WBC # BLD: 4.79 K/UL — LOW (ref 4.8–10.8)
WBC # FLD AUTO: 4.79 K/UL — LOW (ref 4.8–10.8)

## 2025-05-30 PROCEDURE — 99233 SBSQ HOSP IP/OBS HIGH 50: CPT | Mod: GC

## 2025-05-30 RX ORDER — DEXTROSE 50 % IN WATER 50 %
25 SYRINGE (ML) INTRAVENOUS ONCE
Refills: 0 | Status: DISCONTINUED | OUTPATIENT
Start: 2025-05-30 | End: 2025-05-30

## 2025-05-30 RX ORDER — INSULIN GLARGINE-YFGN 100 [IU]/ML
10 INJECTION, SOLUTION SUBCUTANEOUS EVERY MORNING
Refills: 0 | Status: DISCONTINUED | OUTPATIENT
Start: 2025-05-30 | End: 2025-06-02

## 2025-05-30 RX ORDER — DARBEPOETIN ALFA 40 UG/ML
25 SOLUTION INTRAVENOUS; SUBCUTANEOUS ONCE
Refills: 0 | Status: COMPLETED | OUTPATIENT
Start: 2025-05-30 | End: 2025-05-30

## 2025-05-30 RX ORDER — DEXTROSE 50 % IN WATER 50 %
12.5 SYRINGE (ML) INTRAVENOUS ONCE
Refills: 0 | Status: COMPLETED | OUTPATIENT
Start: 2025-05-30 | End: 2025-05-30

## 2025-05-30 RX ORDER — INSULIN LISPRO 100 U/ML
INJECTION, SOLUTION INTRAVENOUS; SUBCUTANEOUS AT BEDTIME
Refills: 0 | Status: DISCONTINUED | OUTPATIENT
Start: 2025-05-30 | End: 2025-06-02

## 2025-05-30 RX ORDER — INSULIN LISPRO 100 U/ML
5 INJECTION, SOLUTION INTRAVENOUS; SUBCUTANEOUS
Refills: 0 | Status: DISCONTINUED | OUTPATIENT
Start: 2025-05-30 | End: 2025-06-02

## 2025-05-30 RX ORDER — INSULIN LISPRO 100 U/ML
6 INJECTION, SOLUTION INTRAVENOUS; SUBCUTANEOUS ONCE
Refills: 0 | Status: COMPLETED | OUTPATIENT
Start: 2025-05-30 | End: 2025-05-30

## 2025-05-30 RX ORDER — INSULIN LISPRO 100 U/ML
INJECTION, SOLUTION INTRAVENOUS; SUBCUTANEOUS
Refills: 0 | Status: DISCONTINUED | OUTPATIENT
Start: 2025-05-30 | End: 2025-06-03

## 2025-05-30 RX ORDER — INSULIN LISPRO 100 U/ML
6 INJECTION, SOLUTION INTRAVENOUS; SUBCUTANEOUS ONCE
Refills: 0 | Status: DISCONTINUED | OUTPATIENT
Start: 2025-05-30 | End: 2025-05-30

## 2025-05-30 RX ADMIN — METHOCARBAMOL 1000 MILLIGRAM(S): 500 TABLET, FILM COATED ORAL at 13:05

## 2025-05-30 RX ADMIN — INSULIN LISPRO 5 UNIT(S): 100 INJECTION, SOLUTION INTRAVENOUS; SUBCUTANEOUS at 08:24

## 2025-05-30 RX ADMIN — INSULIN LISPRO 3: 100 INJECTION, SOLUTION INTRAVENOUS; SUBCUTANEOUS at 21:24

## 2025-05-30 RX ADMIN — Medication 30 MILLIGRAM(S): at 05:59

## 2025-05-30 RX ADMIN — HEPARIN SODIUM 5000 UNIT(S): 1000 INJECTION INTRAVENOUS; SUBCUTANEOUS at 05:59

## 2025-05-30 RX ADMIN — DOXAZOSIN MESYLATE 1 MILLIGRAM(S): 8 TABLET ORAL at 21:25

## 2025-05-30 RX ADMIN — DARBEPOETIN ALFA 25 MICROGRAM(S): 40 SOLUTION INTRAVENOUS; SUBCUTANEOUS at 13:47

## 2025-05-30 RX ADMIN — SEVELAMER HYDROCHLORIDE 2400 MILLIGRAM(S): 800 TABLET ORAL at 17:22

## 2025-05-30 RX ADMIN — Medication 1 APPLICATION(S): at 06:00

## 2025-05-30 RX ADMIN — GABAPENTIN 300 MILLIGRAM(S): 400 CAPSULE ORAL at 21:23

## 2025-05-30 RX ADMIN — Medication 2 TABLET(S): at 21:23

## 2025-05-30 RX ADMIN — Medication 3 UNIT(S)/HR: at 02:04

## 2025-05-30 RX ADMIN — METHOCARBAMOL 1000 MILLIGRAM(S): 500 TABLET, FILM COATED ORAL at 21:23

## 2025-05-30 RX ADMIN — Medication 81 MILLIGRAM(S): at 11:33

## 2025-05-30 RX ADMIN — INSULIN LISPRO 5 UNIT(S): 100 INJECTION, SOLUTION INTRAVENOUS; SUBCUTANEOUS at 16:59

## 2025-05-30 RX ADMIN — SEVELAMER HYDROCHLORIDE 2400 MILLIGRAM(S): 800 TABLET ORAL at 12:07

## 2025-05-30 RX ADMIN — Medication 12.5 GRAM(S): at 05:59

## 2025-05-30 RX ADMIN — LIDOCAINE HYDROCHLORIDE 1 PATCH: 20 JELLY TOPICAL at 11:33

## 2025-05-30 RX ADMIN — SEVELAMER HYDROCHLORIDE 2400 MILLIGRAM(S): 800 TABLET ORAL at 08:25

## 2025-05-30 RX ADMIN — Medication 1 DOSE(S): at 12:06

## 2025-05-30 RX ADMIN — METHOCARBAMOL 1000 MILLIGRAM(S): 500 TABLET, FILM COATED ORAL at 05:59

## 2025-05-30 RX ADMIN — Medication 3 UNIT(S): at 22:44

## 2025-05-30 RX ADMIN — INSULIN LISPRO 5 UNIT(S): 100 INJECTION, SOLUTION INTRAVENOUS; SUBCUTANEOUS at 12:07

## 2025-05-30 RX ADMIN — Medication 100 MILLIGRAM(S): at 17:22

## 2025-05-30 RX ADMIN — INSULIN LISPRO 6 UNIT(S): 100 INJECTION, SOLUTION INTRAVENOUS; SUBCUTANEOUS at 18:59

## 2025-05-30 RX ADMIN — INSULIN LISPRO 1: 100 INJECTION, SOLUTION INTRAVENOUS; SUBCUTANEOUS at 12:07

## 2025-05-30 RX ADMIN — Medication 100 MILLIGRAM(S): at 05:59

## 2025-05-30 RX ADMIN — INSULIN GLARGINE-YFGN 10 UNIT(S): 100 INJECTION, SOLUTION SUBCUTANEOUS at 06:24

## 2025-05-30 RX ADMIN — LIDOCAINE HYDROCHLORIDE 1 PATCH: 20 JELLY TOPICAL at 18:11

## 2025-05-30 RX ADMIN — INSULIN LISPRO 3: 100 INJECTION, SOLUTION INTRAVENOUS; SUBCUTANEOUS at 16:59

## 2025-05-30 RX ADMIN — Medication 100 MILLIGRAM(S): at 21:24

## 2025-05-30 RX ADMIN — DARBEPOETIN ALFA 25 MICROGRAM(S): 40 SOLUTION INTRAVENOUS; SUBCUTANEOUS at 15:38

## 2025-05-30 RX ADMIN — HEPARIN SODIUM 5000 UNIT(S): 1000 INJECTION INTRAVENOUS; SUBCUTANEOUS at 17:21

## 2025-05-30 RX ADMIN — POLYETHYLENE GLYCOL 3350 17 GRAM(S): 17 POWDER, FOR SOLUTION ORAL at 11:33

## 2025-05-30 RX ADMIN — ATORVASTATIN CALCIUM 80 MILLIGRAM(S): 80 TABLET, FILM COATED ORAL at 21:23

## 2025-05-30 NOTE — PROGRESS NOTE ADULT - ASSESSMENT
59y old  Female with PMH of DM, ESRD on HD MWF, HTN, HLD, and HF who presents to ED for chest pain during HD session this morning.     #Hypoglycemia  - Patient has ESRD on insulin  - Episode of significant hypoglycemia  down to 19 on Poc, serum has also been low a few days ago currently intubated, a1c 8   - overall brittle glucose readings had to be briefly started on insulin infusion for DKA , few days ago had severe lows with glucose readings down to 19   - Currently bridged with 10 units of lantus, agree with the same , also started on lispro 5 units three times a day along with a sliding scale of 1 unit for every 50 mg/dl above 150 mg/dl   - will follow and adjust   - DC regimen to be determined

## 2025-05-30 NOTE — PROGRESS NOTE ADULT - SUBJECTIVE AND OBJECTIVE BOX
Patient is a 59y old  Female who presents with a chief complaint of AMS (29 May 2025 13:15)        Over Night Events: On insulin drip 0.5 units / hr. On RA.         ROS:     All ROS are negative except HPI         PHYSICAL EXAM    ICU Vital Signs Last 24 Hrs  T(C): 36.1 (30 May 2025 08:00), Max: 36.1 (29 May 2025 12:00)  T(F): 97 (30 May 2025 08:00), Max: 97 (29 May 2025 12:00)  HR: 74 (30 May 2025 08:00) (64 - 74)  ABP: 130/40 (30 May 2025 08:00) (121/44 - 175/58)  ABP(mean): 67 (30 May 2025 08:00) (25 - 96)  RR: 12 (30 May 2025 08:00) (9 - 21)  SpO2: 97% (30 May 2025 08:00) (97% - 99%)        ENT: Airway patent,  EYES: Pupils equal, Round and reactive to light.  CARDIAC: Normal rate, Regular rhythm.    RESPIRATORY: No wheezing, Bilateral BS, Not tachypneic, No use of accessory muscles  GASTROINTESTINAL: Abdomen soft, Non-tender, No guarding,   NEUROLOGICAL: Alert and oriented   SKIN: Skin normal color for race, Warm and dry and intact.         05-29-25 @ 07:01  -  05-30-25 @ 07:00  --------------------------------------------------------  IN:    dextrose 5% + lactated ringers w/ Additives: 1200 mL    Insulin: 32 mL    Insulin: 2 mL    Insulin: 1 mL    Insulin: 5 mL    Insulin: 0.5 mL    Oral Fluid: 420 mL  Total IN: 1660.5 mL    OUT:  Total OUT: 0 mL    Total NET: 1660.5 mL        LABS:                            8.3    4.79  )-----------( 169      ( 30 May 2025 04:05 )             25.8                                               05-30    139  |  100  |  41[H]  ----------------------------<  104[H]  4.5   |  26  |  7.3[HH]    Ca    8.9      30 May 2025 04:05  Phos  5.4     05-29  Mg     3.1     05-30    TPro  5.5[L]  /  Alb  3.4[L]  /  TBili  0.2  /  DBili  x   /  AST  30  /  ALT  27  /  AlkPhos  104  05-30                                             Urinalysis Basic - ( 30 May 2025 04:05 )    Color: x / Appearance: x / SG: x / pH: x  Gluc: 104 mg/dL / Ketone: x  / Bili: x / Urobili: x   Blood: x / Protein: x / Nitrite: x   Leuk Esterase: x / RBC: x / WBC x   Sq Epi: x / Non Sq Epi: x / Bacteria: x                                                  LIVER FUNCTIONS - ( 30 May 2025 04:05 )  Alb: 3.4 g/dL / Pro: 5.5 g/dL / ALK PHOS: 104 U/L / ALT: 27 U/L / AST: 30 U/L / GGT: x                                                                                                                                   ABG - ( 29 May 2025 11:30 )  pH, Arterial: 7.36  pH, Blood: x     /  pCO2: 43    /  pO2: 123   / HCO3: 24    / Base Excess: -1.3  /  SaO2: 99.1                MEDICATIONS  (STANDING):  aspirin  chewable 81 milliGRAM(s) Oral daily  atorvastatin 80 milliGRAM(s) Oral at bedtime  chlorhexidine 2% Cloths 1 Application(s) Topical <User Schedule>  darbepoetin Injectable ViaL 25 MICROGram(s) IV Push <User Schedule>  dextrose 5% + lactated ringers 1000 milliLiter(s) (100 mL/Hr) IV Continuous <Continuous>  dextrose 5%. 1000 milliLiter(s) (50 mL/Hr) IV Continuous <Continuous>  dextrose 50% Injectable 25 Gram(s) IV Push once  doxazosin 1 milliGRAM(s) Oral <User Schedule>  fluticasone propionate/ salmeterol 100-50 MICROgram(s) Diskus 1 Dose(s) Inhalation two times a day  gabapentin 300 milliGRAM(s) Oral at bedtime  glucagon  Injectable 1 milliGRAM(s) IntraMuscular once  heparin   Injectable 5000 Unit(s) SubCutaneous every 12 hours  hydrALAZINE 100 milliGRAM(s) Oral every 8 hours  insulin glargine Injectable (LANTUS) 10 Unit(s) SubCutaneous every morning  insulin lispro Injectable (ADMELOG) 5 Unit(s) SubCutaneous three times a day before meals  insulin regular Infusion 0.5 Unit(s)/Hr (0.5 mL/Hr) IV Continuous <Continuous>  lidocaine   4% Patch 1 Patch Transdermal daily  methocarbamol 1000 milliGRAM(s) Oral three times a day  NIFEdipine XL 30 milliGRAM(s) Oral daily  ondansetron Injectable 4 milliGRAM(s) IV Push once  polyethylene glycol 3350 17 Gram(s) Oral daily  senna 2 Tablet(s) Oral at bedtime  sevelamer carbonate Powder 2400 milliGRAM(s) Oral three times a day with meals    MEDICATIONS  (PRN):  acetaminophen     Tablet .. 975 milliGRAM(s) Oral every 8 hours PRN Mild Pain (1 - 3)  albuterol    90 MICROgram(s) HFA Inhaler 2 Puff(s) Inhalation every 6 hours PRN Shortness of Breath and/or Wheezing  dextrose Oral Gel 15 Gram(s) Oral once PRN Blood Glucose LESS THAN 70 milliGRAM(s)/deciliter  ondansetron Injectable 4 milliGRAM(s) IV Push every 4 hours PRN Nausea and/or Vomiting  traMADol 50 milliGRAM(s) Oral every 6 hours PRN Severe Pain (7 - 10)

## 2025-05-30 NOTE — PROGRESS NOTE ADULT - ATTENDING COMMENTS
Mr Ponce was seen & examined again at bedside, yesterday patient was found to have an ongoing BHB & AG, and was resumed on insulin gtt.  Today with improvement in AG, will transition to SQ insulin.  Patient due for HD today, OK for HD in unit today.  Will continue close BG monitoring for now given recurrent episodes of hypotension.  Patient will be stable for transfer to the SDU later today  I agree with the fellow note, with the exceptions listed in my attestation above.  The remainder of impression and plan per fellow note.

## 2025-05-30 NOTE — PROGRESS NOTE ADULT - SUBJECTIVE AND OBJECTIVE BOX
Nephrology progress note  Patient is seen and examined, events over the last 24 h noted .  Lying in bed comfortable   no issues     Allergies:  No Known Allergies    Hospital Medications:   MEDICATIONS  (STANDING):  aspirin  chewable 81 milliGRAM(s) Oral daily  atorvastatin 80 milliGRAM(s) Oral at bedtime  darbepoetin Injectable ViaL 25 MICROGram(s) IV Push <User Schedule>  doxazosin 1 milliGRAM(s) Oral <User Schedule>  fluticasone propionate/ salmeterol 100-50 MICROgram(s) Diskus 1 Dose(s) Inhalation two times a day  gabapentin 300 milliGRAM(s) Oral at bedtime  glucagon  Injectable 1 milliGRAM(s) IntraMuscular once  heparin   Injectable 5000 Unit(s) SubCutaneous every 12 hours  hydrALAZINE 100 milliGRAM(s) Oral every 8 hours  insulin glargine Injectable (LANTUS) 10 Unit(s) SubCutaneous every morning  insulin lispro (ADMELOG) corrective regimen sliding scale   SubCutaneous three times a day before meals  insulin lispro (ADMELOG) corrective regimen sliding scale   SubCutaneous at bedtime  insulin lispro Injectable (ADMELOG) 5 Unit(s) SubCutaneous three times a day before meals  lidocaine   4% Patch 1 Patch Transdermal daily  methocarbamol 1000 milliGRAM(s) Oral three times a day  NIFEdipine XL 30 milliGRAM(s) Oral daily  polyethylene glycol 3350 17 Gram(s) Oral daily  senna 2 Tablet(s) Oral at bedtime  sevelamer carbonate Powder 2400 milliGRAM(s) Oral three times a day with meals        VITALS:  T(F): 97 (05-30-25 @ 08:00), Max: 97 (05-29-25 @ 12:00)  HR: 76 (05-30-25 @ 09:00)  BP: --  RR: 11 (05-30-25 @ 09:00)  SpO2: 97% (05-30-25 @ 08:00)  -    05-28 @ 07:01  -  05-29 @ 07:00  --------------------------------------------------------  IN: 370.6 mL / OUT: 2880 mL / NET: -2509.4 mL    05-29 @ 07:01  -  05-30 @ 07:00  --------------------------------------------------------  IN: 1660.5 mL / OUT: 0 mL / NET: 1660.5 mL      Height (cm): 163 (05-29 @ 13:15)    PHYSICAL EXAM:  Constitutional: NAD  Respiratory: CTAB  Cardiovascular: S1, S2, RRR  Gastrointestinal: BS+, soft, NT/ND  Extremities: No cyanosis or clubbing. No peripheral edema  :  No murillo.   Skin: No rashes    LABS:  05-30    139  |  100  |  41[H]  ----------------------------<  104[H]  4.5   |  26  |  7.3[HH]    Ca    8.9      30 May 2025 04:05  Phos  5.4     05-29  Mg     3.1     05-30    TPro  5.5[L]  /  Alb  3.4[L]  /  TBili  0.2  /  DBili      /  AST  30  /  ALT  27  /  AlkPhos  104  05-30                          8.3    4.79  )-----------( 169      ( 30 May 2025 04:05 )             25.8       Urine Studies:  Urinalysis Basic - ( 30 May 2025 04:05 )    Color:  / Appearance:  / SG:  / pH:   Gluc: 104 mg/dL / Ketone:   / Bili:  / Urobili:    Blood:  / Protein:  / Nitrite:    Leuk Esterase:  / RBC:  / WBC    Sq Epi:  / Non Sq Epi:  / Bacteria:           Iron 29, TIBC 175, %sat 17      [05-28-25 @ 04:22]  Ferritin 538      [05-28-25 @ 04:22]          RADIOLOGY & ADDITIONAL STUDIES:

## 2025-05-30 NOTE — PROGRESS NOTE ADULT - SUBJECTIVE AND OBJECTIVE BOX
ENDOCRINE INITIAL CONSULT -     HPI:  Patient is a 59y old  Female with PMH of DM, ESRD on HD MWF, HTN, HLD, and HF who presents to ED for chest pain during HD session this morning.  Had 90 minutes of total HD prior to arrival.  Patient stated that she felt chest pressure and exertional shortness of breath. Shortness of breath improves with rest. Patient was alert and oriented however speaking sluggishly on arrival to the ED. Endorses neck pain and chest pressure as well. Denies abdominal pain, dysuria, swelling.    in the ED /54, HR 68, Temp 97.7, SpO2 98 on 2L NC  labs significant for trop 64 -> 66, Creatinine 4.1  UA showed bacteria  CXR negative  CT head showed A focal hypodensity is noted within the right frontal periventricular white matter, not previously seen. Finding may reflect age-indeterminate ischemic change.       Patient admitted for workup of chest pain during HD (16 May 2025 15:38)      ENDOCRINE HISTORY     Endocrinologist:  Social History:  Family History:  Surgical History:  Insurance:  Resides In:    PAST MEDICAL & SURGICAL HISTORY:  Chronic kidney disease, unspecified CKD stage          FAMILY HISTORY:      Social History:      Home Medications:  aspirin 81 mg oral tablet: 1 tab(s) orally once a day (16 May 2025 17:28)  doxazosin 1 mg oral tablet: 1 tab(s) orally 2 times a day (16 May 2025 17:28)  guaiFENesin 600 mg oral tablet, extended release: 1 tab(s) orally 2 times a day (18 May 2025 04:42)  hydrALAZINE 100 mg oral tablet: 1 tab(s) orally 2 times a day (18 May 2025 04:42)  hydrALAZINE 50 mg oral tablet: 1 tab(s) orally 2 times a day (18 May 2025 04:42)  Lantus 100 units/mL subcutaneous solution: 15 unit(s) subcutaneous once a day (in the morning) (18 May 2025 04:41)  NIFEdipine 90 mg oral tablet, extended release: 1 tab(s) orally once a day as needed for hypertension take as needed for SBP greater than 200 (18 May 2025 04:42)  NovoLOG FlexPen 100 units/mL injectable solution: 15 unit(s) injectable 3 times a day (before meals) Hold premeal insulin if blood glucose &lt; 100 (18 May 2025 04:42)      MEDICATIONS  (STANDING):  aspirin  chewable 81 milliGRAM(s) Oral daily  atorvastatin 80 milliGRAM(s) Oral at bedtime  chlorhexidine 0.12% Liquid 15 milliLiter(s) Oral Mucosa every 12 hours  chlorhexidine 2% Cloths 1 Application(s) Topical <User Schedule>  darbepoetin Injectable ViaL 25 MICROGram(s) IV Push <User Schedule>  dexMEDEtomidine Infusion 0.4 MICROgram(s)/kG/Hr (7 mL/Hr) IV Continuous <Continuous>  dextrose 5% + sodium chloride 0.45%. 1000 milliLiter(s) (50 mL/Hr) IV Continuous <Continuous>  dextrose 5%. 1000 milliLiter(s) (50 mL/Hr) IV Continuous <Continuous>  dextrose 50% Injectable 25 Gram(s) IV Push once  doxazosin 1 milliGRAM(s) Oral <User Schedule>  fentaNYL   Infusion 0.5 MICROgram(s)/kG/Hr (3.5 mL/Hr) IV Continuous <Continuous>  fluticasone propionate/ salmeterol 100-50 MICROgram(s) Diskus 1 Dose(s) Inhalation two times a day  glucagon  Injectable 1 milliGRAM(s) IntraMuscular once  guaiFENesin  milliGRAM(s) Oral every 12 hours  heparin   Injectable 5000 Unit(s) SubCutaneous every 12 hours  hydrALAZINE 100 milliGRAM(s) Oral every 8 hours  insulin lispro (ADMELOG) corrective regimen sliding scale   SubCutaneous Before meals and at bedtime  lidocaine   4% Patch 1 Patch Transdermal daily  NIFEdipine XL 30 milliGRAM(s) Oral daily  polyethylene glycol 3350 17 Gram(s) Oral daily  propofol Infusion 10 MICROgram(s)/kG/Min (4.2 mL/Hr) IV Continuous <Continuous>  propofol Injectable 20 milliGRAM(s) IV Push once  senna 2 Tablet(s) Oral at bedtime  sevelamer carbonate Powder 2400 milliGRAM(s) Oral three times a day with meals    MEDICATIONS  (PRN):  acetaminophen     Tablet .. 975 milliGRAM(s) Oral every 8 hours PRN Mild Pain (1 - 3)  albuterol    90 MICROgram(s) HFA Inhaler 2 Puff(s) Inhalation every 6 hours PRN Shortness of Breath and/or Wheezing  dextrose Oral Gel 15 Gram(s) Oral once PRN Blood Glucose LESS THAN 70 milliGRAM(s)/deciliter      Allergies    No Known Allergies    Intolerances        REVIEW OF SYSTEMS      UNABLE TO OBTAIN     PHYSICAL EXAM   Vital Signs Last 24 Hrs  T(C): 36.3 (27 May 2025 14:00), Max: 36.8 (26 May 2025 20:30)  T(F): 97.3 (27 May 2025 14:00), Max: 98.2 (26 May 2025 20:30)  HR: 64 (27 May 2025 14:15) (58 - 80)  BP: 140/64 (27 May 2025 13:00) (131/60 - 216/95)  BP(mean): 92 (27 May 2025 13:00) (86 - 136)  RR: 14 (27 May 2025 14:00) (14 - 18)  SpO2: 100% (27 May 2025 14:15) (91% - 100%)    Parameters below as of 27 May 2025 14:00  Patient On (Oxygen Delivery Method): ventilator      GENERAL: ill appearing, intubated    CAPILLARY BLOOD GLUCOSE  19 (27 May 2025 07:12)      POCT Blood Glucose.: 128 mg/dL (27 May 2025 10:34)  POCT Blood Glucose.: 111 mg/dL (27 May 2025 08:24)  POCT Blood Glucose.: 125 mg/dL (27 May 2025 07:08)  POCT Blood Glucose.: 131 mg/dL (27 May 2025 06:58)  POCT Blood Glucose.: 141 mg/dL (27 May 2025 06:49)  POCT Blood Glucose.: 185 mg/dL (27 May 2025 06:41)  POCT Blood Glucose.: 229 mg/dL (27 May 2025 06:40)  POCT Blood Glucose.: 19 mg/dL (27 May 2025 06:37)  POCT Blood Glucose.: 326 mg/dL (26 May 2025 22:13)  POCT Blood Glucose.: 132 mg/dL (26 May 2025 18:43)  POCT Blood Glucose.: 95 mg/dL (26 May 2025 16:56)      A1C with Estimated Average Glucose Result: 8.1 % (05-17-25 @ 06:21)                            11.5   4.44  )-----------( 190      ( 27 May 2025 07:00 )             34.6       05-27    135  |  95[L]  |  33[H]  ----------------------------<  151[H]  4.6   |  24  |  5.6[HH]    Ca    9.5      27 May 2025 07:00  Phos  7.0     05-26  Mg     3.1     05-27    TPro  6.6  /  Alb  4.2  /  TBili  0.3  /  DBili  x   /  AST  40  /  ALT  29  /  AlkPhos  102  05-27          LIPIDS    RADIOLOGY

## 2025-05-30 NOTE — PROGRESS NOTE ADULT - ASSESSMENT
IMPRESSION:    AMS 2/2 hypoglycemia - improved  DKA - resolved   Chronic back pain (severe L4,L5 spinal stenosis)  ESRD on HD  Mod/severe R ICA stenosis  HO HTN, HLD, DM  HO DKA  HO hypertensive urgencies     PLAN    CNS: SP stroke code. Follow up CT head. Fu neurology. Resume pain meds today. MS back to baseline    HEENT: Oral care.    PULMONARY: HOB @ 45 degrees, aspiration precautions.      CARDIAC:  I=O. ECHO EF 54%. Resume BP meds.     GASTROENTEROLOGY: PO Feeding. Bowel Regimen    RENAL: Monitor CMP & UO. Correct electrolytes as needed. HD per nephro    INFECTIOUS: Monitor off ABx. Monitor VS.    HEMATOLOGY: DVT ppx. LE duplex 5/19 negative. Monitor CBC and Coags.    ENDOCRINOLOGY: Check FS q 1 hours. on D5. Endo eval appreciated. Repeat BMP and BH    MUSCULOSKELETAL: IAT    DISPOSITION: SDU  DC A line

## 2025-05-30 NOTE — CHART NOTE - NSCHARTNOTEFT_GEN_A_CORE
Transfer Note    Transfer from: CCU    Transfer to: (  ) Medicine    (  ) Telemetry     (  ) RCU       ( x ) CEU    (  ) VENT                        (  ) Palliative    (  ) Stroke Unit    (  ) MICU    (  ) CCU    Sign out given to:     ----------------------------------------------------------------------------------------------------------  HPI / ICU COURSE:    HPI:  Patient is a 59y old  Female with PMH of DM, ESRD on HD MWF, HTN, HLD, and HF who presents to ED for chest pain during HD session this morning.  Had 90 minutes of total HD prior to arrival.  Patient stated that she felt chest pressure and exertional shortness of breath. Shortness of breath improves with rest. Patient was alert and oriented however speaking sluggishly on arrival to the ED. Endorses neck pain and chest pressure as well. Denies abdominal pain, dysuria, swelling.    in the ED /54, HR 68, Temp 97.7, SpO2 98 on 2L NC  labs significant for trop 64 -> 66, Creatinine 4.1  UA showed bacteria  CXR negative  CT head showed A focal hypodensity is noted within the right frontal periventricular white matter, not previously seen. Finding may reflect age-indeterminate ischemic change.       Patient admitted for workup of chest pain during HD (16 May 2025 15:38)    RR was called due to AMS. Patient was found to be unresponsive to verbal, physical stimuli or sternal rub. BG was measured and noted to be 19. D5 x2 was administered via IV. BG recheck was 229. On exam patient is appeared to have agonal breathing with a pulse at 70 bpm. Patient was still noted to altered and unresponsive. On exam, patient pupils were nonreactive, dilated, still unresponsive, noted to be posturing in the upper and lower extremities bilaterally. CODE STROKE was called and anesthesia at bedside for RSI. Patient was intubated for airway concerns. GOC clarified: FULL code. Zoll pads placed. Family was updated: Rhianna (daughter) who re-established that the patient is full code. All questions answered. Patient upgraded to the CCU for further monitoring.     - CCU Course -   Patient upgraded to the ICU for Hypoglycemia/Metabolic Encephalopathy on Lantus and is ESRD on HD. Insulin held. Was successfully extubated 5/28 onto Nasal Cannula. Blood glucose levels remain highly variable. D5 infusion continued. On 5/29, pt complained of nausea/vomiting and had elevated anion gap despite controlled blood glucose. Beta Hydroxy at 5. Started on Insulin GTT. AG improved by morning of 5/30, pt now transitioned to SQ insulin. Due for Hemodialysis today. Stable for transfer to Step Down Unit after Hemodialysis.     --------------------------------------------------------------------------------------------------------  PMH/PSH:  PAST MEDICAL & SURGICAL HISTORY:  Chronic kidney disease, unspecified CKD stage        -------------------------------------------------------------------------------------------------------  PHYSICAL EXAM:  General: NAD  HEENT: Atraumatic  Respiratory: CTAB  Cardiac: RRR  Abdomen: soft, non-tender, non-distended  Extremities: warm and well-perfused  Neuro: A+Ox4. No FND    Vital Signs Last 24 Hrs  T(C): 36.1 (30 May 2025 08:00), Max: 36.1 (29 May 2025 16:00)  T(F): 97 (30 May 2025 08:00), Max: 97 (29 May 2025 16:00)  HR: 76 (30 May 2025 09:00) (64 - 76)  BP: --  BP(mean): --  RR: 11 (30 May 2025 09:00) (9 - 21)  SpO2: 97% (30 May 2025 08:00) (97% - 99%)        I&O's Summary    29 May 2025 07:01  -  30 May 2025 07:00  --------------------------------------------------------  IN: 1660.5 mL / OUT: 0 mL / NET: 1660.5 mL        --------------------------------------------------------------------------------------------------------  LABS:                               8.3    4.79  )-----------( 169      ( 30 May 2025 04:05 )             25.8       05-30    139  |  100  |  41[H]  ----------------------------<  104[H]  4.5   |  26  |  7.3[HH]    Ca    8.9      30 May 2025 04:05  Phos  5.4     05-29  Mg     3.1     05-30    TPro  5.5[L]  /  Alb  3.4[L]  /  TBili  0.2  /  DBili  x   /  AST  30  /  ALT  27  /  AlkPhos  104  05-30          ABG - ( 29 May 2025 11:30 )  pH, Arterial: 7.36  pH, Blood: x     /  pCO2: 43    /  pO2: 123   / HCO3: 24    / Base Excess: -1.3  /  SaO2: 99.1          Specimen Source:   Method Type:   Gram Stain:   Culture Results:   Bacteria:     ---------------------------------------------------------------------------------------------------  ASSESSMENT & PLAN:     #Chest pain during HD- Resolved  #HFpEF - not in exacerbation  - On admission. trop 64 -> 66, Creatinine 4.1  - ECG showed prominent T wave   - CXR negative   - TTE EF 60%. G1DD  - stress test neg for ischemia  - C/w protonix qd     #AMS 2/2 hypoglycemia likely 2/2 decreased lantus clearance 2/2 ESRD- resolved  #DM2, Insulin dependent- uncontrolled  - RRT on 5/18/2025 for AMS, FS 30 after 80u lantus, s/p d50 and d5LR   - Decrease Lantus to 22 Units from 25Un   - C/w lispro 13  - C/w ISS  - 5/20: S/p D5 for hypoglycemia and AMS  - Monitor fingersticks q4  - Changed gabapentin to 300 qhs  - Increased robaxin to 1000 TID      #ESRD on HD MWF  #HTN    #Hyperkalemia  - nephro recs appreciated   - bp improves with HD  - c/w hydralazine 100mg q8h  - f/u iron studies  - Low K diet  - c/w aranesp 25 weekly with HD  - c/w doxazosin 1mg BID  - c/w sevelamer 1600 mg TID  - patient state she uses nifedipine 90 PO if her SBP is above 200 - will hold for now   - s/p nifedipine 30 x1 and labetalol 10 IVP x1 on 5/20 2/2 HTN to 200s SBP  - c/w nifedipine 30 qd    #On admission, AMS possibly 2/2 transient hypotension during HD- Resolved  - CTH showed A focal hypodensity is noted within the right frontal periventricular white matter, not previously seen. Finding may reflect age-indeterminate ischemic change. An MRI of the brain can be obtained for further evaluation if not clinically contraindicated.  - Neuro consult noted no additional inpatient workup.  - c/w ASA and atorvastatin      FOR FOLLOW UP:  [ ]   [ ]   [ ] Transfer Note    Transfer from: CCU    Transfer to: (  ) Medicine    (  ) Telemetry     (  ) RCU       ( x ) CEU    (  ) VENT                        (  ) Palliative    (  ) Stroke Unit    (  ) MICU    (  ) CCU    Sign out given to:     ----------------------------------------------------------------------------------------------------------  HPI / ICU COURSE:    HPI:  Patient is a 59y old  Female with PMH of DM, ESRD on HD MWF, HTN, HLD, and HF who presents to ED for chest pain during HD session this morning.  Had 90 minutes of total HD prior to arrival.  Patient stated that she felt chest pressure and exertional shortness of breath. Shortness of breath improves with rest. Patient was alert and oriented however speaking sluggishly on arrival to the ED. Endorses neck pain and chest pressure as well. Denies abdominal pain, dysuria, swelling.    in the ED /54, HR 68, Temp 97.7, SpO2 98 on 2L NC  labs significant for trop 64 -> 66, Creatinine 4.1  UA showed bacteria  CXR negative  CT head showed A focal hypodensity is noted within the right frontal periventricular white matter, not previously seen. Finding may reflect age-indeterminate ischemic change.       Patient admitted for workup of chest pain during HD (16 May 2025 15:38)    RR was called due to AMS. Patient was found to be unresponsive to verbal, physical stimuli or sternal rub. BG was measured and noted to be 19. D5 x2 was administered via IV. BG recheck was 229. On exam patient is appeared to have agonal breathing with a pulse at 70 bpm. Patient was still noted to altered and unresponsive. On exam, patient pupils were nonreactive, dilated, still unresponsive, noted to be posturing in the upper and lower extremities bilaterally. CODE STROKE was called and anesthesia at bedside for RSI. Patient was intubated for airway concerns. GOC clarified: FULL code. Zoll pads placed. Family was updated: Rhianna (daughter) who re-established that the patient is full code. All questions answered. Patient upgraded to the CCU for further monitoring.     - CCU Course -   Patient upgraded to the ICU for Hypoglycemia/Metabolic Encephalopathy on Lantus and is ESRD on HD. Insulin held. Was successfully extubated 5/28 onto Nasal Cannula. Blood glucose levels remain highly variable. D5 infusion continued. On 5/29, pt complained of nausea/vomiting and had elevated anion gap despite controlled blood glucose. Beta Hydroxy at 5. Started on Insulin GTT. AG improved by morning of 5/30, pt now transitioned to SQ insulin. Due for Hemodialysis today. Stable for transfer to Step Down Unit after Hemodialysis.     --------------------------------------------------------------------------------------------------------  PMH/PSH:  PAST MEDICAL & SURGICAL HISTORY:  Chronic kidney disease, unspecified CKD stage    -------------------------------------------------------------------------------------------------------  PHYSICAL EXAM:  General: NAD, sitting and resting comfortably in chair   HEENT: Atraumatic  Respiratory: CTAB  Cardiac: RRR  Abdomen: soft, non-tender, non-distended  Extremities: warm and well-perfused  Neuro: A+Ox4. No FND    Vital Signs Last 24 Hrs  T(C): 36.1 (30 May 2025 08:00), Max: 36.1 (29 May 2025 16:00)  T(F): 97 (30 May 2025 08:00), Max: 97 (29 May 2025 16:00)  HR: 76 (30 May 2025 09:00) (64 - 76)  BP: --  BP(mean): --  RR: 11 (30 May 2025 09:00) (9 - 21)  SpO2: 97% (30 May 2025 08:00) (97% - 99%)        I&O's Summary    29 May 2025 07:01  -  30 May 2025 07:00  --------------------------------------------------------  IN: 1660.5 mL / OUT: 0 mL / NET: 1660.5 mL        --------------------------------------------------------------------------------------------------------  LABS:                               8.3    4.79  )-----------( 169      ( 30 May 2025 04:05 )             25.8       05-30    139  |  100  |  41[H]  ----------------------------<  104[H]  4.5   |  26  |  7.3[HH]    Ca    8.9      30 May 2025 04:05  Phos  5.4     05-29  Mg     3.1     05-30    TPro  5.5[L]  /  Alb  3.4[L]  /  TBili  0.2  /  DBili  x   /  AST  30  /  ALT  27  /  AlkPhos  104  05-30          ABG - ( 29 May 2025 11:30 )  pH, Arterial: 7.36  pH, Blood: x     /  pCO2: 43    /  pO2: 123   / HCO3: 24    / Base Excess: -1.3  /  SaO2: 99.1        ---------------------------------------------------------------------------------------------------  ASSESSMENT & PLAN:     #Chest pain during HD- Resolved  #HFpEF - not in exacerbation  - On admission. trop 64 -> 66, Creatinine 4.1  - ECG showed prominent T wave   - CXR negative   - TTE EF 60%. G1DD  - stress test neg for ischemia  - C/w protonix qd     #AMS 2/2 hypoglycemia likely 2/2 decreased lantus clearance 2/2 ESRD   #DM2, Insulin dependent- uncontrolled  - 5/20: S/p D5 for hypoglycemia and AMS  - continue with current insulin sliding scale, if blood sugar starts trending up > 200 can start lantus 8 units daily   - will likely need to readjust basal insulin and add Tradjenta on discharge  - monitor FS q4 on SDU   - f/u Endo     #ESRD on HD MWF  #HTN    #Hyperkalemia  - nephro recs appreciated   - bp improves with HD  - c/w hydralazine 100mg q8h  - f/u iron studies  - Low K diet  - c/w aranesp 25 weekly with HD  - c/w doxazosin 1mg BID  - c/w sevelamer 1600 mg TID  - patient state she uses nifedipine 90 PO if her SBP is above 200 - will hold for now   - s/p nifedipine 30 x1 and labetalol 10 IVP x1 on 5/20 2/2 HTN to 200s SBP  - c/w nifedipine 30 qd    #On admission, AMS possibly 2/2 transient hypotension during HD- Resolved  - CTH showed A focal hypodensity is noted within the right frontal periventricular white matter, not previously seen. Finding may reflect age-indeterminate ischemic change. An MRI of the brain can be obtained for further evaluation if not clinically contraindicated.  - Neuro consult noted no additional inpatient workup.  - c/w ASA and atorvastatin      FOR FOLLOW UP:  [x] CBC/CMP, monitor Hb and electrolytes   [x] Finger Stick Q4  [x] Endocrine re-eval Transfer Note    Transfer from: CCU    Transfer to: (  ) Medicine    (  ) Telemetry     (  ) RCU       ( x ) CEU    (  ) VENT                        (  ) Palliative    (  ) Stroke Unit    (  ) MICU    (  ) CCU    Sign out given to:     ----------------------------------------------------------------------------------------------------------  HPI / ICU COURSE:    HPI:  Patient is a 59y old  Female with PMH of DM, ESRD on HD MWF, HTN, HLD, and HF who presents to ED for chest pain during HD session this morning.  Had 90 minutes of total HD prior to arrival.  Patient stated that she felt chest pressure and exertional shortness of breath. Shortness of breath improves with rest. Patient was alert and oriented however speaking sluggishly on arrival to the ED. Endorses neck pain and chest pressure as well. Denies abdominal pain, dysuria, swelling.    in the ED /54, HR 68, Temp 97.7, SpO2 98 on 2L NC  labs significant for trop 64 -> 66, Creatinine 4.1  UA showed bacteria  CXR negative  CT head showed A focal hypodensity is noted within the right frontal periventricular white matter, not previously seen. Finding may reflect age-indeterminate ischemic change.       Patient admitted for workup of chest pain during HD (16 May 2025 15:38)    RR was called due to AMS. Patient was found to be unresponsive to verbal, physical stimuli or sternal rub. BG was measured and noted to be 19. D5 x2 was administered via IV. BG recheck was 229. On exam patient is appeared to have agonal breathing with a pulse at 70 bpm. Patient was still noted to altered and unresponsive. On exam, patient pupils were nonreactive, dilated, still unresponsive, noted to be posturing in the upper and lower extremities bilaterally. CODE STROKE was called and anesthesia at bedside for RSI. Patient was intubated for airway concerns. GOC clarified: FULL code. Zoll pads placed. Family was updated: Rhianna (daughter) who re-established that the patient is full code. All questions answered. Patient upgraded to the CCU for further monitoring.     - CCU Course -   Patient upgraded to the ICU for Hypoglycemia/Metabolic Encephalopathy on Lantus and is ESRD on HD. Insulin held. Was successfully extubated 5/28 onto Nasal Cannula. Blood glucose levels remain highly variable. D5 infusion continued. On 5/29, pt complained of nausea/vomiting and had elevated anion gap despite controlled blood glucose. Beta Hydroxy at 5. Started on Insulin GTT. AG improved by morning of 5/30, pt now transitioned to SQ insulin. Due for Hemodialysis today. Stable for transfer to Step Down Unit after Hemodialysis.     --------------------------------------------------------------------------------------------------------  PMH/PSH:  PAST MEDICAL & SURGICAL HISTORY:  Chronic kidney disease, unspecified CKD stage    -------------------------------------------------------------------------------------------------------  PHYSICAL EXAM:  General: NAD, sitting and resting comfortably in chair   HEENT: Atraumatic  Respiratory: CTAB  Cardiac: RRR  Abdomen: soft, non-tender, non-distended  Extremities: warm and well-perfused  Neuro: A+Ox4. No FND    Vital Signs Last 24 Hrs  T(C): 36.1 (30 May 2025 08:00), Max: 36.1 (29 May 2025 16:00)  T(F): 97 (30 May 2025 08:00), Max: 97 (29 May 2025 16:00)  HR: 76 (30 May 2025 09:00) (64 - 76)  BP: --  BP(mean): --  RR: 11 (30 May 2025 09:00) (9 - 21)  SpO2: 97% (30 May 2025 08:00) (97% - 99%)        I&O's Summary    29 May 2025 07:01  -  30 May 2025 07:00  --------------------------------------------------------  IN: 1660.5 mL / OUT: 0 mL / NET: 1660.5 mL        --------------------------------------------------------------------------------------------------------  LABS:                               8.3    4.79  )-----------( 169      ( 30 May 2025 04:05 )             25.8       05-30    139  |  100  |  41[H]  ----------------------------<  104[H]  4.5   |  26  |  7.3[HH]    Ca    8.9      30 May 2025 04:05  Phos  5.4     05-29  Mg     3.1     05-30    TPro  5.5[L]  /  Alb  3.4[L]  /  TBili  0.2  /  DBili  x   /  AST  30  /  ALT  27  /  AlkPhos  104  05-30          ABG - ( 29 May 2025 11:30 )  pH, Arterial: 7.36  pH, Blood: x     /  pCO2: 43    /  pO2: 123   / HCO3: 24    / Base Excess: -1.3  /  SaO2: 99.1        ---------------------------------------------------------------------------------------------------  ASSESSMENT & PLAN:     #Chest pain during HD- Resolved  #HFpEF - not in exacerbation  - On admission. trop 64 -> 66, Creatinine 4.1  - ECG showed prominent T wave   - CXR negative   - TTE EF 60%. G1DD  - stress test neg for ischemia  - C/w protonix qd     #AMS 2/2 hypoglycemia likely 2/2 decreased lantus clearance 2/2 ESRD   #DM2, Insulin dependent- uncontrolled  - 5/20: S/p D5 for hypoglycemia and AMS  - continue with current insulin sliding scale, if blood sugar starts trending up > 200 can start lantus 8 units daily   - will likely need to readjust basal insulin and add Tradjenta on discharge  - monitor FS q4 on SDU   - f/u Endo     #ESRD on HD MWF  #HTN    #Hyperkalemia  - nephro recs appreciated   - bp improves with HD  - c/w hydralazine 100mg q8h  - f/u iron studies  - Low K diet  - c/w aranesp 25 weekly with HD  - c/w doxazosin 1mg BID  - c/w sevelamer 1600 mg TID  - s/p nicardipine gtt on 5/28, restarted on home BP medications (Hydralazine 100 q8, Nifedipine 30 daily)     #On admission, AMS possibly 2/2 transient hypotension during HD- Resolved  - CTH showed A focal hypodensity is noted within the right frontal periventricular white matter, not previously seen. Finding may reflect age-indeterminate ischemic change. An MRI of the brain can be obtained for further evaluation if not clinically contraindicated.  - Neuro consult noted no additional inpatient workup.  - c/w ASA and atorvastatin      FOR FOLLOW UP:  [x] CBC/CMP, monitor Hb and electrolytes   [x] Finger Stick Q4  [x] Endocrine re-eval Transfer Note    Transfer from: CCU    Transfer to: (  ) Medicine    (  ) Telemetry     (  ) RCU       ( x ) CEU    (  ) VENT                        (  ) Palliative    (  ) Stroke Unit    (  ) MICU    (  ) CCU    Sign out given to:     ----------------------------------------------------------------------------------------------------------  HPI / ICU COURSE:    HPI:  Patient is a 59y old  Female with PMH of DM, ESRD on HD MWF, HTN, HLD, and HF who presents to ED for chest pain during HD session this morning.  Had 90 minutes of total HD prior to arrival.  Patient stated that she felt chest pressure and exertional shortness of breath. Shortness of breath improves with rest. Patient was alert and oriented however speaking sluggishly on arrival to the ED. Endorses neck pain and chest pressure as well. Denies abdominal pain, dysuria, swelling.    in the ED /54, HR 68, Temp 97.7, SpO2 98 on 2L NC  labs significant for trop 64 -> 66, Creatinine 4.1  UA showed bacteria  CXR negative  CT head showed A focal hypodensity is noted within the right frontal periventricular white matter, not previously seen. Finding may reflect age-indeterminate ischemic change.       Patient admitted for workup of chest pain during HD (16 May 2025 15:38)    RR was called due to AMS. Patient was found to be unresponsive to verbal, physical stimuli or sternal rub. BG was measured and noted to be 19. D5 x2 was administered via IV. BG recheck was 229. On exam patient is appeared to have agonal breathing with a pulse at 70 bpm. Patient was still noted to altered and unresponsive. On exam, patient pupils were nonreactive, dilated, still unresponsive, noted to be posturing in the upper and lower extremities bilaterally. CODE STROKE was called and anesthesia at bedside for RSI. Patient was intubated for airway concerns. GOC clarified: FULL code. Zoll pads placed. Family was updated: Rhianna (daughter) who re-established that the patient is full code. All questions answered. Patient upgraded to the CCU for further monitoring.     - CCU Course -   Patient upgraded to the ICU for Hypoglycemia/Metabolic Encephalopathy on Lantus and is ESRD on HD. Insulin held. Was successfully extubated 5/28 onto Nasal Cannula. Blood glucose levels remain highly variable. D5 infusion continued. On 5/29, pt complained of nausea/vomiting and had elevated anion gap despite controlled blood glucose. Beta Hydroxy at 5. Started on Insulin GTT. AG improved by morning of 5/30, pt now transitioned to SQ insulin. Due for Hemodialysis today. Stable for transfer to Step Down Unit after Hemodialysis.     --------------------------------------------------------------------------------------------------------  PMH/PSH:  PAST MEDICAL & SURGICAL HISTORY:  Chronic kidney disease, unspecified CKD stage    -------------------------------------------------------------------------------------------------------  PHYSICAL EXAM:  General: NAD, sitting and resting comfortably in chair   HEENT: Atraumatic  Respiratory: CTAB  Cardiac: RRR  Abdomen: soft, non-tender, non-distended  Extremities: warm and well-perfused  Neuro: A+Ox4. No FND    Vital Signs Last 24 Hrs  T(C): 36.1 (30 May 2025 08:00), Max: 36.1 (29 May 2025 16:00)  T(F): 97 (30 May 2025 08:00), Max: 97 (29 May 2025 16:00)  HR: 76 (30 May 2025 09:00) (64 - 76)  BP: --  BP(mean): --  RR: 11 (30 May 2025 09:00) (9 - 21)  SpO2: 97% (30 May 2025 08:00) (97% - 99%)        I&O's Summary    29 May 2025 07:01  -  30 May 2025 07:00  --------------------------------------------------------  IN: 1660.5 mL / OUT: 0 mL / NET: 1660.5 mL        --------------------------------------------------------------------------------------------------------  LABS:                               8.3    4.79  )-----------( 169      ( 30 May 2025 04:05 )             25.8       05-30    139  |  100  |  41[H]  ----------------------------<  104[H]  4.5   |  26  |  7.3[HH]    Ca    8.9      30 May 2025 04:05  Phos  5.4     05-29  Mg     3.1     05-30    TPro  5.5[L]  /  Alb  3.4[L]  /  TBili  0.2  /  DBili  x   /  AST  30  /  ALT  27  /  AlkPhos  104  05-30          ABG - ( 29 May 2025 11:30 )  pH, Arterial: 7.36  pH, Blood: x     /  pCO2: 43    /  pO2: 123   / HCO3: 24    / Base Excess: -1.3  /  SaO2: 99.1        ---------------------------------------------------------------------------------------------------  ASSESSMENT & PLAN:     #Chest pain during HD - Resolved  #HFpEF - not in exacerbation   - On admission. trop 64 -> 66, Creatinine 4.1   - ECG showed prominent T waves    - CXR negative   - TTE EF 60%. G1DD   - stress test neg for ischemia   - C/w protonix qd     #AMS 2/2 hypoglycemia likely 2/2 decreased lantus clearance 2/2 ESRD   #DM2, Insulin dependent- uncontrolled  - 5/20: s/p D5 for hypoglycemia and AMS  - 5/28: rapid response called, bg measured and noted to be 19. D5 x2 was administered via IV. BG recheck was 229. Insulin held.   - 5/29: Pt in DKA with elevated AG. Insulin GTT started.   - 5/20: AG improved. Insulin GTT dc'd. Started on subq insulin.   - continue with current insulin sliding scale, if blood sugar starts trending up > 200 can start lantus 8 units daily. will likely need to readjust basal insulin and add Tradjenta on discharge  - monitor FS q4 on SDU   - f/u Endo     #ESRD on HD MWF  #HTN    #Hyperkalemia  - nephro recs appreciated   - bp improves with HD  - c/w hydralazine 100mg q8h  - f/u iron studies  - Low K diet  - c/w aranesp 25 weekly with HD  - c/w doxazosin 1mg BID  - c/w sevelamer 1600 mg TID  - s/p nicardipine gtt on 5/28, restarted on home BP medications (Hydralazine 100 q8, Nifedipine 30 daily)     #On admission, AMS possibly 2/2 transient hypotension during HD- Resolved  - CTH showed A focal hypodensity is noted within the right frontal periventricular white matter, not previously seen. Finding may reflect age-indeterminate ischemic change. An MRI of the brain can be obtained for further evaluation if not clinically contraindicated.  - Neuro consult noted no additional inpatient workup.  - c/w ASA and atorvastatin      FOR FOLLOW UP:  [x] CBC/CMP, monitor Hb and electrolytes   [x] Finger Stick Q4  [x] Endocrine re-eval Transfer Note    Transfer from: CCU    Transfer to: ( x ) Medicine    (  ) Telemetry     (  ) RCU       (  ) CEU    (  ) VENT                        (  ) Palliative    (  ) Stroke Unit    (  ) MICU    (  ) CCU    Sign out given to:     ----------------------------------------------------------------------------------------------------------  HPI / ICU COURSE:    HPI:  Patient is a 59y old  Female with PMH of DM, ESRD on HD MWF, HTN, HLD, and HF who presents to ED for chest pain during HD session this morning.  Had 90 minutes of total HD prior to arrival.  Patient stated that she felt chest pressure and exertional shortness of breath. Shortness of breath improves with rest. Patient was alert and oriented however speaking sluggishly on arrival to the ED. Endorses neck pain and chest pressure as well. Denies abdominal pain, dysuria, swelling.    in the ED /54, HR 68, Temp 97.7, SpO2 98 on 2L NC  labs significant for trop 64 -> 66, Creatinine 4.1  UA showed bacteria  CXR negative  CT head showed A focal hypodensity is noted within the right frontal periventricular white matter, not previously seen. Finding may reflect age-indeterminate ischemic change.       Patient admitted for workup of chest pain during HD (16 May 2025 15:38)    RR was called due to AMS. Patient was found to be unresponsive to verbal, physical stimuli or sternal rub. BG was measured and noted to be 19. D5 x2 was administered via IV. BG recheck was 229. On exam patient is appeared to have agonal breathing with a pulse at 70 bpm. Patient was still noted to altered and unresponsive. On exam, patient pupils were nonreactive, dilated, still unresponsive, noted to be posturing in the upper and lower extremities bilaterally. CODE STROKE was called and anesthesia at bedside for RSI. Patient was intubated for airway concerns. GOC clarified: FULL code. Zoll pads placed. Family was updated: Rhianna (daughter) who re-established that the patient is full code. All questions answered. Patient upgraded to the CCU for further monitoring.     - CCU Course -   Patient upgraded to the ICU for Hypoglycemia/Metabolic Encephalopathy on Lantus and is ESRD on HD. Insulin held. Was successfully extubated 5/28 onto Nasal Cannula. Blood glucose levels remain highly variable. D5 infusion continued. On 5/29, pt complained of nausea/vomiting and had elevated anion gap despite controlled blood glucose. Beta Hydroxy at 5. Started on Insulin GTT. AG improved by morning of 5/30, pt now transitioned to SQ insulin. Due for Hemodialysis today. Stable for transfer to Step Down Unit after Hemodialysis.     --------------------------------------------------------------------------------------------------------  PMH/PSH:  PAST MEDICAL & SURGICAL HISTORY:  Chronic kidney disease, unspecified CKD stage    -------------------------------------------------------------------------------------------------------  PHYSICAL EXAM:  General: NAD, sitting and resting comfortably in chair   HEENT: Atraumatic  Respiratory: CTAB  Cardiac: RRR  Abdomen: soft, non-tender, non-distended  Extremities: warm and well-perfused  Neuro: A+Ox4. No FND    Vital Signs Last 24 Hrs  T(C): 36.1 (30 May 2025 08:00), Max: 36.1 (29 May 2025 16:00)  T(F): 97 (30 May 2025 08:00), Max: 97 (29 May 2025 16:00)  HR: 76 (30 May 2025 09:00) (64 - 76)  BP: --  BP(mean): --  RR: 11 (30 May 2025 09:00) (9 - 21)  SpO2: 97% (30 May 2025 08:00) (97% - 99%)        I&O's Summary    29 May 2025 07:01  -  30 May 2025 07:00  --------------------------------------------------------  IN: 1660.5 mL / OUT: 0 mL / NET: 1660.5 mL        --------------------------------------------------------------------------------------------------------  LABS:                               8.3    4.79  )-----------( 169      ( 30 May 2025 04:05 )             25.8       05-30    139  |  100  |  41[H]  ----------------------------<  104[H]  4.5   |  26  |  7.3[HH]    Ca    8.9      30 May 2025 04:05  Phos  5.4     05-29  Mg     3.1     05-30    TPro  5.5[L]  /  Alb  3.4[L]  /  TBili  0.2  /  DBili  x   /  AST  30  /  ALT  27  /  AlkPhos  104  05-30          ABG - ( 29 May 2025 11:30 )  pH, Arterial: 7.36  pH, Blood: x     /  pCO2: 43    /  pO2: 123   / HCO3: 24    / Base Excess: -1.3  /  SaO2: 99.1        ---------------------------------------------------------------------------------------------------  ASSESSMENT & PLAN:     #Chest pain during HD - Resolved  #HFpEF - not in exacerbation   - On admission. trop 64 -> 66, Creatinine 4.1   - ECG showed prominent T waves    - CXR negative   - TTE EF 60%. G1DD   - stress test neg for ischemia   - C/w protonix qd     #AMS 2/2 hypoglycemia likely 2/2 decreased lantus clearance 2/2 ESRD   #DM2, Insulin dependent- uncontrolled  - 5/20: s/p D5 for hypoglycemia and AMS  - 5/28: rapid response called, bg measured and noted to be 19. D5 x2 was administered via IV. BG recheck was 229. Insulin held.   - 5/29: Pt in DKA with elevated AG. Insulin GTT started.   - 5/20: AG improved. Insulin GTT dc'd. Started on subq insulin.   - continue with current insulin sliding scale, if blood sugar starts trending up > 200 can start lantus 8 units daily. will likely need to readjust basal insulin and add Tradjenta on discharge  - monitor FS q4 on SDU   - f/u Endo     #ESRD on HD MWF  #HTN    #Hyperkalemia  - nephro recs appreciated   - bp improves with HD  - c/w hydralazine 100mg q8h  - f/u iron studies  - Low K diet  - c/w aranesp 25 weekly with HD  - c/w doxazosin 1mg BID  - c/w sevelamer 1600 mg TID  - s/p nicardipine gtt on 5/28, restarted on home BP medications (Hydralazine 100 q8, Nifedipine 30 daily)     #On admission, AMS possibly 2/2 transient hypotension during HD- Resolved  - CTH showed A focal hypodensity is noted within the right frontal periventricular white matter, not previously seen. Finding may reflect age-indeterminate ischemic change. An MRI of the brain can be obtained for further evaluation if not clinically contraindicated.  - Neuro consult noted no additional inpatient workup.  - c/w ASA and atorvastatin      FOR FOLLOW UP:  [x] CBC/CMP, monitor Hb and electrolytes   [x] Finger Stick Q4  [x] Endocrine re-eval

## 2025-05-30 NOTE — PROGRESS NOTE ADULT - ASSESSMENT
59y Female with h/o ESRD on HD ([facility]), IDDM, dyslipidemia, CKD-MBD, HTN who presented to the hospital with CP during HD session this morning.  Had 90 minutes of total HD prior to arrival.  Nephrology now consulted for HD needs while in-house.    HD today 3h opti 160 UF 2l   regular days outpatient MWF  sp intubation on MV / extubated now   follow FS closely appreciate endo eval and follow up   renal diet  bp noted keep current   phos high, increased sevelamer to 3 tabs tid with meals IP better    NST negative   h/h noted / check iron stores / resumed  SHAR with HD aranesp 25 weekly   renal team will follow

## 2025-05-31 LAB
ALBUMIN SERPL ELPH-MCNC: 3.5 G/DL — SIGNIFICANT CHANGE UP (ref 3.5–5.2)
ALP SERPL-CCNC: 109 U/L — SIGNIFICANT CHANGE UP (ref 30–115)
ALT FLD-CCNC: 23 U/L — SIGNIFICANT CHANGE UP (ref 0–41)
ANION GAP SERPL CALC-SCNC: 12 MMOL/L — SIGNIFICANT CHANGE UP (ref 7–14)
ANION GAP SERPL CALC-SCNC: 13 MMOL/L — SIGNIFICANT CHANGE UP (ref 7–14)
AST SERPL-CCNC: 25 U/L — SIGNIFICANT CHANGE UP (ref 0–41)
BASOPHILS # BLD AUTO: 0.02 K/UL — SIGNIFICANT CHANGE UP (ref 0–0.2)
BASOPHILS NFR BLD AUTO: 0.5 % — SIGNIFICANT CHANGE UP (ref 0–1)
BILIRUB SERPL-MCNC: 0.3 MG/DL — SIGNIFICANT CHANGE UP (ref 0.2–1.2)
BUN SERPL-MCNC: 25 MG/DL — HIGH (ref 10–20)
BUN SERPL-MCNC: 26 MG/DL — HIGH (ref 10–20)
CALCIUM SERPL-MCNC: 8.8 MG/DL — SIGNIFICANT CHANGE UP (ref 8.4–10.5)
CALCIUM SERPL-MCNC: 8.8 MG/DL — SIGNIFICANT CHANGE UP (ref 8.4–10.5)
CHLORIDE SERPL-SCNC: 96 MMOL/L — LOW (ref 98–110)
CHLORIDE SERPL-SCNC: 97 MMOL/L — LOW (ref 98–110)
CO2 SERPL-SCNC: 26 MMOL/L — SIGNIFICANT CHANGE UP (ref 17–32)
CO2 SERPL-SCNC: 26 MMOL/L — SIGNIFICANT CHANGE UP (ref 17–32)
CREAT SERPL-MCNC: 4.9 MG/DL — CRITICAL HIGH (ref 0.7–1.5)
CREAT SERPL-MCNC: 4.9 MG/DL — CRITICAL HIGH (ref 0.7–1.5)
EGFR: 10 ML/MIN/1.73M2 — LOW
EOSINOPHIL # BLD AUTO: 0.11 K/UL — SIGNIFICANT CHANGE UP (ref 0–0.7)
EOSINOPHIL NFR BLD AUTO: 2.6 % — SIGNIFICANT CHANGE UP (ref 0–8)
GLUCOSE BLDC GLUCOMTR-MCNC: 116 MG/DL — HIGH (ref 70–99)
GLUCOSE BLDC GLUCOMTR-MCNC: 161 MG/DL — HIGH (ref 70–99)
GLUCOSE BLDC GLUCOMTR-MCNC: 179 MG/DL — HIGH (ref 70–99)
GLUCOSE BLDC GLUCOMTR-MCNC: 188 MG/DL — HIGH (ref 70–99)
GLUCOSE BLDC GLUCOMTR-MCNC: 189 MG/DL — HIGH (ref 70–99)
GLUCOSE BLDC GLUCOMTR-MCNC: 190 MG/DL — HIGH (ref 70–99)
GLUCOSE BLDC GLUCOMTR-MCNC: 235 MG/DL — HIGH (ref 70–99)
GLUCOSE BLDC GLUCOMTR-MCNC: 247 MG/DL — HIGH (ref 70–99)
GLUCOSE BLDC GLUCOMTR-MCNC: 251 MG/DL — HIGH (ref 70–99)
GLUCOSE BLDC GLUCOMTR-MCNC: 291 MG/DL — HIGH (ref 70–99)
GLUCOSE SERPL-MCNC: 171 MG/DL — HIGH (ref 70–99)
GLUCOSE SERPL-MCNC: 237 MG/DL — HIGH (ref 70–99)
HCT VFR BLD CALC: 26.4 % — LOW (ref 37–47)
HGB BLD-MCNC: 8.5 G/DL — LOW (ref 12–16)
IMM GRANULOCYTES NFR BLD AUTO: 0.2 % — SIGNIFICANT CHANGE UP (ref 0.1–0.3)
LYMPHOCYTES # BLD AUTO: 0.76 K/UL — LOW (ref 1.2–3.4)
LYMPHOCYTES # BLD AUTO: 18.1 % — LOW (ref 20.5–51.1)
MAGNESIUM SERPL-MCNC: 2.7 MG/DL — HIGH (ref 1.8–2.4)
MCHC RBC-ENTMCNC: 32.2 G/DL — SIGNIFICANT CHANGE UP (ref 32–37)
MCHC RBC-ENTMCNC: 32.9 PG — HIGH (ref 27–31)
MCV RBC AUTO: 102.3 FL — HIGH (ref 81–99)
MONOCYTES # BLD AUTO: 0.5 K/UL — SIGNIFICANT CHANGE UP (ref 0.1–0.6)
MONOCYTES NFR BLD AUTO: 11.9 % — HIGH (ref 1.7–9.3)
NEUTROPHILS # BLD AUTO: 2.81 K/UL — SIGNIFICANT CHANGE UP (ref 1.4–6.5)
NEUTROPHILS NFR BLD AUTO: 66.7 % — SIGNIFICANT CHANGE UP (ref 42.2–75.2)
NRBC BLD AUTO-RTO: 0 /100 WBCS — SIGNIFICANT CHANGE UP (ref 0–0)
PLATELET # BLD AUTO: 175 K/UL — SIGNIFICANT CHANGE UP (ref 130–400)
PMV BLD: 11.1 FL — HIGH (ref 7.4–10.4)
POTASSIUM SERPL-MCNC: 4.5 MMOL/L — SIGNIFICANT CHANGE UP (ref 3.5–5)
POTASSIUM SERPL-MCNC: 4.6 MMOL/L — SIGNIFICANT CHANGE UP (ref 3.5–5)
POTASSIUM SERPL-SCNC: 4.5 MMOL/L — SIGNIFICANT CHANGE UP (ref 3.5–5)
POTASSIUM SERPL-SCNC: 4.6 MMOL/L — SIGNIFICANT CHANGE UP (ref 3.5–5)
PROT SERPL-MCNC: 5.8 G/DL — LOW (ref 6–8)
RBC # BLD: 2.58 M/UL — LOW (ref 4.2–5.4)
RBC # FLD: 17.1 % — HIGH (ref 11.5–14.5)
SODIUM SERPL-SCNC: 134 MMOL/L — LOW (ref 135–146)
SODIUM SERPL-SCNC: 136 MMOL/L — SIGNIFICANT CHANGE UP (ref 135–146)
WBC # BLD: 4.21 K/UL — LOW (ref 4.8–10.8)
WBC # FLD AUTO: 4.21 K/UL — LOW (ref 4.8–10.8)

## 2025-05-31 PROCEDURE — 99233 SBSQ HOSP IP/OBS HIGH 50: CPT

## 2025-05-31 RX ADMIN — INSULIN LISPRO 3: 100 INJECTION, SOLUTION INTRAVENOUS; SUBCUTANEOUS at 16:27

## 2025-05-31 RX ADMIN — SEVELAMER HYDROCHLORIDE 2400 MILLIGRAM(S): 800 TABLET ORAL at 11:36

## 2025-05-31 RX ADMIN — INSULIN LISPRO 5 UNIT(S): 100 INJECTION, SOLUTION INTRAVENOUS; SUBCUTANEOUS at 06:26

## 2025-05-31 RX ADMIN — Medication 1 DOSE(S): at 21:19

## 2025-05-31 RX ADMIN — ATORVASTATIN CALCIUM 80 MILLIGRAM(S): 80 TABLET, FILM COATED ORAL at 21:18

## 2025-05-31 RX ADMIN — GABAPENTIN 300 MILLIGRAM(S): 400 CAPSULE ORAL at 21:18

## 2025-05-31 RX ADMIN — Medication 100 MILLIGRAM(S): at 21:17

## 2025-05-31 RX ADMIN — DOXAZOSIN MESYLATE 1 MILLIGRAM(S): 8 TABLET ORAL at 08:47

## 2025-05-31 RX ADMIN — LIDOCAINE HYDROCHLORIDE 1 PATCH: 20 JELLY TOPICAL at 23:06

## 2025-05-31 RX ADMIN — SEVELAMER HYDROCHLORIDE 2400 MILLIGRAM(S): 800 TABLET ORAL at 08:22

## 2025-05-31 RX ADMIN — POLYETHYLENE GLYCOL 3350 17 GRAM(S): 17 POWDER, FOR SOLUTION ORAL at 11:36

## 2025-05-31 RX ADMIN — INSULIN LISPRO 5 UNIT(S): 100 INJECTION, SOLUTION INTRAVENOUS; SUBCUTANEOUS at 11:42

## 2025-05-31 RX ADMIN — Medication 1 DOSE(S): at 08:20

## 2025-05-31 RX ADMIN — Medication 2 TABLET(S): at 21:19

## 2025-05-31 RX ADMIN — SEVELAMER HYDROCHLORIDE 2400 MILLIGRAM(S): 800 TABLET ORAL at 18:19

## 2025-05-31 RX ADMIN — Medication 1 APPLICATION(S): at 05:24

## 2025-05-31 RX ADMIN — INSULIN LISPRO 1: 100 INJECTION, SOLUTION INTRAVENOUS; SUBCUTANEOUS at 21:17

## 2025-05-31 RX ADMIN — Medication 30 MILLIGRAM(S): at 05:24

## 2025-05-31 RX ADMIN — Medication 100 MILLIGRAM(S): at 05:24

## 2025-05-31 RX ADMIN — Medication 81 MILLIGRAM(S): at 11:37

## 2025-05-31 RX ADMIN — HEPARIN SODIUM 5000 UNIT(S): 1000 INJECTION INTRAVENOUS; SUBCUTANEOUS at 18:17

## 2025-05-31 RX ADMIN — INSULIN GLARGINE-YFGN 10 UNIT(S): 100 INJECTION, SOLUTION SUBCUTANEOUS at 08:47

## 2025-05-31 RX ADMIN — DOXAZOSIN MESYLATE 1 MILLIGRAM(S): 8 TABLET ORAL at 21:18

## 2025-05-31 RX ADMIN — INSULIN LISPRO 1: 100 INJECTION, SOLUTION INTRAVENOUS; SUBCUTANEOUS at 11:45

## 2025-05-31 RX ADMIN — LIDOCAINE HYDROCHLORIDE 1 PATCH: 20 JELLY TOPICAL at 11:37

## 2025-05-31 RX ADMIN — LIDOCAINE HYDROCHLORIDE 1 PATCH: 20 JELLY TOPICAL at 19:20

## 2025-05-31 RX ADMIN — INSULIN LISPRO 5 UNIT(S): 100 INJECTION, SOLUTION INTRAVENOUS; SUBCUTANEOUS at 16:27

## 2025-05-31 RX ADMIN — INSULIN LISPRO 1: 100 INJECTION, SOLUTION INTRAVENOUS; SUBCUTANEOUS at 06:26

## 2025-05-31 RX ADMIN — HEPARIN SODIUM 5000 UNIT(S): 1000 INJECTION INTRAVENOUS; SUBCUTANEOUS at 05:24

## 2025-05-31 RX ADMIN — METHOCARBAMOL 1000 MILLIGRAM(S): 500 TABLET, FILM COATED ORAL at 05:24

## 2025-05-31 RX ADMIN — Medication 100 MILLIGRAM(S): at 15:54

## 2025-05-31 RX ADMIN — METHOCARBAMOL 1000 MILLIGRAM(S): 500 TABLET, FILM COATED ORAL at 21:16

## 2025-05-31 RX ADMIN — METHOCARBAMOL 1000 MILLIGRAM(S): 500 TABLET, FILM COATED ORAL at 15:54

## 2025-05-31 NOTE — PROGRESS NOTE ADULT - SUBJECTIVE AND OBJECTIVE BOX
seen and examined  24 h events noted   no distress       PAST HISTORY  --------------------------------------------------------------------------------  No significant changes to PMH, PSH, FHx, SHx, unless otherwise noted    ALLERGIES & MEDICATIONS  --------------------------------------------------------------------------------  Allergies    No Known Allergies    Intolerances      Standing Inpatient Medications  aspirin  chewable 81 milliGRAM(s) Oral daily  atorvastatin 80 milliGRAM(s) Oral at bedtime  chlorhexidine 2% Cloths 1 Application(s) Topical <User Schedule>  darbepoetin Injectable ViaL 25 MICROGram(s) IV Push <User Schedule>  dextrose 5%. 1000 milliLiter(s) IV Continuous <Continuous>  dextrose 50% Injectable 25 Gram(s) IV Push once  doxazosin 1 milliGRAM(s) Oral <User Schedule>  fluticasone propionate/ salmeterol 100-50 MICROgram(s) Diskus 1 Dose(s) Inhalation two times a day  gabapentin 300 milliGRAM(s) Oral at bedtime  glucagon  Injectable 1 milliGRAM(s) IntraMuscular once  heparin   Injectable 5000 Unit(s) SubCutaneous every 12 hours  hydrALAZINE 100 milliGRAM(s) Oral every 8 hours  insulin glargine Injectable (LANTUS) 10 Unit(s) SubCutaneous every morning  insulin lispro (ADMELOG) corrective regimen sliding scale   SubCutaneous three times a day before meals  insulin lispro (ADMELOG) corrective regimen sliding scale   SubCutaneous at bedtime  insulin lispro Injectable (ADMELOG) 5 Unit(s) SubCutaneous three times a day before meals  lidocaine   4% Patch 1 Patch Transdermal daily  methocarbamol 1000 milliGRAM(s) Oral three times a day  NIFEdipine XL 30 milliGRAM(s) Oral daily  polyethylene glycol 3350 17 Gram(s) Oral daily  senna 2 Tablet(s) Oral at bedtime  sevelamer carbonate Powder 2400 milliGRAM(s) Oral three times a day with meals    PRN Inpatient Medications  acetaminophen     Tablet .. 975 milliGRAM(s) Oral every 8 hours PRN  albuterol    90 MICROgram(s) HFA Inhaler 2 Puff(s) Inhalation every 6 hours PRN  dextrose Oral Gel 15 Gram(s) Oral once PRN  ondansetron Injectable 4 milliGRAM(s) IV Push every 4 hours PRN  traMADol 50 milliGRAM(s) Oral every 6 hours PRN          VITALS/PHYSICAL EXAM  --------------------------------------------------------------------------------  T(C): 36.7 (05-31-25 @ 04:00), Max: 36.7 (05-31-25 @ 04:00)  HR: 80 (05-31-25 @ 06:00) (72 - 91)  BP: 162/74 (05-31-25 @ 06:00) (138/63 - 172/75)  RR: 17 (05-31-25 @ 06:00) (12 - 25)  SpO2: 96% (05-31-25 @ 06:00) (95% - 99%)  Wt(kg): --  Height (cm): 163 (05-29-25 @ 13:15)      05-29-25 @ 07:01  -  05-30-25 @ 07:00  --------------------------------------------------------  IN: 1660.5 mL / OUT: 0 mL / NET: 1660.5 mL    05-30-25 @ 07:01  -  05-31-25 @ 06:57  --------------------------------------------------------  IN: 730.5 mL / OUT: 2000 mL / NET: -1269.5 mL      Physical Exam:  	Gen: NAD  	Pulm: CTA B/L  	CV:  S1S2; no rub  	Abd:  soft, nontender/nondistended  	LE:  no edema  	Vascular access:av    LABS/STUDIES  --------------------------------------------------------------------------------              8.5    4.21  >-----------<  175      [05-31-25 @ 04:17]              26.4     136  |  97  |  26  ----------------------------<  171      [05-31-25 @ 04:17]  4.6   |  26  |  4.9        Ca     8.8     [05-31-25 @ 04:17]      Mg     2.7     [05-31-25 @ 04:17]    TPro  5.8  /  Alb  3.5  /  TBili  0.3  /  DBili  x   /  AST  25  /  ALT  23  /  AlkPhos  109  [05-31-25 @ 04:17]    Creatinine Trend:  SCr 4.9 [05-31 @ 04:17]  SCr 4.9 [05-30 @ 23:55]  SCr 6.1 [05-30 @ 14:10]  SCr 7.3 [05-30 @ 04:05]  SCr 7.0 [05-29 @ 18:34]    Urinalysis - [05-31-25 @ 04:17]      Color  / Appearance  / SG  / pH       Gluc 171 / Ketone   / Bili  / Urobili        Blood  / Protein  / Leuk Est  / Nitrite       RBC  / WBC  / Hyaline  / Gran  / Sq Epi  / Non Sq Epi  / Bacteria       Iron 29, TIBC 175, %sat 17      [05-28-25 @ 04:22]  Ferritin 538      [05-28-25 @ 04:22]

## 2025-05-31 NOTE — PROGRESS NOTE ADULT - SUBJECTIVE AND OBJECTIVE BOX
Patient is a 59y old  Female who presents with a chief complaint of AMS (29 May 2025 10:23)    HPI:  Patient is a 59y old  Female with PMH of DM, ESRD on HD MWF, HTN, HLD, and HF who presents to ED for chest pain during HD session this morning.  Had 90 minutes of total HD prior to arrival.  Patient stated that she felt chest pressure and exertional shortness of breath. Shortness of breath improves with rest. Patient was alert and oriented however speaking sluggishly on arrival to the ED. Endorses neck pain and chest pressure as well. Denies abdominal pain, dysuria, swelling.    in the ED /54, HR 68, Temp 97.7, SpO2 98 on 2L NC  labs significant for trop 64 -> 66, Creatinine 4.1  UA showed bacteria  CXR negative  CT head showed A focal hypodensity is noted within the right frontal periventricular white matter, not previously seen. Finding may reflect age-indeterminate ischemic change.       Patient admitted for workup of chest pain during HD (16 May 2025 15:38)       INTERVAL HPI/OVERNIGHT EVENTS:   No acute overnight events. patient stable for downgrade          ICU Vital Signs Last 24 Hrs  T(C): 36.7 (31 May 2025 04:00), Max: 36.7 (31 May 2025 04:00)  T(F): 98 (31 May 2025 04:00), Max: 98 (31 May 2025 04:00)  HR: 80 (31 May 2025 05:00) (72 - 91)  BP: 172/73 (31 May 2025 05:00) (138/63 - 172/75)  BP(mean): 105 (31 May 2025 05:00) (94 - 108)  ABP: 136/60 (30 May 2025 12:00) (127/36 - 155/48)  ABP(mean): 76 (30 May 2025 10:00) (60 - 81)  RR: 13 (31 May 2025 05:00) (12 - 25)  SpO2: 96% (31 May 2025 05:00) (95% - 99%)    O2 Parameters below as of 31 May 2025 05:00  Patient On (Oxygen Delivery Method): nasal cannula  O2 Flow (L/min): 2      MEDICATIONS  (STANDING):  aspirin  chewable 81 milliGRAM(s) Oral daily  atorvastatin 80 milliGRAM(s) Oral at bedtime  chlorhexidine 2% Cloths 1 Application(s) Topical <User Schedule>  darbepoetin Injectable ViaL 25 MICROGram(s) IV Push <User Schedule>  dextrose 5%. 1000 milliLiter(s) (50 mL/Hr) IV Continuous <Continuous>  dextrose 50% Injectable 25 Gram(s) IV Push once  doxazosin 1 milliGRAM(s) Oral <User Schedule>  fluticasone propionate/ salmeterol 100-50 MICROgram(s) Diskus 1 Dose(s) Inhalation two times a day  gabapentin 300 milliGRAM(s) Oral at bedtime  glucagon  Injectable 1 milliGRAM(s) IntraMuscular once  heparin   Injectable 5000 Unit(s) SubCutaneous every 12 hours  hydrALAZINE 100 milliGRAM(s) Oral every 8 hours  insulin glargine Injectable (LANTUS) 10 Unit(s) SubCutaneous every morning  insulin lispro (ADMELOG) corrective regimen sliding scale   SubCutaneous three times a day before meals  insulin lispro (ADMELOG) corrective regimen sliding scale   SubCutaneous at bedtime  insulin lispro Injectable (ADMELOG) 5 Unit(s) SubCutaneous three times a day before meals  lidocaine   4% Patch 1 Patch Transdermal daily  methocarbamol 1000 milliGRAM(s) Oral three times a day  NIFEdipine XL 30 milliGRAM(s) Oral daily  polyethylene glycol 3350 17 Gram(s) Oral daily  senna 2 Tablet(s) Oral at bedtime  sevelamer carbonate Powder 2400 milliGRAM(s) Oral three times a day with meals    MEDICATIONS  (PRN):  acetaminophen     Tablet .. 975 milliGRAM(s) Oral every 8 hours PRN Mild Pain (1 - 3)  albuterol    90 MICROgram(s) HFA Inhaler 2 Puff(s) Inhalation every 6 hours PRN Shortness of Breath and/or Wheezing  dextrose Oral Gel 15 Gram(s) Oral once PRN Blood Glucose LESS THAN 70 milliGRAM(s)/deciliter  ondansetron Injectable 4 milliGRAM(s) IV Push every 4 hours PRN Nausea and/or Vomiting  traMADol 50 milliGRAM(s) Oral every 6 hours PRN Severe Pain (7 - 10)      PHYSICAL EXAM:  GENERAL: Awake and alert.   HEAD:  Atraumatic, Normocephalic  EYES: EOMI, PERRLA, conjunctiva and sclera clear  NECK: Supple, No JVD, Normal thyroid, no enlarged nodes  NERVOUS SYSTEM:  Alert & Awake.   CHEST/LUNG: On 2L NC. B/L good air entry; No rales, rhonchi, or wheezing  HEART: S1S2 normal, no S3, Regular rate and rhythm; No murmurs  ABDOMEN: Soft, Nontender, Nondistended; Bowel sounds present  EXTREMITIES:  2+ Peripheral Pulses, No clubbing, cyanosis, or edema  LYMPH: No lymphadenopathy noted  SKIN: No rashes or lesions            LABS:                          8.5    4.21  )-----------( 175      ( 31 May 2025 04:17 )             26.4     05-30    134[L]  |  96[L]  |  25[H]  ----------------------------<  237[H]  4.5   |  26  |  4.9[HH]    Ca    8.8      30 May 2025 23:55  Mg     3.1     05-30    TPro  5.5[L]  /  Alb  3.4[L]  /  TBili  0.2  /  DBili  x   /  AST  30  /  ALT  27  /  AlkPhos  104  05-30    LIVER FUNCTIONS - ( 30 May 2025 04:05 )  Alb: 3.4 g/dL / Pro: 5.5 g/dL / ALK PHOS: 104 U/L / ALT: 27 U/L / AST: 30 U/L / GGT: x             Urinalysis Basic - ( 30 May 2025 23:55 )    Color: x / Appearance: x / SG: x / pH: x  Gluc: 237 mg/dL / Ketone: x  / Bili: x / Urobili: x   Blood: x / Protein: x / Nitrite: x   Leuk Esterase: x / RBC: x / WBC x   Sq Epi: x / Non Sq Epi: x / Bacteria: x

## 2025-05-31 NOTE — PROGRESS NOTE ADULT - SUBJECTIVE AND OBJECTIVE BOX
Patient is a 59y old  Female who presents with a chief complaint of AMS (31 May 2025 06:57)      Over Night Events:  Patient seen and examined.   awake  on  RA      pending bed  SDU  ROS:  See HPI    PHYSICAL EXAM    ICU Vital Signs Last 24 Hrs  T(C): 36.7 (31 May 2025 04:00), Max: 36.7 (31 May 2025 04:00)  T(F): 98 (31 May 2025 04:00), Max: 98 (31 May 2025 04:00)  HR: 80 (31 May 2025 07:00) (76 - 91)  BP: 140/65 (31 May 2025 07:00) (138/63 - 172/75)  BP(mean): 93 (31 May 2025 07:00) (93 - 108)  ABP: 136/60 (30 May 2025 12:00) (127/36 - 154/46)  ABP(mean): 76 (30 May 2025 10:00) (60 - 76)  RR: 15 (31 May 2025 07:00) (12 - 25)  SpO2: 96% (31 May 2025 07:00) (95% - 99%)    O2 Parameters below as of 31 May 2025 07:00  Patient On (Oxygen Delivery Method): nasal cannula  O2 Flow (L/min): 2          General: awake   HEENT:        troy        Lymph Nodes: NO cervical LN   Lungs: Bilateral BS  Cardiovascular: Regular   Abdomen: Soft, Positive BS  Extremities: No clubbing   Skin: warm   Neurological: no focal   Musculoskeletal: move all ext     I&O's Detail    30 May 2025 07:01  -  31 May 2025 07:00  --------------------------------------------------------  IN:    Insulin: 0.5 mL    Oral Fluid: 730 mL  Total IN: 730.5 mL    OUT:    Other (mL): 2000 mL  Total OUT: 2000 mL    Total NET: -1269.5 mL          LABS:                          8.5    4.21  )-----------( 175      ( 31 May 2025 04:17 )             26.4         31 May 2025 04:17    136    |  97     |  26     ----------------------------<  171    4.6     |  26     |  4.9      Ca    8.8        31 May 2025 04:17  Mg     2.7       31 May 2025 04:17    TPro  5.8    /  Alb  3.5    /  TBili  0.3    /  DBili  x      /  AST  25     /  ALT  23     /  AlkPhos  109    31 May 2025 04:17  Amylase x     lipase x                                                                                        Urinalysis Basic - ( 31 May 2025 04:17 )    Color: x / Appearance: x / SG: x / pH: x  Gluc: 171 mg/dL / Ketone: x  / Bili: x / Urobili: x   Blood: x / Protein: x / Nitrite: x   Leuk Esterase: x / RBC: x / WBC x   Sq Epi: x / Non Sq Epi: x / Bacteria: x        Lactate, Blood: 0.8 mmol/L (05-29-25 @ 09:55)                                                                                                                                           ABG - ( 29 May 2025 11:30 )  pH, Arterial: 7.36  pH, Blood: x     /  pCO2: 43    /  pO2: 123   / HCO3: 24    / Base Excess: -1.3  /  SaO2: 99.1                MEDICATIONS  (STANDING):  aspirin  chewable 81 milliGRAM(s) Oral daily  atorvastatin 80 milliGRAM(s) Oral at bedtime  chlorhexidine 2% Cloths 1 Application(s) Topical <User Schedule>  darbepoetin Injectable ViaL 25 MICROGram(s) IV Push <User Schedule>  dextrose 5%. 1000 milliLiter(s) (50 mL/Hr) IV Continuous <Continuous>  dextrose 50% Injectable 25 Gram(s) IV Push once  doxazosin 1 milliGRAM(s) Oral <User Schedule>  fluticasone propionate/ salmeterol 100-50 MICROgram(s) Diskus 1 Dose(s) Inhalation two times a day  gabapentin 300 milliGRAM(s) Oral at bedtime  glucagon  Injectable 1 milliGRAM(s) IntraMuscular once  heparin   Injectable 5000 Unit(s) SubCutaneous every 12 hours  hydrALAZINE 100 milliGRAM(s) Oral every 8 hours  insulin glargine Injectable (LANTUS) 10 Unit(s) SubCutaneous every morning  insulin lispro (ADMELOG) corrective regimen sliding scale   SubCutaneous three times a day before meals  insulin lispro (ADMELOG) corrective regimen sliding scale   SubCutaneous at bedtime  insulin lispro Injectable (ADMELOG) 5 Unit(s) SubCutaneous three times a day before meals  lidocaine   4% Patch 1 Patch Transdermal daily  methocarbamol 1000 milliGRAM(s) Oral three times a day  NIFEdipine XL 30 milliGRAM(s) Oral daily  polyethylene glycol 3350 17 Gram(s) Oral daily  senna 2 Tablet(s) Oral at bedtime  sevelamer carbonate Powder 2400 milliGRAM(s) Oral three times a day with meals    MEDICATIONS  (PRN):  acetaminophen     Tablet .. 975 milliGRAM(s) Oral every 8 hours PRN Mild Pain (1 - 3)  albuterol    90 MICROgram(s) HFA Inhaler 2 Puff(s) Inhalation every 6 hours PRN Shortness of Breath and/or Wheezing  dextrose Oral Gel 15 Gram(s) Oral once PRN Blood Glucose LESS THAN 70 milliGRAM(s)/deciliter  ondansetron Injectable 4 milliGRAM(s) IV Push every 4 hours PRN Nausea and/or Vomiting  traMADol 50 milliGRAM(s) Oral every 6 hours PRN Severe Pain (7 - 10)          Xrays:  TLC:  OG:  ET tube:                                                                                       ECHO:  [facility] ICU:

## 2025-05-31 NOTE — PROGRESS NOTE ADULT - ASSESSMENT
59y old  Female with PMH of DM, ESRD on HD MWF, HTN, HLD, and HF who presents to ED for chest pain during HD session this morning.     #Hypoglycemia  - Patient has ESRD on insulin  - Episode of significant hypoglycemia  down to 19 on Poc, serum has also been low a few days ago currently intubated, a1c 8   - overall brittle glucose readings had to be briefly started on insulin infusion for DKA , few days ago had severe lows with glucose readings down to 19   - Currently on 10 units of lantus, agree with the same , also started on lispro 5 units three times a day along with a sliding scale of 1 unit for every 50 mg/dl above 150 mg/dl , blood glucose readings under fair control   - will follow and adjust   - DC regimen to be determined

## 2025-05-31 NOTE — PROGRESS NOTE ADULT - ASSESSMENT
IMPRESSION:    AMS 2/2 hypoglycemia - improved  DKA - resolved   Chronic back pain (severe L4,L5 spinal stenosis)  ESRD on HD  Mod/severe R ICA stenosis  HO HTN, HLD, DM  HO DKA  HO hypertensive urgencies     PLAN    CNS: SP stroke code. Follow up CT head. Fu neurology. Resume pain meds today. MS back to baseline    HEENT: Oral care.    PULMONARY: HOB @ 45 degrees, aspiration precautions.      CARDIAC:  I=O. ECHO EF 54%. Resume BP meds.     GASTROENTEROLOGY: PO Feeding. Bowel Regimen    RENAL: Monitor CMP & UO. Correct electrolytes as needed. HD per nephro    INFECTIOUS: Monitor off ABx. Monitor VS.    HEMATOLOGY: DVT ppx. LE duplex 5/19 negative. Monitor CBC and Coags.    ENDOCRINOLOGY: Check FS q 1 hours. on D5. Endo eval appreciated. Repeat BMP and BH    MUSCULOSKELETAL: IAT    DISPOSITION:  floor

## 2025-05-31 NOTE — PROGRESS NOTE ADULT - ASSESSMENT
59y Female with h/o ESRD on HD (MWF), IDDM, dyslipidemia, CKD-MBD, HTN who presented to the hospital with CP during HD session this morning.  Had 90 minutes of total HD prior to arrival.  Nephrology now consulted for HD needs while in-house.    HD on monday  3h opti 160 UF 2l   regular days outpatient MWF  follow FS closely appreciate endo eval and follow up   renal diet  follow bp readings if remains elevated increase  nifedipine to 60    last phos hinoted/ on binders    NST negative   h/h noted /  iron stores noted start venofer with  / resumed  SHAR with HD aranesp 25 weekly   renal team will follow

## 2025-06-01 LAB
ANION GAP SERPL CALC-SCNC: 17 MMOL/L — HIGH (ref 7–14)
BASOPHILS # BLD AUTO: 0.04 K/UL — SIGNIFICANT CHANGE UP (ref 0–0.2)
BASOPHILS NFR BLD AUTO: 1 % — SIGNIFICANT CHANGE UP (ref 0–1)
BUN SERPL-MCNC: 43 MG/DL — HIGH (ref 10–20)
CALCIUM SERPL-MCNC: 9.1 MG/DL — SIGNIFICANT CHANGE UP (ref 8.4–10.5)
CHLORIDE SERPL-SCNC: 97 MMOL/L — LOW (ref 98–110)
CO2 SERPL-SCNC: 23 MMOL/L — SIGNIFICANT CHANGE UP (ref 17–32)
CREAT SERPL-MCNC: 7 MG/DL — CRITICAL HIGH (ref 0.7–1.5)
EGFR: 6 ML/MIN/1.73M2 — LOW
EGFR: 6 ML/MIN/1.73M2 — LOW
EOSINOPHIL # BLD AUTO: 0.16 K/UL — SIGNIFICANT CHANGE UP (ref 0–0.7)
EOSINOPHIL NFR BLD AUTO: 4.1 % — SIGNIFICANT CHANGE UP (ref 0–8)
GLUCOSE BLDC GLUCOMTR-MCNC: 162 MG/DL — HIGH (ref 70–99)
GLUCOSE BLDC GLUCOMTR-MCNC: 178 MG/DL — HIGH (ref 70–99)
GLUCOSE BLDC GLUCOMTR-MCNC: 190 MG/DL — HIGH (ref 70–99)
GLUCOSE BLDC GLUCOMTR-MCNC: 273 MG/DL — HIGH (ref 70–99)
GLUCOSE BLDC GLUCOMTR-MCNC: 63 MG/DL — LOW (ref 70–99)
GLUCOSE BLDC GLUCOMTR-MCNC: 93 MG/DL — SIGNIFICANT CHANGE UP (ref 70–99)
GLUCOSE SERPL-MCNC: 145 MG/DL — HIGH (ref 70–99)
HCT VFR BLD CALC: 27.6 % — LOW (ref 37–47)
HGB BLD-MCNC: 8.9 G/DL — LOW (ref 12–16)
IMM GRANULOCYTES NFR BLD AUTO: 0.3 % — SIGNIFICANT CHANGE UP (ref 0.1–0.3)
LYMPHOCYTES # BLD AUTO: 0.94 K/UL — LOW (ref 1.2–3.4)
LYMPHOCYTES # BLD AUTO: 24 % — SIGNIFICANT CHANGE UP (ref 20.5–51.1)
MAGNESIUM SERPL-MCNC: 2.9 MG/DL — HIGH (ref 1.8–2.4)
MCHC RBC-ENTMCNC: 32.2 G/DL — SIGNIFICANT CHANGE UP (ref 32–37)
MCHC RBC-ENTMCNC: 33.3 PG — HIGH (ref 27–31)
MCV RBC AUTO: 103.4 FL — HIGH (ref 81–99)
MONOCYTES # BLD AUTO: 0.36 K/UL — SIGNIFICANT CHANGE UP (ref 0.1–0.6)
MONOCYTES NFR BLD AUTO: 9.2 % — SIGNIFICANT CHANGE UP (ref 1.7–9.3)
NEUTROPHILS # BLD AUTO: 2.41 K/UL — SIGNIFICANT CHANGE UP (ref 1.4–6.5)
NEUTROPHILS NFR BLD AUTO: 61.4 % — SIGNIFICANT CHANGE UP (ref 42.2–75.2)
NRBC BLD AUTO-RTO: 0 /100 WBCS — SIGNIFICANT CHANGE UP (ref 0–0)
PLATELET # BLD AUTO: 191 K/UL — SIGNIFICANT CHANGE UP (ref 130–400)
PMV BLD: 11.3 FL — HIGH (ref 7.4–10.4)
POTASSIUM SERPL-MCNC: 4.8 MMOL/L — SIGNIFICANT CHANGE UP (ref 3.5–5)
POTASSIUM SERPL-SCNC: 4.8 MMOL/L — SIGNIFICANT CHANGE UP (ref 3.5–5)
RBC # BLD: 2.67 M/UL — LOW (ref 4.2–5.4)
RBC # FLD: 16.8 % — HIGH (ref 11.5–14.5)
SODIUM SERPL-SCNC: 137 MMOL/L — SIGNIFICANT CHANGE UP (ref 135–146)
WBC # BLD: 3.92 K/UL — LOW (ref 4.8–10.8)
WBC # FLD AUTO: 3.92 K/UL — LOW (ref 4.8–10.8)

## 2025-06-01 PROCEDURE — 99232 SBSQ HOSP IP/OBS MODERATE 35: CPT

## 2025-06-01 RX ADMIN — Medication 100 MILLIGRAM(S): at 06:01

## 2025-06-01 RX ADMIN — INSULIN LISPRO 1: 100 INJECTION, SOLUTION INTRAVENOUS; SUBCUTANEOUS at 06:02

## 2025-06-01 RX ADMIN — LIDOCAINE HYDROCHLORIDE 1 PATCH: 20 JELLY TOPICAL at 12:10

## 2025-06-01 RX ADMIN — INSULIN LISPRO 5 UNIT(S): 100 INJECTION, SOLUTION INTRAVENOUS; SUBCUTANEOUS at 17:30

## 2025-06-01 RX ADMIN — Medication 30 MILLIGRAM(S): at 06:01

## 2025-06-01 RX ADMIN — INSULIN LISPRO 1: 100 INJECTION, SOLUTION INTRAVENOUS; SUBCUTANEOUS at 12:19

## 2025-06-01 RX ADMIN — INSULIN GLARGINE-YFGN 10 UNIT(S): 100 INJECTION, SOLUTION SUBCUTANEOUS at 08:47

## 2025-06-01 RX ADMIN — HEPARIN SODIUM 5000 UNIT(S): 1000 INJECTION INTRAVENOUS; SUBCUTANEOUS at 17:31

## 2025-06-01 RX ADMIN — Medication 1 APPLICATION(S): at 06:03

## 2025-06-01 RX ADMIN — Medication 1 DOSE(S): at 21:57

## 2025-06-01 RX ADMIN — Medication 1 DOSE(S): at 08:20

## 2025-06-01 RX ADMIN — DOXAZOSIN MESYLATE 1 MILLIGRAM(S): 8 TABLET ORAL at 22:18

## 2025-06-01 RX ADMIN — METHOCARBAMOL 1000 MILLIGRAM(S): 500 TABLET, FILM COATED ORAL at 06:01

## 2025-06-01 RX ADMIN — INSULIN LISPRO 5 UNIT(S): 100 INJECTION, SOLUTION INTRAVENOUS; SUBCUTANEOUS at 12:19

## 2025-06-01 RX ADMIN — Medication 81 MILLIGRAM(S): at 12:11

## 2025-06-01 RX ADMIN — GABAPENTIN 300 MILLIGRAM(S): 400 CAPSULE ORAL at 21:56

## 2025-06-01 RX ADMIN — POLYETHYLENE GLYCOL 3350 17 GRAM(S): 17 POWDER, FOR SOLUTION ORAL at 12:11

## 2025-06-01 RX ADMIN — HEPARIN SODIUM 5000 UNIT(S): 1000 INJECTION INTRAVENOUS; SUBCUTANEOUS at 06:01

## 2025-06-01 RX ADMIN — SEVELAMER HYDROCHLORIDE 2400 MILLIGRAM(S): 800 TABLET ORAL at 17:31

## 2025-06-01 RX ADMIN — Medication 100 MILLIGRAM(S): at 21:56

## 2025-06-01 RX ADMIN — SEVELAMER HYDROCHLORIDE 2400 MILLIGRAM(S): 800 TABLET ORAL at 08:20

## 2025-06-01 RX ADMIN — LIDOCAINE HYDROCHLORIDE 1 PATCH: 20 JELLY TOPICAL at 18:37

## 2025-06-01 RX ADMIN — ATORVASTATIN CALCIUM 80 MILLIGRAM(S): 80 TABLET, FILM COATED ORAL at 21:58

## 2025-06-01 RX ADMIN — INSULIN LISPRO 3: 100 INJECTION, SOLUTION INTRAVENOUS; SUBCUTANEOUS at 17:30

## 2025-06-01 RX ADMIN — SEVELAMER HYDROCHLORIDE 2400 MILLIGRAM(S): 800 TABLET ORAL at 12:11

## 2025-06-01 RX ADMIN — INSULIN LISPRO 5 UNIT(S): 100 INJECTION, SOLUTION INTRAVENOUS; SUBCUTANEOUS at 06:01

## 2025-06-01 RX ADMIN — DOXAZOSIN MESYLATE 1 MILLIGRAM(S): 8 TABLET ORAL at 10:54

## 2025-06-01 RX ADMIN — Medication 100 MILLIGRAM(S): at 13:40

## 2025-06-01 RX ADMIN — METHOCARBAMOL 1000 MILLIGRAM(S): 500 TABLET, FILM COATED ORAL at 13:40

## 2025-06-01 RX ADMIN — METHOCARBAMOL 1000 MILLIGRAM(S): 500 TABLET, FILM COATED ORAL at 21:59

## 2025-06-01 RX ADMIN — Medication 2 TABLET(S): at 21:54

## 2025-06-01 NOTE — PROGRESS NOTE ADULT - ASSESSMENT
59y Female with h/o ESRD on HD (MWF), IDDM, dyslipidemia, CKD-MBD, HTN who presented to the hospital with CP during HD session this morning.  Had 90 minutes of total HD prior to arrival.  Nephrology now consulted for HD needs while in-house.    HD in AM   3h opti 160 UF 2l   regular days outpatient MWF  follow FS closely appreciate endo eval and follow up   renal diet  follow bp readings  increase  nifedipine to 60    last phos noted/ on binders repeat    NST negative   h/h noted /  iron stores noted start venofer with  / resumed  SHAR with HD aranesp 25 weekly   renal team will follow

## 2025-06-01 NOTE — PROGRESS NOTE ADULT - ASSESSMENT
59y old  Female with PMH of DM, ESRD on HD MWF, HTN, HLD, and HF who presents to ED for chest pain during HD session this morning.     #Hypoglycemia  - Patient has ESRD on insulin  - Episode of significant hypoglycemia  down to 19 on Poc, serum has also been low a few days ago currently intubated, a1c 8   - overall brittle glucose readings had to be briefly started on insulin infusion for DKA , few days ago had severe lows with glucose readings down to 19   - Currently on 10 units of lantus, agree with the same and lispro 5 units three times a day along with a sliding scale of 1 unit for every 50 mg/dl above 150 mg/dl , blood glucose readings ranging from 63 -200s  - overall imrpoved control but am glucose dropped to 63 after receiving 1 unit sliding scale for 162 mg/dl this am, for now will continue with current regimen if persistent may have to switch to 1 unit fore very 50 above 200 mg/dl   - DC regimen to be determined

## 2025-06-01 NOTE — PROGRESS NOTE ADULT - ASSESSMENT
IMPRESSION:    AMS 2/2 hypoglycemia - improved  DKA - resolved   Chronic back pain (severe L4,L5 spinal stenosis)  ESRD on HD  Mod/severe R ICA stenosis  HO HTN, HLD, DM  HO DKA  HO hypertensive urgencies     PLAN    CNS: SP stroke code. Follow up CT head. Fu neurology. Resume pain meds today. MS back to baseline    HEENT: Oral care.    PULMONARY: HOB @ 45 degrees, aspiration precautions.      CARDIAC:  I=O. ECHO EF 54%. Resume BP meds.     GASTROENTEROLOGY: PO Feeding. Bowel Regimen    RENAL: Monitor CMP & UO. Correct electrolytes as needed. HD per nephro    INFECTIOUS: Monitor off ABx. Monitor VS.    HEMATOLOGY: DVT ppx. LE duplex 5/19 negative. Monitor CBC and Coags.    ENDOCRINOLOGY:    . Endo eval appreciated. Repeat BMP and BH  on lantus and sliding scale FS controlled   MUSCULOSKELETAL: IAT    DISPOSITION:  floor

## 2025-06-01 NOTE — PROGRESS NOTE ADULT - SUBJECTIVE AND OBJECTIVE BOX
Patient is a 59y old  Female who presents with a chief complaint of AMS (31 May 2025 11:11)      Over Night Events:  Patient seen and examined.   pending bed on floor   no other events     ROS:  See HPI    PHYSICAL EXAM    ICU Vital Signs Last 24 Hrs  T(C): 36.7 (01 Jun 2025 04:00), Max: 36.9 (31 May 2025 16:00)  T(F): 98 (01 Jun 2025 04:00), Max: 98.4 (31 May 2025 16:00)  HR: 80 (01 Jun 2025 06:00) (76 - 86)  BP: 162/72 (01 Jun 2025 06:00) (131/63 - 176/75)  BP(mean): 103 (01 Jun 2025 06:00) (89 - 108)  ABP: --  ABP(mean): --  RR: 15 (01 Jun 2025 06:00) (14 - 25)  SpO2: 98% (01 Jun 2025 06:00) (93% - 100%)    O2 Parameters below as of 01 Jun 2025 06:00  Patient On (Oxygen Delivery Method): nasal cannula  O2 Flow (L/min): 2          General:awake   HEENT:                Lymph Nodes: NO cervical LN   Lungs: Bilateral BS  Cardiovascular: Regular   Abdomen: Soft, Positive BS  Extremities: No clubbing   Skin: warm   Neurological: no focal   Musculoskeletal: move all ext     I&O's Detail    31 May 2025 07:01  -  01 Jun 2025 07:00  --------------------------------------------------------  IN:    Oral Fluid: 600 mL  Total IN: 600 mL    OUT:    Voided (mL): 300 mL  Total OUT: 300 mL    Total NET: 300 mL          LABS:                          8.9    3.92  )-----------( 191      ( 01 Jun 2025 04:24 )             27.6         01 Jun 2025 04:24    137    |  97     |  43     ----------------------------<  145    4.8     |  23     |  7.0      Ca    9.1        01 Jun 2025 04:24  Mg     2.9       01 Jun 2025 04:24                                                                                      Urinalysis Basic - ( 01 Jun 2025 04:24 )    Color: x / Appearance: x / SG: x / pH: x  Gluc: 145 mg/dL / Ketone: x  / Bili: x / Urobili: x   Blood: x / Protein: x / Nitrite: x   Leuk Esterase: x / RBC: x / WBC x   Sq Epi: x / Non Sq Epi: x / Bacteria: x        Lactate, Blood: 0.8 mmol/L (05-29-25 @ 09:55)                                                                                                                                               MEDICATIONS  (STANDING):  aspirin  chewable 81 milliGRAM(s) Oral daily  atorvastatin 80 milliGRAM(s) Oral at bedtime  chlorhexidine 2% Cloths 1 Application(s) Topical <User Schedule>  darbepoetin Injectable ViaL 25 MICROGram(s) IV Push <User Schedule>  dextrose 5%. 1000 milliLiter(s) (50 mL/Hr) IV Continuous <Continuous>  dextrose 50% Injectable 25 Gram(s) IV Push once  doxazosin 1 milliGRAM(s) Oral <User Schedule>  fluticasone propionate/ salmeterol 100-50 MICROgram(s) Diskus 1 Dose(s) Inhalation two times a day  gabapentin 300 milliGRAM(s) Oral at bedtime  glucagon  Injectable 1 milliGRAM(s) IntraMuscular once  heparin   Injectable 5000 Unit(s) SubCutaneous every 12 hours  hydrALAZINE 100 milliGRAM(s) Oral every 8 hours  insulin glargine Injectable (LANTUS) 10 Unit(s) SubCutaneous every morning  insulin lispro (ADMELOG) corrective regimen sliding scale   SubCutaneous three times a day before meals  insulin lispro (ADMELOG) corrective regimen sliding scale   SubCutaneous at bedtime  insulin lispro Injectable (ADMELOG) 5 Unit(s) SubCutaneous three times a day before meals  lidocaine   4% Patch 1 Patch Transdermal daily  methocarbamol 1000 milliGRAM(s) Oral three times a day  NIFEdipine XL 30 milliGRAM(s) Oral daily  polyethylene glycol 3350 17 Gram(s) Oral daily  senna 2 Tablet(s) Oral at bedtime  sevelamer carbonate Powder 2400 milliGRAM(s) Oral three times a day with meals    MEDICATIONS  (PRN):  acetaminophen     Tablet .. 975 milliGRAM(s) Oral every 8 hours PRN Mild Pain (1 - 3)  albuterol    90 MICROgram(s) HFA Inhaler 2 Puff(s) Inhalation every 6 hours PRN Shortness of Breath and/or Wheezing  dextrose Oral Gel 15 Gram(s) Oral once PRN Blood Glucose LESS THAN 70 milliGRAM(s)/deciliter  ondansetron Injectable 4 milliGRAM(s) IV Push every 4 hours PRN Nausea and/or Vomiting  traMADol 50 milliGRAM(s) Oral every 6 hours PRN Severe Pain (7 - 10)          Xrays:  TLC:  OG:  ET tube:                                                                                       ECHO:  [facility] ICU:

## 2025-06-01 NOTE — PROGRESS NOTE ADULT - SUBJECTIVE AND OBJECTIVE BOX
Nephrology progress note  Patient is seen and examined, events over the last 24 h noted .  Lying in bed   no events     Allergies:  No Known Allergies    Hospital Medications:   MEDICATIONS  (STANDING):  aspirin  chewable 81 milliGRAM(s) Oral daily  atorvastatin 80 milliGRAM(s) Oral at bedtime  darbepoetin Injectable ViaL 25 MICROGram(s) IV Push <User Schedule>  doxazosin 1 milliGRAM(s) Oral <User Schedule>  fluticasone propionate/ salmeterol 100-50 MICROgram(s) Diskus 1 Dose(s) Inhalation two times a day  gabapentin 300 milliGRAM(s) Oral at bedtime  glucagon  Injectable 1 milliGRAM(s) IntraMuscular once  heparin   Injectable 5000 Unit(s) SubCutaneous every 12 hours  hydrALAZINE 100 milliGRAM(s) Oral every 8 hours  insulin glargine Injectable (LANTUS) 10 Unit(s) SubCutaneous every morning  insulin lispro (ADMELOG) corrective regimen sliding scale   SubCutaneous three times a day before meals  insulin lispro (ADMELOG) corrective regimen sliding scale   SubCutaneous at bedtime  insulin lispro Injectable (ADMELOG) 5 Unit(s) SubCutaneous three times a day before meals  lidocaine   4% Patch 1 Patch Transdermal daily  methocarbamol 1000 milliGRAM(s) Oral three times a day  NIFEdipine XL 30 milliGRAM(s) Oral daily  polyethylene glycol 3350 17 Gram(s) Oral daily  senna 2 Tablet(s) Oral at bedtime  sevelamer carbonate Powder 2400 milliGRAM(s) Oral three times a day with meals        VITALS:  T(F): 98 (06-01-25 @ 08:00), Max: 98.4 (05-31-25 @ 16:00)  HR: 77 (06-01-25 @ 08:00)  BP: 180/75 (06-01-25 @ 08:00)  RR: 12 (06-01-25 @ 08:00)  SpO2: 94% (06-01-25 @ 08:00)      05-30 @ 07:01  -  05-31 @ 07:00  --------------------------------------------------------  IN: 730.5 mL / OUT: 2000 mL / NET: -1269.5 mL    05-31 @ 07:01  -  06-01 @ 07:00  --------------------------------------------------------  IN: 600 mL / OUT: 300 mL / NET: 300 mL          PHYSICAL EXAM:  Constitutional: NAD  Respiratory: CTAB,   Cardiovascular: S1, S2, RRR  Gastrointestinal: BS+, soft, NT/ND  Extremities: No cyanosis or clubbing. No peripheral edema  :  No murillo.   Skin: No rashes    LABS:  06-01    137  |  97[L]  |  43[H]  ----------------------------<  145[H]  4.8   |  23  |  7.0[HH]    Ca    9.1      01 Jun 2025 04:24  Mg     2.9     06-01    TPro  5.8[L]  /  Alb  3.5  /  TBili  0.3  /  DBili      /  AST  25  /  ALT  23  /  AlkPhos  109  05-31                          8.9    3.92  )-----------( 191      ( 01 Jun 2025 04:24 )             27.6       Urine Studies:  Urinalysis Basic - ( 01 Jun 2025 04:24 )    Color:  / Appearance:  / SG:  / pH:   Gluc: 145 mg/dL / Ketone:   / Bili:  / Urobili:    Blood:  / Protein:  / Nitrite:    Leuk Esterase:  / RBC:  / WBC    Sq Epi:  / Non Sq Epi:  / Bacteria:           Iron 29, TIBC 175, %sat 17      [05-28-25 @ 04:22]  Ferritin 538      [05-28-25 @ 04:22]          RADIOLOGY & ADDITIONAL STUDIES:

## 2025-06-02 LAB
ALBUMIN SERPL ELPH-MCNC: 4.1 G/DL — SIGNIFICANT CHANGE UP (ref 3.5–5.2)
ALP SERPL-CCNC: 134 U/L — HIGH (ref 30–115)
ALT FLD-CCNC: 18 U/L — SIGNIFICANT CHANGE UP (ref 0–41)
ANION GAP SERPL CALC-SCNC: 21 MMOL/L — HIGH (ref 7–14)
AST SERPL-CCNC: 21 U/L — SIGNIFICANT CHANGE UP (ref 0–41)
BASOPHILS # BLD AUTO: 0.05 K/UL — SIGNIFICANT CHANGE UP (ref 0–0.2)
BASOPHILS NFR BLD AUTO: 0.9 % — SIGNIFICANT CHANGE UP (ref 0–1)
BILIRUB SERPL-MCNC: 0.3 MG/DL — SIGNIFICANT CHANGE UP (ref 0.2–1.2)
BUN SERPL-MCNC: 65 MG/DL — CRITICAL HIGH (ref 10–20)
CALCIUM SERPL-MCNC: 9.4 MG/DL — SIGNIFICANT CHANGE UP (ref 8.4–10.5)
CHLORIDE SERPL-SCNC: 94 MMOL/L — LOW (ref 98–110)
CO2 SERPL-SCNC: 19 MMOL/L — SIGNIFICANT CHANGE UP (ref 17–32)
CREAT SERPL-MCNC: 8.5 MG/DL — CRITICAL HIGH (ref 0.7–1.5)
EGFR: 5 ML/MIN/1.73M2 — LOW
EGFR: 5 ML/MIN/1.73M2 — LOW
EOSINOPHIL # BLD AUTO: 0.15 K/UL — SIGNIFICANT CHANGE UP (ref 0–0.7)
EOSINOPHIL NFR BLD AUTO: 2.8 % — SIGNIFICANT CHANGE UP (ref 0–8)
GLUCOSE SERPL-MCNC: 309 MG/DL — HIGH (ref 70–99)
HCT VFR BLD CALC: 27.5 % — LOW (ref 37–47)
HGB BLD-MCNC: 8.8 G/DL — LOW (ref 12–16)
IMM GRANULOCYTES NFR BLD AUTO: 0.4 % — HIGH (ref 0.1–0.3)
LYMPHOCYTES # BLD AUTO: 0.88 K/UL — LOW (ref 1.2–3.4)
LYMPHOCYTES # BLD AUTO: 16.7 % — LOW (ref 20.5–51.1)
MAGNESIUM SERPL-MCNC: 3.1 MG/DL — CRITICAL HIGH (ref 1.8–2.4)
MCHC RBC-ENTMCNC: 32 G/DL — SIGNIFICANT CHANGE UP (ref 32–37)
MCHC RBC-ENTMCNC: 33 PG — HIGH (ref 27–31)
MCV RBC AUTO: 103 FL — HIGH (ref 81–99)
MONOCYTES # BLD AUTO: 0.39 K/UL — SIGNIFICANT CHANGE UP (ref 0.1–0.6)
MONOCYTES NFR BLD AUTO: 7.4 % — SIGNIFICANT CHANGE UP (ref 1.7–9.3)
NEUTROPHILS # BLD AUTO: 3.78 K/UL — SIGNIFICANT CHANGE UP (ref 1.4–6.5)
NEUTROPHILS NFR BLD AUTO: 71.8 % — SIGNIFICANT CHANGE UP (ref 42.2–75.2)
NRBC BLD AUTO-RTO: 0 /100 WBCS — SIGNIFICANT CHANGE UP (ref 0–0)
PLATELET # BLD AUTO: 202 K/UL — SIGNIFICANT CHANGE UP (ref 130–400)
PMV BLD: 11.3 FL — HIGH (ref 7.4–10.4)
POTASSIUM SERPL-MCNC: 5.4 MMOL/L — HIGH (ref 3.5–5)
POTASSIUM SERPL-SCNC: 5.4 MMOL/L — HIGH (ref 3.5–5)
PROT SERPL-MCNC: 6.3 G/DL — SIGNIFICANT CHANGE UP (ref 6–8)
RBC # BLD: 2.67 M/UL — LOW (ref 4.2–5.4)
RBC # FLD: 16.7 % — HIGH (ref 11.5–14.5)
SODIUM SERPL-SCNC: 134 MMOL/L — LOW (ref 135–146)
WBC # BLD: 5.27 K/UL — SIGNIFICANT CHANGE UP (ref 4.8–10.8)
WBC # FLD AUTO: 5.27 K/UL — SIGNIFICANT CHANGE UP (ref 4.8–10.8)

## 2025-06-02 PROCEDURE — 99232 SBSQ HOSP IP/OBS MODERATE 35: CPT

## 2025-06-02 PROCEDURE — 71045 X-RAY EXAM CHEST 1 VIEW: CPT | Mod: 26

## 2025-06-02 PROCEDURE — 99291 CRITICAL CARE FIRST HOUR: CPT | Mod: 25

## 2025-06-02 RX ORDER — DEXTROSE 50 % IN WATER 50 %
25 SYRINGE (ML) INTRAVENOUS ONCE
Refills: 0 | Status: COMPLETED | OUTPATIENT
Start: 2025-06-02 | End: 2025-06-02

## 2025-06-02 RX ORDER — DEXTROMETHORPHAN HBR, GUAIFENESIN 200 MG/10ML
100 LIQUID ORAL EVERY 6 HOURS
Refills: 0 | Status: DISCONTINUED | OUTPATIENT
Start: 2025-06-02 | End: 2025-06-08

## 2025-06-02 RX ORDER — NIFEDIPINE 30 MG
60 TABLET, EXTENDED RELEASE 24 HR ORAL DAILY
Refills: 0 | Status: DISCONTINUED | OUTPATIENT
Start: 2025-06-03 | End: 2025-06-08

## 2025-06-02 RX ORDER — NIFEDIPINE 30 MG
60 TABLET, EXTENDED RELEASE 24 HR ORAL DAILY
Refills: 0 | Status: DISCONTINUED | OUTPATIENT
Start: 2025-06-02 | End: 2025-06-02

## 2025-06-02 RX ORDER — INSULIN GLARGINE-YFGN 100 [IU]/ML
5 INJECTION, SOLUTION SUBCUTANEOUS EVERY MORNING
Refills: 0 | Status: DISCONTINUED | OUTPATIENT
Start: 2025-06-02 | End: 2025-06-04

## 2025-06-02 RX ORDER — INSULIN GLARGINE-YFGN 100 [IU]/ML
12 INJECTION, SOLUTION SUBCUTANEOUS EVERY MORNING
Refills: 0 | Status: DISCONTINUED | OUTPATIENT
Start: 2025-06-02 | End: 2025-06-02

## 2025-06-02 RX ADMIN — INSULIN LISPRO 5 UNIT(S): 100 INJECTION, SOLUTION INTRAVENOUS; SUBCUTANEOUS at 08:32

## 2025-06-02 RX ADMIN — HEPARIN SODIUM 5000 UNIT(S): 1000 INJECTION INTRAVENOUS; SUBCUTANEOUS at 17:52

## 2025-06-02 RX ADMIN — METHOCARBAMOL 1000 MILLIGRAM(S): 500 TABLET, FILM COATED ORAL at 13:17

## 2025-06-02 RX ADMIN — METHOCARBAMOL 1000 MILLIGRAM(S): 500 TABLET, FILM COATED ORAL at 06:34

## 2025-06-02 RX ADMIN — Medication 1 DOSE(S): at 13:20

## 2025-06-02 RX ADMIN — INSULIN LISPRO 5 UNIT(S): 100 INJECTION, SOLUTION INTRAVENOUS; SUBCUTANEOUS at 13:27

## 2025-06-02 RX ADMIN — Medication 25 GRAM(S): at 22:14

## 2025-06-02 RX ADMIN — LIDOCAINE HYDROCHLORIDE 1 PATCH: 20 JELLY TOPICAL at 13:19

## 2025-06-02 RX ADMIN — INSULIN LISPRO 5: 100 INJECTION, SOLUTION INTRAVENOUS; SUBCUTANEOUS at 08:33

## 2025-06-02 RX ADMIN — DOXAZOSIN MESYLATE 1 MILLIGRAM(S): 8 TABLET ORAL at 08:32

## 2025-06-02 RX ADMIN — HEPARIN SODIUM 5000 UNIT(S): 1000 INJECTION INTRAVENOUS; SUBCUTANEOUS at 06:36

## 2025-06-02 RX ADMIN — Medication 1 APPLICATION(S): at 06:36

## 2025-06-02 RX ADMIN — Medication 100 MILLIGRAM(S): at 13:17

## 2025-06-02 RX ADMIN — SEVELAMER HYDROCHLORIDE 2400 MILLIGRAM(S): 800 TABLET ORAL at 13:20

## 2025-06-02 RX ADMIN — LIDOCAINE HYDROCHLORIDE 1 PATCH: 20 JELLY TOPICAL at 01:00

## 2025-06-02 RX ADMIN — Medication 1 DOSE(S): at 22:30

## 2025-06-02 RX ADMIN — Medication 30 MILLIGRAM(S): at 06:34

## 2025-06-02 RX ADMIN — INSULIN GLARGINE-YFGN 10 UNIT(S): 100 INJECTION, SOLUTION SUBCUTANEOUS at 08:34

## 2025-06-02 RX ADMIN — INSULIN LISPRO 5 UNIT(S): 100 INJECTION, SOLUTION INTRAVENOUS; SUBCUTANEOUS at 17:43

## 2025-06-02 RX ADMIN — SEVELAMER HYDROCHLORIDE 2400 MILLIGRAM(S): 800 TABLET ORAL at 17:52

## 2025-06-02 RX ADMIN — LIDOCAINE HYDROCHLORIDE 1 PATCH: 20 JELLY TOPICAL at 20:00

## 2025-06-02 RX ADMIN — Medication 100 MILLIGRAM(S): at 06:34

## 2025-06-02 RX ADMIN — Medication 81 MILLIGRAM(S): at 13:18

## 2025-06-02 NOTE — PROGRESS NOTE ADULT - SUBJECTIVE AND OBJECTIVE BOX
seen and examined  24 h events noted   no distress       PAST HISTORY  --------------------------------------------------------------------------------  No significant changes to PMH, PSH, FHx, SHx, unless otherwise noted    ALLERGIES & MEDICATIONS  --------------------------------------------------------------------------------  Allergies    No Known Allergies    Intolerances      Standing Inpatient Medications  aspirin  chewable 81 milliGRAM(s) Oral daily  atorvastatin 80 milliGRAM(s) Oral at bedtime  chlorhexidine 2% Cloths 1 Application(s) Topical <User Schedule>  darbepoetin Injectable ViaL 25 MICROGram(s) IV Push <User Schedule>  dextrose 5%. 1000 milliLiter(s) IV Continuous <Continuous>  dextrose 50% Injectable 25 Gram(s) IV Push once  doxazosin 1 milliGRAM(s) Oral <User Schedule>  fluticasone propionate/ salmeterol 100-50 MICROgram(s) Diskus 1 Dose(s) Inhalation two times a day  gabapentin 300 milliGRAM(s) Oral at bedtime  glucagon  Injectable 1 milliGRAM(s) IntraMuscular once  heparin   Injectable 5000 Unit(s) SubCutaneous every 12 hours  hydrALAZINE 100 milliGRAM(s) Oral every 8 hours  insulin glargine Injectable (LANTUS) 10 Unit(s) SubCutaneous every morning  insulin lispro (ADMELOG) corrective regimen sliding scale   SubCutaneous three times a day before meals  insulin lispro (ADMELOG) corrective regimen sliding scale   SubCutaneous at bedtime  insulin lispro Injectable (ADMELOG) 5 Unit(s) SubCutaneous three times a day before meals  lidocaine   4% Patch 1 Patch Transdermal daily  methocarbamol 1000 milliGRAM(s) Oral three times a day  NIFEdipine XL 30 milliGRAM(s) Oral daily  polyethylene glycol 3350 17 Gram(s) Oral daily  senna 2 Tablet(s) Oral at bedtime  sevelamer carbonate Powder 2400 milliGRAM(s) Oral three times a day with meals    PRN Inpatient Medications  acetaminophen     Tablet .. 975 milliGRAM(s) Oral every 8 hours PRN  albuterol    90 MICROgram(s) HFA Inhaler 2 Puff(s) Inhalation every 6 hours PRN  dextrose Oral Gel 15 Gram(s) Oral once PRN  ondansetron Injectable 4 milliGRAM(s) IV Push every 4 hours PRN  traMADol 50 milliGRAM(s) Oral every 6 hours PRN        VITALS/PHYSICAL EXAM  --------------------------------------------------------------------------------  T(C): 36.8 (06-02-25 @ 04:50), Max: 36.8 (06-02-25 @ 04:50)  HR: 89 (06-02-25 @ 04:50) (76 - 89)  BP: 164/69 (06-02-25 @ 04:50) (113/64 - 193/81)  RR: 18 (06-02-25 @ 04:50) (16 - 18)  SpO2: 93% (06-02-25 @ 04:50) (91% - 96%)  Wt(kg): --        06-01-25 @ 07:01  -  06-02-25 @ 07:00  --------------------------------------------------------  IN: 360 mL / OUT: 150 mL / NET: 210 mL      Physical Exam:  	Gen: NAD  	Pulm: CTA B/L  	Abd: soft,/nondistended  	LE: no edema  	Vascular access:av    LABS/STUDIES  --------------------------------------------------------------------------------              8.9    3.92  >-----------<  191      [06-01-25 @ 04:24]              27.6     137  |  97  |  43  ----------------------------<  145      [06-01-25 @ 04:24]  4.8   |  23  |  7.0        Ca     9.1     [06-01-25 @ 04:24]      Mg     2.9     [06-01-25 @ 04:24]        Creatinine Trend:  SCr 7.0 [06-01 @ 04:24]  SCr 4.9 [05-31 @ 04:17]  SCr 4.9 [05-30 @ 23:55]  SCr 6.1 [05-30 @ 14:10]  SCr 7.3 [05-30 @ 04:05]    Urinalysis - [06-01-25 @ 04:24]      Color  / Appearance  / SG  / pH       Gluc 145 / Ketone   / Bili  / Urobili        Blood  / Protein  / Leuk Est  / Nitrite       RBC  / WBC  / Hyaline  / Gran  / Sq Epi  / Non Sq Epi  / Bacteria       Iron 29, TIBC 175, %sat 17      [05-28-25 @ 04:22]  Ferritin 538      [05-28-25 @ 04:22]

## 2025-06-02 NOTE — PROGRESS NOTE ADULT - SUBJECTIVE AND OBJECTIVE BOX
SUBJECTIVE:    Patient is a 59y old Female who presents with a chief complaint of AMS (02 Jun 2025 08:12)    Currently admitted to medicine with the primary diagnosis of Elevated troponin       Today is hospital day 17d. This morning she is resting comfortably in bed and reports no new issues or overnight events.     INTERVAL EVENTS:     PAST MEDICAL & SURGICAL HISTORY  Chronic kidney disease, unspecified CKD stage        ALLERGIES:  No Known Allergies    MEDICATIONS:  STANDING MEDICATIONS  aspirin  chewable 81 milliGRAM(s) Oral daily  atorvastatin 80 milliGRAM(s) Oral at bedtime  chlorhexidine 2% Cloths 1 Application(s) Topical <User Schedule>  darbepoetin Injectable ViaL 25 MICROGram(s) IV Push <User Schedule>  dextrose 5%. 1000 milliLiter(s) IV Continuous <Continuous>  dextrose 50% Injectable 25 Gram(s) IV Push once  doxazosin 1 milliGRAM(s) Oral <User Schedule>  fluticasone propionate/ salmeterol 100-50 MICROgram(s) Diskus 1 Dose(s) Inhalation two times a day  gabapentin 300 milliGRAM(s) Oral at bedtime  glucagon  Injectable 1 milliGRAM(s) IntraMuscular once  heparin   Injectable 5000 Unit(s) SubCutaneous every 12 hours  hydrALAZINE 100 milliGRAM(s) Oral every 8 hours  insulin glargine Injectable (LANTUS) 10 Unit(s) SubCutaneous every morning  insulin lispro (ADMELOG) corrective regimen sliding scale   SubCutaneous three times a day before meals  insulin lispro (ADMELOG) corrective regimen sliding scale   SubCutaneous at bedtime  insulin lispro Injectable (ADMELOG) 5 Unit(s) SubCutaneous three times a day before meals  lidocaine   4% Patch 1 Patch Transdermal daily  methocarbamol 1000 milliGRAM(s) Oral three times a day  polyethylene glycol 3350 17 Gram(s) Oral daily  senna 2 Tablet(s) Oral at bedtime  sevelamer carbonate Powder 2400 milliGRAM(s) Oral three times a day with meals    PRN MEDICATIONS  acetaminophen     Tablet .. 975 milliGRAM(s) Oral every 8 hours PRN  albuterol    90 MICROgram(s) HFA Inhaler 2 Puff(s) Inhalation every 6 hours PRN  dextrose Oral Gel 15 Gram(s) Oral once PRN  ondansetron Injectable 4 milliGRAM(s) IV Push every 4 hours PRN  traMADol 50 milliGRAM(s) Oral every 6 hours PRN    VITALS:   T(F): 98.9  HR: 84  BP: 161/61  RR: 18  SpO2: 91%    LABS:                        8.8    5.27  )-----------( 202      ( 02 Jun 2025 07:43 )             27.5     06-02    134[L]  |  94[L]  |  65[HH]  ----------------------------<  309[H]  5.4[H]   |  19  |  8.5[HH]    Ca    9.4      02 Jun 2025 07:43  Mg     3.1     06-02    TPro  6.3  /  Alb  4.1  /  TBili  0.3  /  DBili  x   /  AST  21  /  ALT  18  /  AlkPhos  134[H]  06-02      Urinalysis Basic - ( 02 Jun 2025 07:43 )    Color: x / Appearance: x / SG: x / pH: x  Gluc: 309 mg/dL / Ketone: x  / Bili: x / Urobili: x   Blood: x / Protein: x / Nitrite: x   Leuk Esterase: x / RBC: x / WBC x   Sq Epi: x / Non Sq Epi: x / Bacteria: x                RADIOLOGY:    PHYSICAL EXAM:  GEN: No acute distress  PULM/CHEST: Clear to auscultation bilaterally, no rales, rhonchi or wheezes   CVS: Regular rate and rhythm, S1-S2, no murmurs  ABD: Soft, non-tender, non-distended, +BS  EXT: No edema  NEURO: AAOx3    Brandt Catheter:   Indwelling Urethral Catheter:     Connect To:  Straight Drainage/Gravity    Indication:  Urine Output Monitoring in Critically Ill (05-28-25 @ 00:46) (not performed) [Active]  Indwelling Urethral Catheter:     Connect To:  Leg Bag    Indication:  Urine Output Monitoring in Critically Ill (05-27-25 @ 13:05) (not performed) [Active]  Indwelling Urethral Catheter:     Connect To:  Straight Drainage/Gravity    Indication:  Urine Output Monitoring in Critically Ill (05-27-25 @ 12:10) (not performed) [Active]

## 2025-06-02 NOTE — PROGRESS NOTE ADULT - ASSESSMENT
#Chest pain during HD - Resolved  #HFpEF - not in exacerbation   - On admission. trop 64 -> 66, Creatinine 4.1   - ECG showed prominent T waves    - CXR negative   - TTE EF 60%. G1DD   - stress test neg for ischemia   - C/w protonix qd     #AMS 2/2 hypoglycemia likely 2/2 decreased lantus clearance 2/2 ESRD   #DM2, Insulin dependent- uncontrolled  - 5/20: s/p D5 for hypoglycemia and AMS  - 5/28: rapid response called, bg measured and noted to be 19. D5 x2 was administered via IV. BG recheck was 229. Insulin held.   - 5/29: Pt in DKA with elevated AG. Insulin GTT started.   - 5/20: AG improved. Insulin GTT dc'd. Started on subq insulin.   - continue with current insulin sliding scale, if blood sugar starts trending up > 200 can start lantus 8 units daily. will likely need to readjust basal insulin and add Tradjenta on discharge  - monitor FS q4 on SDU   - f/u Endo     #ESRD on HD MWF  #HTN    #Hyperkalemia  - nephro recs appreciated   - bp improves with HD  - c/w hydralazine 100mg q8h  - f/u iron studies  - Low K diet  - c/w aranesp 25 weekly with HD  - c/w doxazosin 1mg BID  - c/w sevelamer 1600 mg TID  - s/p nicardipine gtt on 5/28, restarted on home BP medications (Hydralazine 100 q8, Nifedipine 30 daily)     #On admission, AMS possibly 2/2 transient hypotension during HD- Resolved  - CTH showed A focal hypodensity is noted within the right frontal periventricular white matter, not previously seen. Finding may reflect age-indeterminate ischemic change. An MRI of the brain can be obtained for further evaluation if not clinically contraindicated.  - Neuro consult noted no additional inpatient workup.  - c/w ASA and atorvastatin    Pending: Optimal sugar control

## 2025-06-02 NOTE — RAPID RESPONSE TEAM SUMMARY - NSSITUATIONBACKGROUNDRRT_GEN_ALL_CORE
Rapid was called due to patient being unresponsive. Pt did not respond to sternal rub, verbal stimuli and painful stimuli. Finger stick was 41. Pt was given half amp of dextrose and slowly became more arousable. Pt is now AOX3 and following verbal commands

## 2025-06-02 NOTE — PROGRESS NOTE ADULT - ASSESSMENT
59y Female with h/o ESRD on HD (MWF), IDDM, dyslipidemia, CKD-MBD, HTN who presented to the hospital with CP during HD session this morning.  Had 90 minutes of total HD prior to arrival.  Nephrology now consulted for HD needs while in-house.    HD i\today   3h opti 160 UF 2l   regular days outpatient MWF  follow FS closely appreciate endo eval and follow up   renal diet  follow bp readings after hd  increase  nifedipine to 60    last phos noted/ on binders repeat    NST negative   h/h noted /  iron stores noted start venofer with  / resumed  SHAR with HD aranesp 25 weekly   renal team will follow

## 2025-06-02 NOTE — PROGRESS NOTE ADULT - ATTENDING COMMENTS
I saw and examined the patient with [place]  #[id number]. I discussed the case with the resident, reviewed and edited the resident's note and agree with the findings and plan as documented.

## 2025-06-03 LAB
ALBUMIN SERPL ELPH-MCNC: 3.8 G/DL — SIGNIFICANT CHANGE UP (ref 3.5–5.2)
ALP SERPL-CCNC: 95 U/L — SIGNIFICANT CHANGE UP (ref 30–115)
ALT FLD-CCNC: 16 U/L — SIGNIFICANT CHANGE UP (ref 0–41)
ANION GAP SERPL CALC-SCNC: 17 MMOL/L — HIGH (ref 7–14)
AST SERPL-CCNC: 22 U/L — SIGNIFICANT CHANGE UP (ref 0–41)
BASOPHILS # BLD AUTO: 0.02 K/UL — SIGNIFICANT CHANGE UP (ref 0–0.2)
BASOPHILS NFR BLD AUTO: 0.5 % — SIGNIFICANT CHANGE UP (ref 0–1)
BILIRUB SERPL-MCNC: 0.4 MG/DL — SIGNIFICANT CHANGE UP (ref 0.2–1.2)
BUN SERPL-MCNC: 34 MG/DL — HIGH (ref 10–20)
CALCIUM SERPL-MCNC: 9.3 MG/DL — SIGNIFICANT CHANGE UP (ref 8.4–10.5)
CHLORIDE SERPL-SCNC: 95 MMOL/L — LOW (ref 98–110)
CO2 SERPL-SCNC: 24 MMOL/L — SIGNIFICANT CHANGE UP (ref 17–32)
CREAT SERPL-MCNC: 5.5 MG/DL — CRITICAL HIGH (ref 0.7–1.5)
EGFR: 8 ML/MIN/1.73M2 — LOW
EGFR: 8 ML/MIN/1.73M2 — LOW
EOSINOPHIL # BLD AUTO: 0.05 K/UL — SIGNIFICANT CHANGE UP (ref 0–0.7)
EOSINOPHIL NFR BLD AUTO: 1.2 % — SIGNIFICANT CHANGE UP (ref 0–8)
GLUCOSE SERPL-MCNC: 223 MG/DL — HIGH (ref 70–99)
HCT VFR BLD CALC: 28 % — LOW (ref 37–47)
HGB BLD-MCNC: 9 G/DL — LOW (ref 12–16)
IMM GRANULOCYTES NFR BLD AUTO: 0.5 % — HIGH (ref 0.1–0.3)
LYMPHOCYTES # BLD AUTO: 0.63 K/UL — LOW (ref 1.2–3.4)
LYMPHOCYTES # BLD AUTO: 15.6 % — LOW (ref 20.5–51.1)
MAGNESIUM SERPL-MCNC: 2.7 MG/DL — HIGH (ref 1.8–2.4)
MCHC RBC-ENTMCNC: 32.1 G/DL — SIGNIFICANT CHANGE UP (ref 32–37)
MCHC RBC-ENTMCNC: 32.8 PG — HIGH (ref 27–31)
MCV RBC AUTO: 102.2 FL — HIGH (ref 81–99)
MONOCYTES # BLD AUTO: 0.36 K/UL — SIGNIFICANT CHANGE UP (ref 0.1–0.6)
MONOCYTES NFR BLD AUTO: 8.9 % — SIGNIFICANT CHANGE UP (ref 1.7–9.3)
NEUTROPHILS # BLD AUTO: 2.97 K/UL — SIGNIFICANT CHANGE UP (ref 1.4–6.5)
NEUTROPHILS NFR BLD AUTO: 73.3 % — SIGNIFICANT CHANGE UP (ref 42.2–75.2)
NRBC BLD AUTO-RTO: 0 /100 WBCS — SIGNIFICANT CHANGE UP (ref 0–0)
PHOSPHATE SERPL-MCNC: 5.2 MG/DL — HIGH (ref 2.1–4.9)
PLATELET # BLD AUTO: 228 K/UL — SIGNIFICANT CHANGE UP (ref 130–400)
PMV BLD: 10.7 FL — HIGH (ref 7.4–10.4)
POTASSIUM SERPL-MCNC: 4.7 MMOL/L — SIGNIFICANT CHANGE UP (ref 3.5–5)
POTASSIUM SERPL-SCNC: 4.7 MMOL/L — SIGNIFICANT CHANGE UP (ref 3.5–5)
PROT SERPL-MCNC: 6.2 G/DL — SIGNIFICANT CHANGE UP (ref 6–8)
RAPID RVP RESULT: SIGNIFICANT CHANGE UP
RBC # BLD: 2.74 M/UL — LOW (ref 4.2–5.4)
RBC # FLD: 16.6 % — HIGH (ref 11.5–14.5)
SARS-COV-2 RNA SPEC QL NAA+PROBE: SIGNIFICANT CHANGE UP
SODIUM SERPL-SCNC: 136 MMOL/L — SIGNIFICANT CHANGE UP (ref 135–146)
WBC # BLD: 4.05 K/UL — LOW (ref 4.8–10.8)
WBC # FLD AUTO: 4.05 K/UL — LOW (ref 4.8–10.8)

## 2025-06-03 PROCEDURE — 99232 SBSQ HOSP IP/OBS MODERATE 35: CPT

## 2025-06-03 RX ORDER — INSULIN LISPRO 100 U/ML
2 INJECTION, SOLUTION INTRAVENOUS; SUBCUTANEOUS ONCE
Refills: 0 | Status: COMPLETED | OUTPATIENT
Start: 2025-06-03 | End: 2025-06-03

## 2025-06-03 RX ORDER — INSULIN LISPRO 100 U/ML
4 INJECTION, SOLUTION INTRAVENOUS; SUBCUTANEOUS
Refills: 0 | Status: DISCONTINUED | OUTPATIENT
Start: 2025-06-03 | End: 2025-06-06

## 2025-06-03 RX ORDER — INSULIN LISPRO 100 U/ML
3 INJECTION, SOLUTION INTRAVENOUS; SUBCUTANEOUS
Refills: 0 | Status: DISCONTINUED | OUTPATIENT
Start: 2025-06-03 | End: 2025-06-03

## 2025-06-03 RX ORDER — INSULIN LISPRO 100 U/ML
2 INJECTION, SOLUTION INTRAVENOUS; SUBCUTANEOUS ONCE
Refills: 0 | Status: DISCONTINUED | OUTPATIENT
Start: 2025-06-03 | End: 2025-06-05

## 2025-06-03 RX ADMIN — SEVELAMER HYDROCHLORIDE 2400 MILLIGRAM(S): 800 TABLET ORAL at 16:49

## 2025-06-03 RX ADMIN — METHOCARBAMOL 1000 MILLIGRAM(S): 500 TABLET, FILM COATED ORAL at 13:17

## 2025-06-03 RX ADMIN — INSULIN LISPRO 2 UNIT(S): 100 INJECTION, SOLUTION INTRAVENOUS; SUBCUTANEOUS at 22:28

## 2025-06-03 RX ADMIN — ATORVASTATIN CALCIUM 80 MILLIGRAM(S): 80 TABLET, FILM COATED ORAL at 22:29

## 2025-06-03 RX ADMIN — DEXTROMETHORPHAN HBR, GUAIFENESIN 100 MILLIGRAM(S): 200 LIQUID ORAL at 00:33

## 2025-06-03 RX ADMIN — METHOCARBAMOL 1000 MILLIGRAM(S): 500 TABLET, FILM COATED ORAL at 06:28

## 2025-06-03 RX ADMIN — SEVELAMER HYDROCHLORIDE 2400 MILLIGRAM(S): 800 TABLET ORAL at 13:18

## 2025-06-03 RX ADMIN — INSULIN LISPRO 4 UNIT(S): 100 INJECTION, SOLUTION INTRAVENOUS; SUBCUTANEOUS at 17:10

## 2025-06-03 RX ADMIN — Medication 100 MILLIGRAM(S): at 00:34

## 2025-06-03 RX ADMIN — Medication 100 MILLIGRAM(S): at 22:28

## 2025-06-03 RX ADMIN — SEVELAMER HYDROCHLORIDE 2400 MILLIGRAM(S): 800 TABLET ORAL at 08:35

## 2025-06-03 RX ADMIN — Medication 1 DOSE(S): at 08:35

## 2025-06-03 RX ADMIN — Medication 1 APPLICATION(S): at 06:36

## 2025-06-03 RX ADMIN — INSULIN LISPRO 2: 100 INJECTION, SOLUTION INTRAVENOUS; SUBCUTANEOUS at 08:36

## 2025-06-03 RX ADMIN — INSULIN LISPRO 3 UNIT(S): 100 INJECTION, SOLUTION INTRAVENOUS; SUBCUTANEOUS at 12:10

## 2025-06-03 RX ADMIN — Medication 60 MILLIGRAM(S): at 06:28

## 2025-06-03 RX ADMIN — GABAPENTIN 300 MILLIGRAM(S): 400 CAPSULE ORAL at 00:33

## 2025-06-03 RX ADMIN — Medication 2 TABLET(S): at 00:30

## 2025-06-03 RX ADMIN — GABAPENTIN 300 MILLIGRAM(S): 400 CAPSULE ORAL at 22:29

## 2025-06-03 RX ADMIN — ATORVASTATIN CALCIUM 80 MILLIGRAM(S): 80 TABLET, FILM COATED ORAL at 00:26

## 2025-06-03 RX ADMIN — INSULIN LISPRO 2 UNIT(S): 100 INJECTION, SOLUTION INTRAVENOUS; SUBCUTANEOUS at 17:11

## 2025-06-03 RX ADMIN — METHOCARBAMOL 1000 MILLIGRAM(S): 500 TABLET, FILM COATED ORAL at 00:28

## 2025-06-03 RX ADMIN — INSULIN GLARGINE-YFGN 5 UNIT(S): 100 INJECTION, SOLUTION SUBCUTANEOUS at 08:34

## 2025-06-03 RX ADMIN — DOXAZOSIN MESYLATE 1 MILLIGRAM(S): 8 TABLET ORAL at 08:35

## 2025-06-03 RX ADMIN — HEPARIN SODIUM 5000 UNIT(S): 1000 INJECTION INTRAVENOUS; SUBCUTANEOUS at 17:12

## 2025-06-03 RX ADMIN — Medication 100 MILLIGRAM(S): at 13:10

## 2025-06-03 RX ADMIN — METHOCARBAMOL 1000 MILLIGRAM(S): 500 TABLET, FILM COATED ORAL at 22:29

## 2025-06-03 RX ADMIN — DOXAZOSIN MESYLATE 1 MILLIGRAM(S): 8 TABLET ORAL at 21:29

## 2025-06-03 RX ADMIN — HEPARIN SODIUM 5000 UNIT(S): 1000 INJECTION INTRAVENOUS; SUBCUTANEOUS at 06:30

## 2025-06-03 RX ADMIN — Medication 100 MILLIGRAM(S): at 06:28

## 2025-06-03 RX ADMIN — DEXTROMETHORPHAN HBR, GUAIFENESIN 100 MILLIGRAM(S): 200 LIQUID ORAL at 06:29

## 2025-06-03 RX ADMIN — DOXAZOSIN MESYLATE 1 MILLIGRAM(S): 8 TABLET ORAL at 00:33

## 2025-06-03 RX ADMIN — Medication 81 MILLIGRAM(S): at 13:19

## 2025-06-03 NOTE — PROGRESS NOTE ADULT - ASSESSMENT
#Chest pain during HD - Resolved  #HFpEF - not in exacerbation   - On admission. trop 64 -> 66, Creatinine 4.1   - ECG showed prominent T waves    - CXR negative   - TTE EF 60%. G1DD   - stress test neg for ischemia   - C/w protonix qd     #AMS 2/2 hypoglycemia likely 2/2 decreased lantus clearance 2/2 ESRD   #DM2, Insulin dependent- uncontrolled  - 5/20: s/p D5 for hypoglycemia and AMS  - 5/28: rapid response called, bg measured and noted to be 19. D5 x2 was administered via IV. BG recheck was 229. Insulin held.   - 5/29: Pt in DKA with elevated AG. Insulin GTT started.   - 5/20: AG improved. Insulin GTT dc'd. Started on subq insulin.   - 6/2 hypoglycemic and altered, lantus reduced from 10u to 5u in AM  - discussed w/Dr. Carter, will keep lantus at 5u, glucose now in 240s, will dc sliding scale due to highly labile glucose and do 3u lispro w/meals       #ESRD on HD MWF  #HTN    #Hyperkalemia  - nephro recs appreciated   - bp improves with HD  - c/w hydralazine 100mg q8h  - f/u iron studies  - Low K diet  - c/w aranesp 25 weekly with HD  - c/w doxazosin 1mg BID  - c/w sevelamer 1600 mg TID  - s/p nicardipine gtt on 5/28, restarted on home BP medications (Hydralazine 100 q8, Nifedipine 30 daily)     #On admission, AMS possibly 2/2 transient hypotension during HD- Resolved  - CTH showed A focal hypodensity is noted within the right frontal periventricular white matter, not previously seen. Finding may reflect age-indeterminate ischemic change. An MRI of the brain can be obtained for further evaluation if not clinically contraindicated.  - Neuro consult noted no additional inpatient workup.  - c/w ASA and atorvastatin    Pending: Optimal sugar control   #Chest pain during HD - Resolved  #HFpEF - not in exacerbation   - On admission. trop 64 -> 66, Creatinine 4.1   - ECG showed prominent T waves    - CXR negative   - TTE EF 60%. G1DD   - stress test neg for ischemia   - C/w protonix qd     #AMS 2/2 hypoglycemia likely 2/2 decreased lantus clearance 2/2 ESRD   #DM2, Insulin dependent- uncontrolled  - 5/20: s/p D5 for hypoglycemia and AMS  - 5/28: rapid response called, bg measured and noted to be 19. D5 x2 was administered via IV. BG recheck was 229. Insulin held.   - 5/29: Pt in DKA with elevated AG. Insulin GTT started.   - 5/20: AG improved. Insulin GTT dc'd. Started on subq insulin.   - 6/2 hypoglycemic and altered, lantus reduced from 10u to 5u in AM  - discussed w/Dr. Carter, will keep lantus at 5u, glucose now in 240s, will dc sliding scale due to highly labile glucose and do 3u lispro w/meals       #ESRD on HD MWF  #HTN    #Hyperkalemia  - nephro recs appreciated   - bp improves with HD  - c/w hydralazine 100mg q8h  - f/u iron studies  - Low K diet  - c/w aranesp 25 weekly with HD  - c/w doxazosin 1mg BID  - c/w sevelamer 1600 mg TID  - s/p nicardipine gtt on 5/28, restarted on home BP medications (Hydralazine 100 q8, Nifedipine 30 daily increased to now 60mg q daily 6/2)     #On admission, AMS possibly 2/2 transient hypotension during HD- Resolved  - CTH showed A focal hypodensity is noted within the right frontal periventricular white matter, not previously seen. Finding may reflect age-indeterminate ischemic change. An MRI of the brain can be obtained for further evaluation if not clinically contraindicated.  - Neuro consult noted no additional inpatient workup.  - c/w ASA and atorvastatin    Pending: Optimal sugar control

## 2025-06-03 NOTE — PROGRESS NOTE ADULT - ASSESSMENT
59y old  Female with PMH of DM, ESRD on HD MWF, HTN, HLD, and HF who presents to ED for chest pain during HD session this morning.     #Hypoglycemia  - Patient has ESRD on insulin  - Episode of significant hypoglycemia despite no correction   - overall brittle glucose readings  - lower Lantus to 5units today , if FS high in am can increase to 8 units   - lispro 4 units TIDAC , hold if NPO or not eating full meal   - hold sliding scale to avoid over correction , patient is very sensitive to insulin and if correction needed no more then 2 units

## 2025-06-03 NOTE — PROVIDER CONTACT NOTE (OTHER) - SITUATION
patients fs=40, patient is alert , gave juice .
patients repeat  fs=41, gave juice with sugar no amp ordered.
patients repeat FS=60
Patient complaining of 10 out of 10 pain in chest and back , requesting pain meds. VSS
Pt FS checked for ACHS, FS was 41. Pt was unresponsive, pt was cold, clammy and diaphoretic. Pt did not respond to sternal rub.

## 2025-06-03 NOTE — PROGRESS NOTE ADULT - SUBJECTIVE AND OBJECTIVE BOX
FS reviewed , hypoglycemia noted   O:Vital Signs Last 24 Hrs  T(C): 37.2 (03 Jun 2025 11:57), Max: 37.2 (03 Jun 2025 11:57)  T(F): 98.9 (03 Jun 2025 11:57), Max: 98.9 (03 Jun 2025 11:57)  HR: 82 (03 Jun 2025 11:57) (75 - 84)  BP: 121/57 (03 Jun 2025 11:57) (121/57 - 178/70)  BP(mean): 103 (02 Jun 2025 20:00) (103 - 103)  RR: 18 (03 Jun 2025 11:57) (10 - 18)  SpO2: 92% (03 Jun 2025 11:57) (88% - 100%)    Parameters below as of 03 Jun 2025 08:40  Patient On (Oxygen Delivery Method): room air        CAPILLARY BLOOD GLUCOSE      POCT Blood Glucose.: 442 mg/dL (03 Jun 2025 18:35)  POCT Blood Glucose.: 527 mg/dL (03 Jun 2025 16:47)  POCT Blood Glucose.: 399 mg/dL (03 Jun 2025 11:35)  POCT Blood Glucose.: 240 mg/dL (03 Jun 2025 07:57)  POCT Blood Glucose.: 127 mg/dL (02 Jun 2025 23:49)  POCT Blood Glucose.: 168 mg/dL (02 Jun 2025 22:22)  POCT Blood Glucose.: 41 mg/dL (02 Jun 2025 22:12)      MEDICATIONS  (STANDING):  aspirin  chewable 81 milliGRAM(s) Oral daily  atorvastatin 80 milliGRAM(s) Oral at bedtime  chlorhexidine 2% Cloths 1 Application(s) Topical <User Schedule>  darbepoetin Injectable ViaL 25 MICROGram(s) IV Push <User Schedule>  dextrose 5%. 1000 milliLiter(s) (50 mL/Hr) IV Continuous <Continuous>  dextrose 50% Injectable 25 Gram(s) IV Push once  doxazosin 1 milliGRAM(s) Oral <User Schedule>  fluticasone propionate/ salmeterol 100-50 MICROgram(s) Diskus 1 Dose(s) Inhalation two times a day  gabapentin 300 milliGRAM(s) Oral at bedtime  glucagon  Injectable 1 milliGRAM(s) IntraMuscular once  heparin   Injectable 5000 Unit(s) SubCutaneous every 12 hours  hydrALAZINE 100 milliGRAM(s) Oral every 8 hours  insulin glargine Injectable (LANTUS) 5 Unit(s) SubCutaneous every morning  insulin lispro Injectable (ADMELOG) 4 Unit(s) SubCutaneous three times a day before meals  lidocaine   4% Patch 1 Patch Transdermal daily  methocarbamol 1000 milliGRAM(s) Oral three times a day  NIFEdipine XL 60 milliGRAM(s) Oral daily  polyethylene glycol 3350 17 Gram(s) Oral daily  senna 2 Tablet(s) Oral at bedtime  sevelamer carbonate Powder 2400 milliGRAM(s) Oral three times a day with meals    MEDICATIONS  (PRN):  acetaminophen     Tablet .. 975 milliGRAM(s) Oral every 8 hours PRN Mild Pain (1 - 3)  albuterol    90 MICROgram(s) HFA Inhaler 2 Puff(s) Inhalation every 6 hours PRN Shortness of Breath and/or Wheezing  dextrose Oral Gel 15 Gram(s) Oral once PRN Blood Glucose LESS THAN 70 milliGRAM(s)/deciliter  guaiFENesin Oral Liquid (Sugar-Free) 100 milliGRAM(s) Oral every 6 hours PRN Cough  ondansetron Injectable 4 milliGRAM(s) IV Push every 4 hours PRN Nausea and/or Vomiting  traMADol 50 milliGRAM(s) Oral every 6 hours PRN Severe Pain (7 - 10)

## 2025-06-03 NOTE — PROGRESS NOTE ADULT - ATTENDING COMMENTS
I saw and examined the patient. I discussed the case with the resident, reviewed and edited the resident's note and agree with the findings and plan as documented.    Handoff: BG is still labile. D/w Endo and adjusted insulin. If stable likely dc tomorrow. D/w family at bedside.

## 2025-06-03 NOTE — PROVIDER CONTACT NOTE (OTHER) - ACTION/TREATMENT ORDERED:
as per MD will come to bedside to assess patient ok to give pain meds.
as per MD give D50
D5 ampule, RRT called, oxygen non rebreather placed, zoll attached, FS rechecked

## 2025-06-03 NOTE — PROVIDER CONTACT NOTE (OTHER) - ASSESSMENT
/65, HR 75, O2 88, RR 10, FS 41  Pt unresponsive, sweating, cold, clammy, did not respond to sternal rub.

## 2025-06-03 NOTE — PROGRESS NOTE ADULT - SUBJECTIVE AND OBJECTIVE BOX
24H events:    Patient is a 59y old Female who presents with a chief complaint of AMS (02 Jun 2025 14:06)    Primary diagnosis of hypoglycemia ams    overnight events:   22:28: Acute mental status changes  Rapid was called due to patient being unresponsive. Pt did not respond to sternal rub, verbal stimuli and painful stimuli. Finger stick was 41. Pt was given half amp of dextrose and slowly became more arousable. Pt is now AOX3 and following verbal commands  given full amp of dextrose     currently satting 95% on RA  mentating well   Code Status:    Family communication:  Contact date:  Name of person contacted:  Relationship to patient:  Communication details:  What matters most:    PAST MEDICAL & SURGICAL HISTORY  Chronic kidney disease, unspecified CKD stage      SOCIAL HISTORY:  Social History:      ALLERGIES:  No Known Allergies    MEDICATIONS:  STANDING MEDICATIONS  aspirin  chewable 81 milliGRAM(s) Oral daily  atorvastatin 80 milliGRAM(s) Oral at bedtime  chlorhexidine 2% Cloths 1 Application(s) Topical <User Schedule>  darbepoetin Injectable ViaL 25 MICROGram(s) IV Push <User Schedule>  dextrose 5%. 1000 milliLiter(s) IV Continuous <Continuous>  dextrose 50% Injectable 25 Gram(s) IV Push once  doxazosin 1 milliGRAM(s) Oral <User Schedule>  fluticasone propionate/ salmeterol 100-50 MICROgram(s) Diskus 1 Dose(s) Inhalation two times a day  gabapentin 300 milliGRAM(s) Oral at bedtime  glucagon  Injectable 1 milliGRAM(s) IntraMuscular once  heparin   Injectable 5000 Unit(s) SubCutaneous every 12 hours  hydrALAZINE 100 milliGRAM(s) Oral every 8 hours  insulin glargine Injectable (LANTUS) 5 Unit(s) SubCutaneous every morning  insulin lispro Injectable (ADMELOG) 3 Unit(s) SubCutaneous three times a day before meals  lidocaine   4% Patch 1 Patch Transdermal daily  methocarbamol 1000 milliGRAM(s) Oral three times a day  NIFEdipine XL 60 milliGRAM(s) Oral daily  polyethylene glycol 3350 17 Gram(s) Oral daily  senna 2 Tablet(s) Oral at bedtime  sevelamer carbonate Powder 2400 milliGRAM(s) Oral three times a day with meals    PRN MEDICATIONS  acetaminophen     Tablet .. 975 milliGRAM(s) Oral every 8 hours PRN  albuterol    90 MICROgram(s) HFA Inhaler 2 Puff(s) Inhalation every 6 hours PRN  dextrose Oral Gel 15 Gram(s) Oral once PRN  guaiFENesin Oral Liquid (Sugar-Free) 100 milliGRAM(s) Oral every 6 hours PRN  ondansetron Injectable 4 milliGRAM(s) IV Push every 4 hours PRN  traMADol 50 milliGRAM(s) Oral every 6 hours PRN    VITALS:   T(F): 97.6  HR: 84  BP: 178/70  RR: 18  SpO2: 95%    PHYSICAL EXAM:  GENERAL: NAD, lying in bed comfortably  HEAD: NCAD, no hematoma or laceration   NECK: Supple, no neck stiffness/nuchal rigidity, no JVD    HEART: Regular rate and rhythm, normal S1/S2, no murmurs, heaves, thrills  LUNGS: No acute respiratory distress, clear b/l breath sounds, no wheezing, rales, rhonchi  ABDOMEN:  soft, non-tender, non-distended, + BS, no hepatosplenomegaly   EXTREMITIES: no rashes, extremities warm/dry, no cyanosis, no edema, ulcerations or ecchymosis  NERVOUS SYSTEM:  A&Ox3, CNII-XII intace, follows commands, answers questions appropriately   SKIN: No rashes or lesions           (  ) Indwelling Brandt Catheter:   Date insterted:    Reason (  ) Critical illness     (  ) urinary retention    (  ) Accurate Ins/Outs Monitoring     (  ) CMO patient    (  ) Central Line:   Date inserted:  Location: (  ) Right IJ     (  ) Left IJ     (  ) Right Fem     (  ) Left Fem    (  ) SPC        (  ) pigtail       (  ) PEG tube       (  ) colostomy       (  ) jejunostomy  (  ) U-Dall    LABS:                        9.0    4.05  )-----------( 228      ( 03 Jun 2025 05:55 )             28.0     06-03    136  |  95[L]  |  34[H]  ----------------------------<  223[H]  4.7   |  24  |  5.5[HH]    Ca    9.3      03 Jun 2025 05:55  Phos  5.2     06-03  Mg     2.7     06-03    TPro  6.2  /  Alb  3.8  /  TBili  0.4  /  DBili  x   /  AST  22  /  ALT  16  /  AlkPhos  95  06-03      Urinalysis Basic - ( 03 Jun 2025 05:55 )    Color: x / Appearance: x / SG: x / pH: x  Gluc: 223 mg/dL / Ketone: x  / Bili: x / Urobili: x   Blood: x / Protein: x / Nitrite: x   Leuk Esterase: x / RBC: x / WBC x   Sq Epi: x / Non Sq Epi: x / Bacteria: x                RADIOLOGY:    RADIOLOGY

## 2025-06-04 LAB
ALBUMIN SERPL ELPH-MCNC: 4 G/DL — SIGNIFICANT CHANGE UP (ref 3.5–5.2)
ALP SERPL-CCNC: 112 U/L — SIGNIFICANT CHANGE UP (ref 30–115)
ALT FLD-CCNC: 15 U/L — SIGNIFICANT CHANGE UP (ref 0–41)
ANION GAP SERPL CALC-SCNC: 18 MMOL/L — HIGH (ref 7–14)
AST SERPL-CCNC: 17 U/L — SIGNIFICANT CHANGE UP (ref 0–41)
BASE EXCESS BLDA CALC-SCNC: 2.8 MMOL/L — SIGNIFICANT CHANGE UP (ref -2–3)
BASOPHILS # BLD AUTO: 0.05 K/UL — SIGNIFICANT CHANGE UP (ref 0–0.2)
BASOPHILS NFR BLD AUTO: 1.3 % — HIGH (ref 0–1)
BILIRUB SERPL-MCNC: 0.3 MG/DL — SIGNIFICANT CHANGE UP (ref 0.2–1.2)
BUN SERPL-MCNC: 51 MG/DL — HIGH (ref 10–20)
CALCIUM SERPL-MCNC: 9.4 MG/DL — SIGNIFICANT CHANGE UP (ref 8.4–10.5)
CHLORIDE SERPL-SCNC: 93 MMOL/L — LOW (ref 98–110)
CO2 SERPL-SCNC: 22 MMOL/L — SIGNIFICANT CHANGE UP (ref 17–32)
CREAT SERPL-MCNC: 6.9 MG/DL — CRITICAL HIGH (ref 0.7–1.5)
EGFR: 6 ML/MIN/1.73M2 — LOW
EGFR: 6 ML/MIN/1.73M2 — LOW
EOSINOPHIL # BLD AUTO: 0.23 K/UL — SIGNIFICANT CHANGE UP (ref 0–0.7)
EOSINOPHIL NFR BLD AUTO: 6.1 % — SIGNIFICANT CHANGE UP (ref 0–8)
GLUCOSE SERPL-MCNC: 193 MG/DL — HIGH (ref 70–99)
HCO3 BLDA-SCNC: 27 MMOL/L — SIGNIFICANT CHANGE UP (ref 21–28)
HCT VFR BLD CALC: 25.8 % — LOW (ref 37–47)
HGB BLD-MCNC: 8.4 G/DL — LOW (ref 12–16)
IMM GRANULOCYTES NFR BLD AUTO: 0.5 % — HIGH (ref 0.1–0.3)
LYMPHOCYTES # BLD AUTO: 0.84 K/UL — LOW (ref 1.2–3.4)
LYMPHOCYTES # BLD AUTO: 22.5 % — SIGNIFICANT CHANGE UP (ref 20.5–51.1)
MAGNESIUM SERPL-MCNC: 2.8 MG/DL — HIGH (ref 1.8–2.4)
MCHC RBC-ENTMCNC: 32.6 G/DL — SIGNIFICANT CHANGE UP (ref 32–37)
MCHC RBC-ENTMCNC: 33.2 PG — HIGH (ref 27–31)
MCV RBC AUTO: 102 FL — HIGH (ref 81–99)
MONOCYTES # BLD AUTO: 0.42 K/UL — SIGNIFICANT CHANGE UP (ref 0.1–0.6)
MONOCYTES NFR BLD AUTO: 11.2 % — HIGH (ref 1.7–9.3)
NEUTROPHILS # BLD AUTO: 2.18 K/UL — SIGNIFICANT CHANGE UP (ref 1.4–6.5)
NEUTROPHILS NFR BLD AUTO: 58.4 % — SIGNIFICANT CHANGE UP (ref 42.2–75.2)
NRBC BLD AUTO-RTO: 0 /100 WBCS — SIGNIFICANT CHANGE UP (ref 0–0)
PCO2 BLDA: 40 MMHG — SIGNIFICANT CHANGE UP (ref 32–45)
PH BLDA: 7.44 — SIGNIFICANT CHANGE UP (ref 7.35–7.45)
PHOSPHATE SERPL-MCNC: 5.1 MG/DL — HIGH (ref 2.1–4.9)
PLATELET # BLD AUTO: 235 K/UL — SIGNIFICANT CHANGE UP (ref 130–400)
PMV BLD: 10.8 FL — HIGH (ref 7.4–10.4)
PO2 BLDA: 111 MMHG — HIGH (ref 83–108)
POTASSIUM SERPL-MCNC: 4.5 MMOL/L — SIGNIFICANT CHANGE UP (ref 3.5–5)
POTASSIUM SERPL-SCNC: 4.5 MMOL/L — SIGNIFICANT CHANGE UP (ref 3.5–5)
PROT SERPL-MCNC: 6.3 G/DL — SIGNIFICANT CHANGE UP (ref 6–8)
RBC # BLD: 2.53 M/UL — LOW (ref 4.2–5.4)
RBC # FLD: 16.4 % — HIGH (ref 11.5–14.5)
SAO2 % BLDA: 99.4 % — HIGH (ref 94–98)
SODIUM SERPL-SCNC: 133 MMOL/L — LOW (ref 135–146)
WBC # BLD: 3.74 K/UL — LOW (ref 4.8–10.8)
WBC # FLD AUTO: 3.74 K/UL — LOW (ref 4.8–10.8)

## 2025-06-04 PROCEDURE — 99233 SBSQ HOSP IP/OBS HIGH 50: CPT

## 2025-06-04 RX ORDER — INSULIN GLARGINE-YFGN 100 [IU]/ML
8 INJECTION, SOLUTION SUBCUTANEOUS EVERY MORNING
Refills: 0 | Status: DISCONTINUED | OUTPATIENT
Start: 2025-06-05 | End: 2025-06-06

## 2025-06-04 RX ORDER — INSULIN LISPRO 100 U/ML
5 INJECTION, SOLUTION INTRAVENOUS; SUBCUTANEOUS ONCE
Refills: 0 | Status: COMPLETED | OUTPATIENT
Start: 2025-06-04 | End: 2025-06-04

## 2025-06-04 RX ADMIN — HEPARIN SODIUM 5000 UNIT(S): 1000 INJECTION INTRAVENOUS; SUBCUTANEOUS at 17:00

## 2025-06-04 RX ADMIN — INSULIN LISPRO 4 UNIT(S): 100 INJECTION, SOLUTION INTRAVENOUS; SUBCUTANEOUS at 08:13

## 2025-06-04 RX ADMIN — SEVELAMER HYDROCHLORIDE 2400 MILLIGRAM(S): 800 TABLET ORAL at 17:01

## 2025-06-04 RX ADMIN — SEVELAMER HYDROCHLORIDE 2400 MILLIGRAM(S): 800 TABLET ORAL at 12:31

## 2025-06-04 RX ADMIN — LIDOCAINE HYDROCHLORIDE 1 PATCH: 20 JELLY TOPICAL at 19:35

## 2025-06-04 RX ADMIN — Medication 100 MILLIGRAM(S): at 06:51

## 2025-06-04 RX ADMIN — Medication 100 MILLIGRAM(S): at 15:49

## 2025-06-04 RX ADMIN — GABAPENTIN 300 MILLIGRAM(S): 400 CAPSULE ORAL at 21:55

## 2025-06-04 RX ADMIN — Medication 100 MILLIGRAM(S): at 21:56

## 2025-06-04 RX ADMIN — Medication 1 DOSE(S): at 08:07

## 2025-06-04 RX ADMIN — INSULIN GLARGINE-YFGN 5 UNIT(S): 100 INJECTION, SOLUTION SUBCUTANEOUS at 08:07

## 2025-06-04 RX ADMIN — ATORVASTATIN CALCIUM 80 MILLIGRAM(S): 80 TABLET, FILM COATED ORAL at 21:56

## 2025-06-04 RX ADMIN — Medication 81 MILLIGRAM(S): at 12:32

## 2025-06-04 RX ADMIN — INSULIN LISPRO 4 UNIT(S): 100 INJECTION, SOLUTION INTRAVENOUS; SUBCUTANEOUS at 12:31

## 2025-06-04 RX ADMIN — INSULIN LISPRO 4 UNIT(S): 100 INJECTION, SOLUTION INTRAVENOUS; SUBCUTANEOUS at 17:00

## 2025-06-04 RX ADMIN — METHOCARBAMOL 1000 MILLIGRAM(S): 500 TABLET, FILM COATED ORAL at 06:52

## 2025-06-04 RX ADMIN — METHOCARBAMOL 1000 MILLIGRAM(S): 500 TABLET, FILM COATED ORAL at 21:55

## 2025-06-04 RX ADMIN — Medication 60 MILLIGRAM(S): at 06:52

## 2025-06-04 RX ADMIN — METHOCARBAMOL 1000 MILLIGRAM(S): 500 TABLET, FILM COATED ORAL at 15:48

## 2025-06-04 RX ADMIN — DOXAZOSIN MESYLATE 1 MILLIGRAM(S): 8 TABLET ORAL at 08:07

## 2025-06-04 RX ADMIN — DOXAZOSIN MESYLATE 1 MILLIGRAM(S): 8 TABLET ORAL at 21:56

## 2025-06-04 RX ADMIN — Medication 1 APPLICATION(S): at 06:54

## 2025-06-04 RX ADMIN — LIDOCAINE HYDROCHLORIDE 1 PATCH: 20 JELLY TOPICAL at 12:32

## 2025-06-04 RX ADMIN — SEVELAMER HYDROCHLORIDE 2400 MILLIGRAM(S): 800 TABLET ORAL at 08:07

## 2025-06-04 RX ADMIN — INSULIN LISPRO 5 UNIT(S): 100 INJECTION, SOLUTION INTRAVENOUS; SUBCUTANEOUS at 01:45

## 2025-06-04 RX ADMIN — HEPARIN SODIUM 5000 UNIT(S): 1000 INJECTION INTRAVENOUS; SUBCUTANEOUS at 06:51

## 2025-06-04 NOTE — PROGRESS NOTE ADULT - SUBJECTIVE AND OBJECTIVE BOX
seen and examined  24 h events noted   no distress   on 4 liters         PAST HISTORY  --------------------------------------------------------------------------------  No significant changes to PMH, PSH, FHx, SHx, unless otherwise noted    ALLERGIES & MEDICATIONS  --------------------------------------------------------------------------------  Allergies    No Known Allergies    Intolerances      Standing Inpatient Medications  aspirin  chewable 81 milliGRAM(s) Oral daily  atorvastatin 80 milliGRAM(s) Oral at bedtime  chlorhexidine 2% Cloths 1 Application(s) Topical <User Schedule>  darbepoetin Injectable ViaL 25 MICROGram(s) IV Push <User Schedule>  dextrose 5%. 1000 milliLiter(s) IV Continuous <Continuous>  dextrose 50% Injectable 25 Gram(s) IV Push once  doxazosin 1 milliGRAM(s) Oral <User Schedule>  fluticasone propionate/ salmeterol 100-50 MICROgram(s) Diskus 1 Dose(s) Inhalation two times a day  gabapentin 300 milliGRAM(s) Oral at bedtime  glucagon  Injectable 1 milliGRAM(s) IntraMuscular once  heparin   Injectable 5000 Unit(s) SubCutaneous every 12 hours  hydrALAZINE 100 milliGRAM(s) Oral every 8 hours  insulin glargine Injectable (LANTUS) 5 Unit(s) SubCutaneous every morning  insulin lispro Injectable (ADMELOG) 4 Unit(s) SubCutaneous three times a day before meals  insulin lispro Injectable (ADMELOG). 2 Unit(s) SubCutaneous once  lidocaine   4% Patch 1 Patch Transdermal daily  methocarbamol 1000 milliGRAM(s) Oral three times a day  NIFEdipine XL 60 milliGRAM(s) Oral daily  polyethylene glycol 3350 17 Gram(s) Oral daily  senna 2 Tablet(s) Oral at bedtime  sevelamer carbonate Powder 2400 milliGRAM(s) Oral three times a day with meals    PRN Inpatient Medications  acetaminophen     Tablet .. 975 milliGRAM(s) Oral every 8 hours PRN  albuterol    90 MICROgram(s) HFA Inhaler 2 Puff(s) Inhalation every 6 hours PRN  dextrose Oral Gel 15 Gram(s) Oral once PRN  guaiFENesin Oral Liquid (Sugar-Free) 100 milliGRAM(s) Oral every 6 hours PRN  ondansetron Injectable 4 milliGRAM(s) IV Push every 4 hours PRN  traMADol 50 milliGRAM(s) Oral every 6 hours PRN        VITALS/PHYSICAL EXAM  --------------------------------------------------------------------------------  T(C): 36.7 (06-04-25 @ 04:47), Max: 37.2 (06-03-25 @ 11:57)  HR: 71 (06-04-25 @ 04:47) (71 - 82)  BP: 156/71 (06-04-25 @ 04:47) (121/57 - 156/71)  RR: 18 (06-04-25 @ 04:47) (18 - 18)  SpO2: 97% (06-04-25 @ 04:47) (91% - 97%)  Wt(kg): --        Physical Exam:  	Gen: NAD  	Pulm: B/L dayron   	CV: S1S2; no rub  	Abd: +distended  	Vascular access:av    LABS/STUDIES  --------------------------------------------------------------------------------              9.0    4.05  >-----------<  228      [06-03-25 @ 05:55]              28.0     136  |  95  |  34  ----------------------------<  223      [06-03-25 @ 05:55]  4.7   |  24  |  5.5        Ca     9.3     [06-03-25 @ 05:55]      Mg     2.7     [06-03-25 @ 05:55]      Phos  5.2     [06-03-25 @ 05:55]    TPro  6.2  /  Alb  3.8  /  TBili  0.4  /  DBili  x   /  AST  22  /  ALT  16  /  AlkPhos  95  [06-03-25 @ 05:55]      Creatinine Trend:  SCr 5.5 [06-03 @ 05:55]  SCr 8.5 [06-02 @ 07:43]  SCr 7.0 [06-01 @ 04:24]  SCr 4.9 [05-31 @ 04:17]  SCr 4.9 [05-30 @ 23:55]    Urinalysis - [06-03-25 @ 05:55]      Color  / Appearance  / SG  / pH       Gluc 223 / Ketone   / Bili  / Urobili        Blood  / Protein  / Leuk Est  / Nitrite       RBC  / WBC  / Hyaline  / Gran  / Sq Epi  / Non Sq Epi  / Bacteria       Iron 29, TIBC 175, %sat 17      [05-28-25 @ 04:22]  Ferritin 538      [05-28-25 @ 04:22]

## 2025-06-04 NOTE — PROGRESS NOTE ADULT - ASSESSMENT
#Chest pain during HD - Resolved  #HFpEF - not in exacerbation   - On admission. trop 64 -> 66, Creatinine 4.1   - ECG showed prominent T waves    - CXR negative   - TTE EF 60%. G1DD   - stress test neg for ischemia   - C/w protonix qd     #AMS 2/2 hypoglycemia likely 2/2 decreased lantus clearance 2/2 ESRD   #DM2, Insulin dependent- uncontrolled  - 5/20: s/p D5 for hypoglycemia and AMS  - 5/28: rapid response called, bg measured and noted to be 19. D5 x2 was administered via IV. BG recheck was 229. Insulin held.   - 5/29: Pt in DKA with elevated AG. Insulin GTT started.   - 5/20: AG improved. Insulin GTT dc'd. Started on subq insulin.   - 6/2 hypoglycemic and altered, lantus reduced from 10u to 5u in AM  - discussed w/Dr. Carter, will keep lantus at 5u, glucose now in 240s, will dc sliding scale due to highly labile glucose and do 3u lispro w/meals       #ESRD on HD MWF  #HTN    #Hyperkalemia  - nephro recs appreciated   - bp improves with HD  - c/w hydralazine 100mg q8h  - f/u iron studies  - Low K diet  - c/w aranesp 25 weekly with HD  - c/w doxazosin 1mg BID  - c/w sevelamer 1600 mg TID  - s/p nicardipine gtt on 5/28, restarted on home BP medications (Hydralazine 100 q8, Nifedipine 30 daily increased to now 60mg q daily 6/2)     #On admission, AMS possibly 2/2 transient hypotension during HD- Resolved  - CTH showed A focal hypodensity is noted within the right frontal periventricular white matter, not previously seen. Finding may reflect age-indeterminate ischemic change. An MRI of the brain can be obtained for further evaluation if not clinically contraindicated.  - Neuro consult noted no additional inpatient workup.  - c/w ASA and atorvastatin    Pending: Optimal sugar control

## 2025-06-04 NOTE — PROGRESS NOTE ADULT - ATTENDING COMMENTS
I have personally reviewed prior records, pertinent labs and imaging for this patient. I have personally ordered additional diagnostic tests when required. I have personally discussed with consultants.    Time-based billing (NON-critical care).     51 minutes spent on total encounter. The necessity of the time spent during the encounter on this date of service was due to:     time spent on review of labs, imaging studies, old records, obtaining history, personally examining patient, counselling and communicating with patient/ family, entering orders for medications/tests/etc, discussions with other health care providers, documentation in electronic health records, independent interpretation of labs, imaging/procedure results and care coordination

## 2025-06-04 NOTE — PHARMACOTHERAPY INTERVENTION NOTE - COMMENTS
Lantus 8 units sc q morning, pt received Lantus 5 units @08:07, d/w Dr Denise, adjust start 8 units to  6/5
nifedipine XL 60mg po daily, pt received 30mg in am, messaged Dr Denise to evaluate, start changed to 6/3

## 2025-06-04 NOTE — PROVIDER CONTACT NOTE (CHANGE IN STATUS NOTIFICATION) - ASSESSMENT
Pt would not awake to voice. RN performed vigorous sternal rub on pt. Pt was lethargic and falling in and out of sleep throughout conversation. VS taken. /71 O2 97% on 4L NC HR 71 temp 97.8. Pt FS was taken and was 247. Throughout vigorous simulation pt started to arose and was reassessed and was A/O x4. Pt is now STAT well in bed resting comfortably and alert and back to baseline.

## 2025-06-04 NOTE — PROGRESS NOTE ADULT - ASSESSMENT
59y Female with h/o ESRD on HD (MWF), IDDM, dyslipidemia, CKD-MBD, HTN who presented to the hospital with CP during HD session this morning.  Had 90 minutes of total HD prior to arrival.  Nephrology now consulted for HD needs while in-house.    HD today  3h opti 160 UF 2l   regular days outpatient MWF  follow FS closely appreciate endo eval and follow up   renal diet  follow bp readings after hd  / nifedipine increased    last phos noted at goal / on binders repeat    NST negative   h/h noted /  iron stores noted start venofer with  / resumed  SHAR with HD aranesp 25 weekly   renal team will follow

## 2025-06-04 NOTE — PROGRESS NOTE ADULT - SUBJECTIVE AND OBJECTIVE BOX
SUBJECTIVE:    Patient is a 59y old Female who presents with a chief complaint of AMS (04 Jun 2025 07:29)    Currently admitted to medicine with the primary diagnosis of Elevated troponin       Today is hospital day 19d. This morning she is resting comfortably in bed and reports no new issues or overnight events.     INTERVAL EVENTS:     PAST MEDICAL & SURGICAL HISTORY  Chronic kidney disease, unspecified CKD stage        ALLERGIES:  No Known Allergies    MEDICATIONS:  STANDING MEDICATIONS  aspirin  chewable 81 milliGRAM(s) Oral daily  atorvastatin 80 milliGRAM(s) Oral at bedtime  chlorhexidine 2% Cloths 1 Application(s) Topical <User Schedule>  darbepoetin Injectable ViaL 25 MICROGram(s) IV Push <User Schedule>  dextrose 5%. 1000 milliLiter(s) IV Continuous <Continuous>  dextrose 50% Injectable 25 Gram(s) IV Push once  doxazosin 1 milliGRAM(s) Oral <User Schedule>  fluticasone propionate/ salmeterol 100-50 MICROgram(s) Diskus 1 Dose(s) Inhalation two times a day  gabapentin 300 milliGRAM(s) Oral at bedtime  glucagon  Injectable 1 milliGRAM(s) IntraMuscular once  heparin   Injectable 5000 Unit(s) SubCutaneous every 12 hours  hydrALAZINE 100 milliGRAM(s) Oral every 8 hours  insulin glargine Injectable (LANTUS) 8 Unit(s) SubCutaneous every morning  insulin lispro Injectable (ADMELOG) 4 Unit(s) SubCutaneous three times a day before meals  insulin lispro Injectable (ADMELOG). 2 Unit(s) SubCutaneous once  lidocaine   4% Patch 1 Patch Transdermal daily  methocarbamol 1000 milliGRAM(s) Oral three times a day  NIFEdipine XL 60 milliGRAM(s) Oral daily  polyethylene glycol 3350 17 Gram(s) Oral daily  senna 2 Tablet(s) Oral at bedtime  sevelamer carbonate Powder 2400 milliGRAM(s) Oral three times a day with meals    PRN MEDICATIONS  acetaminophen     Tablet .. 975 milliGRAM(s) Oral every 8 hours PRN  albuterol    90 MICROgram(s) HFA Inhaler 2 Puff(s) Inhalation every 6 hours PRN  dextrose Oral Gel 15 Gram(s) Oral once PRN  guaiFENesin Oral Liquid (Sugar-Free) 100 milliGRAM(s) Oral every 6 hours PRN  ondansetron Injectable 4 milliGRAM(s) IV Push every 4 hours PRN  traMADol 50 milliGRAM(s) Oral every 6 hours PRN    VITALS:   T(F): 98  HR: 74  BP: 163/70  RR: 18  SpO2: 94%    LABS:                        8.4    3.74  )-----------( 235      ( 04 Jun 2025 07:48 )             25.8     06-04    133[L]  |  93[L]  |  51[H]  ----------------------------<  193[H]  4.5   |  22  |  6.9[HH]    Ca    9.4      04 Jun 2025 07:48  Phos  5.1     06-04  Mg     2.8     06-04    TPro  6.3  /  Alb  4.0  /  TBili  0.3  /  DBili  x   /  AST  17  /  ALT  15  /  AlkPhos  112  06-04      Urinalysis Basic - ( 04 Jun 2025 07:48 )    Color: x / Appearance: x / SG: x / pH: x  Gluc: 193 mg/dL / Ketone: x  / Bili: x / Urobili: x   Blood: x / Protein: x / Nitrite: x   Leuk Esterase: x / RBC: x / WBC x   Sq Epi: x / Non Sq Epi: x / Bacteria: x      ABG - ( 04 Jun 2025 06:51 )  pH, Arterial: 7.44  pH, Blood: x     /  pCO2: 40    /  pO2: 111   / HCO3: 27    / Base Excess: 2.8   /  SaO2: 99.4                      RADIOLOGY:    PHYSICAL EXAM:  GEN: No acute distress  PULM/CHEST: Clear to auscultation bilaterally, no rales, rhonchi or wheezes   CVS: Regular rate and rhythm, S1-S2, no murmurs  ABD: Soft, non-tender, non-distended, +BS  EXT: No edema  NEURO: AAOx3    Brandt Catheter:   Indwelling Urethral Catheter:     Connect To:  Straight Drainage/Gravity    Indication:  Urine Output Monitoring in Critically Ill (05-28-25 @ 00:46) (not performed) [Active]  Indwelling Urethral Catheter:     Connect To:  Leg Bag    Indication:  Urine Output Monitoring in Critically Ill (05-27-25 @ 13:05) (not performed) [Active]  Indwelling Urethral Catheter:     Connect To:  Straight Drainage/Gravity    Indication:  Urine Output Monitoring in Critically Ill (05-27-25 @ 12:10) (not performed) [Active]

## 2025-06-04 NOTE — PROVIDER CONTACT NOTE (CHANGE IN STATUS NOTIFICATION) - ACTION/TREATMENT ORDERED:
RN called MD to bedside in beginning of pt not arousing to voice. MD at bedside spoke and assessed pt and ordered ABG. Awaiting ABG results at this time.

## 2025-06-05 LAB
ALBUMIN SERPL ELPH-MCNC: 3.8 G/DL — SIGNIFICANT CHANGE UP (ref 3.5–5.2)
ALP SERPL-CCNC: 112 U/L — SIGNIFICANT CHANGE UP (ref 30–115)
ALT FLD-CCNC: 15 U/L — SIGNIFICANT CHANGE UP (ref 0–41)
ANION GAP SERPL CALC-SCNC: 20 MMOL/L — HIGH (ref 7–14)
AST SERPL-CCNC: 19 U/L — SIGNIFICANT CHANGE UP (ref 0–41)
BASOPHILS # BLD AUTO: 0.04 K/UL — SIGNIFICANT CHANGE UP (ref 0–0.2)
BASOPHILS NFR BLD AUTO: 1.1 % — HIGH (ref 0–1)
BILIRUB SERPL-MCNC: 0.4 MG/DL — SIGNIFICANT CHANGE UP (ref 0.2–1.2)
BUN SERPL-MCNC: 34 MG/DL — HIGH (ref 10–20)
CALCIUM SERPL-MCNC: 9.1 MG/DL — SIGNIFICANT CHANGE UP (ref 8.4–10.5)
CHLORIDE SERPL-SCNC: 92 MMOL/L — LOW (ref 98–110)
CO2 SERPL-SCNC: 21 MMOL/L — SIGNIFICANT CHANGE UP (ref 17–32)
CREAT SERPL-MCNC: 4.8 MG/DL — CRITICAL HIGH (ref 0.7–1.5)
EGFR: 10 ML/MIN/1.73M2 — LOW
EGFR: 10 ML/MIN/1.73M2 — LOW
EOSINOPHIL # BLD AUTO: 0.21 K/UL — SIGNIFICANT CHANGE UP (ref 0–0.7)
EOSINOPHIL NFR BLD AUTO: 5.6 % — SIGNIFICANT CHANGE UP (ref 0–8)
GLUCOSE SERPL-MCNC: 411 MG/DL — HIGH (ref 70–99)
HCT VFR BLD CALC: 26.2 % — LOW (ref 37–47)
HGB BLD-MCNC: 8.5 G/DL — LOW (ref 12–16)
IMM GRANULOCYTES NFR BLD AUTO: 0.5 % — HIGH (ref 0.1–0.3)
LYMPHOCYTES # BLD AUTO: 0.74 K/UL — LOW (ref 1.2–3.4)
LYMPHOCYTES # BLD AUTO: 19.6 % — LOW (ref 20.5–51.1)
MAGNESIUM SERPL-MCNC: 2.5 MG/DL — HIGH (ref 1.8–2.4)
MCHC RBC-ENTMCNC: 32.4 G/DL — SIGNIFICANT CHANGE UP (ref 32–37)
MCHC RBC-ENTMCNC: 32.9 PG — HIGH (ref 27–31)
MCV RBC AUTO: 101.6 FL — HIGH (ref 81–99)
MONOCYTES # BLD AUTO: 0.41 K/UL — SIGNIFICANT CHANGE UP (ref 0.1–0.6)
MONOCYTES NFR BLD AUTO: 10.9 % — HIGH (ref 1.7–9.3)
NEUTROPHILS # BLD AUTO: 2.35 K/UL — SIGNIFICANT CHANGE UP (ref 1.4–6.5)
NEUTROPHILS NFR BLD AUTO: 62.3 % — SIGNIFICANT CHANGE UP (ref 42.2–75.2)
NRBC BLD AUTO-RTO: 0 /100 WBCS — SIGNIFICANT CHANGE UP (ref 0–0)
PHOSPHATE SERPL-MCNC: 4.9 MG/DL — SIGNIFICANT CHANGE UP (ref 2.1–4.9)
PLATELET # BLD AUTO: 236 K/UL — SIGNIFICANT CHANGE UP (ref 130–400)
PMV BLD: 10.9 FL — HIGH (ref 7.4–10.4)
POTASSIUM SERPL-MCNC: 4.6 MMOL/L — SIGNIFICANT CHANGE UP (ref 3.5–5)
POTASSIUM SERPL-SCNC: 4.6 MMOL/L — SIGNIFICANT CHANGE UP (ref 3.5–5)
PROT SERPL-MCNC: 5.9 G/DL — LOW (ref 6–8)
RBC # BLD: 2.58 M/UL — LOW (ref 4.2–5.4)
RBC # FLD: 16 % — HIGH (ref 11.5–14.5)
SODIUM SERPL-SCNC: 133 MMOL/L — LOW (ref 135–146)
WBC # BLD: 3.77 K/UL — LOW (ref 4.8–10.8)
WBC # FLD AUTO: 3.77 K/UL — LOW (ref 4.8–10.8)

## 2025-06-05 PROCEDURE — 99233 SBSQ HOSP IP/OBS HIGH 50: CPT

## 2025-06-05 PROCEDURE — 99497 ADVNCD CARE PLAN 30 MIN: CPT | Mod: 25

## 2025-06-05 RX ORDER — INSULIN LISPRO 100 U/ML
2 INJECTION, SOLUTION INTRAVENOUS; SUBCUTANEOUS ONCE
Refills: 0 | Status: COMPLETED | OUTPATIENT
Start: 2025-06-05 | End: 2025-06-05

## 2025-06-05 RX ORDER — INSULIN LISPRO 100 U/ML
3 INJECTION, SOLUTION INTRAVENOUS; SUBCUTANEOUS ONCE
Refills: 0 | Status: COMPLETED | OUTPATIENT
Start: 2025-06-05 | End: 2025-06-05

## 2025-06-05 RX ORDER — INSULIN LISPRO 100 U/ML
2 INJECTION, SOLUTION INTRAVENOUS; SUBCUTANEOUS ONCE
Refills: 0 | Status: DISCONTINUED | OUTPATIENT
Start: 2025-06-05 | End: 2025-06-08

## 2025-06-05 RX ADMIN — Medication 60 MILLIGRAM(S): at 05:41

## 2025-06-05 RX ADMIN — LIDOCAINE HYDROCHLORIDE 1 PATCH: 20 JELLY TOPICAL at 19:42

## 2025-06-05 RX ADMIN — DOXAZOSIN MESYLATE 1 MILLIGRAM(S): 8 TABLET ORAL at 21:39

## 2025-06-05 RX ADMIN — METHOCARBAMOL 1000 MILLIGRAM(S): 500 TABLET, FILM COATED ORAL at 05:41

## 2025-06-05 RX ADMIN — INSULIN GLARGINE-YFGN 8 UNIT(S): 100 INJECTION, SOLUTION SUBCUTANEOUS at 09:26

## 2025-06-05 RX ADMIN — INSULIN LISPRO 3 UNIT(S): 100 INJECTION, SOLUTION INTRAVENOUS; SUBCUTANEOUS at 22:01

## 2025-06-05 RX ADMIN — SEVELAMER HYDROCHLORIDE 2400 MILLIGRAM(S): 800 TABLET ORAL at 16:54

## 2025-06-05 RX ADMIN — Medication 975 MILLIGRAM(S): at 12:51

## 2025-06-05 RX ADMIN — Medication 100 MILLIGRAM(S): at 21:39

## 2025-06-05 RX ADMIN — Medication 81 MILLIGRAM(S): at 11:41

## 2025-06-05 RX ADMIN — Medication 1 APPLICATION(S): at 05:46

## 2025-06-05 RX ADMIN — LIDOCAINE HYDROCHLORIDE 1 PATCH: 20 JELLY TOPICAL at 23:25

## 2025-06-05 RX ADMIN — INSULIN LISPRO 4 UNIT(S): 100 INJECTION, SOLUTION INTRAVENOUS; SUBCUTANEOUS at 11:40

## 2025-06-05 RX ADMIN — Medication 100 MILLIGRAM(S): at 13:19

## 2025-06-05 RX ADMIN — INSULIN LISPRO 4 UNIT(S): 100 INJECTION, SOLUTION INTRAVENOUS; SUBCUTANEOUS at 08:01

## 2025-06-05 RX ADMIN — SEVELAMER HYDROCHLORIDE 2400 MILLIGRAM(S): 800 TABLET ORAL at 11:41

## 2025-06-05 RX ADMIN — Medication 1 DOSE(S): at 08:01

## 2025-06-05 RX ADMIN — LIDOCAINE HYDROCHLORIDE 1 PATCH: 20 JELLY TOPICAL at 01:00

## 2025-06-05 RX ADMIN — INSULIN LISPRO 4 UNIT(S): 100 INJECTION, SOLUTION INTRAVENOUS; SUBCUTANEOUS at 16:53

## 2025-06-05 RX ADMIN — ATORVASTATIN CALCIUM 80 MILLIGRAM(S): 80 TABLET, FILM COATED ORAL at 21:39

## 2025-06-05 RX ADMIN — Medication 100 MILLIGRAM(S): at 05:40

## 2025-06-05 RX ADMIN — DOXAZOSIN MESYLATE 1 MILLIGRAM(S): 8 TABLET ORAL at 09:26

## 2025-06-05 RX ADMIN — HEPARIN SODIUM 5000 UNIT(S): 1000 INJECTION INTRAVENOUS; SUBCUTANEOUS at 05:41

## 2025-06-05 RX ADMIN — GABAPENTIN 300 MILLIGRAM(S): 400 CAPSULE ORAL at 21:40

## 2025-06-05 RX ADMIN — LIDOCAINE HYDROCHLORIDE 1 PATCH: 20 JELLY TOPICAL at 11:41

## 2025-06-05 RX ADMIN — METHOCARBAMOL 1000 MILLIGRAM(S): 500 TABLET, FILM COATED ORAL at 21:38

## 2025-06-05 RX ADMIN — HEPARIN SODIUM 5000 UNIT(S): 1000 INJECTION INTRAVENOUS; SUBCUTANEOUS at 17:01

## 2025-06-05 RX ADMIN — METHOCARBAMOL 1000 MILLIGRAM(S): 500 TABLET, FILM COATED ORAL at 13:19

## 2025-06-05 RX ADMIN — Medication 975 MILLIGRAM(S): at 14:05

## 2025-06-05 RX ADMIN — INSULIN LISPRO 2 UNIT(S): 100 INJECTION, SOLUTION INTRAVENOUS; SUBCUTANEOUS at 09:29

## 2025-06-05 RX ADMIN — SEVELAMER HYDROCHLORIDE 2400 MILLIGRAM(S): 800 TABLET ORAL at 08:01

## 2025-06-05 NOTE — PROGRESS NOTE ADULT - SUBJECTIVE AND OBJECTIVE BOX
24H events:    Patient is a 59y old Female who presents with a chief complaint of AMS (05 Jun 2025 08:15)    Primary diagnosis of ams 2/2 hypoglycemia  Today is hospital day 20d. This morning patient was seen and examined at bedside, resting comfortably in bed.    No acute or major events overnight. Hemodynamically stable, tolerating oral diet, voiding appropriately with appropriate bowel movements.     Code Status:    Family communication:  Contact date:  Name of person contacted:  Relationship to patient:  Communication details:  What matters most:    PAST MEDICAL & SURGICAL HISTORY  Chronic kidney disease, unspecified CKD stage      SOCIAL HISTORY:  Social History:      ALLERGIES:  No Known Allergies    MEDICATIONS:  STANDING MEDICATIONS  aspirin  chewable 81 milliGRAM(s) Oral daily  atorvastatin 80 milliGRAM(s) Oral at bedtime  chlorhexidine 2% Cloths 1 Application(s) Topical <User Schedule>  darbepoetin Injectable ViaL 25 MICROGram(s) IV Push <User Schedule>  dextrose 5%. 1000 milliLiter(s) IV Continuous <Continuous>  dextrose 50% Injectable 25 Gram(s) IV Push once  doxazosin 1 milliGRAM(s) Oral <User Schedule>  fluticasone propionate/ salmeterol 100-50 MICROgram(s) Diskus 1 Dose(s) Inhalation two times a day  gabapentin 300 milliGRAM(s) Oral at bedtime  glucagon  Injectable 1 milliGRAM(s) IntraMuscular once  heparin   Injectable 5000 Unit(s) SubCutaneous every 12 hours  hydrALAZINE 100 milliGRAM(s) Oral every 8 hours  insulin glargine Injectable (LANTUS) 8 Unit(s) SubCutaneous every morning  insulin lispro Injectable (ADMELOG) 4 Unit(s) SubCutaneous three times a day before meals  lidocaine   4% Patch 1 Patch Transdermal daily  methocarbamol 1000 milliGRAM(s) Oral three times a day  NIFEdipine XL 60 milliGRAM(s) Oral daily  polyethylene glycol 3350 17 Gram(s) Oral daily  senna 2 Tablet(s) Oral at bedtime  sevelamer carbonate Powder 2400 milliGRAM(s) Oral three times a day with meals    PRN MEDICATIONS  acetaminophen     Tablet .. 975 milliGRAM(s) Oral every 8 hours PRN  albuterol    90 MICROgram(s) HFA Inhaler 2 Puff(s) Inhalation every 6 hours PRN  dextrose Oral Gel 15 Gram(s) Oral once PRN  guaiFENesin Oral Liquid (Sugar-Free) 100 milliGRAM(s) Oral every 6 hours PRN  ondansetron Injectable 4 milliGRAM(s) IV Push every 4 hours PRN    VITALS:   T(F): 97.9  HR: 80  BP: 154/71  RR: 18  SpO2: 96%    PHYSICAL EXAM:  GEN: No acute distress  PULM/CHEST: Clear to auscultation bilaterally, no rales, rhonchi or wheezes   CVS: Regular rate and rhythm, S1-S2, no murmurs  ABD: Soft, non-tender, non-distended, +BS  EXT: No edema  NEURO: AAOx3        (  ) Indwelling Brandt Catheter:   Date insterted:    Reason (  ) Critical illness     (  ) urinary retention    (  ) Accurate Ins/Outs Monitoring     (  ) CMO patient    (  ) Central Line:   Date inserted:  Location: (  ) Right IJ     (  ) Left IJ     (  ) Right Fem     (  ) Left Fem    (  ) SPC        (  ) pigtail       (  ) PEG tube       (  ) colostomy       (  ) jejunostomy  (  ) U-Dall    LABS:                        8.5    3.77  )-----------( 236      ( 05 Jun 2025 06:19 )             26.2     06-05    133[L]  |  92[L]  |  34[H]  ----------------------------<  411[H]  4.6   |  21  |  4.8[HH]    Ca    9.1      05 Jun 2025 06:19  Phos  4.9     06-05  Mg     2.5     06-05    TPro  5.9[L]  /  Alb  3.8  /  TBili  0.4  /  DBili  x   /  AST  19  /  ALT  15  /  AlkPhos  112  06-05      Urinalysis Basic - ( 05 Jun 2025 06:19 )    Color: x / Appearance: x / SG: x / pH: x  Gluc: 411 mg/dL / Ketone: x  / Bili: x / Urobili: x   Blood: x / Protein: x / Nitrite: x   Leuk Esterase: x / RBC: x / WBC x   Sq Epi: x / Non Sq Epi: x / Bacteria: x      ABG - ( 04 Jun 2025 06:51 )  pH, Arterial: 7.44  pH, Blood: x     /  pCO2: 40    /  pO2: 111   / HCO3: 27    / Base Excess: 2.8   /  SaO2: 99.4                      RADIOLOGY:    RADIOLOGY

## 2025-06-05 NOTE — PROGRESS NOTE ADULT - ASSESSMENT
59y old  Female with PMH of DM, ESRD on HD MWF, HTN, HLD, and HF who presents to ED for chest pain during HD session this morning.     #Hypoglycemia  - Patient has ESRD on insulin  - Episode of significant hypoglycemia despite no correction , now hyperglycemia   - overall brittle glucose readings  - increase Lantus to 9 units daily   -increase  lispro to 6 units TIDAC , hold if NPO or not eating full meal   - hold sliding scale to avoid over correction , patient is very sensitive to insulin and if correction needed no more then 2 units

## 2025-06-05 NOTE — PROGRESS NOTE ADULT - ATTENDING COMMENTS
I have personally reviewed prior records, pertinent labs and imaging for this patient. I have personally ordered additional diagnostic tests when required. I have personally discussed with consultants.    Uncontrolled T2DM with hyperglycemia, requiring intensive monitoring and uptitration of insulin. Rest per resident's note.    Time-based billing (NON-critical care).     51 minutes spent on total encounter. The necessity of the time spent during the encounter on this date of service was due to:     time spent on review of labs, imaging studies, old records, obtaining history, personally examining patient, counselling and communicating with patient/ family, entering orders for medications/tests/etc, discussions with other health care providers, documentation in electronic health records, independent interpretation of labs, imaging/procedure results and care coordination

## 2025-06-05 NOTE — PROGRESS NOTE ADULT - ASSESSMENT
#Chest pain during HD - Resolved  #HFpEF - not in exacerbation   - On admission. trop 64 -> 66, Creatinine 4.1   - ECG showed prominent T waves    - CXR negative   - TTE EF 60%. G1DD   - stress test neg for ischemia   - C/w protonix qd     #AMS 2/2 hypoglycemia likely 2/2 decreased lantus clearance 2/2 ESRD   #DM2, Insulin dependent- uncontrolled  - 5/20: s/p D5 for hypoglycemia and AMS  - 5/28: rapid response called, bg measured and noted to be 19. D5 x2 was administered via IV. BG recheck was 229. Insulin held.   - 5/29: Pt in DKA with elevated AG. Insulin GTT started.   - 5/20: AG improved. Insulin GTT dc'd. Started on subq insulin.   - 6/2 hypoglycemic and altered, lantus reduced from 10u to 5u in AM  - discussed w/Dr. Carter, will keep lantus at 5u, glucose now in 240s, will dc sliding scale due to highly labile glucose and do 3u lispro w/meals   - 6/5/25 increased lantus to 8u today, lispro 4u w/meals, no sliding scale, gave additional 1x 2u sub-q lispro  - monitor glucose closely to increase insulin dose as glucose is still labile and has levels in range of 200-400 on fs and cmp       #ESRD on HD MWF  #HTN    #Hyperkalemia  - nephro recs appreciated   - bp improves with HD  - c/w hydralazine 100mg q8h  - f/u iron studies  - Low K diet  - c/w aranesp 25 weekly with HD  - c/w doxazosin 1mg BID  - c/w sevelamer 1600 mg TID  - s/p nicardipine gtt on 5/28, restarted on home BP medications (Hydralazine 100 q8, Nifedipine 30 daily increased to now 60mg q daily 6/2)     #On admission, AMS possibly 2/2 transient hypotension during HD- Resolved  - CTH showed A focal hypodensity is noted within the right frontal periventricular white matter, not previously seen. Finding may reflect age-indeterminate ischemic change. An MRI of the brain can be obtained for further evaluation if not clinically contraindicated.  - Neuro consult noted no additional inpatient workup.  - c/w ASA and atorvastatin    Pending: Optimal sugar control   #Chest pain during HD - Resolved  #HFpEF - not in exacerbation   - On admission. trop 64 -> 66, Creatinine 4.1   - ECG showed prominent T waves    - CXR negative   - TTE EF 60%. G1DD   - stress test neg for ischemia   - C/w protonix qd     #Acute metabolic encephalopathy 2/2 hypoglycemia likely 2/2 decreased lantus clearance 2/2 ESRD   #DM2, Insulin dependent- uncontrolled  - 5/20: s/p D5 for hypoglycemia and AMS  - 5/28: rapid response called, bg measured and noted to be 19. D5 x2 was administered via IV. BG recheck was 229. Insulin held.   - 5/29: Pt in DKA with elevated AG. Insulin GTT started.   - 5/20: AG improved. Insulin GTT dc'd. Started on subq insulin.   - 6/2 hypoglycemic and altered, lantus reduced from 10u to 5u in AM  - discussed w/Dr. Carter, will keep lantus at 5u, glucose now in 240s, will dc sliding scale due to highly labile glucose and do 3u lispro w/meals   - 6/5/25 increased lantus to 8u today, lispro 4u w/meals, no sliding scale, gave additional 1x 2u sub-q lispro  - monitor glucose closely to increase insulin dose as glucose is still labile and has levels in range of 200-400 on fs and cmp       #ESRD on HD MWF  #HTN    #Hyperkalemia  - nephro recs appreciated   - bp improves with HD  - c/w hydralazine 100mg q8h  - f/u iron studies  - Low K diet  - c/w aranesp 25 weekly with HD  - c/w doxazosin 1mg BID  - c/w sevelamer 1600 mg TID  - s/p nicardipine gtt on 5/28, restarted on home BP medications (Hydralazine 100 q8, Nifedipine 30 daily increased to now 60mg q daily 6/2)     #On admission, AMS possibly 2/2 transient hypotension during HD- Resolved  - CTH showed A focal hypodensity is noted within the right frontal periventricular white matter, not previously seen. Finding may reflect age-indeterminate ischemic change. An MRI of the brain can be obtained for further evaluation if not clinically contraindicated.  - Neuro consult noted no additional inpatient workup.  - c/w ASA and atorvastatin    Pending: Optimal sugar control

## 2025-06-05 NOTE — PROGRESS NOTE ADULT - SUBJECTIVE AND OBJECTIVE BOX
S: FS reviewed high now  -O:Vital Signs Last 24 Hrs  T(C): 36.3 (05 Jun 2025 12:00), Max: 36.9 (04 Jun 2025 21:26)  T(F): 97.3 (05 Jun 2025 12:00), Max: 98.4 (04 Jun 2025 21:26)  HR: 76 (05 Jun 2025 12:00) (71 - 80)  BP: 143/64 (05 Jun 2025 12:00) (143/64 - 159/60)  BP(mean): --  RR: 18 (05 Jun 2025 12:00) (18 - 18)  SpO2: 96% (05 Jun 2025 12:00) (93% - 96%)    Parameters below as of 05 Jun 2025 05:30  Patient On (Oxygen Delivery Method): room air        CAPILLARY BLOOD GLUCOSE      POCT Blood Glucose.: 402 mg/dL (05 Jun 2025 11:33)  POCT Blood Glucose.: 429 mg/dL (05 Jun 2025 09:28)  POCT Blood Glucose.: 436 mg/dL (05 Jun 2025 07:31)  POCT Blood Glucose.: 340 mg/dL (04 Jun 2025 21:37)  POCT Blood Glucose.: 224 mg/dL (04 Jun 2025 16:56)      MEDICATIONS  (STANDING):  aspirin  chewable 81 milliGRAM(s) Oral daily  atorvastatin 80 milliGRAM(s) Oral at bedtime  chlorhexidine 2% Cloths 1 Application(s) Topical <User Schedule>  darbepoetin Injectable ViaL 25 MICROGram(s) IV Push <User Schedule>  dextrose 5%. 1000 milliLiter(s) (50 mL/Hr) IV Continuous <Continuous>  dextrose 50% Injectable 25 Gram(s) IV Push once  doxazosin 1 milliGRAM(s) Oral <User Schedule>  fluticasone propionate/ salmeterol 100-50 MICROgram(s) Diskus 1 Dose(s) Inhalation two times a day  gabapentin 300 milliGRAM(s) Oral at bedtime  glucagon  Injectable 1 milliGRAM(s) IntraMuscular once  heparin   Injectable 5000 Unit(s) SubCutaneous every 12 hours  hydrALAZINE 100 milliGRAM(s) Oral every 8 hours  insulin glargine Injectable (LANTUS) 8 Unit(s) SubCutaneous every morning  insulin lispro Injectable (ADMELOG) 4 Unit(s) SubCutaneous three times a day before meals  lidocaine   4% Patch 1 Patch Transdermal daily  methocarbamol 1000 milliGRAM(s) Oral three times a day  NIFEdipine XL 60 milliGRAM(s) Oral daily  polyethylene glycol 3350 17 Gram(s) Oral daily  senna 2 Tablet(s) Oral at bedtime  sevelamer carbonate Powder 2400 milliGRAM(s) Oral three times a day with meals    MEDICATIONS  (PRN):  acetaminophen     Tablet .. 975 milliGRAM(s) Oral every 8 hours PRN Mild Pain (1 - 3)  albuterol    90 MICROgram(s) HFA Inhaler 2 Puff(s) Inhalation every 6 hours PRN Shortness of Breath and/or Wheezing  dextrose Oral Gel 15 Gram(s) Oral once PRN Blood Glucose LESS THAN 70 milliGRAM(s)/deciliter  guaiFENesin Oral Liquid (Sugar-Free) 100 milliGRAM(s) Oral every 6 hours PRN Cough  ondansetron Injectable 4 milliGRAM(s) IV Push every 4 hours PRN Nausea and/or Vomiting

## 2025-06-05 NOTE — PROGRESS NOTE ADULT - SUBJECTIVE AND OBJECTIVE BOX
seen and examined  24 h events noted   no distress       PAST HISTORY  --------------------------------------------------------------------------------  No significant changes to PMH, PSH, FHx, SHx, unless otherwise noted    ALLERGIES & MEDICATIONS  --------------------------------------------------------------------------------  Allergies    No Known Allergies    Intolerances      Standing Inpatient Medications  aspirin  chewable 81 milliGRAM(s) Oral daily  atorvastatin 80 milliGRAM(s) Oral at bedtime  chlorhexidine 2% Cloths 1 Application(s) Topical <User Schedule>  darbepoetin Injectable ViaL 25 MICROGram(s) IV Push <User Schedule>  dextrose 5%. 1000 milliLiter(s) IV Continuous <Continuous>  dextrose 50% Injectable 25 Gram(s) IV Push once  doxazosin 1 milliGRAM(s) Oral <User Schedule>  fluticasone propionate/ salmeterol 100-50 MICROgram(s) Diskus 1 Dose(s) Inhalation two times a day  gabapentin 300 milliGRAM(s) Oral at bedtime  glucagon  Injectable 1 milliGRAM(s) IntraMuscular once  heparin   Injectable 5000 Unit(s) SubCutaneous every 12 hours  hydrALAZINE 100 milliGRAM(s) Oral every 8 hours  insulin glargine Injectable (LANTUS) 8 Unit(s) SubCutaneous every morning  insulin lispro Injectable (ADMELOG) 4 Unit(s) SubCutaneous three times a day before meals  lidocaine   4% Patch 1 Patch Transdermal daily  methocarbamol 1000 milliGRAM(s) Oral three times a day  NIFEdipine XL 60 milliGRAM(s) Oral daily  polyethylene glycol 3350 17 Gram(s) Oral daily  senna 2 Tablet(s) Oral at bedtime  sevelamer carbonate Powder 2400 milliGRAM(s) Oral three times a day with meals    PRN Inpatient Medications  acetaminophen     Tablet .. 975 milliGRAM(s) Oral every 8 hours PRN  albuterol    90 MICROgram(s) HFA Inhaler 2 Puff(s) Inhalation every 6 hours PRN  dextrose Oral Gel 15 Gram(s) Oral once PRN  guaiFENesin Oral Liquid (Sugar-Free) 100 milliGRAM(s) Oral every 6 hours PRN  ondansetron Injectable 4 milliGRAM(s) IV Push every 4 hours PRN      VITALS/PHYSICAL EXAM  --------------------------------------------------------------------------------  T(C): 36.6 (06-05-25 @ 05:30), Max: 36.9 (06-04-25 @ 21:26)  HR: 80 (06-05-25 @ 05:30) (71 - 80)  BP: 154/71 (06-05-25 @ 05:30) (120/56 - 159/60)  RR: 18 (06-05-25 @ 05:30) (18 - 18)  SpO2: 96% (06-05-25 @ 05:30) (93% - 98%)  Wt(kg): --        06-04-25 @ 07:01  -  06-05-25 @ 07:00  --------------------------------------------------------  IN: 0 mL / OUT: 2000 mL / NET: -2000 mL      Physical Exam:  	Gen: NAD  	Pulm: CTA B/L  	CV:  S1S2; no rub  	Abd: +distended  	Vascular access:av     LABS/STUDIES  --------------------------------------------------------------------------------              8.5    3.77  >-----------<  236      [06-05-25 @ 06:19]              26.2     133  |  93  |  51  ----------------------------<  193      [06-04-25 @ 07:48]  4.5   |  22  |  6.9        Ca     9.4     [06-04-25 @ 07:48]      Mg     2.8     [06-04-25 @ 07:48]      Phos  5.1     [06-04-25 @ 07:48]    TPro  6.3  /  Alb  4.0  /  TBili  0.3  /  DBili  x   /  AST  17  /  ALT  15  /  AlkPhos  112  [06-04-25 @ 07:48]      Creatinine Trend:  SCr 6.9 [06-04 @ 07:48]  SCr 5.5 [06-03 @ 05:55]  SCr 8.5 [06-02 @ 07:43]  SCr 7.0 [06-01 @ 04:24]  SCr 4.9 [05-31 @ 04:17]    Urinalysis - [06-04-25 @ 07:48]      Color  / Appearance  / SG  / pH       Gluc 193 / Ketone   / Bili  / Urobili        Blood  / Protein  / Leuk Est  / Nitrite       RBC  / WBC  / Hyaline  / Gran  / Sq Epi  / Non Sq Epi  / Bacteria       Iron 29, TIBC 175, %sat 17      [05-28-25 @ 04:22]  Ferritin 538      [05-28-25 @ 04:22]

## 2025-06-05 NOTE — PROGRESS NOTE ADULT - ASSESSMENT
59y Female with h/o ESRD on HD (MWF), IDDM, dyslipidemia, CKD-MBD, HTN who presented to the hospital with CP during HD session this morning.  Had 90 minutes of total HD prior to arrival.  Nephrology now consulted for HD needs while in-house.    HD in am  3h opti 160 UF 2l   regular days outpatient MWF  follow FS closely appreciate endo eval and follow up   renal diet  follow bp readings if remains elevated increase nifedipine to 90   last phos noted at goal     NST negative  h/h noted /  iron stores noted start venofer with  / resumed  SHAR with HD aranesp 25 weekly   renal team will follow

## 2025-06-06 LAB
ALBUMIN SERPL ELPH-MCNC: 3.9 G/DL — SIGNIFICANT CHANGE UP (ref 3.5–5.2)
ALP SERPL-CCNC: 137 U/L — HIGH (ref 30–115)
ALT FLD-CCNC: 15 U/L — SIGNIFICANT CHANGE UP (ref 0–41)
ANION GAP SERPL CALC-SCNC: 16 MMOL/L — HIGH (ref 7–14)
AST SERPL-CCNC: 16 U/L — SIGNIFICANT CHANGE UP (ref 0–41)
B-OH-BUTYR SERPL-SCNC: 1 MMOL/L — HIGH
BASOPHILS # BLD AUTO: 0.05 K/UL — SIGNIFICANT CHANGE UP (ref 0–0.2)
BASOPHILS NFR BLD AUTO: 1.5 % — HIGH (ref 0–1)
BILIRUB SERPL-MCNC: 0.3 MG/DL — SIGNIFICANT CHANGE UP (ref 0.2–1.2)
BUN SERPL-MCNC: 59 MG/DL — HIGH (ref 10–20)
CALCIUM SERPL-MCNC: 9 MG/DL — SIGNIFICANT CHANGE UP (ref 8.4–10.5)
CHLORIDE SERPL-SCNC: 91 MMOL/L — LOW (ref 98–110)
CO2 SERPL-SCNC: 24 MMOL/L — SIGNIFICANT CHANGE UP (ref 17–32)
CREAT SERPL-MCNC: 7 MG/DL — CRITICAL HIGH (ref 0.7–1.5)
EGFR: 6 ML/MIN/1.73M2 — LOW
EGFR: 6 ML/MIN/1.73M2 — LOW
EOSINOPHIL # BLD AUTO: 0.12 K/UL — SIGNIFICANT CHANGE UP (ref 0–0.7)
EOSINOPHIL NFR BLD AUTO: 3.6 % — SIGNIFICANT CHANGE UP (ref 0–8)
GAS PNL BLDA: SIGNIFICANT CHANGE UP
GLUCOSE SERPL-MCNC: 586 MG/DL — CRITICAL HIGH (ref 70–99)
HCT VFR BLD CALC: 25.8 % — LOW (ref 37–47)
HGB BLD-MCNC: 8.6 G/DL — LOW (ref 12–16)
IMM GRANULOCYTES NFR BLD AUTO: 0.3 % — SIGNIFICANT CHANGE UP (ref 0.1–0.3)
LYMPHOCYTES # BLD AUTO: 0.55 K/UL — LOW (ref 1.2–3.4)
LYMPHOCYTES # BLD AUTO: 16.7 % — LOW (ref 20.5–51.1)
MAGNESIUM SERPL-MCNC: 2.6 MG/DL — HIGH (ref 1.8–2.4)
MCHC RBC-ENTMCNC: 33.3 G/DL — SIGNIFICANT CHANGE UP (ref 32–37)
MCHC RBC-ENTMCNC: 33.7 PG — HIGH (ref 27–31)
MCV RBC AUTO: 101.2 FL — HIGH (ref 81–99)
MONOCYTES # BLD AUTO: 0.27 K/UL — SIGNIFICANT CHANGE UP (ref 0.1–0.6)
MONOCYTES NFR BLD AUTO: 8.2 % — SIGNIFICANT CHANGE UP (ref 1.7–9.3)
NEUTROPHILS # BLD AUTO: 2.3 K/UL — SIGNIFICANT CHANGE UP (ref 1.4–6.5)
NEUTROPHILS NFR BLD AUTO: 69.7 % — SIGNIFICANT CHANGE UP (ref 42.2–75.2)
NRBC BLD AUTO-RTO: 0 /100 WBCS — SIGNIFICANT CHANGE UP (ref 0–0)
PLATELET # BLD AUTO: 241 K/UL — SIGNIFICANT CHANGE UP (ref 130–400)
PMV BLD: 11 FL — HIGH (ref 7.4–10.4)
POTASSIUM SERPL-MCNC: 5 MMOL/L — SIGNIFICANT CHANGE UP (ref 3.5–5)
POTASSIUM SERPL-SCNC: 5 MMOL/L — SIGNIFICANT CHANGE UP (ref 3.5–5)
PROT SERPL-MCNC: 6.6 G/DL — SIGNIFICANT CHANGE UP (ref 6–8)
RBC # BLD: 2.55 M/UL — LOW (ref 4.2–5.4)
RBC # FLD: 16.2 % — HIGH (ref 11.5–14.5)
SODIUM SERPL-SCNC: 131 MMOL/L — LOW (ref 135–146)
WBC # BLD: 3.3 K/UL — LOW (ref 4.8–10.8)
WBC # FLD AUTO: 3.3 K/UL — LOW (ref 4.8–10.8)

## 2025-06-06 PROCEDURE — 99233 SBSQ HOSP IP/OBS HIGH 50: CPT

## 2025-06-06 RX ORDER — INSULIN GLARGINE-YFGN 100 [IU]/ML
10 INJECTION, SOLUTION SUBCUTANEOUS EVERY MORNING
Refills: 0 | Status: DISCONTINUED | OUTPATIENT
Start: 2025-06-06 | End: 2025-06-08

## 2025-06-06 RX ORDER — INSULIN LISPRO 100 U/ML
6 INJECTION, SOLUTION INTRAVENOUS; SUBCUTANEOUS
Refills: 0 | Status: DISCONTINUED | OUTPATIENT
Start: 2025-06-06 | End: 2025-06-08

## 2025-06-06 RX ORDER — INSULIN LISPRO 100 U/ML
5 INJECTION, SOLUTION INTRAVENOUS; SUBCUTANEOUS ONCE
Refills: 0 | Status: COMPLETED | OUTPATIENT
Start: 2025-06-06 | End: 2025-06-06

## 2025-06-06 RX ADMIN — Medication 60 MILLIGRAM(S): at 05:41

## 2025-06-06 RX ADMIN — DARBEPOETIN ALFA 25 MICROGRAM(S): 40 SOLUTION INTRAVENOUS; SUBCUTANEOUS at 11:30

## 2025-06-06 RX ADMIN — Medication 100 MILLIGRAM(S): at 14:07

## 2025-06-06 RX ADMIN — Medication 81 MILLIGRAM(S): at 12:40

## 2025-06-06 RX ADMIN — HEPARIN SODIUM 5000 UNIT(S): 1000 INJECTION INTRAVENOUS; SUBCUTANEOUS at 05:40

## 2025-06-06 RX ADMIN — Medication 100 MILLIGRAM(S): at 05:40

## 2025-06-06 RX ADMIN — INSULIN LISPRO 6 UNIT(S): 100 INJECTION, SOLUTION INTRAVENOUS; SUBCUTANEOUS at 08:31

## 2025-06-06 RX ADMIN — INSULIN LISPRO 6 UNIT(S): 100 INJECTION, SOLUTION INTRAVENOUS; SUBCUTANEOUS at 12:44

## 2025-06-06 RX ADMIN — METHOCARBAMOL 1000 MILLIGRAM(S): 500 TABLET, FILM COATED ORAL at 14:07

## 2025-06-06 RX ADMIN — METHOCARBAMOL 1000 MILLIGRAM(S): 500 TABLET, FILM COATED ORAL at 21:35

## 2025-06-06 RX ADMIN — HEPARIN SODIUM 5000 UNIT(S): 1000 INJECTION INTRAVENOUS; SUBCUTANEOUS at 17:25

## 2025-06-06 RX ADMIN — POLYETHYLENE GLYCOL 3350 17 GRAM(S): 17 POWDER, FOR SOLUTION ORAL at 12:39

## 2025-06-06 RX ADMIN — Medication 1 APPLICATION(S): at 05:41

## 2025-06-06 RX ADMIN — Medication 1 DOSE(S): at 08:23

## 2025-06-06 RX ADMIN — Medication 975 MILLIGRAM(S): at 21:35

## 2025-06-06 RX ADMIN — METHOCARBAMOL 1000 MILLIGRAM(S): 500 TABLET, FILM COATED ORAL at 05:40

## 2025-06-06 RX ADMIN — INSULIN LISPRO 5 UNIT(S): 100 INJECTION, SOLUTION INTRAVENOUS; SUBCUTANEOUS at 09:12

## 2025-06-06 RX ADMIN — ATORVASTATIN CALCIUM 80 MILLIGRAM(S): 80 TABLET, FILM COATED ORAL at 21:34

## 2025-06-06 RX ADMIN — INSULIN GLARGINE-YFGN 10 UNIT(S): 100 INJECTION, SOLUTION SUBCUTANEOUS at 09:10

## 2025-06-06 RX ADMIN — Medication 100 MILLIGRAM(S): at 21:35

## 2025-06-06 RX ADMIN — INSULIN LISPRO 6 UNIT(S): 100 INJECTION, SOLUTION INTRAVENOUS; SUBCUTANEOUS at 17:25

## 2025-06-06 RX ADMIN — GABAPENTIN 300 MILLIGRAM(S): 400 CAPSULE ORAL at 21:34

## 2025-06-06 RX ADMIN — Medication 975 MILLIGRAM(S): at 22:05

## 2025-06-06 RX ADMIN — Medication 2 TABLET(S): at 21:35

## 2025-06-06 RX ADMIN — LIDOCAINE HYDROCHLORIDE 1 PATCH: 20 JELLY TOPICAL at 12:42

## 2025-06-06 RX ADMIN — DOXAZOSIN MESYLATE 1 MILLIGRAM(S): 8 TABLET ORAL at 21:35

## 2025-06-06 RX ADMIN — DOXAZOSIN MESYLATE 1 MILLIGRAM(S): 8 TABLET ORAL at 08:40

## 2025-06-06 NOTE — PROGRESS NOTE ADULT - ASSESSMENT
#Chest pain during HD - Resolved  #HFpEF - not in exacerbation   - On admission. trop 64 -> 66, Creatinine 4.1   - ECG showed prominent T waves    - CXR negative   - TTE EF 60%. G1DD   - stress test neg for ischemia   - C/w protonix qd     #Acute metabolic encephalopathy 2/2 hypoglycemia likely 2/2 decreased lantus clearance 2/2 ESRD   #DM2, Insulin dependent- uncontrolled  - 5/20: s/p D5 for hypoglycemia and AMS  - 5/28: rapid response called, bg measured and noted to be 19. D5 x2 was administered via IV. BG recheck was 229. Insulin held.   - 5/29: Pt in DKA with elevated AG. Insulin GTT started.   - 5/20: AG improved. Insulin GTT dc'd. Started on subq insulin.   - 6/2 hypoglycemic and altered, lantus reduced from 10u to 5u in AM  - discussed w/Dr. Carter, will keep lantus at 5u, glucose now in 240s, will dc sliding scale due to highly labile glucose and do 3u lispro w/meals   - 6/5/25 increased lantus to 8u today, lispro 4u w/meals, no sliding scale, gave additional 1x 2u sub-q lispro  - monitor glucose closely to increase insulin dose as glucose is still labile and has levels in range of 200-400 on fs and cmp   - 6/6/25 patient with fs and cmp glucose in high 500 range, increased lispro to 6u w/meals tid and lantus to 10u   - repeat fs now 235, will hold any additional insulin for now and continue to monitor fs  - notified critical care in setting of uptrending glucose and bhb 1.0 (was 5.1 earlier during this admission), will continue to monitor patient for now       #ESRD on HD MWF  #HTN    #Hyperkalemia  - nephro recs appreciated   - bp improves with HD  - c/w hydralazine 100mg q8h  - f/u iron studies  - Low K diet  - c/w aranesp 25 weekly with HD  - c/w doxazosin 1mg BID  - c/w sevelamer 1600 mg TID  - s/p nicardipine gtt on 5/28, restarted on home BP medications (Hydralazine 100 q8, Nifedipine 30 daily increased to now 60mg q daily 6/2)     #On admission, AMS possibly 2/2 transient hypotension during HD- Resolved  - CTH showed A focal hypodensity is noted within the right frontal periventricular white matter, not previously seen. Finding may reflect age-indeterminate ischemic change. An MRI of the brain can be obtained for further evaluation if not clinically contraindicated.  - Neuro consult noted no additional inpatient workup.  - c/w ASA and atorvastatin    Pending: Optimal sugar control

## 2025-06-06 NOTE — PROGRESS NOTE ADULT - ASSESSMENT
59y Female with h/o ESRD on HD (MWF), IDDM, dyslipidemia, CKD-MBD, HTN who presented to the hospital with CP during HD session this morning.  Had 90 minutes of total HD prior to arrival.  Nephrology now consulted for HD needs while in-house.    HD today  3h opti 160 UF 2l   follow FS closely appreciate endo eval and follow up   renal diet  follow bp readings after HD  if remains elevated increase nifedipine to 90   phos noted at goal     NST negative  h/h noted /  iron stores noted start venofer with  / resumed  SHAR with HD aranesp 25 weekly   renal team will follow

## 2025-06-06 NOTE — PROGRESS NOTE ADULT - ATTENDING SUPERVISION STATEMENT
Resident
Fellow
Fellow
Resident
Fellow
Resident

## 2025-06-06 NOTE — PROGRESS NOTE ADULT - SUBJECTIVE AND OBJECTIVE BOX
24H events:    Patient is a 59y old Female who presents with a chief complaint of AMS (06 Jun 2025 07:51)    Primary diagnosis of Elevated troponin      Today is hospital day 21d. This morning patient was seen and examined at bedside, resting comfortably in bed.    No acute or major events overnight. Hemodynamically stable, tolerating oral diet, voiding appropriately with appropriate bowel movements.     Code Status:    Family communication:  Contact date:  Name of person contacted:  Relationship to patient:  Communication details:  What matters most:    PAST MEDICAL & SURGICAL HISTORY  Chronic kidney disease, unspecified CKD stage      SOCIAL HISTORY:  Social History:      ALLERGIES:  No Known Allergies    MEDICATIONS:  STANDING MEDICATIONS  aspirin  chewable 81 milliGRAM(s) Oral daily  atorvastatin 80 milliGRAM(s) Oral at bedtime  chlorhexidine 2% Cloths 1 Application(s) Topical <User Schedule>  darbepoetin Injectable ViaL 25 MICROGram(s) IV Push <User Schedule>  dextrose 5%. 1000 milliLiter(s) IV Continuous <Continuous>  dextrose 50% Injectable 25 Gram(s) IV Push once  doxazosin 1 milliGRAM(s) Oral <User Schedule>  fluticasone propionate/ salmeterol 100-50 MICROgram(s) Diskus 1 Dose(s) Inhalation two times a day  gabapentin 300 milliGRAM(s) Oral at bedtime  glucagon  Injectable 1 milliGRAM(s) IntraMuscular once  heparin   Injectable 5000 Unit(s) SubCutaneous every 12 hours  hydrALAZINE 100 milliGRAM(s) Oral every 8 hours  insulin glargine Injectable (LANTUS) 10 Unit(s) SubCutaneous every morning  insulin lispro Injectable (ADMELOG) 6 Unit(s) SubCutaneous three times a day before meals  insulin lispro Injectable (ADMELOG). 2 Unit(s) SubCutaneous once  insulin regular  human recombinant. 5 Unit(s) IV Push once  lidocaine   4% Patch 1 Patch Transdermal daily  methocarbamol 1000 milliGRAM(s) Oral three times a day  NIFEdipine XL 60 milliGRAM(s) Oral daily  polyethylene glycol 3350 17 Gram(s) Oral daily  senna 2 Tablet(s) Oral at bedtime  sevelamer carbonate Powder 2400 milliGRAM(s) Oral three times a day with meals    PRN MEDICATIONS  acetaminophen     Tablet .. 975 milliGRAM(s) Oral every 8 hours PRN  albuterol    90 MICROgram(s) HFA Inhaler 2 Puff(s) Inhalation every 6 hours PRN  dextrose Oral Gel 15 Gram(s) Oral once PRN  guaiFENesin Oral Liquid (Sugar-Free) 100 milliGRAM(s) Oral every 6 hours PRN  ondansetron Injectable 4 milliGRAM(s) IV Push every 4 hours PRN    VITALS:   T(F): 97.7  HR: 78  BP: 146/61  RR: 18  SpO2: 95%    PHYSICAL EXAM:    GEN: No acute distress  PULM/CHEST: Clear to auscultation bilaterally, no rales, rhonchi or wheezes   CVS: Regular rate and rhythm, S1-S2, no murmurs  ABD: Soft, non-tender, non-distended, +BS  EXT: No edema  NEURO: AAOx3      (  ) Indwelling Brandt Catheter:   Date insterted:    Reason (  ) Critical illness     (  ) urinary retention    (  ) Accurate Ins/Outs Monitoring     (  ) CMO patient    (  ) Central Line:   Date inserted:  Location: (  ) Right IJ     (  ) Left IJ     (  ) Right Fem     (  ) Left Fem    (  ) SPC        (  ) pigtail       (  ) PEG tube       (  ) colostomy       (  ) jejunostomy  (  ) U-Dall    LABS:                        8.6    3.30  )-----------( 241      ( 06 Jun 2025 09:46 )             25.8     06-06    131[L]  |  91[L]  |  59[H]  ----------------------------<  586[HH]  5.0   |  24  |  7.0[HH]    Ca    9.0      06 Jun 2025 09:46  Phos  4.9     06-05  Mg     2.6     06-06    TPro  6.6  /  Alb  3.9  /  TBili  0.3  /  DBili  x   /  AST  16  /  ALT  15  /  AlkPhos  137[H]  06-06      Urinalysis Basic - ( 06 Jun 2025 09:46 )    Color: x / Appearance: x / SG: x / pH: x  Gluc: 586 mg/dL / Ketone: x  / Bili: x / Urobili: x   Blood: x / Protein: x / Nitrite: x   Leuk Esterase: x / RBC: x / WBC x   Sq Epi: x / Non Sq Epi: x / Bacteria: x                RADIOLOGY:    RADIOLOGY

## 2025-06-06 NOTE — PROGRESS NOTE ADULT - ATTENDING COMMENTS
Uncontrolled T2DM with hyperglycemia, alternating with hypoglycemia. BS >500 this AM, AG mildly elevated, bicarb 24, BHOB 1.0. BS improving now s/p lispro and Lantus 10U in AM. Discussed with ICU, plan to remain on the floor now and monitor BS. Patient remains hospitalized for severe hyperglycemia posing a threat to life. Rest per resident's note.

## 2025-06-06 NOTE — PROGRESS NOTE ADULT - SUBJECTIVE AND OBJECTIVE BOX
seen and examined  24 h events noted   no distress         PAST HISTORY  --------------------------------------------------------------------------------  No significant changes to PMH, PSH, FHx, SHx, unless otherwise noted    ALLERGIES & MEDICATIONS  --------------------------------------------------------------------------------  Allergies    No Known Allergies    Intolerances      Standing Inpatient Medications  aspirin  chewable 81 milliGRAM(s) Oral daily  atorvastatin 80 milliGRAM(s) Oral at bedtime  chlorhexidine 2% Cloths 1 Application(s) Topical <User Schedule>  darbepoetin Injectable ViaL 25 MICROGram(s) IV Push <User Schedule>  dextrose 5%. 1000 milliLiter(s) IV Continuous <Continuous>  dextrose 50% Injectable 25 Gram(s) IV Push once  doxazosin 1 milliGRAM(s) Oral <User Schedule>  fluticasone propionate/ salmeterol 100-50 MICROgram(s) Diskus 1 Dose(s) Inhalation two times a day  gabapentin 300 milliGRAM(s) Oral at bedtime  glucagon  Injectable 1 milliGRAM(s) IntraMuscular once  heparin   Injectable 5000 Unit(s) SubCutaneous every 12 hours  hydrALAZINE 100 milliGRAM(s) Oral every 8 hours  insulin glargine Injectable (LANTUS) 10 Unit(s) SubCutaneous every morning  insulin lispro Injectable (ADMELOG) 6 Unit(s) SubCutaneous three times a day before meals  insulin lispro Injectable (ADMELOG). 2 Unit(s) SubCutaneous once  lidocaine   4% Patch 1 Patch Transdermal daily  methocarbamol 1000 milliGRAM(s) Oral three times a day  NIFEdipine XL 60 milliGRAM(s) Oral daily  polyethylene glycol 3350 17 Gram(s) Oral daily  senna 2 Tablet(s) Oral at bedtime  sevelamer carbonate Powder 2400 milliGRAM(s) Oral three times a day with meals    PRN Inpatient Medications  acetaminophen     Tablet .. 975 milliGRAM(s) Oral every 8 hours PRN  albuterol    90 MICROgram(s) HFA Inhaler 2 Puff(s) Inhalation every 6 hours PRN  dextrose Oral Gel 15 Gram(s) Oral once PRN  guaiFENesin Oral Liquid (Sugar-Free) 100 milliGRAM(s) Oral every 6 hours PRN  ondansetron Injectable 4 milliGRAM(s) IV Push every 4 hours PRN      VITALS/PHYSICAL EXAM  --------------------------------------------------------------------------------  T(C): 36.5 (06-06-25 @ 06:14), Max: 36.9 (06-05-25 @ 22:31)  HR: 77 (06-06-25 @ 06:14) (76 - 80)  BP: 158/66 (06-06-25 @ 06:14) (143/64 - 169/52)  RR: 18 (06-06-25 @ 06:14) (18 - 18)  SpO2: 95% (06-06-25 @ 06:14) (87% - 96%)  Wt(kg): --        Physical Exam:  	Gen: NAD  	Pulm: decrease BS  B/L  	CV:  S1S2; no rub  	Abd: +distended  	Vascular access:av     LABS/STUDIES  --------------------------------------------------------------------------------              8.5    3.77  >-----------<  236      [06-05-25 @ 06:19]              26.2     133  |  92  |  34  ----------------------------<  411      [06-05-25 @ 06:19]  4.6   |  21  |  4.8        Ca     9.1     [06-05-25 @ 06:19]      Mg     2.5     [06-05-25 @ 06:19]      Phos  4.9     [06-05-25 @ 06:19]    TPro  5.9  /  Alb  3.8  /  TBili  0.4  /  DBili  x   /  AST  19  /  ALT  15  /  AlkPhos  112  [06-05-25 @ 06:19]    Creatinine Trend:  SCr 4.8 [06-05 @ 06:19]  SCr 6.9 [06-04 @ 07:48]  SCr 5.5 [06-03 @ 05:55]  SCr 8.5 [06-02 @ 07:43]  SCr 7.0 [06-01 @ 04:24]    Urinalysis - [06-05-25 @ 06:19]      Color  / Appearance  / SG  / pH       Gluc 411 / Ketone   / Bili  / Urobili        Blood  / Protein  / Leuk Est  / Nitrite       RBC  / WBC  / Hyaline  / Gran  / Sq Epi  / Non Sq Epi  / Bacteria       Iron 29, TIBC 175, %sat 17      [05-28-25 @ 04:22]  Ferritin 538      [05-28-25 @ 04:22]

## 2025-06-07 LAB
ALBUMIN SERPL ELPH-MCNC: 3.7 G/DL — SIGNIFICANT CHANGE UP (ref 3.5–5.2)
ALP SERPL-CCNC: 93 U/L — SIGNIFICANT CHANGE UP (ref 30–115)
ALT FLD-CCNC: 13 U/L — SIGNIFICANT CHANGE UP (ref 0–41)
ANION GAP SERPL CALC-SCNC: 16 MMOL/L — HIGH (ref 7–14)
AST SERPL-CCNC: 17 U/L — SIGNIFICANT CHANGE UP (ref 0–41)
BASOPHILS # BLD AUTO: 0.04 K/UL — SIGNIFICANT CHANGE UP (ref 0–0.2)
BASOPHILS NFR BLD AUTO: 1.4 % — HIGH (ref 0–1)
BILIRUB SERPL-MCNC: 0.4 MG/DL — SIGNIFICANT CHANGE UP (ref 0.2–1.2)
BUN SERPL-MCNC: 30 MG/DL — HIGH (ref 10–20)
CALCIUM SERPL-MCNC: 9 MG/DL — SIGNIFICANT CHANGE UP (ref 8.4–10.5)
CHLORIDE SERPL-SCNC: 95 MMOL/L — LOW (ref 98–110)
CO2 SERPL-SCNC: 25 MMOL/L — SIGNIFICANT CHANGE UP (ref 17–32)
CREAT SERPL-MCNC: 4.7 MG/DL — CRITICAL HIGH (ref 0.7–1.5)
EGFR: 10 ML/MIN/1.73M2 — LOW
EGFR: 10 ML/MIN/1.73M2 — LOW
EOSINOPHIL # BLD AUTO: 0.17 K/UL — SIGNIFICANT CHANGE UP (ref 0–0.7)
EOSINOPHIL NFR BLD AUTO: 6.1 % — SIGNIFICANT CHANGE UP (ref 0–8)
GLUCOSE SERPL-MCNC: 223 MG/DL — HIGH (ref 70–99)
HCT VFR BLD CALC: 26.4 % — LOW (ref 37–47)
HGB BLD-MCNC: 8.8 G/DL — LOW (ref 12–16)
IMM GRANULOCYTES NFR BLD AUTO: 0.4 % — HIGH (ref 0.1–0.3)
LYMPHOCYTES # BLD AUTO: 0.92 K/UL — LOW (ref 1.2–3.4)
LYMPHOCYTES # BLD AUTO: 32.9 % — SIGNIFICANT CHANGE UP (ref 20.5–51.1)
MAGNESIUM SERPL-MCNC: 2.3 MG/DL — SIGNIFICANT CHANGE UP (ref 1.8–2.4)
MCHC RBC-ENTMCNC: 33.2 PG — HIGH (ref 27–31)
MCHC RBC-ENTMCNC: 33.3 G/DL — SIGNIFICANT CHANGE UP (ref 32–37)
MCV RBC AUTO: 99.6 FL — HIGH (ref 81–99)
MONOCYTES # BLD AUTO: 0.35 K/UL — SIGNIFICANT CHANGE UP (ref 0.1–0.6)
MONOCYTES NFR BLD AUTO: 12.5 % — HIGH (ref 1.7–9.3)
NEUTROPHILS # BLD AUTO: 1.31 K/UL — LOW (ref 1.4–6.5)
NEUTROPHILS NFR BLD AUTO: 46.7 % — SIGNIFICANT CHANGE UP (ref 42.2–75.2)
NRBC BLD AUTO-RTO: 0 /100 WBCS — SIGNIFICANT CHANGE UP (ref 0–0)
PHOSPHATE SERPL-MCNC: 5 MG/DL — HIGH (ref 2.1–4.9)
PLATELET # BLD AUTO: 218 K/UL — SIGNIFICANT CHANGE UP (ref 130–400)
PMV BLD: 10.6 FL — HIGH (ref 7.4–10.4)
POTASSIUM SERPL-MCNC: 4.3 MMOL/L — SIGNIFICANT CHANGE UP (ref 3.5–5)
POTASSIUM SERPL-SCNC: 4.3 MMOL/L — SIGNIFICANT CHANGE UP (ref 3.5–5)
PROT SERPL-MCNC: 5.8 G/DL — LOW (ref 6–8)
RBC # BLD: 2.65 M/UL — LOW (ref 4.2–5.4)
RBC # FLD: 16.1 % — HIGH (ref 11.5–14.5)
SODIUM SERPL-SCNC: 136 MMOL/L — SIGNIFICANT CHANGE UP (ref 135–146)
WBC # BLD: 2.8 K/UL — LOW (ref 4.8–10.8)
WBC # FLD AUTO: 2.8 K/UL — LOW (ref 4.8–10.8)

## 2025-06-07 PROCEDURE — 99232 SBSQ HOSP IP/OBS MODERATE 35: CPT

## 2025-06-07 RX ADMIN — INSULIN GLARGINE-YFGN 10 UNIT(S): 100 INJECTION, SOLUTION SUBCUTANEOUS at 08:14

## 2025-06-07 RX ADMIN — GABAPENTIN 300 MILLIGRAM(S): 400 CAPSULE ORAL at 21:10

## 2025-06-07 RX ADMIN — Medication 1 DOSE(S): at 21:11

## 2025-06-07 RX ADMIN — Medication 1 DOSE(S): at 08:17

## 2025-06-07 RX ADMIN — DOXAZOSIN MESYLATE 1 MILLIGRAM(S): 8 TABLET ORAL at 21:10

## 2025-06-07 RX ADMIN — Medication 81 MILLIGRAM(S): at 11:31

## 2025-06-07 RX ADMIN — LIDOCAINE HYDROCHLORIDE 1 PATCH: 20 JELLY TOPICAL at 19:22

## 2025-06-07 RX ADMIN — INSULIN LISPRO 6 UNIT(S): 100 INJECTION, SOLUTION INTRAVENOUS; SUBCUTANEOUS at 16:49

## 2025-06-07 RX ADMIN — LIDOCAINE HYDROCHLORIDE 1 PATCH: 20 JELLY TOPICAL at 11:32

## 2025-06-07 RX ADMIN — DOXAZOSIN MESYLATE 1 MILLIGRAM(S): 8 TABLET ORAL at 08:13

## 2025-06-07 RX ADMIN — Medication 100 MILLIGRAM(S): at 13:33

## 2025-06-07 RX ADMIN — LIDOCAINE HYDROCHLORIDE 1 PATCH: 20 JELLY TOPICAL at 23:22

## 2025-06-07 RX ADMIN — Medication 2 TABLET(S): at 21:11

## 2025-06-07 RX ADMIN — LIDOCAINE HYDROCHLORIDE 1 PATCH: 20 JELLY TOPICAL at 07:30

## 2025-06-07 RX ADMIN — METHOCARBAMOL 1000 MILLIGRAM(S): 500 TABLET, FILM COATED ORAL at 13:33

## 2025-06-07 RX ADMIN — HEPARIN SODIUM 5000 UNIT(S): 1000 INJECTION INTRAVENOUS; SUBCUTANEOUS at 05:05

## 2025-06-07 RX ADMIN — Medication 60 MILLIGRAM(S): at 05:05

## 2025-06-07 RX ADMIN — SEVELAMER HYDROCHLORIDE 2400 MILLIGRAM(S): 800 TABLET ORAL at 08:15

## 2025-06-07 RX ADMIN — INSULIN LISPRO 6 UNIT(S): 100 INJECTION, SOLUTION INTRAVENOUS; SUBCUTANEOUS at 08:14

## 2025-06-07 RX ADMIN — METHOCARBAMOL 1000 MILLIGRAM(S): 500 TABLET, FILM COATED ORAL at 21:11

## 2025-06-07 RX ADMIN — INSULIN LISPRO 6 UNIT(S): 100 INJECTION, SOLUTION INTRAVENOUS; SUBCUTANEOUS at 11:37

## 2025-06-07 RX ADMIN — Medication 100 MILLIGRAM(S): at 05:05

## 2025-06-07 RX ADMIN — DEXTROMETHORPHAN HBR, GUAIFENESIN 100 MILLIGRAM(S): 200 LIQUID ORAL at 22:01

## 2025-06-07 RX ADMIN — SEVELAMER HYDROCHLORIDE 2400 MILLIGRAM(S): 800 TABLET ORAL at 11:35

## 2025-06-07 RX ADMIN — ATORVASTATIN CALCIUM 80 MILLIGRAM(S): 80 TABLET, FILM COATED ORAL at 21:10

## 2025-06-07 RX ADMIN — METHOCARBAMOL 1000 MILLIGRAM(S): 500 TABLET, FILM COATED ORAL at 05:05

## 2025-06-07 RX ADMIN — Medication 1 APPLICATION(S): at 05:06

## 2025-06-07 RX ADMIN — HEPARIN SODIUM 5000 UNIT(S): 1000 INJECTION INTRAVENOUS; SUBCUTANEOUS at 17:37

## 2025-06-07 RX ADMIN — Medication 100 MILLIGRAM(S): at 21:10

## 2025-06-07 RX ADMIN — POLYETHYLENE GLYCOL 3350 17 GRAM(S): 17 POWDER, FOR SOLUTION ORAL at 11:31

## 2025-06-07 NOTE — PROGRESS NOTE ADULT - SUBJECTIVE AND OBJECTIVE BOX
ANAM NGUYỄN  [facility] 013 A ([facility])            Patient was evaluated and examined  by bedside,                 REVIEW OF SYSTEMS:  please see pertinent positives mentioned above, all other 12 ROS negative      T(C): , Max: 37.1 (06-06-25 @ 21:36)  HR: 76 (06-07-25 @ 04:48)  BP: 187/67 (06-07-25 @ 04:48)  RR: 18 (06-07-25 @ 04:48)  SpO2: 96% (06-07-25 @ 04:48)  CAPILLARY BLOOD GLUCOSE      POCT Blood Glucose.: 140 mg/dL (07 Jun 2025 12:07)  POCT Blood Glucose.: 229 mg/dL (07 Jun 2025 07:37)  POCT Blood Glucose.: 227 mg/dL (06 Jun 2025 21:46)  POCT Blood Glucose.: 259 mg/dL (06 Jun 2025 19:47)  POCT Blood Glucose.: 273 mg/dL (06 Jun 2025 16:40)  POCT Blood Glucose.: 139 mg/dL (06 Jun 2025 12:39)      PHYSICAL EXAM:  General: NAD, comfortable in bed  HEENT: NCAT  Lungs: No increased WOB  CVS: RRR  Abdomen: , non-distended  Extremities: no edema        LAB  CBC  Date: 06-07-25 @ 07:36  Mean cell Rbseqzyhjp84.2  Mean cell Hemoglobin Conc33.3  Mean cell Volum 99.6  Platelet count-Automate 218  RBC Count 2.65  Red Cell Distrib Width16.1  WBC Count2.80  % Albumin, Urine--  Hematocrit 26.4  Hemoglobin 8.8  CBC  Date: 06-06-25 @ 09:46  Mean cell Irnckyjwjv30.7  Mean cell Hemoglobin Conc33.3  Mean cell Volum 101.2  Platelet count-Automate 241  RBC Count 2.55  Red Cell Distrib Width16.2  WBC Count3.30  % Albumin, Urine--  Hematocrit 25.8  Hemoglobin 8.6  CBC  Date: 06-05-25 @ 06:19  Mean cell Gudpvmhmvv07.9  Mean cell Hemoglobin Conc32.4  Mean cell Volum 101.6  Platelet count-Automate 236  RBC Count 2.58  Red Cell Distrib Width16.0  WBC Count3.77  % Albumin, Urine--  Hematocrit 26.2  Hemoglobin 8.5  CBC  Date: 06-04-25 @ 07:48  Mean cell Oyprslaohg16.2  Mean cell Hemoglobin Conc32.6  Mean cell Volum 102.0  Platelet count-Automate 235  RBC Count 2.53  Red Cell Distrib Width16.4  WBC Count3.74  % Albumin, Urine--  Hematocrit 25.8  Hemoglobin 8.4  CBC  Date: 06-03-25 @ 05:55  Mean cell Bwzmgyuixz68.8  Mean cell Hemoglobin Conc32.1  Mean cell Volum 102.2  Platelet count-Automate 228  RBC Count 2.74  Red Cell Distrib Width16.6  WBC Count4.05  % Albumin, Urine--  Hematocrit 28.0  Hemoglobin 9.0  CBC  Date: 06-02-25 @ 07:43  Mean cell Fsvmcxuwpt99.0  Mean cell Hemoglobin Conc32.0  Mean cell Volum 103.0  Platelet count-Automate 202  RBC Count 2.67  Red Cell Distrib Width16.7  WBC Count5.27  % Albumin, Urine--  Hematocrit 27.5  Hemoglobin 8.8  CBC  Date: 06-01-25 @ 04:24  Mean cell Bziqbrmdgv82.3  Mean cell Hemoglobin Conc32.2  Mean cell Volum 103.4  Platelet count-Automate 191  RBC Count 2.67  Red Cell Distrib Width16.8  WBC Count3.92  % Albumin, Urine--  Hematocrit 27.6  Hemoglobin 8.9    [facility]  06-07-25 @ 07:36  Blood Gas Arterial-Calcium,Ionized--  Blood Urea Nitrogen, Serum 30 mg/dL[H] [10 - 20]  Carbon Dioxide, Serum25 mmol/L [17 - 32]  Chloride, Serum95 mmol/L[L] [98 - 110]  Creatinie, Serum4.7 mg/dL[HH] [0.7 - 1.5] [Critical value:]  Glucose, Fzeoc691 mg/dL[H] [70 - 99]  Potassium, Serum4.3 mmol/L [3.5 - 5.0]  Sodium, Serum 136 mmol/L [135 - 146]  [facility]  06-06-25 @ 09:46  Blood Gas Arterial-Calcium,Ionized--  Blood Urea Nitrogen, Serum 59 mg/dL[H] [10 - 20]  Carbon Dioxide, Serum24 mmol/L [17 - 32]  Chloride, Serum91 mmol/L[L] [98 - 110]  Creatinie, Serum7.0 mg/dL[HH] [0.7 - 1.5] [Critical value:]  Glucose, Jgltf824 mg/dL[HH] [70 - 99] [Critical value:  TYPE:(C=Critical, N=Notification, A=Abnormal) C  TESTS: GLUC  DATE/TIME CALLED: 06/06/2025 10:33:52 EDT  CALLED TO: MOHAN MOE  READ BACK (2 Patient Identifiers)(Y/N): Y  READ BACK VALUES (Y/N): Y  CALLED BY: MJ]  Potassium, Serum5.0 mmol/L [3.5 - 5.0]  Sodium, Serum 131 mmol/L[L] [135 - 146]  [facility]  06-05-25 @ 06:19  Blood Gas Arterial-Calcium,Ionized--  Blood Urea Nitrogen, Serum 34 mg/dL[H] [10 - 20]  Carbon Dioxide, Serum21 mmol/L [17 - 32]  Chloride, Serum92 mmol/L[L] [98 - 110]  Creatinie, Serum4.8 mg/dL[HH] [0.7 - 1.5] [Critical value:]  Glucose, Sodmi055 mg/dL[H] [70 - 99]  Potassium, Serum4.6 mmol/L [3.5 - 5.0]  Sodium, Serum 133 mmol/L[L] [135 - 146]  [facility]  06-04-25 @ 07:48  Blood Gas Arterial-Calcium,Ionized--  Blood Urea Nitrogen, Serum 51 mg/dL[H] [10 - 20]  Carbon Dioxide, Serum22 mmol/L [17 - 32]  Chloride, Serum93 mmol/L[L] [98 - 110]  Creatinie, Serum6.9 mg/dL[HH] [0.7 - 1.5] [Critical value:]  Glucose, Gxgpm417 mg/dL[H] [70 - 99]  Potassium, Serum4.5 mmol/L [3.5 - 5.0]  Sodium, Serum 133 mmol/L[L] [135 - 146]  [facility]  06-03-25 @ 05:55  Blood Gas Arterial-Calcium,Ionized--  Blood Urea Nitrogen, Serum 34 mg/dL[H] [10 - 20] [Delta failure noted. If the result does not correlate with the clinical  picture, please order new sample]  Carbon Dioxide, Serum24 mmol/L [17 - 32]  Chloride, Serum95 mmol/L[L] [98 - 110]  Creatinie, Serum5.5 mg/dL[HH] [0.7 - 1.5] [Critical value:]  Glucose, Loekf730 mg/dL[H] [70 - 99]  Potassium, Serum4.7 mmol/L [3.5 - 5.0]  Sodium, Serum 136 mmol/L [135 - 146]              Microbiology:    Culture - Blood (collected 05-16-25 @ 12:25)  Source: Blood Blood-Peripheral  Final Report (05-21-25 @ 22:00):    No growth at 5 days    Culture - Blood (collected 05-16-25 @ 12:25)  Source: Blood Blood-Peripheral  Final Report (05-21-25 @ 22:00):    No growth at 5 days    Culture - Urine (collected 05-16-25 @ 10:35)  Source: Clean Catch Clean Catch (Midstream)  Final Report (05-20-25 @ 07:52):    >100,000 CFU/ml Escherichia coli  Organism: Escherichia coli (05-20-25 @ 07:52)  Organism: Escherichia coli (05-20-25 @ 07:52)      Method Type: RODNEY      -  Amoxicillin/Clavulanic Acid: S <=8/4      -  Ampicillin: S <=8 These ampicillin results predict results for amoxicillin      -  Ampicillin/Sulbactam: S 8/4      -  Aztreonam: S <=4      -  Cefazolin: S <=2 For uncomplicated UTI with K. pneumoniae, E. coli, or P. mirablis: RDONEY <=16 is sensitive and RODNEY >=32 is resistant. This also predicts results for oral agents cefaclor, cefdinir, cefpodoxime, cefprozil, cefuroxime axetil, cephalexin and locarbef for uncomplicated UTI. Note that some isolates may be susceptible to these agents while testing resistant to cefazolin.      -  Cefepime: S <=2      -  Cefoxitin: S <=8      -  Ceftriaxone: S <=1      -  Cefuroxime: S <=4      -  Ciprofloxacin: S <=0.25      -  Ertapenem: S <=0.5      -  Gentamicin: S <=2      -  Imipenem: S <=1      -  Levofloxacin: S <=0.5      -  Meropenem: S <=1      -  Nitrofurantoin: S <=32 Should not be used to treat pyelonephritis      -  Piperacillin/Tazobactam: S <=8      -  Tigecycline: S <=2 Interpretations based on FDA breakpoints      -  Tobramycin: S <=2      -  Trimethoprim/Sulfamethoxazole: S <=0.5/9.5        RADIOLOGY & ADDITIONAL TESTS:        Medications:  acetaminophen     Tablet .. 975 milliGRAM(s) Oral every 8 hours PRN  albuterol    90 MICROgram(s) HFA Inhaler 2 Puff(s) Inhalation every 6 hours PRN  aspirin  chewable 81 milliGRAM(s) Oral daily  atorvastatin 80 milliGRAM(s) Oral at bedtime  chlorhexidine 2% Cloths 1 Application(s) Topical <User Schedule>  darbepoetin Injectable ViaL 25 MICROGram(s) IV Push <User Schedule>  dextrose 5%. 1000 milliLiter(s) IV Continuous <Continuous>  dextrose 50% Injectable 25 Gram(s) IV Push once  dextrose Oral Gel 15 Gram(s) Oral once PRN  doxazosin 1 milliGRAM(s) Oral <User Schedule>  fluticasone propionate/ salmeterol 100-50 MICROgram(s) Diskus 1 Dose(s) Inhalation two times a day  gabapentin 300 milliGRAM(s) Oral at bedtime  glucagon  Injectable 1 milliGRAM(s) IntraMuscular once  guaiFENesin Oral Liquid (Sugar-Free) 100 milliGRAM(s) Oral every 6 hours PRN  heparin   Injectable 5000 Unit(s) SubCutaneous every 12 hours  hydrALAZINE 100 milliGRAM(s) Oral every 8 hours  insulin glargine Injectable (LANTUS) 10 Unit(s) SubCutaneous every morning  insulin lispro Injectable (ADMELOG) 6 Unit(s) SubCutaneous three times a day before meals  insulin lispro Injectable (ADMELOG). 2 Unit(s) SubCutaneous once  lidocaine   4% Patch 1 Patch Transdermal daily  methocarbamol 1000 milliGRAM(s) Oral three times a day  NIFEdipine XL 60 milliGRAM(s) Oral daily  ondansetron Injectable 4 milliGRAM(s) IV Push every 4 hours PRN  polyethylene glycol 3350 17 Gram(s) Oral daily  senna 2 Tablet(s) Oral at bedtime  sevelamer carbonate Powder 2400 milliGRAM(s) Oral three times a day with meals        Assessment and Plan:    I have personally seen and examined the patient and obtained medical history independently.  I fully participated in the care of this patient. I have personally reviewed prior records, pertinent labs and imaging for this patient. I have personally ordered additional diagnostic tests when required. I have personally discussed with consultants.     59y old  Female with PMH of DM, ESRD on HD MWF, HTN, HLD, and HF who presented with chest pain during HD session, found to be in acute metabolic encephalopathy and DKA< now transferred to the medical floor, pending further BS control, rest of issues resolved    #Acute metabolic encephalopathy 2/2 hypoglycemia likely 2/2 decreased lantus clearance 2/2 ESRD  -resolved   #T2DM, Insulin dependent- uncontrolled with hyperglycemia alternating with hypoglycemia  - 5/20: s/p D5 for hypoglycemia and AMS  - 5/28: rapid response called, bg measured and noted to be 19. D5 x2 was administered via IV. BG recheck was 229. Insulin held.   - 5/29: Pt in DKA with elevated AG. Insulin GTT started.   - 5/20: AG improved. Insulin GTT dc'd. Started on subq insulin.   - 6/2 hypoglycemic and altered, lantus reduced from 10u to 5u in AM  - discussed w/Dr. Carter, will keep lantus at 5u, glucose now in 240s, will dc sliding scale due to highly labile glucose and do 3u lispro w/meals   - 6/5/25 increased lantus to 8u today, lispro 4u w/meals, no sliding scale, gave additional 1x 2u sub-q lispro  - monitor glucose closely to increase insulin dose as glucose is still labile and has levels in range of 200-400 on fs and cmp   - 6/6/25 patient with fs and cmp glucose in high 500 range, increased lispro to 6u w/meals tid and lantus to 10u   -6/7/25, BS better controlled now, no further changes, plan to d/c tomorrow on current insulin dose and follow with endo for further adjustments    #Chest pain during HD - Resolved  #HFpEF - not in exacerbation   - On admission. trop 64 -> 66, Creatinine 4.1   - ECG showed prominent T waves    - CXR negative   - TTE EF 60%. G1DD   - stress test neg for ischemia   - C/w protonix qd         #ESRD on HD MWF  #HTN    #Hyperkalemia  - nephro recs appreciated   - bp improves with HD  - c/w hydralazine 100mg q8h  - f/u iron studies  - Low K diet  - c/w aranesp 25 weekly with HD  - c/w doxazosin 1mg BID  - c/w sevelamer 1600 mg TID  - s/p nicardipine gtt on 5/28, restarted on home BP medications (Hydralazine 100 q8, Nifedipine 30 daily increased to now 60mg q daily 6/2)     #On admission, AMS possibly 2/2 transient hypotension during HD- Resolved  - CTH showed A focal hypodensity is noted within the right frontal periventricular white matter, not previously seen. Finding may reflect age-indeterminate ischemic change. An MRI of the brain can be obtained for further evaluation if not clinically contraindicated.  - Neuro consult noted no additional inpatient workup.  - c/w ASA and atorvastatin    Pending: Optimal sugar control

## 2025-06-07 NOTE — PROGRESS NOTE ADULT - SUBJECTIVE AND OBJECTIVE BOX
seen and examined  24 h events noted   no distress       PAST HISTORY  --------------------------------------------------------------------------------  No significant changes to PMH, PSH, FHx, SHx, unless otherwise noted    ALLERGIES & MEDICATIONS  --------------------------------------------------------------------------------  Allergies    No Known Allergies    Intolerances      Standing Inpatient Medications  aspirin  chewable 81 milliGRAM(s) Oral daily  atorvastatin 80 milliGRAM(s) Oral at bedtime  chlorhexidine 2% Cloths 1 Application(s) Topical <User Schedule>  darbepoetin Injectable ViaL 25 MICROGram(s) IV Push <User Schedule>  dextrose 5%. 1000 milliLiter(s) IV Continuous <Continuous>  dextrose 50% Injectable 25 Gram(s) IV Push once  doxazosin 1 milliGRAM(s) Oral <User Schedule>  fluticasone propionate/ salmeterol 100-50 MICROgram(s) Diskus 1 Dose(s) Inhalation two times a day  gabapentin 300 milliGRAM(s) Oral at bedtime  glucagon  Injectable 1 milliGRAM(s) IntraMuscular once  heparin   Injectable 5000 Unit(s) SubCutaneous every 12 hours  hydrALAZINE 100 milliGRAM(s) Oral every 8 hours  insulin glargine Injectable (LANTUS) 10 Unit(s) SubCutaneous every morning  insulin lispro Injectable (ADMELOG) 6 Unit(s) SubCutaneous three times a day before meals  insulin lispro Injectable (ADMELOG). 2 Unit(s) SubCutaneous once  lidocaine   4% Patch 1 Patch Transdermal daily  methocarbamol 1000 milliGRAM(s) Oral three times a day  NIFEdipine XL 60 milliGRAM(s) Oral daily  polyethylene glycol 3350 17 Gram(s) Oral daily  senna 2 Tablet(s) Oral at bedtime  sevelamer carbonate Powder 2400 milliGRAM(s) Oral three times a day with meals    PRN Inpatient Medications  acetaminophen     Tablet .. 975 milliGRAM(s) Oral every 8 hours PRN  albuterol    90 MICROgram(s) HFA Inhaler 2 Puff(s) Inhalation every 6 hours PRN  dextrose Oral Gel 15 Gram(s) Oral once PRN  guaiFENesin Oral Liquid (Sugar-Free) 100 milliGRAM(s) Oral every 6 hours PRN  ondansetron Injectable 4 milliGRAM(s) IV Push every 4 hours PRN        VITALS/PHYSICAL EXAM  --------------------------------------------------------------------------------  T(C): 36.6 (06-07-25 @ 04:48), Max: 37.1 (06-06-25 @ 21:36)  HR: 76 (06-07-25 @ 04:48) (71 - 78)  BP: 187/67 (06-07-25 @ 04:48) (146/61 - 187/67)  RR: 18 (06-07-25 @ 04:48) (18 - 18)  SpO2: 96% (06-07-25 @ 04:48) (96% - 96%)  Wt(kg): --        06-06-25 @ 07:01  -  06-07-25 @ 07:00  --------------------------------------------------------  IN: 0 mL / OUT: 2000 mL / NET: -2000 mL      Physical Exam:  	Gen: NAD  	Pulm: CTA B/L  	CV: S1S2; no rub  	Abd:  soft,/nondistended  	LE: no edema  	Vascular access:av     LABS/STUDIES  --------------------------------------------------------------------------------              8.6    3.30  >-----------<  241      [06-06-25 @ 09:46]              25.8     131  |  91  |  59  ----------------------------<  586      [06-06-25 @ 09:46]  5.0   |  24  |  7.0        Ca     9.0     [06-06-25 @ 09:46]      Mg     2.6     [06-06-25 @ 09:46]    TPro  6.6  /  Alb  3.9  /  TBili  0.3  /  DBili  x   /  AST  16  /  ALT  15  /  AlkPhos  137  [06-06-25 @ 09:46]      Creatinine Trend:  SCr 7.0 [06-06 @ 09:46]  SCr 4.8 [06-05 @ 06:19]  SCr 6.9 [06-04 @ 07:48]  SCr 5.5 [06-03 @ 05:55]  SCr 8.5 [06-02 @ 07:43]    Urinalysis - [06-06-25 @ 09:46]      Color  / Appearance  / SG  / pH       Gluc 586 / Ketone   / Bili  / Urobili        Blood  / Protein  / Leuk Est  / Nitrite       RBC  / WBC  / Hyaline  / Gran  / Sq Epi  / Non Sq Epi  / Bacteria       Iron 29, TIBC 175, %sat 17      [05-28-25 @ 04:22]  Ferritin 538      [05-28-25 @ 04:22]

## 2025-06-07 NOTE — PROGRESS NOTE ADULT - ASSESSMENT
59y Female with h/o ESRD on HD (MWF), IDDM, dyslipidemia, CKD-MBD, HTN who presented to the hospital with CP during HD session this morning.  Had 90 minutes of total HD prior to arrival.  Nephrology now consulted for HD needs while in-house.    HD on monday   3h opti 160 UF 2l   follow FS closely appreciate endo eval and follow up   renal diet   sodium level noted due to hyperglycemia   follow bp readings   if remains elevated increase nifedipine to 90   last phos noted at goal     NST negative  h/h noted /  iron stores noted start venofer with  / resumed  SHAR with HD aranesp 25 weekly   renal team will follow

## 2025-06-08 VITALS
OXYGEN SATURATION: 96 % | SYSTOLIC BLOOD PRESSURE: 123 MMHG | TEMPERATURE: 96 F | RESPIRATION RATE: 18 BRPM | DIASTOLIC BLOOD PRESSURE: 72 MMHG | HEART RATE: 70 BPM

## 2025-06-08 LAB
ALBUMIN SERPL ELPH-MCNC: 3.8 G/DL — SIGNIFICANT CHANGE UP (ref 3.5–5.2)
ALP SERPL-CCNC: 82 U/L — SIGNIFICANT CHANGE UP (ref 30–115)
ALT FLD-CCNC: 14 U/L — SIGNIFICANT CHANGE UP (ref 0–41)
ANION GAP SERPL CALC-SCNC: 16 MMOL/L — HIGH (ref 7–14)
AST SERPL-CCNC: 18 U/L — SIGNIFICANT CHANGE UP (ref 0–41)
BASOPHILS # BLD AUTO: 0.05 K/UL — SIGNIFICANT CHANGE UP (ref 0–0.2)
BASOPHILS NFR BLD AUTO: 1.5 % — HIGH (ref 0–1)
BILIRUB SERPL-MCNC: 0.3 MG/DL — SIGNIFICANT CHANGE UP (ref 0.2–1.2)
BUN SERPL-MCNC: 46 MG/DL — HIGH (ref 10–20)
CALCIUM SERPL-MCNC: 9.3 MG/DL — SIGNIFICANT CHANGE UP (ref 8.4–10.5)
CHLORIDE SERPL-SCNC: 93 MMOL/L — LOW (ref 98–110)
CO2 SERPL-SCNC: 24 MMOL/L — SIGNIFICANT CHANGE UP (ref 17–32)
CREAT SERPL-MCNC: 6 MG/DL — CRITICAL HIGH (ref 0.7–1.5)
EGFR: 8 ML/MIN/1.73M2 — LOW
EGFR: 8 ML/MIN/1.73M2 — LOW
EOSINOPHIL # BLD AUTO: 0.26 K/UL — SIGNIFICANT CHANGE UP (ref 0–0.7)
EOSINOPHIL NFR BLD AUTO: 7.7 % — SIGNIFICANT CHANGE UP (ref 0–8)
GLUCOSE SERPL-MCNC: 237 MG/DL — HIGH (ref 70–99)
HCT VFR BLD CALC: 26.4 % — LOW (ref 37–47)
HGB BLD-MCNC: 8.9 G/DL — LOW (ref 12–16)
IMM GRANULOCYTES NFR BLD AUTO: 0.3 % — SIGNIFICANT CHANGE UP (ref 0.1–0.3)
LYMPHOCYTES # BLD AUTO: 0.82 K/UL — LOW (ref 1.2–3.4)
LYMPHOCYTES # BLD AUTO: 24.2 % — SIGNIFICANT CHANGE UP (ref 20.5–51.1)
MAGNESIUM SERPL-MCNC: 2.5 MG/DL — HIGH (ref 1.8–2.4)
MCHC RBC-ENTMCNC: 33.7 G/DL — SIGNIFICANT CHANGE UP (ref 32–37)
MCHC RBC-ENTMCNC: 33.7 PG — HIGH (ref 27–31)
MCV RBC AUTO: 100 FL — HIGH (ref 81–99)
MONOCYTES # BLD AUTO: 0.35 K/UL — SIGNIFICANT CHANGE UP (ref 0.1–0.6)
MONOCYTES NFR BLD AUTO: 10.3 % — HIGH (ref 1.7–9.3)
NEUTROPHILS # BLD AUTO: 1.9 K/UL — SIGNIFICANT CHANGE UP (ref 1.4–6.5)
NEUTROPHILS NFR BLD AUTO: 56 % — SIGNIFICANT CHANGE UP (ref 42.2–75.2)
NRBC BLD AUTO-RTO: 0 /100 WBCS — SIGNIFICANT CHANGE UP (ref 0–0)
PHOSPHATE SERPL-MCNC: 5.8 MG/DL — HIGH (ref 2.1–4.9)
PLATELET # BLD AUTO: 215 K/UL — SIGNIFICANT CHANGE UP (ref 130–400)
PMV BLD: 10.8 FL — HIGH (ref 7.4–10.4)
POTASSIUM SERPL-MCNC: 4.7 MMOL/L — SIGNIFICANT CHANGE UP (ref 3.5–5)
POTASSIUM SERPL-SCNC: 4.7 MMOL/L — SIGNIFICANT CHANGE UP (ref 3.5–5)
PROT SERPL-MCNC: 6.3 G/DL — SIGNIFICANT CHANGE UP (ref 6–8)
RBC # BLD: 2.64 M/UL — LOW (ref 4.2–5.4)
RBC # FLD: 15.9 % — HIGH (ref 11.5–14.5)
SODIUM SERPL-SCNC: 133 MMOL/L — LOW (ref 135–146)
WBC # BLD: 3.39 K/UL — LOW (ref 4.8–10.8)
WBC # FLD AUTO: 3.39 K/UL — LOW (ref 4.8–10.8)

## 2025-06-08 PROCEDURE — 99239 HOSP IP/OBS DSCHRG MGMT >30: CPT

## 2025-06-08 RX ORDER — INSULIN ASPART 100 [IU]/ML
6 INJECTION, SOLUTION INTRAVENOUS; SUBCUTANEOUS
Qty: 1 | Refills: 0
Start: 2025-06-08

## 2025-06-08 RX ORDER — INSULIN ASPART 100 [IU]/ML
6 INJECTION, SOLUTION INTRAVENOUS; SUBCUTANEOUS
Qty: 1 | Refills: 0
Start: 2025-06-08 | End: 2025-07-07

## 2025-06-08 RX ORDER — GABAPENTIN 400 MG/1
1 CAPSULE ORAL
Qty: 30 | Refills: 0
Start: 2025-06-08

## 2025-06-08 RX ORDER — ALBUTEROL SULFATE 2.5 MG/3ML
2 VIAL, NEBULIZER (ML) INHALATION
Qty: 1 | Refills: 0
Start: 2025-06-08

## 2025-06-08 RX ORDER — INSULIN GLARGINE-YFGN 100 [IU]/ML
10 INJECTION, SOLUTION SUBCUTANEOUS
Qty: 3 | Refills: 0
Start: 2025-06-08

## 2025-06-08 RX ORDER — METHOCARBAMOL 500 MG/1
1 TABLET, FILM COATED ORAL
Qty: 21 | Refills: 0
Start: 2025-06-08 | End: 2025-06-14

## 2025-06-08 RX ORDER — ISOPROPYL ALCOHOL, BENZOCAINE .7; .06 ML/ML; ML/ML
0 SWAB TOPICAL
Qty: 100 | Refills: 1
Start: 2025-06-08

## 2025-06-08 RX ORDER — INSULIN GLARGINE-YFGN 100 [IU]/ML
10 INJECTION, SOLUTION SUBCUTANEOUS
Qty: 1 | Refills: 0
Start: 2025-06-08

## 2025-06-08 RX ORDER — INSULIN GLARGINE-YFGN 100 [IU]/ML
15 INJECTION, SOLUTION SUBCUTANEOUS
Refills: 0 | DISCHARGE

## 2025-06-08 RX ADMIN — Medication 100 MILLIGRAM(S): at 05:02

## 2025-06-08 RX ADMIN — Medication 60 MILLIGRAM(S): at 05:03

## 2025-06-08 RX ADMIN — INSULIN LISPRO 6 UNIT(S): 100 INJECTION, SOLUTION INTRAVENOUS; SUBCUTANEOUS at 11:49

## 2025-06-08 RX ADMIN — Medication 1 APPLICATION(S): at 05:03

## 2025-06-08 RX ADMIN — INSULIN GLARGINE-YFGN 10 UNIT(S): 100 INJECTION, SOLUTION SUBCUTANEOUS at 08:33

## 2025-06-08 RX ADMIN — Medication 81 MILLIGRAM(S): at 11:45

## 2025-06-08 RX ADMIN — SEVELAMER HYDROCHLORIDE 2400 MILLIGRAM(S): 800 TABLET ORAL at 11:45

## 2025-06-08 RX ADMIN — DEXTROMETHORPHAN HBR, GUAIFENESIN 100 MILLIGRAM(S): 200 LIQUID ORAL at 05:02

## 2025-06-08 RX ADMIN — SEVELAMER HYDROCHLORIDE 2400 MILLIGRAM(S): 800 TABLET ORAL at 08:25

## 2025-06-08 RX ADMIN — INSULIN LISPRO 6 UNIT(S): 100 INJECTION, SOLUTION INTRAVENOUS; SUBCUTANEOUS at 08:25

## 2025-06-08 RX ADMIN — LIDOCAINE HYDROCHLORIDE 1 PATCH: 20 JELLY TOPICAL at 11:46

## 2025-06-08 RX ADMIN — Medication 1 DOSE(S): at 08:26

## 2025-06-08 RX ADMIN — HEPARIN SODIUM 5000 UNIT(S): 1000 INJECTION INTRAVENOUS; SUBCUTANEOUS at 05:02

## 2025-06-08 RX ADMIN — METHOCARBAMOL 1000 MILLIGRAM(S): 500 TABLET, FILM COATED ORAL at 05:02

## 2025-06-08 RX ADMIN — DOXAZOSIN MESYLATE 1 MILLIGRAM(S): 8 TABLET ORAL at 08:24

## 2025-06-08 NOTE — PROGRESS NOTE ADULT - REASON FOR ADMISSION
AMS

## 2025-06-08 NOTE — DISCHARGE NOTE PROVIDER - NSDCCPCAREPLAN_GEN_ALL_CORE_FT
PRINCIPAL DISCHARGE DIAGNOSIS  Diagnosis: Acute hyperglycemia  Assessment and Plan of Treatment:       SECONDARY DISCHARGE DIAGNOSES  Diagnosis: Acute UTI  Assessment and Plan of Treatment:

## 2025-06-08 NOTE — PROGRESS NOTE ADULT - SUBJECTIVE AND OBJECTIVE BOX
Nephrology progress note    Patient is seen and examined, events over the last 24 h noted .  Lying in bed comfortable     Allergies:  No Known Allergies    Hospital Medications:   MEDICATIONS  (STANDING):  aspirin  chewable 81 milliGRAM(s) Oral daily  atorvastatin 80 milliGRAM(s) Oral at bedtime  darbepoetin Injectable ViaL 25 MICROGram(s) IV Push <User Schedule>  doxazosin 1 milliGRAM(s) Oral <User Schedule>  fluticasone propionate/ salmeterol 100-50 MICROgram(s) Diskus 1 Dose(s) Inhalation two times a day  gabapentin 300 milliGRAM(s) Oral at bedtime  glucagon  Injectable 1 milliGRAM(s) IntraMuscular once  heparin   Injectable 5000 Unit(s) SubCutaneous every 12 hours  hydrALAZINE 100 milliGRAM(s) Oral every 8 hours  insulin glargine Injectable (LANTUS) 10 Unit(s) SubCutaneous every morning  insulin lispro Injectable (ADMELOG) 6 Unit(s) SubCutaneous three times a day before meals  insulin lispro Injectable (ADMELOG). 2 Unit(s) SubCutaneous once  lidocaine   4% Patch 1 Patch Transdermal daily  methocarbamol 1000 milliGRAM(s) Oral three times a day  NIFEdipine XL 60 milliGRAM(s) Oral daily  polyethylene glycol 3350 17 Gram(s) Oral daily  senna 2 Tablet(s) Oral at bedtime  sevelamer carbonate Powder 2400 milliGRAM(s) Oral three times a day with meals        VITALS:  T(F): 98.1 (06-08-25 @ 04:29), Max: 98.1 (06-08-25 @ 04:29)  HR: 72 (06-08-25 @ 04:29)  BP: 169/68 (06-08-25 @ 04:29)  RR: 18 (06-08-25 @ 04:29)  SpO2: 94% (06-08-25 @ 04:29)      06-06 @ 07:01  -  06-07 @ 07:00  --------------------------------------------------------  IN: 0 mL / OUT: 2000 mL / NET: -2000 mL          PHYSICAL EXAM:  Constitutional: NAD  Respiratory: CTAB   Cardiovascular: S1, S2, RRR  Gastrointestinal: BS+, soft, NT/ND  Extremities: No cyanosis or clubbing. No peripheral edema  :  No murillo.   Skin: No rashes    LABS:  06-08    133[L]  |  93[L]  |  46[H]  ----------------------------<  237[H]  4.7   |  24  |  6.0[HH]    Ca    9.3      08 Jun 2025 07:33  Phos  5.8     06-08  Mg     2.5     06-08    TPro  6.3  /  Alb  3.8  /  TBili  0.3  /  DBili      /  AST  18  /  ALT  14  /  AlkPhos  82  06-08                          8.9    3.39  )-----------( 215      ( 08 Jun 2025 07:33 )             26.4       Urine Studies:  Urinalysis Basic - ( 08 Jun 2025 07:33 )    Color:  / Appearance:  / SG:  / pH:   Gluc: 237 mg/dL / Ketone:   / Bili:  / Urobili:    Blood:  / Protein:  / Nitrite:    Leuk Esterase:  / RBC:  / WBC    Sq Epi:  / Non Sq Epi:  / Bacteria:           Iron 29, TIBC 175, %sat 17      [05-28-25 @ 04:22]  Ferritin 538      [05-28-25 @ 04:22]          RADIOLOGY & ADDITIONAL STUDIES:

## 2025-06-08 NOTE — DISCHARGE NOTE PROVIDER - NSDCMRMEDTOKEN_GEN_ALL_CORE_FT
Advair Diskus 100 mcg-50 mcg/inh inhalation powder: 1 puff(s) inhaled 2 times a day  Albuterol (Eqv-ProAir HFA) 90 mcg/inh inhalation aerosol: 2 puff(s) inhaled every 6 hours as needed for  shortness of breath and/or wheezing  alcohol swabs: Apply topically to affected area 4 times a day  aspirin 81 mg oral tablet: 1 tab(s) orally once a day  atorvastatin 10 mg oral tablet: 1 tab(s) orally once a day  doxazosin 1 mg oral tablet: 1 tab(s) orally 2 times a day  gabapentin 300 mg oral capsule: 1 cap(s) orally once a day (at bedtime)  guaiFENesin 600 mg oral tablet, extended release: 1 tab(s) orally 2 times a day  hydrALAZINE 100 mg oral tablet: 1 tab(s) orally 2 times a day  hydrALAZINE 50 mg oral tablet: 1 tab(s) orally 2 times a day  Insulin Aspart FlexPen 100 units/mL injectable solution: 6 international unit(s) injectable 3 times a day (before meals) E11  Insulin Glargine Solostar Pen 100 units/mL subcutaneous solution: 10 international unit(s) subcutaneous once a day (in the morning) E11  Insulin Pen Needles, 4mm, E11 code: 1 application subcutaneously 4 times a day. ** Use with insulin pen **  lancets: 1 application subcutaneously 4 times a day  methocarbamol 500 mg oral tablet: 1 tab(s) orally 3 times a day as needed for  muscle spasm  NIFEdipine 90 mg oral tablet, extended release: 1 tab(s) orally once a day as needed for hypertension take as needed for SBP greater than 200  sevelamer carbonate 800 mg oral tablet: 2 tab(s) orally 3 times a day (with meals)  U-100 Insulin Syringe, 1 mL: 1 application subcutaneously 2 times a day ** 1 mL holds up to 100 units of insulin **

## 2025-06-08 NOTE — DISCHARGE NOTE PROVIDER - CARE PROVIDERS DIRECT ADDRESSES
[email or web address]

## 2025-06-08 NOTE — DISCHARGE NOTE PROVIDER - CARE PROVIDER_API CALL
Robyn Chanel  Internal Medicine  [address] [place] [address]  Phone: [phone]  Fax: [phone]  Follow Up Time: 1 week    Lupe Saavedra  Endocrinology/Metab/Diabetes  [address] [place] [address]  Phone: [phone]  Fax: [phone]  Follow Up Time: 1 week    Jeromy Fleming  Pulmonary Disease  [address] [place] [id number]  Phone: [phone]  Fax: [phone]  Follow Up Time: 2 weeks

## 2025-06-08 NOTE — PROGRESS NOTE ADULT - PROVIDER SPECIALTY LIST ADULT
Endocrinology
Endocrinology
Internal Medicine
Nephrology
Pulmonology
CCU
Critical Care
Endocrinology
Endocrinology
Internal Medicine
Nephrology
CCU
Critical Care
Endocrinology
Internal Medicine
Nephrology
Pulmonology
CCU
Critical Care
Internal Medicine
Nephrology
Hospitalist

## 2025-06-08 NOTE — PROGRESS NOTE ADULT - ASSESSMENT
59y Female with h/o ESRD on HD (MWF), IDDM, dyslipidemia, CKD-MBD, HTN who presented to the hospital with CP during HD session this morning.  Had 90 minutes of total HD prior to arrival.  Nephrology now consulted for HD needs while in-house.    HD in AM   follow FS closely appreciate endo eval and follow up   renal diet  follow bp readings  / increase nifedipine to 90   last phos noted at goal     NST negative  h/h noted /  iron stores noted start venofer with  / resumed  SHAR with HD aranesp 25 weekly   renal team will follow

## 2025-06-08 NOTE — DISCHARGE NOTE PROVIDER - HOSPITAL COURSE
59y old  Female with PMH of DM, ESRD on HD MWF, HTN, HLD, and HF who presented with chest pain during HD session, found to be in acute metabolic encephalopathy and DKA< now transferred to the medical floor, pending further BS control, rest of issues resolved    #Acute metabolic encephalopathy 2/2 hypoglycemia likely 2/2 decreased lantus clearance 2/2 ESRD  -resolved   #T2DM, Insulin dependent- uncontrolled with hyperglycemia alternating with hypoglycemia  - 5/20: s/p D5 for hypoglycemia and AMS  - 5/28: rapid response called, bg measured and noted to be 19. D5 x2 was administered via IV. BG recheck was 229. Insulin held.   - 5/29: Pt in DKA with elevated AG. Insulin GTT started.   - 5/20: AG improved. Insulin GTT dc'd. Started on subq insulin.   - 6/2 hypoglycemic and altered, lantus reduced from 10u to 5u in AM  - discussed w/Dr. Carter, will keep lantus at 5u, glucose now in 240s, will dc sliding scale due to highly labile glucose and do 3u lispro w/meals   - 6/5/25 increased lantus to 8u today, lispro 4u w/meals, no sliding scale, gave additional 1x 2u sub-q lispro  - monitor glucose closely to increase insulin dose as glucose is still labile and has levels in range of 200-400 on fs and cmp   - 6/6/25 patient with fs and cmp glucose in high 500 range, increased lispro to 6u w/meals tid and lantus to 10u   -6/7/25, BS better controlled now, discharge on Lantus 10U in AM and aspart 6U TIDAC    #Chest pain during HD - Resolved  #HFpEF - not in exacerbation   - On admission. trop 64 -> 66, Creatinine 4.1   - ECG showed prominent T waves    - CXR negative   - TTE EF 60%. G1DD   - stress test neg for ischemia   - C/w protonix qd         #ESRD on HD MWF  #HTN    #Hyperkalemia  - nephro recs appreciated   - bp improves with HD  - c/w hydralazine 100mg q8h  - f/u iron studies  - Low K diet  - c/w aranesp 25 weekly with HD  - c/w doxazosin 1mg BID  - c/w sevelamer 1600 mg TID  - s/p nicardipine gtt on 5/28, restarted on home BP medications     #On admission, AMS possibly 2/2 transient hypotension during HD- Resolved  - CTH showed A focal hypodensity is noted within the right frontal periventricular white matter, not previously seen. Finding may reflect age-indeterminate ischemic change. An MRI of the brain can be obtained for further evaluation if not clinically contraindicated.  - Neuro consult noted no additional inpatient workup.  - c/w ASA and atorvastatin    #Suspected asthma  -Albuterol and advair prescribed  -Follow with pulm outpatient      Discharge today

## 2025-06-08 NOTE — DISCHARGE NOTE PROVIDER - PROVIDER TOKENS
PROVIDER:[TOKEN:[[id number]],FOLLOWUP:[1 week]],PROVIDER:[TOKEN:[[id number]],FOLLOWUP:[1 week]],PROVIDER:[TOKEN:[[id number]],FOLLOWUP:[2 weeks]]

## 2025-06-11 ENCOUNTER — EMERGENCY (EMERGENCY)
Facility: HOSPITAL | Age: 60
LOS: 0 days | Discharge: HOME | End: 2025-06-11
Attending: STUDENT IN AN ORGANIZED HEALTH CARE EDUCATION/TRAINING PROGRAM
Payer: MEDICAID

## 2025-06-11 VITALS
HEIGHT: 64.17 IN | SYSTOLIC BLOOD PRESSURE: 121 MMHG | OXYGEN SATURATION: 97 % | HEART RATE: 95 BPM | RESPIRATION RATE: 17 BRPM | DIASTOLIC BLOOD PRESSURE: 52 MMHG | TEMPERATURE: 97 F

## 2025-06-11 VITALS — DIASTOLIC BLOOD PRESSURE: 75 MMHG | SYSTOLIC BLOOD PRESSURE: 129 MMHG

## 2025-06-11 DIAGNOSIS — I50.9 HEART FAILURE, UNSPECIFIED: ICD-10-CM

## 2025-06-11 DIAGNOSIS — Z01.89 ENCOUNTER FOR OTHER SPECIFIED SPECIAL EXAMINATIONS: ICD-10-CM

## 2025-06-11 DIAGNOSIS — I13.2 HYPERTENSIVE HEART AND CHRONIC KIDNEY DISEASE WITH HEART FAILURE AND WITH STAGE 5 CHRONIC KIDNEY DISEASE, OR END STAGE RENAL DISEASE: ICD-10-CM

## 2025-06-11 DIAGNOSIS — N18.5 CHRONIC KIDNEY DISEASE, STAGE 5: ICD-10-CM

## 2025-06-11 DIAGNOSIS — E78.5 HYPERLIPIDEMIA, UNSPECIFIED: ICD-10-CM

## 2025-06-11 DIAGNOSIS — Z99.2 DEPENDENCE ON RENAL DIALYSIS: ICD-10-CM

## 2025-06-11 DIAGNOSIS — E11.22 TYPE 2 DIABETES MELLITUS WITH DIABETIC CHRONIC KIDNEY DISEASE: ICD-10-CM

## 2025-06-11 PROCEDURE — 99282 EMERGENCY DEPT VISIT SF MDM: CPT

## 2025-06-11 PROCEDURE — 99283 EMERGENCY DEPT VISIT LOW MDM: CPT

## 2025-06-11 NOTE — ED PROVIDER NOTE - OBJECTIVE STATEMENT
59-year-old female with a past medical history of hypertension, hyperlipidemia, HF, ESRD, diabetes sent in for hypotension while at dialysis.  Patient states to usually have hypotension after dialysis.  Patient denying any symptoms now or while at dialysis. pt denies any other symptoms including fevers, chill, headache, recent illness/travel, cough, abdominal pain, chest pain, or SOB.

## 2025-06-11 NOTE — ED PROVIDER NOTE - PATIENT PORTAL LINK FT
You can access the FollowMyHealth Patient Portal offered by [facility] by registering at the following website: [email or web address]. By joining [facility]’s FollowMyHealth portal, you will also be able to view your health information using other applications (apps) compatible with our system.

## 2025-06-11 NOTE — ED ADULT TRIAGE NOTE - CHIEF COMPLAINT QUOTE
Pt BIBA from dialysis As per EMS, Pt became hypotensive during dialysis. treatment stopped 30 minutes prior to completion. BP stable in triage, complaining on weakness

## 2025-06-11 NOTE — ED PROVIDER NOTE - CLINICAL SUMMARY MEDICAL DECISION MAKING FREE TEXT BOX
58 yo female, PMHx of DM, ESRD on HD MWF, HTN, HLD, and HF, presenting for evaluation.  She states she was at the end of her dialysis session and her blood pressure was a little low so they sent her in for evaluation.  However, she states that normally happens towards the end of her session.  She had no symptoms at the time and currently feels well.  Denies any new symptoms, headache, dizziness, chest pain, shortness of breath, nausea, vomiting, abdominal pain.  Well appearing on exam.  in no acute distress. BP rechecked to be 129/75.  Patient states she feels well and would like to go home.  Given return precautions and follow up outpatient.  Patient comfortable with plan.

## 2025-06-11 NOTE — ED PROVIDER NOTE - ATTENDING APP SHARED VISIT CONTRIBUTION OF CARE
60 yo female, PMHx of DM, ESRD on HD MWF, HTN, HLD, and HF, presenting for evaluation.  She states she was at the end of her dialysis session and her blood pressure was a little low so they sent her in for evaluation.  However, she states that normally happens towards the end of her session.  She had no symptoms at the time and currently feels well.  Denies any new symptoms, headache, dizziness, chest pain, shortness of breath, nausea, vomiting, abdominal pain.    CONSTITUTIONAL: Well-developed; well-nourished; in no acute distress.   SKIN: warm, dry  HEAD: Normocephalic  EYES: no conjunctival erythema  ENT: No nasal discharge; airway clear.  NECK: Supple  CARD: Regular rate and rhythm.   RESP: No wheezes, rales or rhonchi.  ABD: soft ntnd  EXT: Normal ROM.  No clubbing, cyanosis or edema. LUE fistual  NEURO: Alert, oriented, grossly unremarkable  PSYCH: Cooperative, appropriate.

## 2025-06-11 NOTE — ED ADULT NURSE NOTE - OBJECTIVE STATEMENT
patient brought to ED for low BP during dialysis. Currently awake alert and oriented x4. denies complaints of dizziness. bp improved see flowsheet

## 2025-06-17 DIAGNOSIS — I65.21 OCCLUSION AND STENOSIS OF RIGHT CAROTID ARTERY: ICD-10-CM

## 2025-06-17 DIAGNOSIS — E11.40 TYPE 2 DIABETES MELLITUS WITH DIABETIC NEUROPATHY, UNSPECIFIED: ICD-10-CM

## 2025-06-17 DIAGNOSIS — J45.909 UNSPECIFIED ASTHMA, UNCOMPLICATED: ICD-10-CM

## 2025-06-17 DIAGNOSIS — E11.649 TYPE 2 DIABETES MELLITUS WITH HYPOGLYCEMIA WITHOUT COMA: ICD-10-CM

## 2025-06-17 DIAGNOSIS — G89.29 OTHER CHRONIC PAIN: ICD-10-CM

## 2025-06-17 DIAGNOSIS — E78.5 HYPERLIPIDEMIA, UNSPECIFIED: ICD-10-CM

## 2025-06-17 DIAGNOSIS — E11.65 TYPE 2 DIABETES MELLITUS WITH HYPERGLYCEMIA: ICD-10-CM

## 2025-06-17 DIAGNOSIS — Z78.1 PHYSICAL RESTRAINT STATUS: ICD-10-CM

## 2025-06-17 DIAGNOSIS — N39.0 URINARY TRACT INFECTION, SITE NOT SPECIFIED: ICD-10-CM

## 2025-06-17 DIAGNOSIS — M48.061 SPINAL STENOSIS, LUMBAR REGION WITHOUT NEUROGENIC CLAUDICATION: ICD-10-CM

## 2025-06-17 DIAGNOSIS — E11.10 TYPE 2 DIABETES MELLITUS WITH KETOACIDOSIS WITHOUT COMA: ICD-10-CM

## 2025-06-17 DIAGNOSIS — E11.22 TYPE 2 DIABETES MELLITUS WITH DIABETIC CHRONIC KIDNEY DISEASE: ICD-10-CM

## 2025-06-17 DIAGNOSIS — M54.89 OTHER DORSALGIA: ICD-10-CM

## 2025-06-17 DIAGNOSIS — B96.20 UNSPECIFIED ESCHERICHIA COLI [E. COLI] AS THE CAUSE OF DISEASES CLASSIFIED ELSEWHERE: ICD-10-CM

## 2025-06-17 DIAGNOSIS — I13.2 HYPERTENSIVE HEART AND CHRONIC KIDNEY DISEASE WITH HEART FAILURE AND WITH STAGE 5 CHRONIC KIDNEY DISEASE, OR END STAGE RENAL DISEASE: ICD-10-CM

## 2025-06-17 DIAGNOSIS — E87.5 HYPERKALEMIA: ICD-10-CM

## 2025-06-17 DIAGNOSIS — N18.6 END STAGE RENAL DISEASE: ICD-10-CM

## 2025-06-17 DIAGNOSIS — R07.89 OTHER CHEST PAIN: ICD-10-CM

## 2025-06-17 DIAGNOSIS — Z79.82 LONG TERM (CURRENT) USE OF ASPIRIN: ICD-10-CM

## 2025-06-17 DIAGNOSIS — I48.91 UNSPECIFIED ATRIAL FIBRILLATION: ICD-10-CM

## 2025-06-17 DIAGNOSIS — I50.32 CHRONIC DIASTOLIC (CONGESTIVE) HEART FAILURE: ICD-10-CM

## 2025-06-17 DIAGNOSIS — J96.01 ACUTE RESPIRATORY FAILURE WITH HYPOXIA: ICD-10-CM

## 2025-06-17 DIAGNOSIS — I95.89 OTHER HYPOTENSION: ICD-10-CM

## 2025-06-17 DIAGNOSIS — G93.41 METABOLIC ENCEPHALOPATHY: ICD-10-CM

## 2025-06-17 DIAGNOSIS — Z79.4 LONG TERM (CURRENT) USE OF INSULIN: ICD-10-CM

## 2025-06-17 DIAGNOSIS — Z99.2 DEPENDENCE ON RENAL DIALYSIS: ICD-10-CM

## 2025-06-17 DIAGNOSIS — D63.1 ANEMIA IN CHRONIC KIDNEY DISEASE: ICD-10-CM
